# Patient Record
Sex: MALE | Race: BLACK OR AFRICAN AMERICAN | Employment: UNEMPLOYED | URBAN - METROPOLITAN AREA
[De-identification: names, ages, dates, MRNs, and addresses within clinical notes are randomized per-mention and may not be internally consistent; named-entity substitution may affect disease eponyms.]

---

## 2017-08-04 ENCOUNTER — HOSPITAL ENCOUNTER (INPATIENT)
Age: 52
LOS: 14 days | Discharge: HOME | DRG: 460 | End: 2017-08-18
Attending: FAMILY MEDICINE | Admitting: HOSPITALIST
Payer: MEDICAID

## 2017-08-04 ENCOUNTER — APPOINTMENT (OUTPATIENT)
Dept: CT IMAGING | Age: 52
DRG: 460 | End: 2017-08-04
Attending: HOSPITALIST
Payer: MEDICAID

## 2017-08-04 ENCOUNTER — APPOINTMENT (OUTPATIENT)
Dept: GENERAL RADIOLOGY | Age: 52
DRG: 460 | End: 2017-08-04
Attending: FAMILY MEDICINE
Payer: MEDICAID

## 2017-08-04 ENCOUNTER — APPOINTMENT (OUTPATIENT)
Dept: INTERVENTIONAL RADIOLOGY/VASCULAR | Age: 52
DRG: 460 | End: 2017-08-04
Attending: HOSPITALIST
Payer: MEDICAID

## 2017-08-04 ENCOUNTER — APPOINTMENT (OUTPATIENT)
Dept: ULTRASOUND IMAGING | Age: 52
DRG: 460 | End: 2017-08-04
Attending: INTERNAL MEDICINE
Payer: MEDICAID

## 2017-08-04 DIAGNOSIS — N17.9 ACUTE RENAL FAILURE, UNSPECIFIED ACUTE RENAL FAILURE TYPE (HCC): Primary | ICD-10-CM

## 2017-08-04 DIAGNOSIS — N19: ICD-10-CM

## 2017-08-04 DIAGNOSIS — D63.8 CHRONIC DISEASE ANEMIA: ICD-10-CM

## 2017-08-04 DIAGNOSIS — R63.4 WEIGHT LOSS: ICD-10-CM

## 2017-08-04 PROBLEM — D64.9 SEVERE ANEMIA: Status: ACTIVE | Noted: 2017-08-04

## 2017-08-04 PROBLEM — Z91.199 NONADHERENCE TO MEDICAL TREATMENT: Chronic | Status: ACTIVE | Noted: 2017-08-04

## 2017-08-04 PROBLEM — I16.0 HYPERTENSIVE URGENCY, MALIGNANT: Status: ACTIVE | Noted: 2017-08-04

## 2017-08-04 LAB
ALBUMIN SERPL BCP-MCNC: 3.3 G/DL (ref 3.4–5)
ALBUMIN/GLOB SERPL: 0.9 {RATIO} (ref 0.8–1.7)
ALP SERPL-CCNC: 113 U/L (ref 45–117)
ALT SERPL-CCNC: 18 U/L (ref 16–61)
ANION GAP BLD CALC-SCNC: 23 MMOL/L (ref 3–18)
APPEARANCE UR: CLEAR
AST SERPL W P-5'-P-CCNC: 11 U/L (ref 15–37)
BACTERIA URNS QL MICRO: ABNORMAL /HPF
BASOPHILS # BLD AUTO: 0 K/UL (ref 0–0.06)
BASOPHILS # BLD: 0 % (ref 0–2)
BILIRUB SERPL-MCNC: 0.5 MG/DL (ref 0.2–1)
BILIRUB UR QL: NEGATIVE
BUN SERPL-MCNC: 105 MG/DL (ref 7–18)
BUN/CREAT SERPL: 7 (ref 12–20)
CALCIUM SERPL-MCNC: 6 MG/DL (ref 8.5–10.1)
CHLORIDE SERPL-SCNC: 108 MMOL/L (ref 100–108)
CO2 SERPL-SCNC: 13 MMOL/L (ref 21–32)
COLOR UR: YELLOW
CREAT SERPL-MCNC: 15.67 MG/DL (ref 0.6–1.3)
DIFFERENTIAL METHOD BLD: ABNORMAL
EOSINOPHIL # BLD: 0.2 K/UL (ref 0–0.4)
EOSINOPHIL NFR BLD: 2 % (ref 0–5)
EPITH CASTS URNS QL MICRO: ABNORMAL /LPF (ref 0–5)
ERYTHROCYTE [DISTWIDTH] IN BLOOD BY AUTOMATED COUNT: 13.9 % (ref 11.6–14.5)
FERRITIN SERPL-MCNC: 414 NG/ML (ref 8–388)
GLOBULIN SER CALC-MCNC: 3.7 G/DL (ref 2–4)
GLUCOSE SERPL-MCNC: 83 MG/DL (ref 74–99)
GLUCOSE UR STRIP.AUTO-MCNC: NEGATIVE MG/DL
HCT VFR BLD AUTO: 21.1 % (ref 36–48)
HGB BLD-MCNC: 7.1 G/DL (ref 13–16)
HGB UR QL STRIP: ABNORMAL
INR PPP: 1.2 (ref 0.8–1.2)
IRON SATN MFR SERPL: 30 %
IRON SERPL-MCNC: 57 UG/DL (ref 50–175)
KETONES UR QL STRIP.AUTO: NEGATIVE MG/DL
LEUKOCYTE ESTERASE UR QL STRIP.AUTO: NEGATIVE
LIPASE SERPL-CCNC: 339 U/L (ref 73–393)
LYMPHOCYTES # BLD AUTO: 17 % (ref 21–52)
LYMPHOCYTES # BLD: 1.7 K/UL (ref 0.9–3.6)
MCH RBC QN AUTO: 30 PG (ref 24–34)
MCHC RBC AUTO-ENTMCNC: 33.6 G/DL (ref 31–37)
MCV RBC AUTO: 89 FL (ref 74–97)
MONOCYTES # BLD: 0.6 K/UL (ref 0.05–1.2)
MONOCYTES NFR BLD AUTO: 6 % (ref 3–10)
NEUTS SEG # BLD: 7.3 K/UL (ref 1.8–8)
NEUTS SEG NFR BLD AUTO: 75 % (ref 40–73)
NITRITE UR QL STRIP.AUTO: NEGATIVE
PH UR STRIP: 5 [PH] (ref 5–8)
PLATELET # BLD AUTO: 257 K/UL (ref 135–420)
PMV BLD AUTO: 9.5 FL (ref 9.2–11.8)
POTASSIUM SERPL-SCNC: 5.4 MMOL/L (ref 3.5–5.5)
PROT SERPL-MCNC: 7 G/DL (ref 6.4–8.2)
PROT UR STRIP-MCNC: 300 MG/DL
PROTHROMBIN TIME: 14.3 SEC (ref 11.5–15.2)
RBC # BLD AUTO: 2.37 M/UL (ref 4.7–5.5)
RBC #/AREA URNS HPF: ABNORMAL /HPF (ref 0–5)
RBC MORPH BLD: ABNORMAL
SODIUM SERPL-SCNC: 144 MMOL/L (ref 136–145)
SP GR UR REFRACTOMETRY: 1.01 (ref 1–1.03)
TIBC SERPL-MCNC: 190 UG/DL (ref 250–450)
UROBILINOGEN UR QL STRIP.AUTO: 0.2 EU/DL (ref 0.2–1)
WBC # BLD AUTO: 9.8 K/UL (ref 4.6–13.2)
WBC URNS QL MICRO: ABNORMAL /HPF (ref 0–5)

## 2017-08-04 PROCEDURE — 74011000250 HC RX REV CODE- 250: Performed by: HOSPITALIST

## 2017-08-04 PROCEDURE — 74022 RADEX COMPL AQT ABD SERIES: CPT

## 2017-08-04 PROCEDURE — 93005 ELECTROCARDIOGRAM TRACING: CPT

## 2017-08-04 PROCEDURE — C1894 INTRO/SHEATH, NON-LASER: HCPCS

## 2017-08-04 PROCEDURE — 86900 BLOOD TYPING SEROLOGIC ABO: CPT | Performed by: HOSPITALIST

## 2017-08-04 PROCEDURE — 80053 COMPREHEN METABOLIC PANEL: CPT | Performed by: FAMILY MEDICINE

## 2017-08-04 PROCEDURE — 83690 ASSAY OF LIPASE: CPT | Performed by: FAMILY MEDICINE

## 2017-08-04 PROCEDURE — 86038 ANTINUCLEAR ANTIBODIES: CPT | Performed by: INTERNAL MEDICINE

## 2017-08-04 PROCEDURE — 36415 COLL VENOUS BLD VENIPUNCTURE: CPT | Performed by: HOSPITALIST

## 2017-08-04 PROCEDURE — 86160 COMPLEMENT ANTIGEN: CPT | Performed by: INTERNAL MEDICINE

## 2017-08-04 PROCEDURE — 82728 ASSAY OF FERRITIN: CPT | Performed by: INTERNAL MEDICINE

## 2017-08-04 PROCEDURE — 74011250636 HC RX REV CODE- 250/636: Performed by: RADIOLOGY

## 2017-08-04 PROCEDURE — 96361 HYDRATE IV INFUSION ADD-ON: CPT

## 2017-08-04 PROCEDURE — 81001 URINALYSIS AUTO W/SCOPE: CPT | Performed by: FAMILY MEDICINE

## 2017-08-04 PROCEDURE — 76770 US EXAM ABDO BACK WALL COMP: CPT

## 2017-08-04 PROCEDURE — 85610 PROTHROMBIN TIME: CPT | Performed by: HOSPITALIST

## 2017-08-04 PROCEDURE — 86920 COMPATIBILITY TEST SPIN: CPT | Performed by: HOSPITALIST

## 2017-08-04 PROCEDURE — 74011250636 HC RX REV CODE- 250/636: Performed by: HOSPITALIST

## 2017-08-04 PROCEDURE — 99284 EMERGENCY DEPT VISIT MOD MDM: CPT

## 2017-08-04 PROCEDURE — 74011000250 HC RX REV CODE- 250: Performed by: RADIOLOGY

## 2017-08-04 PROCEDURE — 85025 COMPLETE CBC W/AUTO DIFF WBC: CPT | Performed by: FAMILY MEDICINE

## 2017-08-04 PROCEDURE — 74011250636 HC RX REV CODE- 250/636: Performed by: FAMILY MEDICINE

## 2017-08-04 PROCEDURE — 86256 FLUORESCENT ANTIBODY TITER: CPT | Performed by: INTERNAL MEDICINE

## 2017-08-04 PROCEDURE — 74011250637 HC RX REV CODE- 250/637: Performed by: HOSPITALIST

## 2017-08-04 PROCEDURE — 83540 ASSAY OF IRON: CPT | Performed by: INTERNAL MEDICINE

## 2017-08-04 PROCEDURE — 96374 THER/PROPH/DIAG INJ IV PUSH: CPT

## 2017-08-04 PROCEDURE — 65610000006 HC RM INTENSIVE CARE

## 2017-08-04 PROCEDURE — 70490 CT SOFT TISSUE NECK W/O DYE: CPT

## 2017-08-04 PROCEDURE — 74011000250 HC RX REV CODE- 250: Performed by: FAMILY MEDICINE

## 2017-08-04 RX ORDER — ACETAMINOPHEN 325 MG/1
650 TABLET ORAL
Status: DISCONTINUED | OUTPATIENT
Start: 2017-08-04 | End: 2017-08-18 | Stop reason: HOSPADM

## 2017-08-04 RX ORDER — ONDANSETRON 2 MG/ML
4 INJECTION INTRAMUSCULAR; INTRAVENOUS
Status: DISCONTINUED | OUTPATIENT
Start: 2017-08-04 | End: 2017-08-18 | Stop reason: HOSPADM

## 2017-08-04 RX ORDER — HEPARIN SODIUM (PORCINE) LOCK FLUSH IV SOLN 100 UNIT/ML 100 UNIT/ML
500 SOLUTION INTRAVENOUS
Status: COMPLETED | OUTPATIENT
Start: 2017-08-04 | End: 2017-08-04

## 2017-08-04 RX ORDER — SODIUM CHLORIDE 9 MG/ML
250 INJECTION, SOLUTION INTRAVENOUS AS NEEDED
Status: DISCONTINUED | OUTPATIENT
Start: 2017-08-04 | End: 2017-08-18 | Stop reason: HOSPADM

## 2017-08-04 RX ORDER — HYDRALAZINE HYDROCHLORIDE 20 MG/ML
20 INJECTION INTRAMUSCULAR; INTRAVENOUS
Status: DISCONTINUED | OUTPATIENT
Start: 2017-08-04 | End: 2017-08-18 | Stop reason: HOSPADM

## 2017-08-04 RX ORDER — LISINOPRIL AND HYDROCHLOROTHIAZIDE 20; 25 MG/1; MG/1
1 TABLET ORAL DAILY
COMMUNITY
End: 2017-08-18

## 2017-08-04 RX ORDER — SODIUM CHLORIDE 9 MG/ML
250 INJECTION, SOLUTION INTRAVENOUS ONCE
Status: COMPLETED | OUTPATIENT
Start: 2017-08-04 | End: 2017-08-05

## 2017-08-04 RX ORDER — AMLODIPINE BESYLATE 5 MG/1
10 TABLET ORAL DAILY
Status: DISCONTINUED | OUTPATIENT
Start: 2017-08-04 | End: 2017-08-08

## 2017-08-04 RX ORDER — HEPARIN SODIUM 5000 [USP'U]/ML
5000 INJECTION, SOLUTION INTRAVENOUS; SUBCUTANEOUS EVERY 8 HOURS
Status: DISCONTINUED | OUTPATIENT
Start: 2017-08-04 | End: 2017-08-18 | Stop reason: HOSPADM

## 2017-08-04 RX ORDER — LABETALOL HYDROCHLORIDE 5 MG/ML
20 INJECTION, SOLUTION INTRAVENOUS
Status: DISCONTINUED | OUTPATIENT
Start: 2017-08-04 | End: 2017-08-05

## 2017-08-04 RX ORDER — LABETALOL HCL 20 MG/4 ML
20 SYRINGE (ML) INTRAVENOUS
Status: DISCONTINUED | OUTPATIENT
Start: 2017-08-04 | End: 2017-08-04 | Stop reason: RX

## 2017-08-04 RX ORDER — LABETALOL HYDROCHLORIDE 5 MG/ML
20 INJECTION, SOLUTION INTRAVENOUS
Status: COMPLETED | OUTPATIENT
Start: 2017-08-04 | End: 2017-08-04

## 2017-08-04 RX ORDER — LIDOCAINE HYDROCHLORIDE 10 MG/ML
1-20 INJECTION INFILTRATION; PERINEURAL
Status: COMPLETED | OUTPATIENT
Start: 2017-08-04 | End: 2017-08-04

## 2017-08-04 RX ORDER — ONDANSETRON 2 MG/ML
4 INJECTION INTRAMUSCULAR; INTRAVENOUS ONCE
Status: COMPLETED | OUTPATIENT
Start: 2017-08-04 | End: 2017-08-04

## 2017-08-04 RX ORDER — HEPARIN SODIUM 200 [USP'U]/100ML
500 INJECTION, SOLUTION INTRAVENOUS
Status: COMPLETED | OUTPATIENT
Start: 2017-08-04 | End: 2017-08-05

## 2017-08-04 RX ORDER — HEPARIN SODIUM 1000 [USP'U]/ML
10000 INJECTION, SOLUTION INTRAVENOUS; SUBCUTANEOUS
Status: COMPLETED | OUTPATIENT
Start: 2017-08-04 | End: 2017-08-04

## 2017-08-04 RX ADMIN — HYDRALAZINE HYDROCHLORIDE 20 MG: 20 INJECTION INTRAMUSCULAR; INTRAVENOUS at 20:32

## 2017-08-04 RX ADMIN — MICROFIBRILLAR COLLAGEN HEMOSTAT POWDER: POWDER at 17:59

## 2017-08-04 RX ADMIN — SODIUM CHLORIDE 250 ML: 900 INJECTION, SOLUTION INTRAVENOUS at 18:15

## 2017-08-04 RX ADMIN — HYDRALAZINE HYDROCHLORIDE 20 MG: 20 INJECTION INTRAMUSCULAR; INTRAVENOUS at 17:27

## 2017-08-04 RX ADMIN — HEPARIN SODIUM 4000 UNITS: 1000 INJECTION, SOLUTION INTRAVENOUS; SUBCUTANEOUS at 15:47

## 2017-08-04 RX ADMIN — SODIUM CHLORIDE 1000 ML: 900 INJECTION, SOLUTION INTRAVENOUS at 13:07

## 2017-08-04 RX ADMIN — AMLODIPINE BESYLATE 10 MG: 5 TABLET ORAL at 20:05

## 2017-08-04 RX ADMIN — HEPARIN SODIUM 500 UNITS: 200 INJECTION, SOLUTION INTRAVENOUS at 15:35

## 2017-08-04 RX ADMIN — LIDOCAINE HYDROCHLORIDE 3 ML: 10 INJECTION, SOLUTION INFILTRATION; PERINEURAL at 15:42

## 2017-08-04 RX ADMIN — HEPARIN SODIUM (PORCINE) LOCK FLUSH IV SOLN 100 UNIT/ML 200 UNITS: 100 SOLUTION at 15:47

## 2017-08-04 RX ADMIN — LABETALOL HYDROCHLORIDE 20 MG: 5 INJECTION INTRAVENOUS at 14:45

## 2017-08-04 RX ADMIN — ONDANSETRON 4 MG: 2 INJECTION INTRAMUSCULAR; INTRAVENOUS at 13:07

## 2017-08-04 NOTE — IP AVS SNAPSHOT
Summary of Care Report The Summary of Care report has been created to help improve care coordination. Users with access to [employer] or [facility] (Web-based application) may access additional patient information including the Discharge Summary. If you are not currently a [address] user and need more information, please call the number listed below in the [place] [address] section and ask to be connected with Medical Records. Facility Information Name Address Phone [address] [place] [id number] [phone] Patient Information Patient Name Sex  Bertha Gonzáles ([id number]) Male 1965 Discharge Information Admitting Provider Service Area Unit Mercedes Inman MD / [phone] 8 [address] Surg/Onco / [phone] Discharge Provider Discharge Date/Time Discharge Disposition Destination Mercedes Inman MD / [phone] 2017 Afternoon (Pending) AHR (none) Patient Language Language ENGLISH [13] Hospital Problems as of 2017  Reviewed: 2017 10:15 AM by Nathan Santana MD  
  
  
  
 Class Noted - Resolved Last Modified POA Active Problems * (Principal)PAM (acute kidney injury) ([facility] [address]  2017 - Present 2017 by Nathan Santana MD Yes Entered by Christine Rabago MD  
  Overview Signed 2017 11:47 PM by Nathan Santana MD  
   Although clinical history is lacking, this is likely acute on chronic kidney disease Hypertensive urgency, malignant  2017 - Present 2017 by Nathan Santana MD Yes Entered by Mercedes Inman MD  
  Uremia  2017 - Present 2017 by Nathan Santana MD Yes Entered by Mercedes Inman MD  
  Severe anemia  2017 - Present 2017 by Nathan Santana MD Yes   Entered by Mercedes Inman MD  
 Nonadherence to medical treatment (Chronic)  8/4/2017 - Present 8/4/2017 by Inez Treviño MD Yes Entered by Inez Treviño MD  
  Rash of genital area  8/5/2017 - Present 8/8/2017 by Annabella Lorenzo MD Yes Entered by Inez Treviño MD  
  Overview Addendum 8/5/2017 12:25 AM by Inez Treviño MD  
   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 Doubt erythema migrans. Pleural effusion, right  8/6/2017 - Present 8/6/2017 by Inez Treviño MD No  
  Entered by Inez Treviño MD  
  Severe protein-calorie malnutrition ([address]  8/8/2017 - Present 8/8/2017 by Annabella Lorenzo MD Yes Entered by Annabella Lorenzo MD  
  
Non-Hospital Problems as of 8/18/2017  Reviewed: 8/6/2017 10:15 AM by Inez Treviño MD  
  
  
  
 Class Noted - Resolved Last Modified Active Problems Routine general medical examination at a health care facility  11/12/2013 - Present 11/12/2013 by Leonie Miner Entered by Leonie Miner Unspecified essential hypertension  11/12/2013 - Present 8/4/2017 by Inez Treviño MD  
  Entered by Leonie Miner Tobacco abuse  11/22/2013 - Present 8/4/2017 by Inez Treviño MD  
  Entered by Raven Lozano MD  
  Overview Signed 8/4/2017 11:47 PM by Inez Treviño MD  
   Should get PFTs as an outpatient You are allergic to the following No active allergies Current Discharge Medication List  
  
START taking these medications Dose & Instructions Dispensing Information Comments  
 amLODIPine 5 mg tablet Commonly known as:  Haig [place] Dose:  5 mg Take 1 Tab by mouth daily. Quantity:  30 Tab Refills:  1  
   
 aspirin 81 mg chewable tablet Dose:  81 mg Take 1 Tab by mouth daily. Quantity:  30 Tab Refills:  1  
   
 calcium carbonate 500 mg calcium (1,250 mg) tablet Commonly known as:  OS-NATALIE Dose:  1 Tab Take 1 Tab by mouth three (3) times daily (with meals). Quantity:  90 Tab Refills:  0  
   
 carvedilol 25 mg tablet Commonly known as:  Shahana July Dose:  25 mg Take 1 Tab by mouth two (2) times daily (with meals). Indications: hypertension Quantity:  60 Tab Refills:  1  
   
 cloNIDine 0.2 mg/24 hr patch Commonly known as:  CATAPRES Dose:  1 Patch 1 Patch by TransDERmal route every seven (7) days. Quantity:  4 Patch Refills:  1  
   
 clotrimazole-betamethasone topical cream  
Commonly known as:  Valla [place] Apply to groin BID Quantity:  60 g Refills:  0  
   
 simvastatin 20 mg tablet Commonly known as:  ZOCOR Dose:  20 mg Take 1 Tab by mouth nightly. Quantity:  30 Tab Refills:  1  
   
 vit B Cmplx 3-FA-Vit C-Biotin 1- mg-mg-mcg tablet Commonly known as:  NEPHRO FLETCHER RX Dose:  1 Tab Take 1 Tab by mouth daily. Quantity:  30 Tab Refills:  1 STOP taking these medications Comments GOODY'S EXTRA STRENGTH 250-250-65 mg per tablet Generic drug:  aspirin-acetaminophen-caffeine  
   
   
 lisinopril-hydroCHLOROthiazide 20-25 mg per tablet Commonly known as:  Ambrocio Morton Follow-up Information Follow up With Details Comments Contact Info [facility]  Patient will see physician while on dialysis. Chair day and time Kwymjv-Askixliif-Pduscl @ 6:30 a.m. Please arrive by 6:15 a.m. on the first day. [address], [address] 
[phone] Arlis Parkinson  Patient was given [facility] information and schedule. [phone] Discharge Instructions None Chart Review Routing History No Routing History on File

## 2017-08-04 NOTE — ED PROVIDER NOTES
[address]  EMERGENCY DEPARTMENT HISTORY AND PHYSICAL EXAM       Date: 8/4/2017   Patient Name: Anam Chatterjee   YOB: 1965  Medical Record Number: [id number]    History of Presenting Illness     Chief Complaint   Patient presents with    Shortness of Breath    Vomiting        History Provided By:  patient    Additional History:   12:23 PM  Anam Chatterjee is a 49 y.o. male with presenting to the ED C/O intermittent N/V, onset 1 month ago. Associated sxs include SOB, weight loss, and BLE weakness. Pt denies fever, diarrhea, hematochezia, melena, abdominal pain, any other pain, tobacco/EtOH/illicit drug use and any other symptoms or complaints. Primary Care Provider: None   Specialist:    Past History     Past Medical History:   Past Medical History:   Diagnosis Date    Hypertension     Non compliance w medication regimen     noncompliant with HTN meds        Past Surgical History:   History reviewed. No pertinent surgical history. Family History:   Family History   Problem Relation Age of Onset    Cancer Mother         Social History:   Social History   Substance Use Topics    Smoking status: Current Every Day Smoker     Packs/day: 0.50     Years: 7.00     Types: Cigarettes    Smokeless tobacco: Never Used    Alcohol use Yes      Comment: every day beer 20ounces        Allergies:   No Known Allergies     Review of Systems   Review of Systems   Constitutional: Positive for unexpected weight change (loss). Negative for fever. HENT: Positive for congestion. Respiratory: Positive for shortness of breath. Gastrointestinal: Positive for nausea and vomiting. Negative for abdominal pain, anal bleeding, blood in stool and diarrhea. Musculoskeletal: Negative for arthralgias and myalgias. Neurological: Positive for weakness (BLE). All other systems reviewed and are negative.       Physical Exam  Vitals:    08/04/17 1145 08/04/17 1400 08/04/17 1415 08/04/17 1551   BP: (!) 213/115 (!) 245/101 (!) 238/109    Pulse: 91 (!) 109 (!) 102    Resp: 20 29 21 18   Temp: 98.3 °F (36.8 °C)      SpO2: 100% 95% 97%    Weight: 54.4 kg (120 lb)      Height: 5' 5\" (1.651 m)          Physical Exam   Nursing note and vitals reviewed. Vital signs and nursing notes reviewed    CONSTITUTIONAL: Alert; thin middle-aged male, doesnt appear to feel well  HEAD:  Normocephalic, atraumatic  EYES: PERRL; EOM's intact. ENTM: Nose: no rhinorrhea; Throat: no erythema or exudate, mucous membranes moist  Neck:  No JVD, supple without lymphadenopathy  RESP: Chest clear, equal breath sounds. CV: S1 and S2 WNL; No murmurs, gallops or rubs. GI: Normal bowel sounds, abdomen soft and non-tender. No masses or organomegaly. : No costo-vertebral angle tenderness. RECTAL: Brown stool, heme negative. BACK:  Non-tender  UPPER EXT:  Normal inspection  LOWER EXT: No edema, no calf tenderness. Distal pulses intact. NEURO: CN intact, reflexes 2/4 and sym, strength 5/5 and sym, sensation intact. SKIN: No rashes; Normal for age and stage. PSYCH:  Alert and oriented, normal affect. Diagnostic Study Results     Labs -      Recent Results (from the past 12 hour(s))   CBC WITH AUTOMATED DIFF    Collection Time: 08/04/17 12:40 PM   Result Value Ref Range    WBC 9.8 4.6 - 13.2 K/uL    RBC 2.37 (L) 4.70 - 5.50 M/uL    HGB 7.1 (L) 13.0 - 16.0 g/dL    HCT 21.1 (L) 36.0 - 48.0 %    MCV 89.0 74.0 - 97.0 FL    MCH 30.0 24.0 - 34.0 PG    MCHC 33.6 31.0 - 37.0 g/dL    RDW 13.9 11.6 - 14.5 %    PLATELET 409 577 - 863 K/uL    MPV 9.5 9.2 - 11.8 FL    NEUTROPHILS 75 (H) 40 - 73 %    LYMPHOCYTES 17 (L) 21 - 52 %    MONOCYTES 6 3 - 10 %    EOSINOPHILS 2 0 - 5 %    BASOPHILS 0 0 - 2 %    ABS. NEUTROPHILS 7.3 1.8 - 8.0 K/UL    ABS. LYMPHOCYTES 1.7 0.9 - 3.6 K/UL    ABS. MONOCYTES 0.6 0.05 - 1.2 K/UL    ABS. EOSINOPHILS 0.2 0.0 - 0.4 K/UL    ABS.  BASOPHILS 0.0 0.0 - 0.06 K/UL    RBC COMMENTS MICROCYTOSIS  1+        RBC COMMENTS SPHEROCYTES  1+        RBC COMMENTS POIKILOCYTOSIS  1+        RBC COMMENTS OVALOCYTES  FEW  SCHISTOCYTES  FEW        DF AUTOMATED     METABOLIC PANEL, COMPREHENSIVE    Collection Time: 08/04/17 12:40 PM   Result Value Ref Range    Sodium 144 136 - 145 mmol/L    Potassium 5.4 3.5 - 5.5 mmol/L    Chloride 108 100 - 108 mmol/L    CO2 13 (L) 21 - 32 mmol/L    Anion gap 23 (H) 3.0 - 18 mmol/L    Glucose 83 74 - 99 mg/dL     (H) 7.0 - 18 MG/DL    Creatinine 15.67 (H) 0.6 - 1.3 MG/DL    BUN/Creatinine ratio 7 (L) 12 - 20      GFR est AA 4 (L) >60 ml/min/1.73m2    GFR est non-AA 3 (L) >60 ml/min/1.73m2    Calcium 6.0 (L) 8.5 - 10.1 MG/DL    Bilirubin, total 0.5 0.2 - 1.0 MG/DL    ALT (SGPT) 18 16 - 61 U/L    AST (SGOT) 11 (L) 15 - 37 U/L    Alk.  phosphatase 113 45 - 117 U/L    Protein, total 7.0 6.4 - 8.2 g/dL    Albumin 3.3 (L) 3.4 - 5.0 g/dL    Globulin 3.7 2.0 - 4.0 g/dL    A-G Ratio 0.9 0.8 - 1.7     LIPASE    Collection Time: 08/04/17 12:40 PM   Result Value Ref Range    Lipase 339 73 - 393 U/L   URINALYSIS W/ RFLX MICROSCOPIC    Collection Time: 08/04/17 12:40 PM   Result Value Ref Range    Color YELLOW      Appearance CLEAR      Specific gravity 1.011 1.005 - 1.030      pH (UA) 5.0 5.0 - 8.0      Protein 300 (A) NEG mg/dL    Glucose NEGATIVE  NEG mg/dL    Ketone NEGATIVE  NEG mg/dL    Bilirubin NEGATIVE  NEG      Blood SMALL (A) NEG      Urobilinogen 0.2 0.2 - 1.0 EU/dL    Nitrites NEGATIVE  NEG      Leukocyte Esterase NEGATIVE  NEG     URINE MICROSCOPIC ONLY    Collection Time: 08/04/17 12:40 PM   Result Value Ref Range    WBC 0 to 3 0 - 5 /hpf    RBC 0 to 3 0 - 5 /hpf    Epithelial cells FEW 0 - 5 /lpf    Bacteria FEW (A) NEG /hpf     RADIOLOGY FINDINGS  Abdomen with chest X-ray shows no acute process  Pending review by Radiologist  Recorded by Quan Ballard, ED Scribe, as dictated by Ely Cruz MD    Radiologic Studies -    XR ABD ACUTE W 1 V CHEST   Final Result  IMPRESSION:   Single nonspecific mildly dilated loop of small bowel in the upper abdomen with questionable wall thickening. Otherwise, the small bowel and colon are nondilated. As read by the radiologist.       Musa Sullivan    (Results Pending)        Medical Decision Making   I am the first provider for this patient. I reviewed the vital signs, available nursing notes, past medical history, past surgical history, family history and social history. Vital Signs-Reviewed the patient's vital signs. Patient Vitals for the past 12 hrs:   Temp Pulse Resp BP SpO2   08/04/17 1551 - - 18 - -   08/04/17 1415 - (!) 102 21 (!) 238/109 97 %   08/04/17 1400 - (!) 109 29 (!) 245/101 95 %   08/04/17 1145 98.3 °F (36.8 °C) 91 20 (!) 213/115 100 %       INITIAL CLINICAL IMPRESSION and PLANS:  The patient presents with the primary complaint(s) of: abdominal pain. The presentation, to include historical aspects and clinical findings are consistent with the DX of pancreatitis. However, other possible DX's to consider as primary, associated with, or exacerbated by include:    1. Gallstones  2. UTI  3. GERD    Considering the above, my initial management plan to evaluate and therapeutic interventions include the following and as noted in the orders:    1. Labs: CBC, BMP, lipase, UA  2. Imaging: Abd XR    Pulse Oximetry Analysis - Normal 100% on RA. No intervention needed. Cardiac Monitor:   Rate: 108 bpm  Rhythm: Sinus Tachycardia      EKG interpretation: (Preliminary)  6:13 PM  78 bpm, NSR, possible left atrial enlargement, prolonged QT  EKG read by Lillie. Shira Guzman MD     Old Medical Records: Nursing notes. Procedures:    PROCEDURE NOTE - RECTAL EXAM:   1:35 PM  Performed by: Art Elmore MD  Rectal exam performed. Brown stool was collected. Stool was Hemoccult tested, and found to be heme Negative. The procedure took 1-15 minutes, and pt tolerated well.     Medications Given in the ED:  Medications labetalol (NORMODYNE;TRANDATE) injection 20 mg (not administered)   ondansetron (ZOFRAN) injection 4 mg (not administered)   acetaminophen (TYLENOL) tablet 650 mg (not administered)   pantoprazole (PROTONIX) 40 mg in sodium chloride 0.9 % 10 mL injection (not administered)   heparin (porcine) injection 5,000 Units (not administered)   hydrALAZINE (APRESOLINE) 20 mg/mL injection 20 mg (not administered)   0.9% sodium chloride infusion 250 mL (not administered)   sodium chloride 0.9 % bolus infusion 1,000 mL (0 mL IntraVENous IV Completed 8/4/17 1430)   ondansetron (ZOFRAN) injection 4 mg (4 mg IntraVENous Given 8/4/17 1307)   labetalol (NORMODYNE;TRANDATE) injection 20 mg (20 mg IntraVENous Given 8/4/17 1445)   lidocaine (XYLOCAINE) 10 mg/mL (1 %) injection 1-20 mL (3 mL SubCUTAneous Given by Provider 8/4/17 2802)   heparinized saline 2 units/mL infusion 1,000 Units (500 Units Irrigation New Bag 8/4/17 1535)   heparin (porcine) 100 unit/mL injection 500 Units (200 Units InterCATHeter Given by Provider 8/4/17 1548)   heparin (porcine) 1,000 unit/mL injection 10,000 Units (4,000 Units Hemodialysis Given by Provider 8/4/17 154)      ED Course:     12:23 PM Initial assessment performed. 1:49 PM Discussed patient's history, exam, and available diagnostics results with Roz Alvarez MD, hospitalist, who agree with admitting the patient to ICU. Requests that we consult nephrology. 1:52 PM Pt is tachypneic and has kussmaul breathing. Admission Note: 1:50 PM  Patient is being admitted to the hospital by Roz Alvarez MD. The results of their tests and reasons for their admission have been discussed with them and/or available family. They convey agreement and understanding for the need to be admitted and for their admission diagnosis. 2:01 PM Discussed patient's history, exam, and available diagnostics results with Dr. Graciela Saleh, nephrology, will consult on patient.     2:15 PM Roz Alvarez MD and  Bertha Grewal, nephrologist in ED to see patient. 2:34 PM Dr. Bertha Grewal confimed that patient will have emergent dialysis      CONDITIONS ON ADMISSION:  Deep Vein Thrombosis is not present at the time of admission. Thrombosis is not present at the time of admission. Urinary Tract Infection is not present at the time of admission. Pneumonia is not present at the time of admission. MRSA is not present at the time of admission. Wound infection is not present at the time of admission. Pressure Ulcer is not present at the time of admission. Diagnosis   Clinical Impression:   1. Acute renal failure, unspecified acute renal failure type ([facility] [address]    2. Chronic disease anemia    3. Weight loss    4. Uremic syndrome       Critical care time:  2:18 PM  I have spent 30 minutes of critical care time involved in lab review, consultations with specialist, family decision-making, and documentation. During this entire length of time I was immediately available to the patient. Critical Care: The reason for providing this level of medical care for this critically ill patient was due a critical illness that impaired one or more vital organ systems such that there was a high probability of imminent or life threatening deterioration in the patients condition. This care involved high complexity decision making to assess, manipulate, and support vital system functions, to treat this degreee vital organ system failure and to prevent further life threatening deterioration of the patients condition. Discussion  Pt presented with anorexia, nausea, weight loss for last several weeks. He has no known medical issues. On arrival systolic blood pressure was in 220-230's. Had some tachypnea. AAS was negative, creat was 15 CO2 was 13, potassium was 5. Hes going to be admitted to ICU for emergent dialysis. Nephrology has been involved. Notes he had anemia is likely acute on chronic disease.  He is heme negative    _______________________________   Attestations:     SCRIBE ATTESTATION:  This note is prepared by [employer] and Taylor Bishop, acting as Scribe for Tessa Rodriguez MD.    PROVIDER ATTESTATION:  Tessa Rodriguez MD: The scribe's documentation has been prepared under my direction and personally reviewed by me in its entirety.  I confirm that the note above accurately reflects all work, treatment, procedures, and medical decision making performed by me.   _______________________________

## 2017-08-04 NOTE — ED NOTES
Dialysis catheter site continues to bleed through dressings. MD paged for orders. Patient also endorses discomfort with swallowing at this time.

## 2017-08-04 NOTE — PROGRESS NOTES
Admission Medication Reconciliation has been performed on this ED patient consisting of interview of the patient regarding their PTA Home Medication List, Allergies and PMH as well as obtaining outpatient pharmacy information. Interviewed patient significant other who was not a good historian. Patient did not provide a written list of home medications. Patient's outpatient pharmacy is [facility]  Smoking status is daily ~ 5 cigs  Alcohol use monthly socially  Illicit drug use marijuana two weeks ago  Patient ABX use within the past 30 days = none  Has patient received any antineoplastics in the past 30 days? na  Has patient received any radiation treatments in the past 45 days? na      Medication Reconciliation Interventions:   Wrong Medication Identified 0  Wrong/missing medication strength or dose identified  0  Wrong/missing Interval Identified 0  Wrong/missing Route Identified 0  Medication Duplication 0  Omissions 1  Commissions 0  Other Issue(s) Identified (Indicate): 0            Medication Compliance Issues and/or Medication Concerns:   at first she reported he takes labetolol and amlodipine. And that ran out of refills 2 months ago on the amlodipine. [facility] reports zestoretic (no labetolol) and amlodipine last filled for 30ds last August ( a year ago). Significant other states she must be the one taking the labetolol and to go by what Allen Turner says.     4 [facility]. Pharmacist  [phone]

## 2017-08-04 NOTE — CONSULTS
Nephrology Consult    Patient: Meg Stallings  Requesting physician: Dr. Joe Blevins  Reason for consult: Renal failure    CC: Weakness and tremors    History of Present Illness:  Meg Stallings is a 49 y.o. AA male with a past medical history significant for HTN, followed at [facility], was on labetalol and norvasc but out of home meds for a while. Pt presented to the hospital with worsening SOB, weakness and tremors. Pt has experienced poor appetite and n/v. No cp, fever or chills. He did take BC powders prn pain. Cr was 1.2 in 11/2013, now elevated to 15.6 today.  and CO2 at 13, K 5.4. Pt however reports having \"normal amount\" of urine output. Nephrology was consulted for further evaluation and management of his renal failure. Past Medical History:  Patient Active Problem List   Diagnosis Code    Cervical strain, acute S16. 1XXA    Routine general medical examination at a health care facility Z00.00    Unspecified essential hypertension I10    Tobacco abuse Z72.0    PAM (acute kidney injury) ([facility] [address] N17.9   HTN dx at least since 2013    Social History:  Social History     Social History    Marital status: SINGLE     Spouse name: N/A    Number of children: N/A    Years of education: N/A     Social History Main Topics    Smoking status: Current Every Day Smoker     Packs/day: 0.50     Years: 7.00     Types: Cigarettes    Smokeless tobacco: Never Used    Alcohol use Yes      Comment: every day beer 20ounces    Drug use: Yes     Special: Marijuana    Sexual activity: Not Asked     Other Topics Concern    None     Social History Narrative       Family History:  Family History   Problem Relation Age of Onset    Cancer Mother      Pt reports no kidney disease in the family.     Allergy:  No Known Allergies     Medication:  Home meds: reviewed    Inpatient meds:  Current Facility-Administered Medications   Medication Dose Route Frequency    labetalol (NORMODYNE;TRANDATE) 20 mg/4 mL (5 mg/mL) injection 20 mg  20 mg IntraVENous NOW     Current Outpatient Prescriptions   Medication Sig    lisinopril-hydrochlorothiazide (PRINZIDE, ZESTORETIC) 20-25 mg per tablet Take 1 Tab by mouth daily.                         Review of Systems:  Gen: no fever or chills, has worsening weakness and poor appetite  Head: no headache or trauma  Eyes: no change in vision  Throat/Neck: no sore throat or dysphagia  CV: no chest pain or palpitation, + GONZALEZ  Pulm: no cough or hemoptysis  GI: + nausea, vomiting but no diarrhea  : no dysuria or hematuria  Skin: no new rash or lesions  Endocrine: no hot or cold intolerance  Psych: no anxiety or depression    Vital signs:   Visit Vitals    BP (!) 213/115 (BP 1 Location: Right arm, BP Patient Position: At rest)    Pulse 91    Temp 98.3 °F (36.8 °C)    Resp 20    Ht 5' 5\" (1.651 m)    Wt 54.4 kg (120 lb)    SpO2 100%    BMI 19.97 kg/m2     No intake or output data in the 24 hours ending 08/04/17 1410    Physical Exam:    General: No acute distress, has wasted appearance   HENT: Atraumatic and normocephalic   Eyes: Normal conjunctiva, EOMI   Neck: Supple with no mass or JVD   Cardiovascular: Normal S1 & S2, no m/r/g   Pulmonary/Chest Wall: Clear to auscultation bilaterally, no w/c   Abdominal: Soft and non-tender, normal active bowel sound   Musculoskeletal: No edema lower ext bilaterraly   Skin: No lesions   Neurological: No focal neurological deficits, no asterixis       Data Review:  BMP:   Lab Results   Component Value Date/Time     08/04/2017 12:40 PM    K 5.4 08/04/2017 12:40 PM     08/04/2017 12:40 PM    CO2 13 (L) 08/04/2017 12:40 PM    AGAP 23 (H) 08/04/2017 12:40 PM    GLU 83 08/04/2017 12:40 PM     (H) 08/04/2017 12:40 PM    CREA 15.67 (H) 08/04/2017 12:40 PM    GFRAA 4 (L) 08/04/2017 12:40 PM    GFRNA 3 (L) 08/04/2017 12:40 PM     CMP:   Lab Results   Component Value Date/Time     08/04/2017 12:40 PM    K 5.4 08/04/2017 12:40 PM    CL 108 08/04/2017 12:40 PM    CO2 13 (L) 08/04/2017 12:40 PM    AGAP 23 (H) 08/04/2017 12:40 PM    GLU 83 08/04/2017 12:40 PM     (H) 08/04/2017 12:40 PM    CREA 15.67 (H) 08/04/2017 12:40 PM    GFRAA 4 (L) 08/04/2017 12:40 PM    GFRNA 3 (L) 08/04/2017 12:40 PM    CA 6.0 (L) 08/04/2017 12:40 PM    ALB 3.3 (L) 08/04/2017 12:40 PM    TP 7.0 08/04/2017 12:40 PM    GLOB 3.7 08/04/2017 12:40 PM    AGRAT 0.9 08/04/2017 12:40 PM    SGOT 11 (L) 08/04/2017 12:40 PM    ALT 18 08/04/2017 12:40 PM     CBC:   Lab Results   Component Value Date/Time    WBC 9.8 08/04/2017 12:40 PM    HGB 7.1 (L) 08/04/2017 12:40 PM    HCT 21.1 (L) 08/04/2017 12:40 PM     08/04/2017 12:40 PM     All Cardiac Markers in the last 24 hours: No results found for: CPK, CKMMB, CKMB, RCK3, CKMBT, CKNDX, CKND1, RICHARD, TROPT, TROIQ, SWAPNIL, TROPT, TNIPOC, BNP, BNPP  Recent Glucose Results:   Lab Results   Component Value Date/Time    GLU 83 08/04/2017 12:40 PM     ABG: No results found for: PH, PHI, PCO2, PCO2I, PO2, PO2I, HCO3, HCO3I, FIO2, FIO2I  COAGS: No results found for: APTT, PTP, INR  Liver Panel:   Lab Results   Component Value Date/Time    ALB 3.3 (L) 08/04/2017 12:40 PM    TP 7.0 08/04/2017 12:40 PM    GLOB 3.7 08/04/2017 12:40 PM    AGRAT 0.9 08/04/2017 12:40 PM    SGOT 11 (L) 08/04/2017 12:40 PM    ALT 18 08/04/2017 12:40 PM     08/04/2017 12:40 PM     Pancreatic Markers:   Lab Results   Component Value Date/Time    AMAE 339 08/04/2017 12:40 PM       CXR:    Single nonspecific mildly dilated loop of small bowel in the upper abdomen with  questionable wall thickening. Otherwise, the small bowel and colon are  nondilated. Kidney ultrasound:    none    Impression:     1. PAM on possible CKD. Don't have any recent baseline lab. Suspect pt has been having progressive CKD due to uncontrolled hypertension. PAM could be due to hypertensive urgency vs progression of his CKD.   Will need a kidney ultrasound to rule out obstruction but this is less likely  2. Hypertensive urgengy  3. Metabolic acidosis  4. Anemia  5. Proteinuria  6. Hematuria    Plan:     Check RADHA, c3, c4, ANCA, hepB, hepC  Kidney ultrasound  Discussed at length with pt and wife, agree to start dialysis  Discussed with ED attending and Dr Tanesha Mtz of the admitting team, will need help from vascular for temp dialysis catheter  Will arrange for first HD today  Recheck a renal panel, cbc, iron panel, ferritin and iPTH in AM  May need another run tomorrow  Avoid IV contrast and NSAIDs for now  BP management    Thanks for inviting us to participate in this patient's medical care.   We'll follow the patient closely with the medical team.    Maria Ines Benson MD  [facility]  [phone]

## 2017-08-04 NOTE — ED TRIAGE NOTES
Patient with complaints of intermittent vomiting, weight loss and shortness of breath for one month. Patient blood pressure 216/110 in triage. Sepsis Screening completed    (  )Patient meets SIRS criteria. ( x )Patient does not meet SIRS criteria.       SIRS Criteria is achieved when two or more of the following are present   Temperature < 96.8°F (36°C) or > 100.9°F (38.3°C)   Heart Rate > 90 beats per minute   Respiratory Rate > 20 breaths per minute   WBC count > 12,000 or <4,000 or > 10% bands

## 2017-08-04 NOTE — IP AVS SNAPSHOT
Ina Potter 
 
 
 [address] [address] 
[phone] Patient: [facility] MRN: [id number] GTL:[id number] Current Discharge Medication List  
  
START taking these medications Dose & Instructions Dispensing Information Comments Morning Noon Evening Bedtime  
 amLODIPine 5 mg tablet Commonly known as:  Nathalie Tam Your last dose was: Your next dose is:    
   
   
 Dose:  5 mg Take 1 Tab by mouth daily. Quantity:  30 Tab Refills:  1  
     
   
   
   
  
 aspirin 81 mg chewable tablet Your last dose was: Your next dose is:    
   
   
 Dose:  81 mg Take 1 Tab by mouth daily. Quantity:  30 Tab Refills:  1  
     
   
   
   
  
 calcium carbonate 500 mg calcium (1,250 mg) tablet Commonly known as:  OS-NATALIE Your last dose was: Your next dose is:    
   
   
 Dose:  1 Tab Take 1 Tab by mouth three (3) times daily (with meals). Quantity:  90 Tab Refills:  0  
     
   
   
   
  
 carvedilol 25 mg tablet Commonly known as:  Angelina Penmarcellot Your last dose was: Your next dose is:    
   
   
 Dose:  25 mg Take 1 Tab by mouth two (2) times daily (with meals). Indications: hypertension Quantity:  60 Tab Refills:  1  
     
   
   
   
  
 cloNIDine 0.2 mg/24 hr patch Commonly known as:  CATAPRES Your last dose was: Your next dose is:    
   
   
 Dose:  1 Patch 1 Patch by TransDERmal route every seven (7) days. Quantity:  4 Patch Refills:  1  
     
   
   
   
  
 clotrimazole-betamethasone topical cream  
Commonly known as:  Margy Ernandez Your last dose was: Your next dose is:    
   
   
 Apply to groin BID Quantity:  60 g Refills:  0  
     
   
   
   
  
 simvastatin 20 mg tablet Commonly known as:  ZOCOR Your last dose was: Your next dose is:    
   
   
 Dose:  20 mg Take 1 Tab by mouth nightly. Quantity:  30 Tab Refills:  1  
     
   
   
   
  
 vit B Cmplx 3-FA-Vit C-Biotin 1- mg-mg-mcg tablet Commonly known as:  NEPHRO FLETCHER RX Your last dose was: Your next dose is:    
   
   
 Dose:  1 Tab Take 1 Tab by mouth daily. Quantity:  30 Tab Refills:  1 STOP taking these medications GOODY'S EXTRA STRENGTH 250-250-65 mg per tablet Generic drug:  aspirin-acetaminophen-caffeine  
   
  
 lisinopril-hydroCHLOROthiazide 20-25 mg per tablet Commonly known as:  Joy Job Where to Get Your Medications Information on where to get these meds will be given to you by the nurse or doctor. ! Ask your nurse or doctor about these medications  
  amLODIPine 5 mg tablet  
 aspirin 81 mg chewable tablet  
 calcium carbonate 500 mg calcium (1,250 mg) tablet  
 carvedilol 25 mg tablet  
 cloNIDine 0.2 mg/24 hr patch  
 clotrimazole-betamethasone topical cream  
 simvastatin 20 mg tablet  
 vit B Cmplx 3-FA-Vit C-Biotin 1- mg-mg-mcg tablet

## 2017-08-04 NOTE — H&P
[address] [place] [address]  ROUTINE HISTORY AND PHYSICAL    Name:  Katie Osgood  MR#:  [id number]  :  1965  Account #:  [de-identified]  Date of Adm:  2017      ADMITTING DIAGNOSES  1. Acute renal failure. 2. Uremia. 3. Hypertensive urgency and malignant hypertension. 4. Tobacco abuse. 5. Severe anemia. HISTORY OF PRESENT ILLNESS: This is a 72-year-old [facility]  American male with a past medical history for hypertension. He is  supposed to be on labetalol and Norvasc, but it sounds as if he has not  taken his medications for some time. He is a poor historian when it  comes to specifying how much time, but according to his wife, he has  been doing poorly for the past 6 weeks. He has lost weight, up to 20  pounds or so, he has had intermittent and persistent nausea with  vomiting. He used to work as a . He had to stop working because  all types of food were making him sick. He has been feeling short of  breath over the past few weeks, which has been worsening. No fevers  or chills. He has had some headaches. The whites of his eyes have  turned yellow per his wife and he was brought in finally to the  emergency room today at the convincing of his wife, where he was  noted to have a creatinine of 15, potassium of 5.4 and dyspnea that is  fairly significant on exertion with evidence for probable congestive  heart failure symptoms and/or volume overload. He does report that he  has been urinating regularly, but we do not have any clear evidence of  that at this point. Nephrology was consulted in the emergency room  and they had recommended that he be dialyzed this evening urgently  and be admitted to the intensive care unit. PAST MEDICAL HISTORY: Notable for hypertension, tobacco use and  there is no history of chronic kidney disease that he is aware of. He  has never been told that. SOCIAL HISTORY: Up until 6 weeks ago, he was drinking at least 3-4  beers a day.  He smokes a pack of cigarettes a day. He does smoke  marijuana on a regular basis. He denies illicit drug use otherwise. FAMILY HISTORY: His mother had cancer. There is no reported  kidney disease. ALLERGIES: HE HAS NO MEDICATION ALLERGIES. MEDICATIONS: He is not on any prescription medications at home  currently. REVIEW OF SYSTEMS  GENERAL: He denies fevers or chills. He has had no upper respiratory  infection symptoms, had no notable headaches. EYES: No change in his vision. CARDIOVASCULAR: No chest pain, palpitations. He has had no leg  swelling. RESPIRATORY: He complains of shortness of breath when he exerts  himself, a nonproductive cough, no hemoptysis. GASTROINTESTINAL: He has nausea and intermittent vomiting. No  diarrhea. No blood in the stool or hematemesis. GENITOURINARY: He denies any dysuria or hematuria. His urine has  looked darker than usual for a week or 2. SKIN: No rashes or lesions. PHYSICAL EXAMINATION  GENERAL: He is awake and alert. He appears as a chronically ill,  black male in no acute distress, but he is dyspneic after exerting  himself to the bathroom. VITAL SIGNS: Temperature is 98.3, pulse 102, blood pressure  238/109, respiratory rate 21, SaO2 is 97% on room air. NECK: No JVD, supple. No lymphadenopathy. HEENT: Oropharynx is dry. No lesions. Sclerae icteric. Conjunctivae  pale. LUNGS: Diminished breath sounds at the bases with rales bilaterally,  no wheezes noted. CARDIAC: Tachycardic, regular rhythm. No murmur, rub or gallop. ABDOMEN: Soft, flat, nontender, nondistended. No  hepatosplenomegaly. LOWER EXTREMITIES: No clubbing, cyanosis or edema. Distal  pulses are palpable. LABORATORY DATA: His labs show a chest x-ray, clear lungs, no  effusions. He has small bowel and colon that looked fairly normal.  Renal ultrasound result is pending. He had a urinalysis that showed no  nitrites or leukocyte esterase.  White blood cell count of 9.8,  hemoglobin and hematocrit of 7.1 and 21.1, platelets are 017. BUN  and creatinine 105 and 15.67. GFR is 4. ALT and AST 18 and 11  respectively. Lipase 339. Potassium 5.4, sodium 144, bicarb is 13. ASSESSMENT  1. Acute renal failure with uremia and hypertensive urgency. 2. Severe anemia, likely related to chronic kidney disease, though he  was Hemoccult negative in the emergency room. PLAN: Admission to the intensive care unit. Consult the intensivist and  Nephrology has been consulted and already worked on this patient. I  have discussed the dialysis catheter with Interventional Radiology and  we are working towards dialyzing him this evening. Labetalol has been  ordered as needed every 4 hours for systolic greater than 532. I  suspect his blood pressure is going to come down quite a bit though,  however, once he is dialyzed, and because of his nausea and  vomiting, we have ordered Protonix intravenously, Zofran as needed  for nausea and vomiting. Heparin for DVT prophylaxis, but hold until  after he is dialyzed. Tylenol as needed for headache. He has multiple  labs that have been ordered by Nephrology to determine the type of  kidney disorder that he has and would need an EKG stat. A renal  ultrasound is pending. He needs to be transfused a unit of blood with  dialysis if possible, and we will follow him closely in the intensive care  unit tonight. He will be on a cardiac monitor there. We are planning  dialysis, blood pressure management and control, transfusion of blood  and aggressive workup to determine the etiology for his renal failure. At this point in time, he is unstable and needs to be admitted to the  intensive care unit.         MD Ivana Fiore / [facility]  D:  08/04/2017   15:26  T:  08/04/2017   16:06  Job #:  [id number]

## 2017-08-04 NOTE — ED NOTES
TRANSFER - OUT REPORT:    Verbal report given to Lauren Mcclure RN on [facility]  being transferred to (ICU) for routine progression of care       Report consisted of patients Situation, Background, Assessment and   Recommendations(SBAR). Information from the following report(s) SBAR, ED Summary, Procedure Summary, MAR, Recent Results and Cardiac Rhythm sinus tach was reviewed with the receiving nurse. Lines:   Peripheral IV 08/04/17 Right Antecubital (Active)   Phlebitis Assessment 0 8/4/2017 12:44 PM   Infiltration Assessment 0 8/4/2017 12:44 PM   Dressing Status Clean, dry, & intact 8/4/2017 12:44 PM   Dressing Type Transparent 8/4/2017 12:44 PM   Hub Color/Line Status Pink;Patent 8/4/2017 12:44 PM   Action Taken Blood drawn 8/4/2017 12:44 PM        Opportunity for questions and clarification was provided.       Patient transported with:   Monitor  O2 @ 2 LPM liters  Registered Nurse

## 2017-08-04 NOTE — Clinical Note
Status[de-identified] Inpatient [101] Type of Bed: Intensive Care [6] Inpatient Hospitalization Certified Necessary for the Following Reasons: 4. Patient requires ICU level of care interventions (further clarification in H&P documentation) Admitting Diagnosis: PAM (acute kidney injury) ([facility] [address] [[id number]] Admitting Physician: Brijesh Villarreal [[id number]] Attending Physician: Brijesh Villarreal [[id number]] Estimated Length of Stay: 2 Midnights Discharge Plan[de-identified] Home with Office Follow-up

## 2017-08-04 NOTE — IP AVS SNAPSHOT
Nelson Johnson 
 
 
 [address] [facility] [address] 
[phone] Patient: [facility] MRN: [id number] MHT:2/44/7143 You are allergic to the following No active allergies Recent Documentation Height Weight BMI Smoking Status 1.651 m 58.2 kg 21.33 kg/m2 Current Every Day Smoker Emergency Contacts Name Discharge Info Relation Home Work Mobile Rahel Cruz  Spouse [3] [phone] About your hospitalization You were admitted on:  August 4, 2017 You last received care in the:  [address] [place] [address] You were discharged on:  August 18, 2017 Unit phone number:  [phone] Why you were hospitalized Your primary diagnosis was:  Tyrone (Acute Kidney Injury) (Hcc) Your diagnoses also included:  Hypertensive Urgency, Malignant, Uremia, Severe Anemia, Nonadherence To Medical Treatment, Rash Of Genital Area, Pleural Effusion, Right, Severe Protein-Calorie Malnutrition (Hcc) Providers Seen During Your Hospitalizations Provider Role Specialty Primary office phone Piter Dhillon MD Attending Provider Emergency Medicine [phone] Trina Pillai MD Attending Provider [facility] [phone] Your Primary Care Physician (PCP) Primary Care Physician Office Phone Office Fax NONE ** None ** ** None ** Follow-up Information Follow up With Details Comments Contact Info [facility]  Patient will see physician while on dialysis. Chair day and time Srkfvl-[id number]-Zxbugl @ 6:30 a.m. Please arrive by 6:15 a.m. on the first day. [address], [address] 
[phone] Alexander Heredia  Patient was given [facility] information and schedule. [phone] Current Discharge Medication List  
  
START taking these medications Dose & Instructions Dispensing Information Comments Morning Noon Evening Bedtime  
 amLODIPine 5 mg tablet Commonly known as:  Nathalie Tam Your last dose was: Your next dose is:    
   
   
 Dose:  5 mg Take 1 Tab by mouth daily. Quantity:  30 Tab Refills:  1  
     
   
   
   
  
 aspirin 81 mg chewable tablet Your last dose was: Your next dose is:    
   
   
 Dose:  81 mg Take 1 Tab by mouth daily. Quantity:  30 Tab Refills:  1  
     
   
   
   
  
 calcium carbonate 500 mg calcium (1,250 mg) tablet Commonly known as:  OS-NATALIE Your last dose was: Your next dose is:    
   
   
 Dose:  1 Tab Take 1 Tab by mouth three (3) times daily (with meals). Quantity:  90 Tab Refills:  0  
     
   
   
   
  
 carvedilol 25 mg tablet Commonly known as:  Navarro Penmarcellot Your last dose was: Your next dose is:    
   
   
 Dose:  25 mg Take 1 Tab by mouth two (2) times daily (with meals). Indications: hypertension Quantity:  60 Tab Refills:  1  
     
   
   
   
  
 cloNIDine 0.2 mg/24 hr patch Commonly known as:  CATAPRES Your last dose was: Your next dose is:    
   
   
 Dose:  1 Patch 1 Patch by TransDERmal route every seven (7) days. Quantity:  4 Patch Refills:  1  
     
   
   
   
  
 clotrimazole-betamethasone topical cream  
Commonly known as:  Margy Ernandez Your last dose was: Your next dose is:    
   
   
 Apply to groin BID Quantity:  60 g Refills:  0  
     
   
   
   
  
 simvastatin 20 mg tablet Commonly known as:  ZOCOR Your last dose was: Your next dose is:    
   
   
 Dose:  20 mg Take 1 Tab by mouth nightly. Quantity:  30 Tab Refills:  1  
     
   
   
   
  
 vit B Cmplx 3-FA-Vit C-Biotin 1- mg-mg-mcg tablet Commonly known as:  NEPHRO FLETCHER RX Your last dose was: Your next dose is:    
   
   
 Dose:  1 Tab Take 1 Tab by mouth daily. Quantity:  30 Tab Refills:  1 STOP taking these medications GOODY'S EXTRA STRENGTH 250-250-65 mg per tablet Generic drug:  aspirin-acetaminophen-caffeine  
   
  
 lisinopril-hydroCHLOROthiazide 20-25 mg per tablet Commonly known as:  Boo Cash Where to Get Your Medications Information on where to get these meds will be given to you by the nurse or doctor. ! Ask your nurse or doctor about these medications  
  amLODIPine 5 mg tablet  
 aspirin 81 mg chewable tablet  
 calcium carbonate 500 mg calcium (1,250 mg) tablet  
 carvedilol 25 mg tablet  
 cloNIDine 0.2 mg/24 hr patch  
 clotrimazole-betamethasone topical cream  
 simvastatin 20 mg tablet  
 vit B Cmplx 3-FA-Vit C-Biotin 1- mg-mg-mcg tablet Discharge Instructions None Discharge Orders None [employer] Announcement We are excited to announce that we are making your provider's discharge notes available to you in [employer]. You will see these notes when they are completed and signed by the physician that discharged you from your recent hospital stay. If you have any questions or concerns about any information you see in [employer], please call the Health Information Department where you were seen or reach out to your Primary Care Provider for more information about your plan of care. Introducing hospitals & HEALTH SERVICES! [employer] introduces [employer] patient portal. Now you can access parts of your medical record, email your doctor's office, and request medication refills online. 1. In your internet browser, go to [email or web address] 2. Click on the First Time User? Click Here link in the Sign In box. You will see the New Member Sign Up page. 3. Enter your [employer] Access Code exactly as it appears below. You will not need to use this code after youve completed the sign-up process. If you do not sign up before the expiration date, you must request a new code. · [email or web address] Access Code: [email or web address] Expires: 11/2/2017  2:58 PM 
 
4. Enter the last four digits of your Social Security Number (xxxx) and Date of Birth (mm/dd/yyyy) as indicated and click Submit. You will be taken to the next sign-up page. 5. Create a [email or web address] ID. This will be your [email or web address] login ID and cannot be changed, so think of one that is secure and easy to remember. 6. Create a [employer] password. You can change your password at any time. 7. Enter your Password Reset Question and Answer. This can be used at a later time if you forget your password. 8. Enter your e-mail address. You will receive e-mail notification when new information is available in [address] 9. Click Sign Up. You can now view and download portions of your medical record. 10. Click the Download Summary menu link to download a portable copy of your medical information. If you have questions, please visit the Frequently Asked Questions section of the [email or web address] website. Remember, [email or web address] is NOT to be used for urgent needs. For medical emergencies, dial 911. Now available from your iPhone and Android! General Information Please provide this summary of care documentation to your next provider. Patient Signature:  ____________________________________________________________ Date:  ____________________________________________________________  
  
Bobbi Brown Provider Signature:  ____________________________________________________________ Date:  ____________________________________________________________

## 2017-08-04 NOTE — ED NOTES
Assumed care of patient. Patient presents complaining of nausea, vomiting, and weight loss. Patient states \"I haven't been eating well for almost three months. \" Patient's wife states \"He vomits almost always after he eats. \" Patient denies any other symptoms. PIV access established with 20g in right AC. Urine sample obtained from patient.

## 2017-08-04 NOTE — ED NOTES
Responded to call bell, wife states the dialysis catheter site right neck/chest wall is bleeding, noted steady oozing from site, tegaderm removed and direct pressure held to site, sutures x 2 intact, bleeding from suture sites as well, no hematoma noted, denies any chest pain, states chronic shortness of breath and no change to that and is at baseline, placed on o2 nc at 4 lpm for o2 sat 88%; primary nurse sanjeev to bedside; 02 sat up to 98%, in nad;   1700-gelfoam applied to site due to continued to ooze at slower steady rate when manual direct pressure released

## 2017-08-05 PROBLEM — R21 RASH OF GENITAL AREA: Status: ACTIVE | Noted: 2017-08-05

## 2017-08-05 LAB
ALBUMIN SERPL BCP-MCNC: 3.1 G/DL (ref 3.4–5)
ANION GAP BLD CALC-SCNC: 21 MMOL/L (ref 3–18)
ATRIAL RATE: 78 BPM
ATRIAL RATE: 94 BPM
BUN SERPL-MCNC: 52 MG/DL (ref 7–18)
BUN/CREAT SERPL: 6 (ref 12–20)
CALCIUM SERPL-MCNC: 7.8 MG/DL (ref 8.5–10.1)
CALCIUM SERPL-MCNC: 7.9 MG/DL (ref 8.5–10.1)
CALCULATED P AXIS, ECG09: 64 DEGREES
CALCULATED P AXIS, ECG09: 67 DEGREES
CALCULATED R AXIS, ECG10: 18 DEGREES
CALCULATED R AXIS, ECG10: 26 DEGREES
CALCULATED T AXIS, ECG11: 65 DEGREES
CALCULATED T AXIS, ECG11: 78 DEGREES
CHLORIDE SERPL-SCNC: 104 MMOL/L (ref 100–108)
CO2 SERPL-SCNC: 19 MMOL/L (ref 21–32)
CREAT SERPL-MCNC: 8.65 MG/DL (ref 0.6–1.3)
DIAGNOSIS, 93000: NORMAL
DIAGNOSIS, 93000: NORMAL
ERYTHROCYTE [DISTWIDTH] IN BLOOD BY AUTOMATED COUNT: 14.2 % (ref 11.6–14.5)
GLUCOSE SERPL-MCNC: 80 MG/DL (ref 74–99)
HCT VFR BLD AUTO: 24.1 % (ref 36–48)
HGB BLD-MCNC: 8.2 G/DL (ref 13–16)
MCH RBC QN AUTO: 29.6 PG (ref 24–34)
MCHC RBC AUTO-ENTMCNC: 34 G/DL (ref 31–37)
MCV RBC AUTO: 87 FL (ref 74–97)
P-R INTERVAL, ECG05: 140 MS
P-R INTERVAL, ECG05: 158 MS
PHOSPHATE SERPL-MCNC: 4.2 MG/DL (ref 2.5–4.9)
PLATELET # BLD AUTO: 247 K/UL (ref 135–420)
PMV BLD AUTO: 10 FL (ref 9.2–11.8)
POTASSIUM SERPL-SCNC: 3.2 MMOL/L (ref 3.5–5.5)
PTH-INTACT SERPL-MCNC: 681.8 PG/ML (ref 14–72)
Q-T INTERVAL, ECG07: 420 MS
Q-T INTERVAL, ECG07: 500 MS
QRS DURATION, ECG06: 92 MS
QRS DURATION, ECG06: 96 MS
QTC CALCULATION (BEZET), ECG08: 525 MS
QTC CALCULATION (BEZET), ECG08: 570 MS
RBC # BLD AUTO: 2.77 M/UL (ref 4.7–5.5)
SODIUM SERPL-SCNC: 144 MMOL/L (ref 136–145)
VENTRICULAR RATE, ECG03: 78 BPM
VENTRICULAR RATE, ECG03: 94 BPM
WBC # BLD AUTO: 12.8 K/UL (ref 4.6–13.2)

## 2017-08-05 PROCEDURE — 86803 HEPATITIS C AB TEST: CPT | Performed by: INTERNAL MEDICINE

## 2017-08-05 PROCEDURE — 87491 CHLMYD TRACH DNA AMP PROBE: CPT | Performed by: INTERNAL MEDICINE

## 2017-08-05 PROCEDURE — 82164 ANGIOTENSIN I ENZYME TEST: CPT | Performed by: INTERNAL MEDICINE

## 2017-08-05 PROCEDURE — 80069 RENAL FUNCTION PANEL: CPT | Performed by: INTERNAL MEDICINE

## 2017-08-05 PROCEDURE — 86706 HEP B SURFACE ANTIBODY: CPT | Performed by: INTERNAL MEDICINE

## 2017-08-05 PROCEDURE — 65610000006 HC RM INTENSIVE CARE

## 2017-08-05 PROCEDURE — 90935 HEMODIALYSIS ONE EVALUATION: CPT

## 2017-08-05 PROCEDURE — 87340 HEPATITIS B SURFACE AG IA: CPT | Performed by: INTERNAL MEDICINE

## 2017-08-05 PROCEDURE — 74011250637 HC RX REV CODE- 250/637: Performed by: INTERNAL MEDICINE

## 2017-08-05 PROCEDURE — 74011250636 HC RX REV CODE- 250/636: Performed by: INTERNAL MEDICINE

## 2017-08-05 PROCEDURE — P9016 RBC LEUKOCYTES REDUCED: HCPCS | Performed by: HOSPITALIST

## 2017-08-05 PROCEDURE — 82088 ASSAY OF ALDOSTERONE: CPT | Performed by: INTERNAL MEDICINE

## 2017-08-05 PROCEDURE — 86618 LYME DISEASE ANTIBODY: CPT | Performed by: INTERNAL MEDICINE

## 2017-08-05 PROCEDURE — 36415 COLL VENOUS BLD VENIPUNCTURE: CPT | Performed by: INTERNAL MEDICINE

## 2017-08-05 PROCEDURE — 30233N1 TRANSFUSION OF NONAUTOLOGOUS RED BLOOD CELLS INTO PERIPHERAL VEIN, PERCUTANEOUS APPROACH: ICD-10-PCS | Performed by: HOSPITALIST

## 2017-08-05 PROCEDURE — 74011000250 HC RX REV CODE- 250: Performed by: HOSPITALIST

## 2017-08-05 PROCEDURE — 74011250636 HC RX REV CODE- 250/636: Performed by: HOSPITALIST

## 2017-08-05 PROCEDURE — 74011250636 HC RX REV CODE- 250/636

## 2017-08-05 PROCEDURE — 85027 COMPLETE CBC AUTOMATED: CPT | Performed by: INTERNAL MEDICINE

## 2017-08-05 PROCEDURE — 5A1D60Z PERFORMANCE OF URINARY FILTRATION, MULTIPLE: ICD-10-PCS | Performed by: INTERNAL MEDICINE

## 2017-08-05 PROCEDURE — 83970 ASSAY OF PARATHORMONE: CPT | Performed by: INTERNAL MEDICINE

## 2017-08-05 PROCEDURE — 74011250637 HC RX REV CODE- 250/637: Performed by: HOSPITALIST

## 2017-08-05 RX ORDER — CLONIDINE 0.2 MG/24H
1 PATCH, EXTENDED RELEASE TRANSDERMAL
Status: DISCONTINUED | OUTPATIENT
Start: 2017-08-05 | End: 2017-08-18 | Stop reason: HOSPADM

## 2017-08-05 RX ORDER — CARVEDILOL 3.12 MG/1
3.12 TABLET ORAL 2 TIMES DAILY WITH MEALS
Status: DISCONTINUED | OUTPATIENT
Start: 2017-08-05 | End: 2017-08-05

## 2017-08-05 RX ORDER — CARVEDILOL 3.12 MG/1
3.12 TABLET ORAL ONCE
Status: COMPLETED | OUTPATIENT
Start: 2017-08-05 | End: 2017-08-05

## 2017-08-05 RX ORDER — CARVEDILOL 3.12 MG/1
6.25 TABLET ORAL 2 TIMES DAILY WITH MEALS
Status: DISCONTINUED | OUTPATIENT
Start: 2017-08-05 | End: 2017-08-08

## 2017-08-05 RX ORDER — DOXYCYCLINE 100 MG/1
100 CAPSULE ORAL EVERY 12 HOURS
Status: DISCONTINUED | OUTPATIENT
Start: 2017-08-05 | End: 2017-08-11

## 2017-08-05 RX ORDER — PANTOPRAZOLE SODIUM 40 MG/1
40 TABLET, DELAYED RELEASE ORAL
Status: DISCONTINUED | OUTPATIENT
Start: 2017-08-05 | End: 2017-08-18 | Stop reason: HOSPADM

## 2017-08-05 RX ORDER — HEPARIN SODIUM 1000 [USP'U]/ML
INJECTION, SOLUTION INTRAVENOUS; SUBCUTANEOUS
Status: COMPLETED
Start: 2017-08-05 | End: 2017-08-05

## 2017-08-05 RX ADMIN — PANTOPRAZOLE SODIUM 40 MG: 40 TABLET, DELAYED RELEASE ORAL at 09:35

## 2017-08-05 RX ADMIN — DOXYCYCLINE 100 MG: 100 CAPSULE ORAL at 09:36

## 2017-08-05 RX ADMIN — HEPARIN SODIUM 5000 UNITS: 5000 INJECTION, SOLUTION INTRAVENOUS; SUBCUTANEOUS at 23:11

## 2017-08-05 RX ADMIN — HEPARIN SODIUM 5000 UNITS: 5000 INJECTION, SOLUTION INTRAVENOUS; SUBCUTANEOUS at 15:58

## 2017-08-05 RX ADMIN — ERYTHROPOIETIN 5000 UNITS: 3000 INJECTION, SOLUTION INTRAVENOUS; SUBCUTANEOUS at 15:19

## 2017-08-05 RX ADMIN — HEPARIN SODIUM 5000 UNITS: 5000 INJECTION, SOLUTION INTRAVENOUS; SUBCUTANEOUS at 06:20

## 2017-08-05 RX ADMIN — CARVEDILOL 3.12 MG: 3.12 TABLET, FILM COATED ORAL at 15:59

## 2017-08-05 RX ADMIN — CARVEDILOL 3.12 MG: 3.12 TABLET, FILM COATED ORAL at 09:35

## 2017-08-05 RX ADMIN — CARVEDILOL 6.25 MG: 3.12 TABLET, FILM COATED ORAL at 18:42

## 2017-08-05 RX ADMIN — LABETALOL HYDROCHLORIDE 20 MG: 5 INJECTION, SOLUTION INTRAVENOUS at 01:19

## 2017-08-05 RX ADMIN — HYDRALAZINE HYDROCHLORIDE 20 MG: 20 INJECTION INTRAMUSCULAR; INTRAVENOUS at 06:18

## 2017-08-05 RX ADMIN — AMLODIPINE BESYLATE 10 MG: 5 TABLET ORAL at 09:35

## 2017-08-05 RX ADMIN — DOXYCYCLINE 100 MG: 100 CAPSULE ORAL at 20:11

## 2017-08-05 RX ADMIN — HEPARIN SODIUM 1300 UNITS: 1000 INJECTION, SOLUTION INTRAVENOUS; SUBCUTANEOUS at 15:15

## 2017-08-05 RX ADMIN — DOXYCYCLINE 100 MG: 100 CAPSULE ORAL at 02:34

## 2017-08-05 RX ADMIN — HYDRALAZINE HYDROCHLORIDE 20 MG: 20 INJECTION INTRAMUSCULAR; INTRAVENOUS at 17:52

## 2017-08-05 NOTE — PROGRESS NOTES
Hospitalist Progress Note    Patient: Vince Dyson MRN: [id number]  CSN: [id number]    YOB: 1965  Age: 49 y.o. Sex: male    DOA: 8/4/2017 LOS:  LOS: 1 day          Chief Complaint:    ARF      Assessment/Plan     ARF  Uremia  Hypertensive urgency  Severe anemia    Dialyzed last night  Bleeding from right neck UDALL stopped  Transfused 1 unit blood, Hgb is up appropriately  Add catapres patch for HYN, dropping it too much makes him very nauseated as he has been hy[ertensive for so long  Dialysis again today'add renal diet  DVT proph-heparin  Daily BMP and CBC    Very ill young man, will likely be facing ESRD and dialysis dependence  BP likely etiology but extensive workup ordered also to clarify by nephrology      Patient Active Problem List   Diagnosis Code    Cervical strain, acute S16. 1XXA    Routine general medical examination at a health care facility Z00.00    Unspecified essential hypertension I10    Tobacco abuse Z72.0    PAM (acute kidney injury) ([facility] [address] N17.9    Hypertensive urgency, malignant I16.0    Uremia N19    Severe anemia D64.9    Nonadherence to medical treatment Z91.19    Rash of genital area R21       Subjective:    Denies SOB, has a mild HA    Review of systems:    Constitutional: denies fevers, chills, myalgias  Respiratory: denies SOB, cough  Cardiovascular: denies chest pain, palpitations  Gastrointestinal: denies nausea, vomiting, diarrhea      Vital signs/Intake and Output:  Visit Vitals    /75    Pulse (!) 101    Temp 99 °F (37.2 °C)    Resp 22    Ht 5' 5\" (1.651 m)    Wt 53.4 kg (117 lb 11.6 oz)    SpO2 100%    BMI 19.59 kg/m2     Current Shift:     Last three shifts:  08/03 1901 - 08/05 0700  In: 310   Out: 1325 [Urine:325]    Exam:    General: Well developed, alert, NAD, OX3  Head/Neck: NCAT, supple, No masses, No lymphadenopathy  CVS:Regular rate and rhythm, no M/R/G, S1/S2 heard, no thrill  Lungs:Clear to auscultation bilaterally, no wheezes, rhonchi, or rales  Abdomen: Soft, Nontender, No distention, Normal Bowel sounds, No hepatomegaly  Extremities: No C/C/E, pulses palpable 2+  Neuro:grossly normal , follows commands  Psych:appropriate                Labs: Results:       Chemistry Recent Labs      08/05/17   0445  08/04/17   1240   GLU  80  83   NA  144  144   K  3.2*  5.4   CL  104  108   CO2  19*  13*   BUN  52*  105*   CREA  8.65*  15.67*   CA  7.9*  6.0*   AGAP  21*  23*   BUCR  6*  7*   AP   --   113   TP   --   7.0   ALB  3.1*  3.3*   GLOB   --   3.7   AGRAT   --   0.9      CBC w/Diff Recent Labs      08/05/17 0445 08/04/17   1240   WBC  12.8  9.8   RBC  2.77*  2.37*   HGB  8.2*  7.1*   HCT  24.1*  21.1*   PLT  247  257   GRANS   --   75*   LYMPH   --   17*   EOS   --   2      Cardiac Enzymes No results for input(s): CPK, CKND1, RICHARD in the last 72 hours. No lab exists for component: CKRMB, TROIP   Coagulation Recent Labs      08/04/17   1240   PTP  14.3   INR  1.2       Lipid Panel Lab Results   Component Value Date/Time    Cholesterol, total 195 11/12/2013 10:53 AM    HDL Cholesterol 42 11/12/2013 10:53 AM    LDL, calculated 121.4 11/12/2013 10:53 AM    VLDL, calculated 31.6 11/12/2013 10:53 AM    Triglyceride 158 11/12/2013 10:53 AM    CHOL/HDL Ratio 4.6 11/12/2013 10:53 AM      BNP No results for input(s): BNPP in the last 72 hours.    Liver Enzymes Recent Labs      08/05/17 0445  08/04/17   1240   TP   --   7.0   ALB  3.1*  3.3*   AP   --   113   SGOT   --   11*      Thyroid Studies No results found for: T4, T3U, TSH, TSHEXT     Procedures/imaging: see electronic medical records for all procedures/Xrays and details which were not copied into this note but were reviewed prior to creation of Bailey Dejesus MD

## 2017-08-05 NOTE — CONSULTS
Pulmonology Consult    Subjective:   Consult Note: 8/4/2017 11:24 PM    This patient has been seen and evaluated at the request of Dr. Pat Meredith. Patient is a 49 y.o. male smoker admitted with accelerated hypertension. He has known that he has hypertension since 2013. He admits that he has been nonadherent to prescribed antihypertensive therapy. He presented today with dyspnea. This apparently is a new symptom. He has experienced chronic muscle cramps (irrespective of whether or not he takes his antihypertensive regimen), about 20 lbs of weight loss over the past 6 weeks or so, and intermittent nausea and vomiting. In the ER, he was found to have SBP > 200. His serum Cr > 15. He was admitted to the ICU in anticipation of urgent hemodialysis. He immigrated from [place] over 20 years ago. He used to work as a . He had to stop working because all types of food were making him sick. 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 He has been feeling short of breath over the past few weeks, which has been worsening. The patient reports (after initial clinical interview) that he removed a tick from his penis approximately 2 weeks ago. Subsequently, he developed a rash that persists. Past Medical History:   Diagnosis Date    Hypertension     Non compliance w medication regimen     noncompliant with HTN meds     History reviewed. No pertinent surgical history.    Family History   Problem Relation Age of Onset    Cancer Mother      Social History   Substance Use Topics    Smoking status: Current Every Day Smoker     Packs/day: 0.50     Years: 7.00     Types: Cigarettes    Smokeless tobacco: Never Used    Alcohol use Yes      Comment: every day beer 20ounces      Current Facility-Administered Medications   Medication Dose Route Frequency Provider Last Rate Last Dose    labetalol (NORMODYNE;TRANDATE) injection 20 mg  20 mg IntraVENous Q4H PRN Srini Pete MD        ondansetron [facility]) injection 4 mg  4 mg IntraVENous Q6H PRN Cassie Whitaker MD        acetaminophen (TYLENOL) tablet 650 mg  650 mg Oral Q6H PRN Cassie Whitaker MD        [START ON 2017] pantoprazole (PROTONIX) 40 mg in sodium chloride 0.9 % 10 mL injection  40 mg IntraVENous DAILY Cassie Whitaker MD        heparin (porcine) injection 5,000 Units  5,000 Units SubCUTAneous Diane Sun MD   Stopped at 17 1727    hydrALAZINE (APRESOLINE) 20 mg/mL injection 20 mg  20 mg IntraVENous Q6H PRN Cassie Whitaker MD   20 mg at 17    0.9% sodium chloride infusion 250 mL  250 mL IntraVENous PRN Cassie Whitaker MD        amLODIPine (NORVASC) tablet 10 mg  10 mg Oral DAILY Cassie Whitaker MD   10 mg at 17        No Known Allergies    Review of Systems:  A comprehensive review of systems was negative except for: Constitutional: positive for fatigue and weight loss  Eyes: positive for icterus  Respiratory: positive for dyspnea on exertion  Gastrointestinal: positive for nausea and vomiting  Genitourinary: positive for frequency  Musculoskeletal: positive for cramps    Objective:     Blood pressure 158/78, pulse (!) 102, temperature 98.8 °F (37.1 °C), resp. rate 18, height 5' 5\" (1.651 m), weight 53.4 kg (117 lb 11.6 oz), SpO2 98 %.  Temp (24hrs), Av.6 °F (37 °C), Min:98.3 °F (36.8 °C), Max:98.8 °F (37.1 °C)      Intake and Output:  Current Shift:    Last 3 Shifts:      Physical Exam:   Visit Vitals    /78    Pulse (!) 102    Temp 98.8 °F (37.1 °C)    Resp 18    Ht 5' 5\" (1.651 m)    Wt 53.4 kg (117 lb 11.6 oz)    SpO2 98%    BMI 19.59 kg/m2     General appearance: alert, cooperative, no distress, appears stated age  Head: Normocephalic, without obvious abnormality, atraumatic  Eyes: positive findings: scleral icterus (mild)  Throat: Lips, mucosa, and tongue normal. Teeth and gums normal  Neck: supple, symmetrical, trachea midline, no adenopathy, thyroid: not enlarged, symmetric, no tenderness/mass/nodules, no carotid bruit and no JVD  Lungs: clear to auscultation bilaterally  Chest wall: no tenderness  Heart: S1, S2 normal, diastolic murmur: mid diastolic 3/6, crescendo, decrescendo at apex  Abdomen: soft, non-tender. Bowel sounds normal. No masses,  no organomegaly  Extremities: extremities normal, atraumatic, no cyanosis or edema  Skin: Skin color, texture, turgor normal. 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 Pedunculated fungating warty growth in the inside of the right thigh. Lymph nodes: Cervical, supraclavicular, and axillary nodes normal.  Neurologic: Alert and oriented X 3, normal strength and tone. Normal symmetric reflexes.  Normal coordination and gait    Additional comments:None    Lab/Data Review:  CMP:   Lab Results   Component Value Date/Time     08/04/2017 12:40 PM    K 5.4 08/04/2017 12:40 PM     08/04/2017 12:40 PM    CO2 13 (L) 08/04/2017 12:40 PM    AGAP 23 (H) 08/04/2017 12:40 PM    GLU 83 08/04/2017 12:40 PM     (H) 08/04/2017 12:40 PM    CREA 15.67 (H) 08/04/2017 12:40 PM    GFRAA 4 (L) 08/04/2017 12:40 PM    GFRNA 3 (L) 08/04/2017 12:40 PM    CA 6.0 (L) 08/04/2017 12:40 PM    ALB 3.3 (L) 08/04/2017 12:40 PM    TP 7.0 08/04/2017 12:40 PM    GLOB 3.7 08/04/2017 12:40 PM    AGRAT 0.9 08/04/2017 12:40 PM    SGOT 11 (L) 08/04/2017 12:40 PM    ALT 18 08/04/2017 12:40 PM     CBC:   Lab Results   Component Value Date/Time    WBC 9.8 08/04/2017 12:40 PM    HGB 7.1 (L) 08/04/2017 12:40 PM    HCT 21.1 (L) 08/04/2017 12:40 PM     08/04/2017 12:40 PM     All Cardiac Markers in the last 24 hours: No results found for: CPK, CKMMB, CKMB, RCK3, CKMBT, CKNDX, CKND1, RICHARD, TROPT, TROIQ, SWAPNIL, TROPT, TNIPOC, BNP, BNPP    Chest X-Ray: Normal.    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 Doubt erythema       migrans. Plan:       Prophylaxis:  Stress Ulcer Protocol Active: Yes  Deep Vein Thrombosis Protocol Active: Yes      Check labs:  BMP   Check plasma renin activity, serum aldosterone concentration, urine for catecholamines. Check serum Lyme titers. Check GC-PCR. Check ACE level. Check cultures:      Check radiology:  ultrasound kidneys    Procedures:  hemodialysis    Therapeutic:  Continue current  antihypertensive agents  Start  antibiotics (doxycycline)     Consult:  Nephrology input appreciated. Activity: Bed Rest    Disposition and Family: Discussed above with patient.     Total time spent with patient: 55 min

## 2017-08-05 NOTE — ROUTINE PROCESS
Bedside and Verbal shift change report given to Taylor Beltran RN (oncoming nurse) by London Becerra RN (offgoing nurse).  Report included the following information SBAR, Kardex, ED Summary, Procedure Summary, Intake/Output, MAR, Recent Results, Med Rec Status and Cardiac Rhythm SR-ST.

## 2017-08-05 NOTE — PROGRESS NOTES
@5452 pt admitted from ED on bed on room air, awake alert and oriented x4. Denies pain at present. Experiencing some SOB head elevated, 2L/nc started . Pt no longer having SOB . Dialysis cath observed in rt neck with pigtail. Pt voids clear yellow urine. HR and BP elevated. Medications administered as prescribed. Assessment done and documented in approriate flow sheets Nursing management continues. @4408 Dr Malorie Goode was called updated on pt informed that Norvasc was administered and SBP still above 200 order to administer Hydralazine as previously ordered ,same done. Observation continues. @6644 Spoke to Dr. Aminah Santana , Pt will get dialysis tonight, dialysis nurse has already been informed and has verbal orders. @9085 Dr. Jose Lewis was called and informed of consult, updated on pt and the fact that vital signs improving an pt will have dialysis tonight. Care continues. @2300 Dr. Jose Lewis at bedside orders carried out as prescribed. @0000 Dialysis commenced care continues. @0119  Pt SBP consistently above 200 , Hydralazine cannot be administered at this time as it will be to soon per orders. Labetalol administered . PT BP immediately dropped into 119/57  And hr dropped into the 50's and 40's , patient became nauseated . Pt remains awake alert and oriented . Pt heart rate and BP recovered in 30 mins . @6426 Dr Malorie Goode was called and updated on patient and the effects of Labetalol on pt vital signs. Labetalol was discontinued as a result. Pt is no longer vomiting and blood pressure and HR improved . Nursing observation continues. @0300 pt asleep easily aroused vital signs continue to fluctuate. Pt continues to deny pain nursing management continues. @0500 no changes care continues. @6641 Bedside and Verbal shift change report given to Yana Saha (oncoming nurse) by Óscar Rogers (offgoing nurse). Report included the following information SBAR, Kardex, Intake/Output, MAR, Recent Results and Med Rec Status.    Alarm parameters reviewed, on and audible Appropriate for patient clinical condition

## 2017-08-05 NOTE — PROGRESS NOTES
Nephrology Progress Note      History of Present Illness:  Dina Limon is a 49 y.o. AA male with a past medical history significant for HTN, followed at [facility], was on labetalol and norvasc but out of home meds for a while. Pt presented to the hospital with worsening SOB, weakness and tremors. Pt has experienced poor appetite and n/v. No cp, fever or chills. He did take BC powders prn pain. Cr was 1.2 in 11/2013, now elevated to 15.6 today.  and CO2 at 13, K 5.4. Pt however reports having \"normal amount\" of urine output. Nephrology was consulted for further evaluation and management of his renal failure. Pt tolerated first HD yesterday. Currently, SOB much better. No CP or LE edema.      Medication:  Inpatient meds:  Current Facility-Administered Medications   Medication Dose Route Frequency    doxycycline (MONODOX) capsule 100 mg  100 mg Oral Q12H    cloNIDine (CATAPRES) 0.2 mg/24 hr patch 1 Patch  1 Patch TransDERmal Q7D    pantoprazole (PROTONIX) tablet 40 mg  40 mg Oral ACB    carvedilol (COREG) tablet 3.125 mg  3.125 mg Oral BID WITH MEALS    ondansetron (ZOFRAN) injection 4 mg  4 mg IntraVENous Q6H PRN    acetaminophen (TYLENOL) tablet 650 mg  650 mg Oral Q6H PRN    heparin (porcine) injection 5,000 Units  5,000 Units SubCUTAneous Q8H    hydrALAZINE (APRESOLINE) 20 mg/mL injection 20 mg  20 mg IntraVENous Q6H PRN    0.9% sodium chloride infusion 250 mL  250 mL IntraVENous PRN    amLODIPine (NORVASC) tablet 10 mg  10 mg Oral DAILY         Physical Examination    Vital signs:   Visit Vitals    /77    Pulse (!) 102    Temp 99.5 °F (37.5 °C)    Resp 17    Ht 5' 5\" (1.651 m)    Wt 53.4 kg (117 lb 11.6 oz)    SpO2 98%    BMI 19.59 kg/m2       Intake/Output Summary (Last 24 hours) at 08/05/17 1118  Last data filed at 08/05/17 0944   Gross per 24 hour   Intake              550 ml   Output             1425 ml   Net             -875 ml       Physical Exam:    General: No acute distress, has wasted appearance   HENT: Atraumatic and normocephalic   Eyes: Normal conjunctiva, EOMI   Neck: Supple with no mass or JVD   Cardiovascular: Normal S1 & S2, no m/r/g   Pulmonary/Chest Wall: Clear to auscultation bilaterally, no w/c   Abdominal: Soft and non-tender, normal active bowel sound   Musculoskeletal: No edema lower ext bilaterraly   Skin: No lesions   Neurological: No focal neurological deficits, no asterixis   HD access: Rt IJ temp line     Data Review:  BMP:   Lab Results   Component Value Date/Time     08/05/2017 04:45 AM    K 3.2 (L) 08/05/2017 04:45 AM     08/05/2017 04:45 AM    CO2 19 (L) 08/05/2017 04:45 AM    AGAP 21 (H) 08/05/2017 04:45 AM    GLU 80 08/05/2017 04:45 AM    BUN 52 (H) 08/05/2017 04:45 AM    CREA 8.65 (H) 08/05/2017 04:45 AM    GFRAA 8 (L) 08/05/2017 04:45 AM    GFRNA 7 (L) 08/05/2017 04:45 AM     CMP:   Lab Results   Component Value Date/Time     08/05/2017 04:45 AM    K 3.2 (L) 08/05/2017 04:45 AM     08/05/2017 04:45 AM    CO2 19 (L) 08/05/2017 04:45 AM    AGAP 21 (H) 08/05/2017 04:45 AM    GLU 80 08/05/2017 04:45 AM    BUN 52 (H) 08/05/2017 04:45 AM    CREA 8.65 (H) 08/05/2017 04:45 AM    GFRAA 8 (L) 08/05/2017 04:45 AM    GFRNA 7 (L) 08/05/2017 04:45 AM    CA 7.9 (L) 08/05/2017 04:45 AM    PHOS 4.2 08/05/2017 04:45 AM    ALB 3.1 (L) 08/05/2017 04:45 AM    TP 7.0 08/04/2017 12:40 PM    GLOB 3.7 08/04/2017 12:40 PM    AGRAT 0.9 08/04/2017 12:40 PM    SGOT 11 (L) 08/04/2017 12:40 PM    ALT 18 08/04/2017 12:40 PM     CBC:   Lab Results   Component Value Date/Time    WBC 12.8 08/05/2017 04:45 AM    HGB 8.2 (L) 08/05/2017 04:45 AM    HCT 24.1 (L) 08/05/2017 04:45 AM     08/05/2017 04:45 AM     All Cardiac Markers in the last 24 hours: No results found for: CPK, CKMMB, CKMB, RCK3, CKMBT, CKNDX, CKND1, RICHARD, TROPT, TROIQ, SWAPNIL, TROPT, TNIPOC, BNP, BNPP  Recent Glucose Results:   Lab Results   Component Value Date/Time    GLU 80 08/05/2017 04:45 AM    GLU 83 08/04/2017 12:40 PM     ABG: No results found for: PH, PHI, PCO2, PCO2I, PO2, PO2I, HCO3, HCO3I, FIO2, FIO2I  COAGS:   Lab Results   Component Value Date/Time    PTP 14.3 08/04/2017 12:40 PM    INR 1.2 08/04/2017 12:40 PM     Liver Panel:   Lab Results   Component Value Date/Time    ALB 3.1 (L) 08/05/2017 04:45 AM    TP 7.0 08/04/2017 12:40 PM    GLOB 3.7 08/04/2017 12:40 PM    AGRAT 0.9 08/04/2017 12:40 PM    SGOT 11 (L) 08/04/2017 12:40 PM    ALT 18 08/04/2017 12:40 PM     08/04/2017 12:40 PM     Pancreatic Markers:   Lab Results   Component Value Date/Time    LPSE 339 08/04/2017 12:40 PM       CXR:    Single nonspecific mildly dilated loop of small bowel in the upper abdomen with  questionable wall thickening. Otherwise, the small bowel and colon are  nondilated. Impression:     - PAM on CKD vs progression of his CKD. Suspect pt has been having progressive CKD due to uncontrolled hypertension. PAM could be due to hypertensive urgenc . Don't have any recent baseline lab. Echogenic kidneys on US. Started on HD on 8/4. - Hypertensive urgency, improving  - Metabolic acidosis, improving   - Anemia, adequate iron.    - Proteinuria  - Hematuria    Plan:   - Second HD today on 4K bath for more clearance  - RADHA, c3, c4, ANCA, hepB, hepC,  iron panel, ferritin and iPTH pending   - Avoid IV contrast and NSAIDs for now  - Continue current BP management  - EPO TIW  - Sec HTN workup in progress       MD Jesse Banuelos  [phone]

## 2017-08-05 NOTE — DIALYSIS
ACUTE HEMODIALYSIS FLOW SHEET    HEMODIALYSIS ORDERS: Physician: MONICA Capellan     Dialyzer:  Revaclear        Duration: 2 hr  BFR: 300   DFR: 600   Dialysate:  Temp 36.5 K+   2    Ca+  3 Na 140 Bicarb 30   Weight:  53.4 kg    Bed Scale []     Unable to Obtain []      Dry weight/UF Goal: 1 KG Access R. IJ  Needle Gauge N/A    Heparin []  Bolus      Units    [] Hourly       Units    [x]None     Catheter locking solution Heparijn   Pre BP:   176/84    Pulse:     000     Temperature:   98.9  Respirations: 19  Tx: NS       ml/Bolus  Other        [x] N/A   Labs: Pre  : Hep B ag/ Ab   Additional Orders(medications, blood products, hypotension management): 1 U PRBC     [x] Time Out/Safety Check  [x] [facility] Consent Verified     CATHETER ACCESS: []N/A   [x]Right   []Left   [x]IJ     []Fem   [] First use X-ray verified     []Tunnel                [x] Non Tunneled   [x]No S/S infection  []Redness  []Drainage []Cultured []Swelling []Pain   [x]Medical Aseptic Prep Utilized   []Dressing Changed  [] Biopatch  Date:       []Clotted   [x]Patent   Flows: [x]Good  []Poor  []Reversed   If access problem,  notified: []Yes    []N/A  Date:           GRAFT/FISTULA ACCESS:  [x]N/A     []Right     []Left     []UE     []LE   []AVG   []AVF        []Buttonhole    []Medical Aseptic Prep Utilized   []No S/S infection  []Redness  []Drainage []Cultured []Swelling []Pain    Bruit:   [] Strong    [] Weak       Thrill :   [] Strong    [] Weak       Needle Gauge:    Length:     If access problem,  notified: []Yes     [x]N/A  Date:        Please describe access if present and not used:N/A       GENERAL ASSESSMENT:    LUNGS:  Rate 19 SaO2%   100  [] N/A    [x] Clear  [] Coarse  [] Crackles  [] Wheezing        [] Diminished     Location : []RLL   []LLL    []RUL  []KATRIN   Cough: []Productive  [x]Dry  []N/A   Respirations:  []Easy  [x]Labored   Therapy:  []RA  [x]NC 2 l/min    Mask: []NRB []Venti       O2%                  []Ventilator []Intubated  [] Trach  [] BiPaP   CARDIAC: [x]Regular      [] Irregular   [] Pericardial Rub  [] JVD        []  Monitored  [x] Bedside  [] Remotely monitored [] N/A  Rhythm: nsr-TACHYCARDIA    EDEMA: [x] None noted at this time  []Generalized  [] Pitting [] 1    [] 2    [] 3    [] 4                 [] Facial  [] Pedal  []  UE  [] LE   SKIN:   [x] Warm  [] Hot     [] Cold   [x] Dry     [] Pale   [] Diaphoretic                  [] Flushed  [] Jaundiced  [] Cyanotic  [] Rash  [] Weeping   LOC:    [x] Alert      [x]Oriented:    [x] Person     [x] Place  [x]Time               [] Confused  [] Lethargic  [] Medicated  [] Non-responsive     GI / ABDOMEN:  [x] Flat    [] Distended    [] Soft    [] Firm   []  Obese                             [] Diarrhea  [x] Bowel Sounds-LBM 8/4/17  [] Nausea  [] Vomiting       / URINE ASSESSMENT:[x] Voiding, uses a urinal   [] Oliguria  [] Anuria   []  Neal     [] Incontinent    []  Incontinent Brief      []  Bathroom Privileges     PAIN: [x] 0 []1  []2   []3   []4   []5   []6   []7   []8   []9   []10            Scale 0-10  Action/Follow Up: N/A   MOBILITY:  [x] Amb    [] Amb/Assist    [x] Bed    [] Wheelchair  [] Stretcher      All Vitals and Treatment Details on Attached [address]: [facility]        Room # 101-icu     [] 1st Time Acute  [x] Stat  [] Routine  [] Urgent     [] Acute Room  []  Bedside  [x] ICU/CCU  [] ER   Isolation Precautions:  [x] Dialysis   [] Airborne   [] Contact    [] Reverse   Special Considerations:         [x] Blood Consent Verified []N/A     ALLERGIES:   [x] NKA          Code Status:  [x] Full Code  [] DNR  [] Other           HBsAg ONLY: Date Drawn 8/4/17         []Negative []Positive [x]Unknown   HBsAb: Date 8/4/17    [] Susceptible   [] [id number] []Not Drawn  [x] Drawn     Current Labs:    Date of Labs: Today [x]     Results for CHRISTOPHER, ALMA ST (MRN [id number]) as of 8/5/2017 00:28   Ref.  Range 8/4/2017 12:40   WBC Latest Ref Range: 4.6 - 13.2 K/uL 9.8   RBC Latest Ref Range: 4.70 - 5.50 M/uL 2.37 (L)   HGB Latest Ref Range: 13.0 - 16.0 g/dL 7.1 (L)   HCT Latest Ref Range: 36.0 - 48.0 % 21.1 (L)   MCV Latest Ref Range: 74.0 - 97.0 FL 89.0   MCH Latest Ref Range: 24.0 - 34.0 PG 30.0   MCHC Latest Ref Range: 31.0 - 37.0 g/dL 33.6   RDW Latest Ref Range: 11.6 - 14.5 % 13.9   PLATELET Latest Ref Range: 135 - 420 K/uL 257   MPV Latest Ref Range: 9.2 - 11.8 FL 9.5   NEUTROPHILS Latest Ref Range: 40 - 73 % 75 (H)   LYMPHOCYTES Latest Ref Range: 21 - 52 % 17 (L)   MONOCYTES Latest Ref Range: 3 - 10 % 6   EOSINOPHILS Latest Ref Range: 0 - 5 % 2   BASOPHILS Latest Ref Range: 0 - 2 % 0   DF Latest Units:   AUTOMATED   ABS. NEUTROPHILS Latest Ref Range: 1.8 - 8.0 K/UL 7.3   ABS. LYMPHOCYTES Latest Ref Range: 0.9 - 3.6 K/UL 1.7   ABS. MONOCYTES Latest Ref Range: 0.05 - 1.2 K/UL 0.6   ABS. EOSINOPHILS Latest Ref Range: 0.0 - 0.4 K/UL 0.2   ABS. BASOPHILS Latest Ref Range: 0.0 - 0.06 K/UL 0.0   RBC COMMENTS Latest Units:   OVALOCYTES. ..    Color Latest Units:   YELLOW   Appearance Latest Units:   CLEAR   Specific gravity Latest Ref Range: 1.005 - 1.030   1.011   pH (UA) Latest Ref Range: 5.0 - 8.0   5.0   Protein Latest Ref Range: NEG mg/dL 300 (A)   Glucose Latest Ref Range: NEG mg/dL NEGATIVE   Ketone Latest Ref Range: NEG mg/dL NEGATIVE   Blood Latest Ref Range: NEG   SMALL (A)   Bilirubin Latest Ref Range: NEG   NEGATIVE   Urobilinogen Latest Ref Range: 0.2 - 1.0 EU/dL 0.2   Nitrites Latest Ref Range: NEG   NEGATIVE   Leukocyte Esterase Latest Ref Range: NEG   NEGATIVE   Epithelial cells Latest Ref Range: 0 - 5 /lpf FEW   WBC Latest Ref Range: 0 - 5 /hpf 0 to 3   RBC Latest Ref Range: 0 - 5 /hpf 0 to 3   Bacteria Latest Ref Range: NEG /hpf FEW (A)   INR Latest Ref Range: 0.8 - 1.2   1.2   Prothrombin time Latest Ref Range: 11.5 - 15.2 sec 14.3   Sodium Latest Ref Range: 136 - 145 mmol/L 144   Potassium Latest Ref Range: 3.5 - 5.5 mmol/L 5.4   Chloride Latest Ref Range: 100 - 108 mmol/L 108   CO2 Latest Ref Range: 21 - 32 mmol/L 13 (L)   Anion gap Latest Ref Range: 3.0 - 18 mmol/L 23 (H)   Glucose Latest Ref Range: 74 - 99 mg/dL 83   BUN Latest Ref Range: 7.0 - 18 MG/ (H)   Creatinine Latest Ref Range: 0.6 - 1.3 MG/DL 15.67 (H)   BUN/Creatinine ratio Latest Ref Range: 12 - 20   7 (L)   Calcium Latest Ref Range: 8.5 - 10.1 MG/DL 6.0 (L)   GFR est non-AA Latest Ref Range: >60 ml/min/1.73m2 3 (L)   GFR est AA Latest Ref Range: >60 ml/min/1.73m2 4 (L)   Bilirubin, total Latest Ref Range: 0.2 - 1.0 MG/DL 0.5   Protein, total Latest Ref Range: 6.4 - 8.2 g/dL 7.0   Albumin Latest Ref Range: 3.4 - 5.0 g/dL 3.3 (L)   Globulin Latest Ref Range: 2.0 - 4.0 g/dL 3.7   A-G Ratio Latest Ref Range: 0.8 - 1.7   0.9   ALT (SGPT) Latest Ref Range: 16 - 61 U/L 18   AST Latest Ref Range: 15 - 37 U/L 11 (L)   Alk.  phosphatase Latest Ref Range: 45 - 117 U/L 113   Lipase Latest Ref Range: 73 - 393 U/L 339                                                                                                                                   DIET:  [x] Renal    [] Other     [] NPO     []  Diabetic      PRIMARY NURSE REPORT: First initial/Last name/Title      Pre Dialysis: Jefry Sorto RN    Time: 8/4/17 2869      EDUCATION:    [x] Patient [] Other         Knowledge Basis: []None [x]Minimal [] Substantial   Barriers to learning  [x]N/A   [x] Access Care     [x] S&S of infection     [] Fluid Management     []K+     []Procedural    []Albumin     [] Medications     [] Tx Options     [] Transplant     [] Diet     [x] Other-Acute vs chronic kidney failure   Teaching Tools:  [x] Explain  [] Demo  [] Handouts [] Video  Patient response:   [x] Verbalized understanding  [] Teach back  [] Return demonstration [] Requires follow up: pt does not feel well and this is a new diagnoses   Inappropriate due to            [address] Before each treatment:     Machine Number: [facility] [place]                                                             [] Portable Machine #1/RO serial #                                   [x] Portable Machine #2/RO serial # [id number]                                                                                                           Alarm Test:  Pass time 1507        Other:         [x] RO/Machine Log Complete      Temp    36.5            [x]Extracorporeal Circuit Tested for integrity   Dialysate: pH  7.2 Conductivity: Meter   14     HD Machine   14.1                  TCD: 13.9  Dialyzer Lot # [id number]            Blood Tubing Lot # [id number]          Saline Lot #  [id number]     CHLORINE TESTING-Before each treatment and every 4 hours    Total Chlorine: [x] less than 0.1 ppm  Time: 7587 4 Hr/2nd Check Time: n/a   (if greater than 0.1 ppm from Primary then every 30 minutes from Secondary)     TREATMENT INITIATION  with Dialysis Precautions:   [x] All Connections Secured                 [x] Saline Line Double Clamped   [x] Venous Parameters Set                  [x] Arterial Parameters Set    [x] Prime Given  250 ml               [x]Air Foam Detector Engaged      Treatment Initiation Note: tx initiated without difficulty, pt is a/o x3. CVC is an IJ placed in R. Neck today pt denies pain and/or discomfort . No drainage noted from site. Nurse will monitor pt throughout tx. Medication Dose Volume Route Initials Dialyzer Cleared: [x] Good [] Fair  [] Poor    Blood processed:  34.6 L  UF Removed  1000 Ml    Post Wt: 52.4    kg  Post BP:   172/84       Pulse: 96      Respirations: 17  Temperature: 98.4     PRBC 1 Unit 310 ml Dialysis ST, RN Post Tx Vascular Access: AVF/AVG: Bleeding stopped  N/A                                   Catheter: Locking solution: Heparin 1:1000 Art. 1.3  Junito.  1.3                                   Post Assessment:                                    Skin:  [x] Warm  [x] Dry [] Diaphoretic    [] Flushed  [] Pale [] Cyanotic, C/O rash in perineal area, physician aware   [address]  Time Lungs: [x] Clear    [] Course  [] Crackles  [] Wheezing [] Diminished   Abby Washburn RN ST 2949 Cardiac: [x] Regular   [] Irregular   [] Monitor  [] N/A  Rhythm: NSR       Edema:  [x] None    [] General     [] Facial   [] Pedal    [] UE    [] LE       Pain: [x]0  []1  []2   []3  []4   []5   []6   []7   []8   []9   []10         Post Treatment Note: HD tx complete, patient tolerated procedure well, 1000 ml net UF removed     POST TREATMENT PRIMARY NURSE HANDOFF REPORT:     First initial/Last name/Title         Post Dialysis: Selvin Boucher RN Time:  0200     Abbreviations: AVG-arterial venous graft, AVF-arterial venous fistula, IJ-Internal Jugular, Subcl-Subclavian, Fem-Femoral, Tx-treatment, AP/HR-apical heart rate, DFR-dialysate flow rate, BFR-blood flow rate, AP-arterial pressure, -venous pressure, UF-ultrafiltrate, TMP-transmembrane pressure, Junito-Venous, Art-Arterial, RO-Reverse Osmosis

## 2017-08-05 NOTE — PROGRESS NOTES
Critical Care Daily Progress Note    Subjective:     Admission Date: 8/4/2017     Complaint:  Admitted yesterday with accelerated hypertension. Got HD last night. On amlodipine, Catapres and hydralazine PRN. Patient admits that his nonadherence to prescribed therapy was due to unemployment/financial constraints. No nausea. No vomiting. Still has bouts of dyspnea, which even occur during rest.    Current Facility-Administered Medications   Medication Dose Route Frequency    doxycycline (MONODOX) capsule 100 mg  100 mg Oral Q12H    cloNIDine (CATAPRES) 0.2 mg/24 hr patch 1 Patch  1 Patch TransDERmal Q7D    pantoprazole (PROTONIX) tablet 40 mg  40 mg Oral ACB    ondansetron (ZOFRAN) injection 4 mg  4 mg IntraVENous Q6H PRN    acetaminophen (TYLENOL) tablet 650 mg  650 mg Oral Q6H PRN    heparin (porcine) injection 5,000 Units  5,000 Units SubCUTAneous Q8H    hydrALAZINE (APRESOLINE) 20 mg/mL injection 20 mg  20 mg IntraVENous Q6H PRN    0.9% sodium chloride infusion 250 mL  250 mL IntraVENous PRN    amLODIPine (NORVASC) tablet 10 mg  10 mg Oral DAILY       Review of Systems:  Integument/breast: persistent inguinal/scrotal rash    Objective:     Visit Vitals    /75    Pulse (!) 101    Temp 99 °F (37.2 °C)    Resp 22    Ht 5' 5\" (1.651 m)    Wt 53.4 kg (117 lb 11.6 oz)    SpO2 100%    BMI 19.59 kg/m2       Intake and Output:   08/03 1901 - 08/05 0700  In: 310   Out: 1325 [Urine:325]       Chest tube IN:    Chest tube OUT    NG Tube IN:    NG Tube OUT:    Urine void OUT: Urine Voided: 100 ml (08/05/17 0430)  Urine cath OUT:    IV IN:     TPN IN:    Feeding tube IN:      Physical Exam:   General appearance: alert, well appearing, and in no distress. Head; normocephalic, atraumatic. KENNETH.   ENT- ENT exam normal, no neck nodes or sinus tenderness. Neck exam - supple, no significant adenopathy. Chest: clear to auscultation, no wheezes, rales or rhonchi, symmetric air entry.    CVS exam: regular S1 and S2, tachycardic, 3/6 systolic murmur best heard at the mitral area. Abdominal exam: soft, nontender, nondistended, no masses or organomegaly. Exam of extremities: peripheral pulses normal, no pedal edema, no clubbing or cyanosis  Musculoskeletal exam: no joint tenderness, deformity or swelling. Neurological exam reveals alert, oriented, normal speech, no focal findings or movement disorder noted. Skin exam - persistent well demarcated, lichenified, sclerotic, pruritic eruption involving scrotum. Data Review:     Recent Results (from the past 24 hour(s))   CBC WITH AUTOMATED DIFF    Collection Time: 08/04/17 12:40 PM   Result Value Ref Range    WBC 9.8 4.6 - 13.2 K/uL    RBC 2.37 (L) 4.70 - 5.50 M/uL    HGB 7.1 (L) 13.0 - 16.0 g/dL    HCT 21.1 (L) 36.0 - 48.0 %    MCV 89.0 74.0 - 97.0 FL    MCH 30.0 24.0 - 34.0 PG    MCHC 33.6 31.0 - 37.0 g/dL    RDW 13.9 11.6 - 14.5 %    PLATELET 316 620 - 024 K/uL    MPV 9.5 9.2 - 11.8 FL    NEUTROPHILS 75 (H) 40 - 73 %    LYMPHOCYTES 17 (L) 21 - 52 %    MONOCYTES 6 3 - 10 %    EOSINOPHILS 2 0 - 5 %    BASOPHILS 0 0 - 2 %    ABS. NEUTROPHILS 7.3 1.8 - 8.0 K/UL    ABS. LYMPHOCYTES 1.7 0.9 - 3.6 K/UL    ABS. MONOCYTES 0.6 0.05 - 1.2 K/UL    ABS. EOSINOPHILS 0.2 0.0 - 0.4 K/UL    ABS.  BASOPHILS 0.0 0.0 - 0.06 K/UL    RBC COMMENTS MICROCYTOSIS  1+        RBC COMMENTS SPHEROCYTES  1+        RBC COMMENTS POIKILOCYTOSIS  1+        RBC COMMENTS OVALOCYTES  FEW  SCHISTOCYTES  FEW        DF AUTOMATED     METABOLIC PANEL, COMPREHENSIVE    Collection Time: 08/04/17 12:40 PM   Result Value Ref Range    Sodium 144 136 - 145 mmol/L    Potassium 5.4 3.5 - 5.5 mmol/L    Chloride 108 100 - 108 mmol/L    CO2 13 (L) 21 - 32 mmol/L    Anion gap 23 (H) 3.0 - 18 mmol/L    Glucose 83 74 - 99 mg/dL     (H) 7.0 - 18 MG/DL    Creatinine 15.67 (H) 0.6 - 1.3 MG/DL    BUN/Creatinine ratio 7 (L) 12 - 20      GFR est AA 4 (L) >60 ml/min/1.73m2    GFR est non-AA 3 (L) >60 ml/min/1.73m2 Calcium 6.0 (L) 8.5 - 10.1 MG/DL    Bilirubin, total 0.5 0.2 - 1.0 MG/DL    ALT (SGPT) 18 16 - 61 U/L    AST (SGOT) 11 (L) 15 - 37 U/L    Alk.  phosphatase 113 45 - 117 U/L    Protein, total 7.0 6.4 - 8.2 g/dL    Albumin 3.3 (L) 3.4 - 5.0 g/dL    Globulin 3.7 2.0 - 4.0 g/dL    A-G Ratio 0.9 0.8 - 1.7     LIPASE    Collection Time: 08/04/17 12:40 PM   Result Value Ref Range    Lipase 339 73 - 393 U/L   URINALYSIS W/ RFLX MICROSCOPIC    Collection Time: 08/04/17 12:40 PM   Result Value Ref Range    Color YELLOW      Appearance CLEAR      Specific gravity 1.011 1.005 - 1.030      pH (UA) 5.0 5.0 - 8.0      Protein 300 (A) NEG mg/dL    Glucose NEGATIVE  NEG mg/dL    Ketone NEGATIVE  NEG mg/dL    Bilirubin NEGATIVE  NEG      Blood SMALL (A) NEG      Urobilinogen 0.2 0.2 - 1.0 EU/dL    Nitrites NEGATIVE  NEG      Leukocyte Esterase NEGATIVE  NEG     URINE MICROSCOPIC ONLY    Collection Time: 08/04/17 12:40 PM   Result Value Ref Range    WBC 0 to 3 0 - 5 /hpf    RBC 0 to 3 0 - 5 /hpf    Epithelial cells FEW 0 - 5 /lpf    Bacteria FEW (A) NEG /hpf   PROTHROMBIN TIME + INR    Collection Time: 08/04/17 12:40 PM   Result Value Ref Range    Prothrombin time 14.3 11.5 - 15.2 sec    INR 1.2 0.8 - 1.2     EKG, 12 LEAD, INITIAL    Collection Time: 08/04/17  5:11 PM   Result Value Ref Range    Ventricular Rate 94 BPM    Atrial Rate 94 BPM    P-R Interval 158 ms    QRS Duration 92 ms    Q-T Interval 420 ms    QTC Calculation (Bezet) 525 ms    Calculated P Axis 67 degrees    Calculated R Axis 18 degrees    Calculated T Axis 78 degrees    Diagnosis       Normal sinus rhythm  Possible Left atrial enlargement  Left ventricular hypertrophy  Prolonged QT  Abnormal ECG  No previous ECGs available     IRON PROFILE    Collection Time: 08/04/17  5:15 PM   Result Value Ref Range    Iron 57 50 - 175 ug/dL    TIBC 190 (L) 250 - 450 ug/dL    Iron % saturation 30 %   FERRITIN    Collection Time: 08/04/17  5:15 PM   Result Value Ref Range    Ferritin 414 (H) 8 - 388 NG/ML   EKG, 12 LEAD, INITIAL    Collection Time: 08/04/17  6:13 PM   Result Value Ref Range    Ventricular Rate 78 BPM    Atrial Rate 78 BPM    P-R Interval 140 ms    QRS Duration 96 ms    Q-T Interval 500 ms    QTC Calculation (Bezet) 570 ms    Calculated P Axis 64 degrees    Calculated R Axis 26 degrees    Calculated T Axis 65 degrees    Diagnosis       Normal sinus rhythm  Possible Left atrial enlargement  Left ventricular hypertrophy  Prolonged QT  Abnormal ECG  When compared with ECG of 04-AUG-2017 17:11,  No significant change was found     TYPE & CROSSMATCH    Collection Time: 08/04/17  8:00 PM   Result Value Ref Range    Crossmatch Expiration 08/07/2017     ABO/Rh(D) A POSITIVE     Antibody screen NEG     CALLED TO: 1 [facility] ICU RN AT 1258 ON [id number] BY Derrek Sadler     Unit number [id number]     Blood component type RC LR AS1     Unit division 00     Status of unit ISSUED     Crossmatch result Compatible    RENAL FUNCTION PANEL    Collection Time: 08/05/17  4:45 AM   Result Value Ref Range    Sodium 144 136 - 145 mmol/L    Potassium 3.2 (L) 3.5 - 5.5 mmol/L    Chloride 104 100 - 108 mmol/L    CO2 19 (L) 21 - 32 mmol/L    Anion gap 21 (H) 3.0 - 18 mmol/L    Glucose 80 74 - 99 mg/dL    BUN 52 (H) 7.0 - 18 MG/DL    Creatinine 8.65 (H) 0.6 - 1.3 MG/DL    BUN/Creatinine ratio 6 (L) 12 - 20      GFR est AA 8 (L) >60 ml/min/1.73m2    GFR est non-AA 7 (L) >60 ml/min/1.73m2    Calcium 7.9 (L) 8.5 - 10.1 MG/DL    Phosphorus 4.2 2.5 - 4.9 MG/DL    Albumin 3.1 (L) 3.4 - 5.0 g/dL   CBC W/O DIFF    Collection Time: 08/05/17  4:45 AM   Result Value Ref Range    WBC 12.8 4.6 - 13.2 K/uL    RBC 2.77 (L) 4.70 - 5.50 M/uL    HGB 8.2 (L) 13.0 - 16.0 g/dL    HCT 24.1 (L) 36.0 - 48.0 %    MCV 87.0 74.0 - 97.0 FL    MCH 29.6 24.0 - 34.0 PG    MCHC 34.0 31.0 - 37.0 g/dL    RDW 14.2 11.6 - 14.5 %    PLATELET 426 980 - 859 K/uL    MPV 10.0 9.2 - 11.8 FL       Images:       Hemodynamics:       PAP: Wedge:         CVP:             CO:                CI:                  SVR:             PVR:          Oxygen Therapy:  Oxygen Therapy  O2 Sat (%): 100 % (08/05/17 0800)  O2 Device: Nasal cannula (08/04/17 1948)  O2 Flow Rate (L/min): 2 l/min (08/04/17 1948)    Ventilator:         Assessment:     Principal Problem:    PAM (acute kidney injury) ([facility] [address] (8/4/2017)      Overview: Although clinical history is lacking, this is likely acute on chronic       kidney disease    Active Problems:    Hypertensive urgency, malignant (8/4/2017)      Uremia (8/4/2017)      Severe anemia (8/4/2017)      Nonadherence to medical treatment (8/4/2017)      Rash of genital area (8/5/2017)      Overview: 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 Doubt erythema       migrans. Plan:     Check labs:  ABG    Check cultures:      Check radiology:  chest x-ray    Procedures:      Therapeutic: Start  antihypertensive agents (add carvedilol)    Consult:  None    Activity: Bed Rest    Disposition and Family: Unchanged.     Total time spent with patient:

## 2017-08-05 NOTE — DIALYSIS
ACUTE HEMODIALYSIS FLOW SHEET    HEMODIALYSIS ORDERS: Physician: London Lefort, M     Dialyzer:  Revaclear        Duration: 3 hr  BFR: 350   DFR: 600   Dialysate:  Temp 36.9 K+   4    Ca+  3 Na 140 Bicarb 35   Weight:  53.4 kg    Bed Scale [x]  Dry weight/UF Goal: 1500 ml Access CVC- R. IJ     Heparin   [x]None     Catheter locking solution Heparin   Pre BP:   152/83    Pulse:     88     Temperature:   99.8  Respirations: 16     Labs:[] N/A   Additional Orders(medications, blood products, hypotension management): Epogen     [x] Time Out/Safety Check  [x] [facility] Consent Verified     CATHETER ACCESS:   [x]Right   []Left   [x]IJ     []Fem   [x] First use X-ray verified     []Tunnel                [x] Non Tunneled   [x]No S/S infection  []Redness  []Drainage []Cultured []Swelling []Pain   [x]Medical Aseptic Prep Utilized   [x]Dressing Changed  [x] Biopatch  Date:8/5/17   []Clotted   [x]Patent   Flows: [x]Good  []Poor  []Reversed        GENERAL ASSESSMENT:    LUNGS:  Rate 16 SaO2%   100  [] N/A    [x] Clear     Cough: [x]Productive   Respirations:  [x]Labored at times, pt observed taking deep breaths during conversation. However, he states that his breathing is better than yesterday   Therapy:  []RA  [x]NC 2 l/min      CARDIAC: [x]Regular  [x] Bedside Rhythm: NSR   EDEMA: [x] None seen at this time    SKIN:   [x] Warm    [x] Dry    [x] Rash , pt describes a rash to the r. Upper thigh. Nurse briefly saw this,. It is hyperpigmented with small papules. Pt admits that he excessively scratches this area. In addition, in the center there is a large elevation possible keloid? Physician-hospitalist aware. Pt. Denies drainage from this area. LOC:    [x] Alert      [x]Oriented:    [x] Person     [x] Place  [x]Time   GI / ABDOMEN:  [x] Flat   [x] Soft [x] Bowel Sounds-LBM 8/5/17 early morning described as beth colored and pasty.   [x] Nausea- not at his time but earlier today    / URINE ASSESSMENT:[x] Voiding -uses urinal, straw colored strong odor. Currently under 24 hour collection. PAIN: [x] 0 []1  []2   []3   []4   []5   []6   []7   []8   []9   []10            Scale 0-10  Action/Follow Up: pt denies pain however he states that there is discomfort to the IJ site and when he swallows. Nurse offered pt ice or something to drink but he states that he is fine at this time. MOBILITY:   [x] Amb/Assist    [x] Bed -at this time however, pt able to ambulate      All Vitals and Treatment Details on Attached [address]: THE [facility]         Room # 101-ICU     [] 1st Time Acute  [] Stat  [x] Routine  [] Urgent     [] Acute Room  []  Bedside  [x] ICU/CCU  [] ER   Isolation Precautions:  [x] Dialysis   [] Airborne   [] Contact    [] Reverse   Special Considerations:  Pt is a new start, second dialysis treatment, has not been determined type of kidney failure. ALLERGIES:   [x] NKA          Code Status:  [x] Full Code  [] DNR  [] Other           HBsAg ONLY: Date Drawn 8/4/17         []Negative []Positive [x]Unknown   HBsAb: Date 8/4/17    [] Susceptible   [] [id number] []Not Drawn  [x] Drawn     Current Labs:    Date of Labs: Today [x]       Results for ALAM WHITE (MRN [id number]) as of 8/5/2017 13:13   Ref.  Range 8/5/2017 04:45   WBC Latest Ref Range: 4.6 - 13.2 K/uL 12.8   RBC Latest Ref Range: 4.70 - 5.50 M/uL 2.77 (L)   HGB Latest Ref Range: 13.0 - 16.0 g/dL 8.2 (L)   HCT Latest Ref Range: 36.0 - 48.0 % 24.1 (L)   MCV Latest Ref Range: 74.0 - 97.0 FL 87.0   MCH Latest Ref Range: 24.0 - 34.0 PG 29.6   MCHC Latest Ref Range: 31.0 - 37.0 g/dL 34.0   RDW Latest Ref Range: 11.6 - 14.5 % 14.2   PLATELET Latest Ref Range: 135 - 420 K/uL 247   MPV Latest Ref Range: 9.2 - 11.8 FL 10.0   Sodium Latest Ref Range: 136 - 145 mmol/L 144   Potassium Latest Ref Range: 3.5 - 5.5 mmol/L 3.2 (L)   Chloride Latest Ref Range: 100 - 108 mmol/L 104   CO2 Latest Ref Range: 21 - 32 mmol/L 19 (L)   Anion gap Latest Ref Range: 3.0 - 18 mmol/L 21 (H)   Glucose Latest Ref Range: 74 - 99 mg/dL 80   BUN Latest Ref Range: 7.0 - 18 MG/DL 52 (H)   Creatinine Latest Ref Range: 0.6 - 1.3 MG/DL 8.65 (H)   BUN/Creatinine ratio Latest Ref Range: 12 - 20   6 (L)   Calcium Latest Ref Range: 8.5 - 10.1 MG/DL 7.9 (L)   Phosphorus Latest Ref Range: 2.5 - 4.9 MG/DL 4.2   GFR est non-AA Latest Ref Range: >60 ml/min/1.73m2 7 (L)   GFR est AA Latest Ref Range: >60 ml/min/1.73m2 8 (L)   Albumin Latest Ref Range: 3.4 - 5.0 g/dL 3.1 (L)                                                                                                                                 DIET:  [x] Renal    [] Other     [] NPO     []  Diabetic      PRIMARY NURSE REPORT: First initial/Last name/Title      Pre Dialysis: Farhan Schulte RN    Time: 1200      EDUCATION:    [x] Patient [] Other   Knowledge Basis: []None [x]Minimal [] Substantial   Barriers to learning  [x]N/A   [x] Access Care     [x] S&S of infection     [] Fluid Management     [x]K+   - pt on 4k bath, nurse explained thatb pts curremnt k+ level is 3.5, normal is 3.5-5.5. Pt asked questions in re: dialyzer and hiow acid/ bicarb works, pt verba   [x]Albumin -pt albumin is low, he states he has experienced wt. Loss of 30 lbs over the last couple of months d/t n/v. Pt. States he feels like he can eat today and named good sources of protein. [x] Medications- pt. Will receive first dose of epogen(hormone normally made in kidney) today. Nurse explained purpose was to increase RBCs   [x] Diet- see above in re: albumin      -Teaching Tools:  [x] Explain    -Patient response:   [x] Verbalized understanding[x] Requires follow up: pt. In newly diagnosed w/ kidney failure this is his second treatment. Continued education and care will be needed.         RO/HEMODIALYSIS MACHINE SAFETY CHECKS  Before each treatment:     Machine Number:                   [facility] [place]                                                                [] Portable Machine #1/RO serial #                                   [x] Portable Machine #2/RO serial # [id number]    Alarm Test:  Pass time 8293         Other:         [x] RO/Machine Log Complete      Temp    36.9            [x]Extracorporeal Circuit Tested for integrity   Dialysate: pH  7.0 Conductivity: Meter   14.4     HD Machine   14.4                  TCD: 14.1  Dialyzer Lot # [id number]            Blood Tubing Lot # [id number]          Saline Lot #  [id number]     CHLORINE TESTING-Before each treatment and every 4 hours    Total Chlorine: [x] less than 0.1 ppm  Time: 1240 4 Hr/2nd Check Time: N/A   (if greater than 0.1 ppm from Primary then every 30 minutes from Secondary)     TREATMENT INITIATION  with Dialysis Precautions:   [x] All Connections Secured                 [x] Saline Line Double Clamped   [x] Venous Parameters Set                  [x] Arterial Parameters Set    [x] Prime Given  250 ml               [x]Air Foam Detector Engaged      Treatment Initiation Note: Initiated tx without difficulty. Nurse will cont. To monitor throughout dialysis. Medication Dose Volume Route Initials Dialyzer Cleared: [] Good [x] Fair  [] Poor    Blood processed:  55.3 L  UF Removed  1500 Ml    Post Wt: 51.9(est)  kg  Post BP:   194/88       Pulse: 101      Respirations: 17  Temperature: 99.4     Epogen  5000u 2 ml Dialysis ST, RN Post Tx Vascular Access: N/A                                 Catheter: Locking solution: Heparin 1:1000 Art.  1.3ml  Junito. 1.3ml                                   Post Assessment: No significant changes from previous assessment-ST, RN 1600                                   Skin:  [x] Warm  [x] Dry , no changes from previous assessment   [facility] Signatures Title Initials  Time Lungs: [x] Clear    [] Coarse  [] Crackles  [] Wheezing [] Diminished   Cherilynn Sox RN ST 1600 Cardiac: [x] Regular   [] Irregular   [] Monitor  [] N/A  Rhythm:           Edema:  [x] None    [] General     [] Facial   [] Pedal    [] UE    [] LE       Pain: [x]0  []1  []2   []3  []4   []5   []6   []7   []8   []9   []10         Post Treatment Note: HD tx complete, patient tolerated procedure well, 1500ml net UF removed     POST TREATMENT PRIMARY NURSE HANDOFF REPORT:     First initial/Last name/Title         Post Dialysis: Mariya Mondragon RN Time:  1600     Abbreviations: AVG-arterial venous graft, AVF-arterial venous fistula, IJ-Internal Jugular, Subcl-Subclavian, Fem-Femoral, Tx-treatment, AP/HR-apical heart rate, DFR-dialysate flow rate, BFR-blood flow rate, AP-arterial pressure, -venous pressure, UF-ultrafiltrate, TMP-transmembrane pressure, Junito-Venous, Art-Arterial, RO-Reverse Osmosis

## 2017-08-06 ENCOUNTER — APPOINTMENT (OUTPATIENT)
Dept: GENERAL RADIOLOGY | Age: 52
DRG: 460 | End: 2017-08-06
Attending: INTERNAL MEDICINE
Payer: MEDICAID

## 2017-08-06 PROBLEM — J90 PLEURAL EFFUSION, RIGHT: Status: ACTIVE | Noted: 2017-08-06

## 2017-08-06 LAB
ANION GAP BLD CALC-SCNC: 9 MMOL/L (ref 3–18)
ARTERIAL PATENCY WRIST A: YES
BASE EXCESS BLD CALC-SCNC: 4 MMOL/L
BDY SITE: ABNORMAL
BODY TEMPERATURE: 37.5
BUN SERPL-MCNC: 26 MG/DL (ref 7–18)
BUN/CREAT SERPL: 4 (ref 12–20)
C3 SERPL-MCNC: 101 MG/DL (ref 82–167)
C4 SERPL-MCNC: 44 MG/DL (ref 14–44)
CALCIUM SERPL-MCNC: 7.4 MG/DL (ref 8.5–10.1)
CHLORIDE SERPL-SCNC: 103 MMOL/L (ref 100–108)
CO2 SERPL-SCNC: 28 MMOL/L (ref 21–32)
CREAT SERPL-MCNC: 5.89 MG/DL (ref 0.6–1.3)
ERYTHROCYTE [DISTWIDTH] IN BLOOD BY AUTOMATED COUNT: 14.6 % (ref 11.6–14.5)
GAS FLOW.O2 O2 DELIVERY SYS: ABNORMAL L/MIN
GAS FLOW.O2 SETTING OXYMISER: 2 L/M
GLUCOSE SERPL-MCNC: 99 MG/DL (ref 74–99)
HCO3 BLD-SCNC: 28.3 MMOL/L (ref 22–26)
HCT VFR BLD AUTO: 22.9 % (ref 36–48)
HGB BLD-MCNC: 7.7 G/DL (ref 13–16)
MCH RBC QN AUTO: 29.8 PG (ref 24–34)
MCHC RBC AUTO-ENTMCNC: 33.6 G/DL (ref 31–37)
MCV RBC AUTO: 88.8 FL (ref 74–97)
O2/TOTAL GAS SETTING VFR VENT: 0.29 %
PCO2 BLD: 41.6 MMHG (ref 35–45)
PH BLD: 7.44 [PH] (ref 7.35–7.45)
PLATELET # BLD AUTO: 208 K/UL (ref 135–420)
PMV BLD AUTO: 10.4 FL (ref 9.2–11.8)
PO2 BLD: 142 MMHG (ref 80–100)
POTASSIUM SERPL-SCNC: 4.3 MMOL/L (ref 3.5–5.5)
RBC # BLD AUTO: 2.58 M/UL (ref 4.7–5.5)
SAO2 % BLD: 99 % (ref 92–97)
SERVICE CMNT-IMP: ABNORMAL
SODIUM SERPL-SCNC: 140 MMOL/L (ref 136–145)
SPECIMEN TYPE: ABNORMAL
TOTAL RESP. RATE, ITRR: 18
WBC # BLD AUTO: 11.5 K/UL (ref 4.6–13.2)

## 2017-08-06 PROCEDURE — 71010 XR CHEST PORT: CPT

## 2017-08-06 PROCEDURE — 82384 ASSAY THREE CATECHOLAMINES: CPT | Performed by: HOSPITALIST

## 2017-08-06 PROCEDURE — 80048 BASIC METABOLIC PNL TOTAL CA: CPT | Performed by: HOSPITALIST

## 2017-08-06 PROCEDURE — 85027 COMPLETE CBC AUTOMATED: CPT | Performed by: HOSPITALIST

## 2017-08-06 PROCEDURE — 36600 WITHDRAWAL OF ARTERIAL BLOOD: CPT

## 2017-08-06 PROCEDURE — 93306 TTE W/DOPPLER COMPLETE: CPT

## 2017-08-06 PROCEDURE — 74011250637 HC RX REV CODE- 250/637: Performed by: INTERNAL MEDICINE

## 2017-08-06 PROCEDURE — 74011250637 HC RX REV CODE- 250/637: Performed by: HOSPITALIST

## 2017-08-06 PROCEDURE — 74011250636 HC RX REV CODE- 250/636: Performed by: HOSPITALIST

## 2017-08-06 PROCEDURE — 65660000000 HC RM CCU STEPDOWN

## 2017-08-06 PROCEDURE — 36415 COLL VENOUS BLD VENIPUNCTURE: CPT | Performed by: HOSPITALIST

## 2017-08-06 PROCEDURE — 82803 BLOOD GASES ANY COMBINATION: CPT

## 2017-08-06 RX ORDER — CALCITRIOL 0.25 UG/1
0.25 CAPSULE ORAL DAILY
Status: DISCONTINUED | OUTPATIENT
Start: 2017-08-06 | End: 2017-08-11

## 2017-08-06 RX ORDER — NYSTATIN 100000 U/G
OINTMENT TOPICAL 3 TIMES DAILY
Status: DISCONTINUED | OUTPATIENT
Start: 2017-08-06 | End: 2017-08-15

## 2017-08-06 RX ADMIN — NYSTATIN: 100000 OINTMENT TOPICAL at 21:38

## 2017-08-06 RX ADMIN — CARVEDILOL 6.25 MG: 3.12 TABLET, FILM COATED ORAL at 07:57

## 2017-08-06 RX ADMIN — AMLODIPINE BESYLATE 10 MG: 5 TABLET ORAL at 07:57

## 2017-08-06 RX ADMIN — HEPARIN SODIUM 5000 UNITS: 5000 INJECTION, SOLUTION INTRAVENOUS; SUBCUTANEOUS at 16:07

## 2017-08-06 RX ADMIN — NYSTATIN: 100000 OINTMENT TOPICAL at 13:48

## 2017-08-06 RX ADMIN — DOXYCYCLINE 100 MG: 100 CAPSULE ORAL at 07:57

## 2017-08-06 RX ADMIN — PANTOPRAZOLE SODIUM 40 MG: 40 TABLET, DELAYED RELEASE ORAL at 06:36

## 2017-08-06 RX ADMIN — CALCITRIOL 0.25 MCG: 0.25 CAPSULE, LIQUID FILLED ORAL at 13:48

## 2017-08-06 RX ADMIN — HEPARIN SODIUM 5000 UNITS: 5000 INJECTION, SOLUTION INTRAVENOUS; SUBCUTANEOUS at 06:36

## 2017-08-06 RX ADMIN — CARVEDILOL 6.25 MG: 3.12 TABLET, FILM COATED ORAL at 17:17

## 2017-08-06 RX ADMIN — DOXYCYCLINE 100 MG: 100 CAPSULE ORAL at 21:38

## 2017-08-06 NOTE — PROGRESS NOTES
@2000 pt taken over awake alert oriented x4 in bed on 2L/nc, denies pain vital signs fluctuates. Wife in with patient assisting with personal hygiene. Assessment done and charted in relevant flow sheets . Nursing management continues. @2200 no changes care continues. @0000 pt asleep easily aroused vital signs improved ,nursing management continues. @0200 no changes. @4628 pt care continues no new developments . Nursing observation continues. @0530 no new developments nursing care continues  @9429 Bedside and Verbal shift change report given to Ayan Victor  (oncoming nurse) by Stephania Erwin (offgoing nurse). Report included the following information SBAR, Kardex, Intake/Output, MAR, Recent Results and Med Rec Status.    Alarm parameters reviewed, on and audible Appropriate for patient clinical condition

## 2017-08-06 NOTE — ROUTINE PROCESS
Bedside and Verbal shift change report given to Jessy Alvarez RN (oncoming nurse) by Anabella Kim RN (offgoing nurse).  Report included the following information SBAR, Kardex, Intake/Output, MAR, Recent Results, Med Rec Status and Cardiac Rhythm SR.

## 2017-08-06 NOTE — ROUTINE PROCESS
TRANSFER - IN REPORT:    Verbal report received from [facility] VENANCIO, RN on [facility]  being received from ICU for routine progression of care      Report consisted of patients Situation, Background, Assessment and   Recommendations(SBAR). Information from the following report(s) SBAR, Kardex, Procedure Summary, Intake/Output, MAR, Recent Results, Med Rec Status and Cardiac Rhythm SR was reviewed with the receiving nurse. Opportunity for questions and clarification was provided. Assessment completed upon patients arrival to unit and care assumed.

## 2017-08-06 NOTE — PROGRESS NOTES
Nephrology Progress Note      History of Present Illness:  Dina Limon is a 49 y.o. AA male with a past medical history significant for HTN, followed at [facility] clinic, was on labetalol and norvasc but out of home meds for a while. Pt presented to the hospital with worsening SOB, weakness and tremors. Pt has experienced poor appetite and n/v. No cp, fever or chills. He did take BC powders prn pain. Cr was 1.2 in 11/2013, now elevated to 15.6 today.  and CO2 at 13, K 5.4. Pt however reports having \"normal amount\" of urine output. Nephrology was consulted for further evaluation and management of his renal failure. Pt tolerated second HD yesterday. Currently No SOB, CP or LE edema.      Medication:  Inpatient meds:  Current Facility-Administered Medications   Medication Dose Route Frequency    nystatin (MYCOSTATIN) 100,000 unit/gram ointment   Topical TID    doxycycline (MONODOX) capsule 100 mg  100 mg Oral Q12H    cloNIDine (CATAPRES) 0.2 mg/24 hr patch 1 Patch  1 Patch TransDERmal Q7D    pantoprazole (PROTONIX) tablet 40 mg  40 mg Oral ACB    carvedilol (COREG) tablet 6.25 mg  6.25 mg Oral BID WITH MEALS    ondansetron (ZOFRAN) injection 4 mg  4 mg IntraVENous Q6H PRN    acetaminophen (TYLENOL) tablet 650 mg  650 mg Oral Q6H PRN    heparin (porcine) injection 5,000 Units  5,000 Units SubCUTAneous Q8H    hydrALAZINE (APRESOLINE) 20 mg/mL injection 20 mg  20 mg IntraVENous Q6H PRN    0.9% sodium chloride infusion 250 mL  250 mL IntraVENous PRN    amLODIPine (NORVASC) tablet 10 mg  10 mg Oral DAILY         Physical Examination    Vital signs:   Visit Vitals    /78    Pulse 80    Temp 99.8 °F (37.7 °C)    Resp 18    Ht 5' 5\" (1.651 m)    Wt 51.2 kg (112 lb 14 oz)    SpO2 100%    BMI 18.78 kg/m2       Intake/Output Summary (Last 24 hours) at 08/06/17 0947  Last data filed at 08/05/17 2300   Gross per 24 hour   Intake              240 ml   Output             1675 ml   Net -1435 ml       Physical Exam:    General: No acute distress, has wasted appearance   HENT: Atraumatic and normocephalic   Eyes: Normal conjunctiva, EOMI   Neck: Supple with no mass or JVD   Cardiovascular: Normal S1 & S2, no m/r/g   Pulmonary/Chest Wall: Clear to auscultation bilaterally, no w/c   Abdominal: Soft and non-tender, normal active bowel sound   Musculoskeletal: No edema lower ext bilaterraly   Skin: No lesions   Neurological: No focal neurological deficits, no asterixis   HD access: Rt IJ temp line     Data Review:  BMP:   Lab Results   Component Value Date/Time     08/06/2017 04:51 AM    K 4.3 08/06/2017 04:51 AM     08/06/2017 04:51 AM    CO2 28 08/06/2017 04:51 AM    AGAP 9 08/06/2017 04:51 AM    GLU 99 08/06/2017 04:51 AM    BUN 26 (H) 08/06/2017 04:51 AM    CREA 5.89 (H) 08/06/2017 04:51 AM    GFRAA 12 (L) 08/06/2017 04:51 AM    GFRNA 10 (L) 08/06/2017 04:51 AM     CMP:   Lab Results   Component Value Date/Time     08/06/2017 04:51 AM    K 4.3 08/06/2017 04:51 AM     08/06/2017 04:51 AM    CO2 28 08/06/2017 04:51 AM    AGAP 9 08/06/2017 04:51 AM    GLU 99 08/06/2017 04:51 AM    BUN 26 (H) 08/06/2017 04:51 AM    CREA 5.89 (H) 08/06/2017 04:51 AM    GFRAA 12 (L) 08/06/2017 04:51 AM    GFRNA 10 (L) 08/06/2017 04:51 AM    CA 7.4 (L) 08/06/2017 04:51 AM     CBC:   Lab Results   Component Value Date/Time    WBC 11.5 08/06/2017 04:51 AM    HGB 7.7 (L) 08/06/2017 04:51 AM    HCT 22.9 (L) 08/06/2017 04:51 AM     08/06/2017 04:51 AM     All Cardiac Markers in the last 24 hours: No results found for: CPK, CKMMB, CKMB, RCK3, CKMBT, CKNDX, CKND1, RICHARD, TROPT, TROIQ, SWAPNIL, TROPT, TNIPOC, BNP, BNPP  Recent Glucose Results:   Lab Results   Component Value Date/Time    GLU 99 08/06/2017 04:51 AM     ABG:   Lab Results   Component Value Date/Time    PHI 7.443 08/06/2017 12:35 AM    PCO2I 41.6 08/06/2017 12:35 AM    PO2I 142 (H) 08/06/2017 12:35 AM    HCO3I 28.3 (H) 08/06/2017 12:35 AM FIO2I 0.29 08/06/2017 12:35 AM     COAGS:   No results found for: APTT, PTP, INR  Liver Panel:   No results found for: ALB, CBIL, TBIL, TP, GLOB, AGRAT, SGOT, ASTPOC, ALTPOC, ALT, GPT, AP  Pancreatic Markers:   No results found for: AMYLPOCT, AML, LIPPOCT, LPSE    CXR:    Single nonspecific mildly dilated loop of small bowel in the upper abdomen with  questionable wall thickening. Otherwise, the small bowel and colon are  nondilated. Impression:     - PAM on CKD vs progression of his CKD. Suspect pt has been having progressive CKD due to uncontrolled hypertension. PAM could be due to hypertensive urgenc . Don't have any recent baseline lab. Echogenic kidneys on US. Started on HD on 8/4. - Hypertensive urgency, improving  - Metabolic acidosis, improving   - Anemia, adequate iron.    - Sec HPT  - Proteinuria  - Hematuria    Plan:   - Next HD tomorrow  - Calcitriol daily   - RADHA, c3, c4, ANCA, hepB, hepC,   pending   - Avoid IV contrast and NSAIDs for now  - Continue current BP management  - EPO TIW  - Sec HTN workup in progress   - Echo pending   - Will need tunneled HD cath and permanent HD access eval  - Will arrange for out pt HD chair       Nettie Alexander MD  [address] [facility]

## 2017-08-06 NOTE — PROGRESS NOTES
Critical Care Daily Progress Note    Subjective:     Admission Date: 8/4/2017     Complaint:  Admitted with accelerated hypertension. Had HD again yesterday. On amlodipine, carvedilol Catapres and hydralazine PRN. Patient admits that his nonadherence to prescribed therapy was due to unemployment/financial constraints. No nausea. No vomiting. Has occasional dyspnea. CXR shows a small right pleural effusion.     Current Facility-Administered Medications   Medication Dose Route Frequency    nystatin (MYCOSTATIN) 100,000 unit/gram ointment   Topical TID    calcitRIOL (ROCALTROL) capsule 0.25 mcg  0.25 mcg Oral DAILY    [START ON 8/7/2017] epoetin luann (EPOGEN;PROCRIT) injection 5,000 Units  5,000 Units IntraVENous DIALYSIS MON, WED & FRI    doxycycline (MONODOX) capsule 100 mg  100 mg Oral Q12H    cloNIDine (CATAPRES) 0.2 mg/24 hr patch 1 Patch  1 Patch TransDERmal Q7D    pantoprazole (PROTONIX) tablet 40 mg  40 mg Oral ACB    carvedilol (COREG) tablet 6.25 mg  6.25 mg Oral BID WITH MEALS    ondansetron (ZOFRAN) injection 4 mg  4 mg IntraVENous Q6H PRN    acetaminophen (TYLENOL) tablet 650 mg  650 mg Oral Q6H PRN    heparin (porcine) injection 5,000 Units  5,000 Units SubCUTAneous Q8H    hydrALAZINE (APRESOLINE) 20 mg/mL injection 20 mg  20 mg IntraVENous Q6H PRN    0.9% sodium chloride infusion 250 mL  250 mL IntraVENous PRN    amLODIPine (NORVASC) tablet 10 mg  10 mg Oral DAILY       Review of Systems:  Integument/breast: persistent inguinal/scrotal rash    Objective:     Visit Vitals    /78    Pulse 80    Temp 99.8 °F (37.7 °C)    Resp 18    Ht 5' 5\" (1.651 m)    Wt 51.2 kg (112 lb 14 oz)    SpO2 100%    BMI 18.78 kg/m2       Intake and Output:   08/04 1901 - 08/06 0700  In: 790 [P.O.:480]  Out: 3100 [Urine:600]       Chest tube IN:    Chest tube OUT    NG Tube IN:    NG Tube OUT:    Urine void OUT: Urine Voided: 100 ml (08/05/17 2300)  Urine cath OUT:    IV IN:     TPN IN:    Feeding tube IN:      Physical Exam:   General appearance: alert, well appearing, and in no distress. Head; normocephalic, atraumatic. KENNETH.   ENT- ENT exam normal, no neck nodes or sinus tenderness. Neck exam - supple, no significant adenopathy. Chest: clear to auscultation, no wheezes, rales or rhonchi, symmetric air entry. CVS exam: regular S1 and S2, tachycardic, 2/6 systolic murmur best heard at the mitral area. Abdominal exam: soft, nontender, nondistended, no masses or organomegaly. Exam of extremities: peripheral pulses normal, no pedal edema, no clubbing or cyanosis  Musculoskeletal exam: no joint tenderness, deformity or swelling. Neurological exam reveals alert, oriented, normal speech, no focal findings or movement disorder noted. Skin exam - persistent well demarcated, lichenified, sclerotic, pruritic eruption involving scrotum. Data Review:     Recent Results (from the past 24 hour(s))   POC G3    Collection Time: 08/06/17 12:35 AM   Result Value Ref Range    Device: NASAL CANNULA      Flow rate (POC) 2 L/M    FIO2 (POC) 0.29 %    pH (POC) 7.443 7.35 - 7.45      pCO2 (POC) 41.6 35.0 - 45.0 MMHG    pO2 (POC) 142 (H) 80 - 100 MMHG    HCO3 (POC) 28.3 (H) 22 - 26 MMOL/L    sO2 (POC) 99 (H) 92 - 97 %    Base excess (POC) 4 mmol/L    Allens test (POC) YES      Total resp. rate 18      Site RIGHT RADIAL      Patient temp.  37.5      Specimen type (POC) ARTERIAL      Performed by [facility]    Collection Time: 08/06/17  4:51 AM   Result Value Ref Range    Sodium 140 136 - 145 mmol/L    Potassium 4.3 3.5 - 5.5 mmol/L    Chloride 103 100 - 108 mmol/L    CO2 28 21 - 32 mmol/L    Anion gap 9 3.0 - 18 mmol/L    Glucose 99 74 - 99 mg/dL    BUN 26 (H) 7.0 - 18 MG/DL    Creatinine 5.89 (H) 0.6 - 1.3 MG/DL    BUN/Creatinine ratio 4 (L) 12 - 20      GFR est AA 12 (L) >60 ml/min/1.73m2    GFR est non-AA 10 (L) >60 ml/min/1.73m2    Calcium 7.4 (L) 8.5 - 10.1 MG/DL   CBC W/O DIFF Collection Time: 08/06/17  4:51 AM   Result Value Ref Range    WBC 11.5 4.6 - 13.2 K/uL    RBC 2.58 (L) 4.70 - 5.50 M/uL    HGB 7.7 (L) 13.0 - 16.0 g/dL    HCT 22.9 (L) 36.0 - 48.0 %    MCV 88.8 74.0 - 97.0 FL    MCH 29.8 24.0 - 34.0 PG    MCHC 33.6 31.0 - 37.0 g/dL    RDW 14.6 (H) 11.6 - 14.5 %    PLATELET 456 125 - 343 K/uL    MPV 10.4 9.2 - 11.8 FL       Images:       Hemodynamics:       PAP:             Wedge:         CVP:             CO:                CI:                  SVR:             PVR:          Oxygen Therapy:  Oxygen Therapy  O2 Sat (%): 100 % (08/06/17 0700)  O2 Device: Nasal cannula (08/05/17 2000)  O2 Flow Rate (L/min): 2 l/min (08/05/17 2000)    Ventilator:         Assessment:     Principal Problem:    PAM (acute kidney injury) (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 Doubt erythema       migrans. Plan:     Check labs:      Check cultures:      Check radiology:      Procedures:  2D echo PENDING    Therapeutic: Continue current  antihypertensive agents (amlodipine, carvedilol, Catapres and hydralazine PRN)    Consult:  None    Activity: Bed Rest    Disposition and Family: Unchanged. If BP reasonably controlled today, should be able to transfer to floor in am. Anticipating HD again tomorrow (Monday).     Total time spent with patient:

## 2017-08-06 NOTE — PROGRESS NOTES
Hospitalist Progress Note    Patient: Jena Cabral MRN: [id number]  CSN: [id number]    YOB: 1965  Age: 49 y.o. Sex: male    DOA: 8/4/2017 LOS:  LOS: 2 days          Chief Complaint:  ARF        Assessment/Plan     ARF, now s/p 2 dialysis sessions-CR is improvd as is BP control  HTN [id number] same meds as on for now  Anemia of CKD-stable with Hgb 7.7, received 1 unit blood since admission, on epo with dialysis  Scrotal rash-add nystatin, may be excoriated chronic yeast, no acute lesions seen, multiple tests sent yesterday as he noted a tick on penis recently-pedunculated soft skin polyp right thigh  HTN-unmedicated and noncompliant for a long time  Uremia resolved    Can go to tele floor  PT and OOB  DVT prophylaxis  May need dialysis chair and line for outpt treatment   Appreciate nephro and crit care help with case    Patient Active Problem List   Diagnosis Code    Cervical strain, acute S16. 1XXA    Routine general medical examination at a health care facility Z00.00    Unspecified essential hypertension I10    Tobacco abuse Z72.0    PAM (acute kidney injury) ([facility] [address] N17.9    Hypertensive urgency, malignant I16.0    Uremia N19    Severe anemia D64.9    Nonadherence to medical treatment Z91.19    Rash of genital area R21       Subjective:    Feeling better  Nausea finally gone, has eaten  Denies HA, CP, SOB    Review of systems:    Constitutional: denies fevers, chills, myalgias  Respiratory: denies SOB, cough  Cardiovascular: denies chest pain, palpitations  Gastrointestinal: denies nausea, vomiting, diarrhea      Vital signs/Intake and Output:  Visit Vitals    /78    Pulse 80    Temp 99.8 °F (37.7 °C)    Resp 18    Ht 5' 5\" (1.651 m)    Wt 51.2 kg (112 lb 14 oz)    SpO2 100%    BMI 18.78 kg/m2     Current Shift:     Last three shifts:  08/04 1901 - 08/06 0700  In: 790 [P.O.:480]  Out: 3100 [Urine:600]    Exam:    General: Well developed, alert, NAD, OX3  Head/Neck: NCAT, supple, No masses, No lymphadenopathy  CVS:Regular rate and rhythm, no M/R/G, S1/S2 heard, no thrill  Lungs:Clear to auscultation bilaterally, no wheezes, rhonchi, or rales  Abdomen: Soft, Nontender, No distention, Normal Bowel sounds, No hepatomegaly  Extremities: No C/C/E, pulses palpable 2+  Skin:lichenified scrotal rash, soft tissue polyp right thigh on stalk, approx 1.5 cm in diameter, uncircumcised penis, no lesions or d/c noted  Neuro:grossly normal , follows commands  Psych:appropriate                Labs: Results:       Chemistry Recent Labs      08/06/17   0451 08/05/17   0445  08/05/17   0400  08/04/17   1240   GLU  99  80   --   83   NA  140  144   --   144   K  4.3  3.2*   --   5.4   CL  103  104   --   108   CO2  28  19*   --   13*   BUN  26*  52*   --   105*   CREA  5.89*  8.65*   --   15.67*   CA  7.4*  7.9*  7.8*  6.0*   AGAP  9  21*   --   23*   BUCR  4*  6*   --   7*   AP   --    --    --   113   TP   --    --    --   7.0   ALB   --   3.1*   --   3.3*   GLOB   --    --    --   3.7   AGRAT   --    --    --   0.9      CBC w/Diff Recent Labs      08/06/17 0451 08/05/17 0445 08/04/17   1240   WBC  11.5  12.8  9.8   RBC  2.58*  2.77*  2.37*   HGB  7.7*  8.2*  7.1*   HCT  22.9*  24.1*  21.1*   PLT  208  247  257   GRANS   --    --   75*   LYMPH   --    --   17*   EOS   --    --   2      Cardiac Enzymes No results for input(s): CPK, CKND1, RICHARD in the last 72 hours. No lab exists for component: CKRMB, TROIP   Coagulation Recent Labs      08/04/17   1240   PTP  14.3   INR  1.2       Lipid Panel Lab Results   Component Value Date/Time    Cholesterol, total 195 11/12/2013 10:53 AM    HDL Cholesterol 42 11/12/2013 10:53 AM    LDL, calculated 121.4 11/12/2013 10:53 AM    VLDL, calculated 31.6 11/12/2013 10:53 AM    Triglyceride 158 11/12/2013 10:53 AM    CHOL/HDL Ratio 4.6 11/12/2013 10:53 AM      BNP No results for input(s): BNPP in the last 72 hours.    Liver Enzymes Recent Labs      08/05/17   0445  08/04/17   1240   TP   --   7.0   ALB  3.1*  3.3*   AP   --   113   SGOT   --   11*      Thyroid Studies No results found for: T4, T3U, TSH, TSHEXT     Procedures/imaging: see electronic medical records for all procedures/Xrays and details which were not copied into this note but were reviewed prior to creation of Kathie Ann MD

## 2017-08-06 NOTE — PROGRESS NOTES
conducted an initial consultation and Spiritual Assessment for [facility], who is a 49 y.o.,male. Patients Primary Language is: [place]. According to the patients EMR Temple Affiliation is: No Alevism. The reason the Patient came to the hospital is:   Patient Active Problem List    Diagnosis Date Noted    Pleural effusion, right 08/06/2017    Rash of genital area 08/05/2017    PAM (acute kidney injury) ([facility] [address] 08/04/2017    Hypertensive urgency, malignant 08/04/2017    Uremia 08/04/2017    Severe anemia 08/04/2017    Nonadherence to medical treatment 08/04/2017    Tobacco abuse 11/22/2013    Cervical strain, acute 11/12/2013    Routine general medical examination at a health care facility 11/12/2013    Unspecified essential hypertension 11/12/2013      Very attentive patient.  explained his mandate and had a brief conversation. Patient claimed to be Orthodox and was appreciative of visit and prayer. The  provided the following Interventions:  Initiated a relationship of care and support. Explored issues of shan, belief, spirituality and Temple/ritual needs while hospitalized. Listened empathically. Provided chaplaincy education. Provided information about Spiritual Care Services. Offered prayer and assurance of prayer on patient's behalf. Chart reviewed. The following outcomes where achieved:  Patient shared limited information about both their medical narrative and spiritual journey/beliefs.  confirmed Patient's Temple Affiliation. Patient processed feeling about current hospitalization. Patient expressed gratitude for 's visit. Assessment:  Patient does not have any Temple/cultural needs that will affect patients preferences in health care. Plan:  Chaplains will continue to follow and will provide pastoral care on an as needed/requested basis.   recommends bedside caregivers page  on duty if patient shows signs of acute spiritual or emotional distress.     The [address]  [phone]

## 2017-08-07 ENCOUNTER — APPOINTMENT (OUTPATIENT)
Dept: INTERVENTIONAL RADIOLOGY/VASCULAR | Age: 52
DRG: 460 | End: 2017-08-07
Attending: SURGERY
Payer: MEDICAID

## 2017-08-07 LAB
ACE SERPL-CCNC: < 15 U/L (ref 14–82)
ANA TITR SER IF: NEGATIVE {TITER}
ANION GAP BLD CALC-SCNC: 11 MMOL/L (ref 3–18)
B BURGDOR IGG+IGM SER-ACNC: <0.91 ISR (ref 0–0.9)
BUN SERPL-MCNC: 38 MG/DL (ref 7–18)
BUN/CREAT SERPL: 5 (ref 12–20)
C TRACH RRNA SPEC QL NAA+PROBE: NEGATIVE
C-ANCA TITR SER IF: NORMAL TITER
CALCIUM SERPL-MCNC: 6.7 MG/DL (ref 8.5–10.1)
CHLORIDE SERPL-SCNC: 102 MMOL/L (ref 100–108)
CO2 SERPL-SCNC: 25 MMOL/L (ref 21–32)
CREAT SERPL-MCNC: 8.1 MG/DL (ref 0.6–1.3)
ERYTHROCYTE [DISTWIDTH] IN BLOOD BY AUTOMATED COUNT: 14.9 % (ref 11.6–14.5)
GLUCOSE SERPL-MCNC: 111 MG/DL (ref 74–99)
HBV SURFACE AB SER QL IA: NEGATIVE
HBV SURFACE AB SERPL IA-ACNC: <3.1 MIU/ML
HBV SURFACE AG SER QL: <0.1 INDEX
HBV SURFACE AG SER QL: NEGATIVE
HCT VFR BLD AUTO: 22.6 % (ref 36–48)
HCV AB SER IA-ACNC: 0.07 INDEX
HCV AB SERPL QL IA: NEGATIVE
HCV COMMENT,HCGAC: NORMAL
HEP BS AB COMMENT,HBSAC: ABNORMAL
HGB BLD-MCNC: 7.3 G/DL (ref 13–16)
MCH RBC QN AUTO: 29.9 PG (ref 24–34)
MCHC RBC AUTO-ENTMCNC: 32.3 G/DL (ref 31–37)
MCV RBC AUTO: 92.6 FL (ref 74–97)
N GONORRHOEA RRNA SPEC QL NAA+PROBE: NEGATIVE
P-ANCA ATYPICAL TITR SER IF: NORMAL TITER
P-ANCA TITR SER IF: NORMAL TITER
PLATELET # BLD AUTO: 213 K/UL (ref 135–420)
PMV BLD AUTO: 10.7 FL (ref 9.2–11.8)
POTASSIUM SERPL-SCNC: 4.8 MMOL/L (ref 3.5–5.5)
RBC # BLD AUTO: 2.44 M/UL (ref 4.7–5.5)
SODIUM SERPL-SCNC: 138 MMOL/L (ref 136–145)
SPECIMEN SOURCE: NORMAL
WBC # BLD AUTO: 12.2 K/UL (ref 4.6–13.2)

## 2017-08-07 PROCEDURE — 80048 BASIC METABOLIC PNL TOTAL CA: CPT | Performed by: HOSPITALIST

## 2017-08-07 PROCEDURE — 85027 COMPLETE CBC AUTOMATED: CPT | Performed by: HOSPITALIST

## 2017-08-07 PROCEDURE — 74011250637 HC RX REV CODE- 250/637: Performed by: INTERNAL MEDICINE

## 2017-08-07 PROCEDURE — 02HV33Z INSERTION OF INFUSION DEVICE INTO SUPERIOR VENA CAVA, PERCUTANEOUS APPROACH: ICD-10-PCS | Performed by: SURGERY

## 2017-08-07 PROCEDURE — 36415 COLL VENOUS BLD VENIPUNCTURE: CPT | Performed by: HOSPITALIST

## 2017-08-07 PROCEDURE — 36430 TRANSFUSION BLD/BLD COMPNT: CPT

## 2017-08-07 PROCEDURE — P9016 RBC LEUKOCYTES REDUCED: HCPCS | Performed by: HOSPITALIST

## 2017-08-07 PROCEDURE — 65660000000 HC RM CCU STEPDOWN

## 2017-08-07 PROCEDURE — 74011250636 HC RX REV CODE- 250/636: Performed by: HOSPITALIST

## 2017-08-07 PROCEDURE — 74011000250 HC RX REV CODE- 250: Performed by: HOSPITALIST

## 2017-08-07 PROCEDURE — 74011250637 HC RX REV CODE- 250/637: Performed by: HOSPITALIST

## 2017-08-07 PROCEDURE — 74011250636 HC RX REV CODE- 250/636: Performed by: SURGERY

## 2017-08-07 PROCEDURE — C1750 CATH, HEMODIALYSIS,LONG-TERM: HCPCS

## 2017-08-07 PROCEDURE — 99152 MOD SED SAME PHYS/QHP 5/>YRS: CPT

## 2017-08-07 PROCEDURE — 74011250636 HC RX REV CODE- 250/636: Performed by: INTERNAL MEDICINE

## 2017-08-07 RX ORDER — SODIUM CHLORIDE 9 MG/ML
250 INJECTION, SOLUTION INTRAVENOUS AS NEEDED
Status: DISCONTINUED | OUTPATIENT
Start: 2017-08-07 | End: 2017-08-18 | Stop reason: HOSPADM

## 2017-08-07 RX ORDER — HEPARIN SODIUM 200 [USP'U]/100ML
500 INJECTION, SOLUTION INTRAVENOUS
Status: COMPLETED | OUTPATIENT
Start: 2017-08-07 | End: 2017-08-13

## 2017-08-07 RX ORDER — NALOXONE HYDROCHLORIDE 0.4 MG/ML
0.1 INJECTION, SOLUTION INTRAMUSCULAR; INTRAVENOUS; SUBCUTANEOUS AS NEEDED
Status: DISCONTINUED | OUTPATIENT
Start: 2017-08-07 | End: 2017-08-08 | Stop reason: ALTCHOICE

## 2017-08-07 RX ORDER — HEPARIN SODIUM 1000 [USP'U]/ML
10000 INJECTION, SOLUTION INTRAVENOUS; SUBCUTANEOUS
Status: COMPLETED | OUTPATIENT
Start: 2017-08-07 | End: 2017-08-07

## 2017-08-07 RX ORDER — FLUMAZENIL 0.1 MG/ML
0.2 INJECTION INTRAVENOUS
Status: DISCONTINUED | OUTPATIENT
Start: 2017-08-07 | End: 2017-08-08 | Stop reason: ALTCHOICE

## 2017-08-07 RX ORDER — SODIUM CHLORIDE 0.9 % (FLUSH) 0.9 %
5-10 SYRINGE (ML) INJECTION AS NEEDED
Status: DISCONTINUED | OUTPATIENT
Start: 2017-08-07 | End: 2017-08-08 | Stop reason: ALTCHOICE

## 2017-08-07 RX ORDER — MIDAZOLAM HYDROCHLORIDE 1 MG/ML
.5-4 INJECTION, SOLUTION INTRAMUSCULAR; INTRAVENOUS
Status: DISCONTINUED | OUTPATIENT
Start: 2017-08-07 | End: 2017-08-08 | Stop reason: ALTCHOICE

## 2017-08-07 RX ORDER — LIDOCAINE HYDROCHLORIDE 10 MG/ML
1-20 INJECTION INFILTRATION; PERINEURAL
Status: COMPLETED | OUTPATIENT
Start: 2017-08-07 | End: 2017-08-07

## 2017-08-07 RX ORDER — SODIUM CHLORIDE 9 MG/ML
25 INJECTION, SOLUTION INTRAVENOUS CONTINUOUS
Status: DISCONTINUED | OUTPATIENT
Start: 2017-08-07 | End: 2017-08-08 | Stop reason: ALTCHOICE

## 2017-08-07 RX ORDER — SODIUM CHLORIDE 0.9 % (FLUSH) 0.9 %
5-10 SYRINGE (ML) INJECTION EVERY 8 HOURS
Status: DISCONTINUED | OUTPATIENT
Start: 2017-08-07 | End: 2017-08-08 | Stop reason: ALTCHOICE

## 2017-08-07 RX ORDER — FENTANYL CITRATE 50 UG/ML
25-200 INJECTION, SOLUTION INTRAMUSCULAR; INTRAVENOUS
Status: DISCONTINUED | OUTPATIENT
Start: 2017-08-07 | End: 2017-08-08 | Stop reason: ALTCHOICE

## 2017-08-07 RX ADMIN — ERYTHROPOIETIN 5000 UNITS: 3000 INJECTION, SOLUTION INTRAVENOUS; SUBCUTANEOUS at 17:42

## 2017-08-07 RX ADMIN — PANTOPRAZOLE SODIUM 40 MG: 40 TABLET, DELAYED RELEASE ORAL at 06:52

## 2017-08-07 RX ADMIN — NYSTATIN: 100000 OINTMENT TOPICAL at 15:41

## 2017-08-07 RX ADMIN — MIDAZOLAM HYDROCHLORIDE 1 MG: 1 INJECTION, SOLUTION INTRAMUSCULAR; INTRAVENOUS at 11:04

## 2017-08-07 RX ADMIN — HEPARIN SODIUM 5000 UNITS: 5000 INJECTION, SOLUTION INTRAVENOUS; SUBCUTANEOUS at 00:47

## 2017-08-07 RX ADMIN — Medication 10 ML: at 23:16

## 2017-08-07 RX ADMIN — HEPARIN SODIUM 500 UNITS: 200 INJECTION, SOLUTION INTRAVENOUS at 11:01

## 2017-08-07 RX ADMIN — FENTANYL CITRATE 50 MCG: 50 INJECTION, SOLUTION INTRAMUSCULAR; INTRAVENOUS at 11:04

## 2017-08-07 RX ADMIN — Medication 1 TABLET: at 12:21

## 2017-08-07 RX ADMIN — Medication 10 ML: at 12:21

## 2017-08-07 RX ADMIN — CALCITRIOL 0.25 MCG: 0.25 CAPSULE, LIQUID FILLED ORAL at 08:43

## 2017-08-07 RX ADMIN — HEPARIN SODIUM 5000 UNITS: 5000 INJECTION, SOLUTION INTRAVENOUS; SUBCUTANEOUS at 23:15

## 2017-08-07 RX ADMIN — AMLODIPINE BESYLATE 10 MG: 5 TABLET ORAL at 08:43

## 2017-08-07 RX ADMIN — CARVEDILOL 6.25 MG: 3.12 TABLET, FILM COATED ORAL at 17:44

## 2017-08-07 RX ADMIN — CARVEDILOL 6.25 MG: 3.12 TABLET, FILM COATED ORAL at 08:43

## 2017-08-07 RX ADMIN — DOXYCYCLINE 100 MG: 100 CAPSULE ORAL at 20:38

## 2017-08-07 RX ADMIN — NYSTATIN: 100000 OINTMENT TOPICAL at 23:16

## 2017-08-07 RX ADMIN — LIDOCAINE HYDROCHLORIDE 5 ML: 10 INJECTION, SOLUTION INFILTRATION; PERINEURAL at 11:04

## 2017-08-07 RX ADMIN — HEPARIN SODIUM 4000 UNITS: 1000 INJECTION, SOLUTION INTRAVENOUS; SUBCUTANEOUS at 11:15

## 2017-08-07 RX ADMIN — HYDRALAZINE HYDROCHLORIDE 20 MG: 20 INJECTION INTRAMUSCULAR; INTRAVENOUS at 20:38

## 2017-08-07 RX ADMIN — HEPARIN SODIUM 5000 UNITS: 5000 INJECTION, SOLUTION INTRAVENOUS; SUBCUTANEOUS at 15:41

## 2017-08-07 RX ADMIN — DOXYCYCLINE 100 MG: 100 CAPSULE ORAL at 08:43

## 2017-08-07 RX ADMIN — NYSTATIN: 100000 OINTMENT TOPICAL at 08:44

## 2017-08-07 NOTE — PROGRESS NOTES
2130:  Pt in room transported via wheelchair by ICU personnel. Pt resting in bed. Breathing even and unlabored. No s/s of distress of any kind. Pt oriented to floor and room. Call bell within reach. Family at bedside. Shift summary:  Pt had uneventful shift.

## 2017-08-07 NOTE — PROGRESS NOTES
PT    Attempted PT evaluation at this time. Pt was receiving a blood transfusion and anticipated receiving HD soon. Will attempt PT evaluation tomorrow if pt medically appropriate.     Dave De Leónt Kiel PT, DPT

## 2017-08-07 NOTE — PROGRESS NOTES
Readmission Risk Assessment: Low Risk and MSSP/Good Help ACO patients    RRAT Score: 1 - 12    Initial Assessment:Emergency Contact: chart reviewed pt admitted for PAM,APA notified for medicaid application, cm has been contact by Korin Patiño ,arrangements being made for dialysis chair but not finalized,pt states he's from [place] been living here in the [facility] since 1999,lives at home with carlos,denies employment at this time,cm will cont to review case and remain available with assistance for dialysis chair arrangements. Pertinent Medical Hx: see chart  PCP/Specialists: Community Services: [facility]  DME:     Low Risk Care Transition Plan:  1. Evaluate for [facility] or [address] coordination of resources  2. Involve patient/caregiver in assessment, planning, education and implement of intervention. 3. CM daily patient care huddles/interdisciplinary rounds. 4. PCP/Specialist appointment within 7 - 10 days made prior to discharge. 5. Facilitate transportation and logistics for follow-up appointments. 6. Handoff to [address] Nurse Navigator or PCP practice.

## 2017-08-07 NOTE — ROUTINE PROCESS
1935:  Bedside and Verbal shift change report received from Federica Max RN (offgoing nurse) to Marisela García RN (oncoming nurse). Report included the following information SBAR, Kardex, Intake/Output, MAR, Recent Results and Cardiac Rhythm SR. Pt resting in bed. Appears to be in no distress at this time. Call bell within reach. Zone phone number given to pt. Pt instructed to call zone phone or call bell if needed. Pt instructed to call for assistance before ambulating. 2300:  24h urine sent to lab; 750ml collected from 2300 8/6/17 to 2300 8/7/17.  0350:  Pt complaint of chest pain. Per protocol, placed Pt on 2L NC O2 and performed EKG, see results. Made Dr. Parish Thomson aware who ordered cardiac panel and morphine 1mg IV. Bedside and Verbal shift change report given to Casey Ruelas RN (oncoming nurse) by Marisela García RN (offgoing nurse).  Report included the following information SBAR, Kardex, Intake/Output, MAR, Recent Results, Med Rec Status and Cardiac Rhythm SR.

## 2017-08-07 NOTE — PROGRESS NOTES
0800 Assumed pt care, pt is alert and oriented x4, denies SOB/cp/pain, lungs are clear on room air. VSS and no acute events reported overnight. Pt instructed to collect urine on ice until 2300 tonight. Pt NPO for [facility] placement today. Will continue to monitor pt.

## 2017-08-07 NOTE — PROGRESS NOTES
OT note: Pt getting blood right now secondary to HGB 7.3 and dialysis after that. Will re-attempt eval again tomorrow.  Thank you Eduard Ribera OTR/L

## 2017-08-07 NOTE — PROGRESS NOTES
INITIAL NUTRITION ASSESSMENT     RECOMMENDATIONS/PLAN:   Will order Nepro TID to aid in meeting estimated needs  Monitor/encourage PO intakes >75%  Will add nephrovite to aid in meeting DRIs  Monitor appetite, N/V  Monitor weight/fluid status, skin integrity, bowel function    Adult Malnutrition Criteria:     Nutrition assessment was completed by RDN and the patient was found to meet the following malnutrition criteria established by ASPEN/AND:    Adult Malnutrition Guidelines:  SEVERE PROTEIN CALORIE MALNUTRITION IN THE CONTEXT OF CHRONIC ILLNESS  Weight Loss:  >5% x 1 month or >7.5% x 3 months or >10% x 6 months or >20% x 12 months  Energy Intake: <75% energy intake compared to estimated energy needs >1 month  Body Fat:  Severe Depletion  Muscle Mass: Severe Depletion      [facility]  08/07/17      REASON FOR ASSESSMENT:     []  RN Referral:    [] MST score >/=2  Malnutrition Screening Tool (MST):  Recently Lost Weight Without Trying: Yes  If Yes, How Much Weight Loss: >33 lbs  Eating Poorly Due to Decreased Appetite: Yes  MST Score: 5     NUTRITION ASSESSMENT:   Client History: 49 yrs old Male admitted with likely PAM on CKD however unclear hx. Pt also with HTN urgency per MD note. Pt for [facility] placement today. Pt also with small right PE,     PMHx: HTN, tobacco use, reports 6 weeks ago he was drinking 3-4 beers a day, +marijuana use.   Cultural/[employer] Food Preferences: None Identified    FOOD/NUTRITION HISTORY  Diet History: poor appetite PTA  Food Allergies:  [x] NKFA       Pertinent PTA Medications: reviewed    NUTRITION INTAKE   Diet Order:   NPO for [facility] today  Average PO Intake:       Patient Vitals for the past 100 hrs:   % Diet Eaten   08/07/17 0600 0 %   08/06/17 1823 50 %   08/06/17 1300 100 %   08/06/17 0900 100 %   08/05/17 1830 10 %   08/05/17 1330 0 %   08/05/17 0944 100 %      Pertinent Medications:  [x] Reviewed; protonix    Insulin:  [] SSI  [] Pre-meal   []  Basal   [] Drip  [x] None  Pt expected to meet estimated nutrient needs through next review:          []  Yes     [x] No; NPO cannot provide for estimated needs ANTHROPOMETRICS  Height: 5' 5\" (165.1 cm)       Weight: 51.5 kg (113 lb 8.6 oz)    BMI: 18.9 kg/m^2  -  normal weight (18.5%-24.9% BMI)        Weight change: per H&P wt loss of approx 20lbs over past few months, pt reports 40lbs wt loss x 3 months                                 Comparison to Reference Standards:  IBW: 136 lbs      %IBW: 83%      AdjBW: N/A kg    NUTRITION-FOCUSED PHYSICAL ASSESSMENT  Skin: no pressure areas per documentation   GI: BM 8/5    BIOCHEMICAL DATA & MEDICAL TESTS  Pertinent Labs:  [x] Reviewed    NUTRITION PRESCRIPTION  Calories: 2067 kcal/day based on miffilin x 1.3 x 1.2  Protein: 53 g/day based on 1 g/kg  CHO: 258 g/day based on 50% of total energy  Fluid: 2067 ml/day based on 1 kcal/ml      NUTRITION DIAGNOSES:   1. Unintentional weight loss related to poor appetite as evidenced by wt loss of approx 40lbs x 3 months per pt report. NUTRITION INTERVENTIONS:   INTERVENTIONS:        GOALS:  1. Nepphrovite; nepro 1. Meet 100% estimated needs and DRIs throughout the next 3 days     LEARNING NEEDS (Diet, Supplementation, Food/Nutrient-Drug Interaction):   [] None Identified  [x] Inpatient education provided/documented--will need renal diet education prior to D/C    [] Identified and patient:  [] Declined     [] Was not appropriate/indicated  NUTRITION MONITORING /EVALUATION:   Follow PO intake  Monitor wt  Monitor renal labs, electrolytes, fluid status  Monitor for additional supplement needs    [] Participated in Interdisciplinary Rounds  [x] [address] [place] [address] Reviewed/Documented  DISCHARGE NUTRITION RECOMMENDATIONS ADDRESSED:     [x] Yes- recommended renal diet     [] To be determined closer to discharge    NUTRITION RISK:     [x]  At risk                     []  Not currently at risk     Will follow-up per policy.   Dmitri La RD  PAGER: [phone]

## 2017-08-07 NOTE — ROUTINE PROCESS
TRANSFER - OUT REPORT:    Verbal report given to PETE Gaspar RN(name) on [employer]  being transferred to [employer] Telemetry(unit) for routine progression of care       Report consisted of patients Situation, Background, Assessment and   Recommendations(SBAR). Information from the following report(s) SBAR, Kardex, Intake/Output, MAR, Recent Results, Med Rec Status and Cardiac Rhythm SR was reviewed with the receiving nurse. Lines:   Peripheral IV 08/04/17 Right Antecubital (Active)   Site Assessment Clean, dry, & intact 8/6/2017  4:00 PM   Phlebitis Assessment 0 8/6/2017  4:00 PM   Infiltration Assessment 0 8/6/2017  4:00 PM   Dressing Status Clean, dry, & intact 8/6/2017  4:00 PM   Dressing Type Transparent 8/6/2017  4:00 PM   Hub Color/Line Status Capped 8/6/2017  4:00 PM   Action Taken Open ports on tubing capped 8/6/2017  4:00 PM   Alcohol Cap Used Yes 8/6/2017  4:00 PM        Opportunity for questions and clarification was provided.       Patient transported with:   Monitor  Patient-specific medications from Pharmacy  Registered Nurse

## 2017-08-07 NOTE — PROGRESS NOTES
Pt SR on monitor, on room air, tolerating well, A&Ox4, BECERRA, Oliguric, all urine being collected for 24 hour urine to end at 2300, Dialysis this shift through new TDC, Skin is intact. 1 unit PRBC given, per Dr. Gina Goldman check CBC in AM. Pt updated on POC, all questions answered.

## 2017-08-07 NOTE — ROUTINE PROCESS
Bedside and Verbal shift change report given to Mason Lion RN (oncoming nurse) by Nathalie Vasquez RN (offgoing nurse).  Report included the following information SBAR, Kardex, Intake/Output, MAR, Recent Results, Med Rec Status and Cardiac Rhythm SR.

## 2017-08-07 NOTE — CDMP QUERY
Please clarify if this patient is being treated/managed for:    =>Severe Protein Calorie Malnutrition as documented by Dietitian  =>Other Explanation of clinical findings  =>Unable to Determine (no explanation of clinical findings)    The medical record reflects the following:    Risk:ESRD, HTN    Clinical Indicators:RD note states--\"Nutrition assessment was completed by RDN and the patient was found to meet the following malnutrition criteria established by ASPEN/AND:     Adult Malnutrition Criteria:      Nutrition assessment was completed by RDN and the patient was found to meet the following malnutrition criteria established by ASPEN/AND:     Adult Malnutrition Guidelines:  SEVERE PROTEIN CALORIE MALNUTRITION IN THE CONTEXT OF CHRONIC ILLNESS  Weight Loss:  >5% x 1 month or >7.5% x 3 months or >10% x 6 months or >20% x 12 months  Energy Intake: <75% energy intake compared to estimated energy needs >1 month  Body Fat:  Severe Depletion  Muscle Mass: Severe Depletion   Recently Lost Weight Without Trying: Yes  If Yes, How Much Weight Loss: >33 lbs  Eating Poorly Due to Decreased Appetite: Yes  MST Score: 5  BMI=18.89    Treatment: Nephro TID    Please clarify and document your clinical opinion in the progress notes and discharge summary including the definitive and/or presumptive diagnosis, (suspected or probable), related to the above clinical findings. Please include clinical findings supporting your diagnosis. If you DECLINE this query or would like to communicate with [facility], please utilize the \"[facility] message box\" at the TOP of the Progress Note on the right.       Thank you,  Lindsey Castanon RN [facility]  [phone]

## 2017-08-07 NOTE — INTERVAL H&P NOTE
H&P Update:  [facility] was seen and examined. History and physical has been reviewed. Significant clinical changes have occurred as noted: The patient has tolerated dialysis via his temporary right IJ catheter. He will now need long term dialysis so we will convert this access to a tunneled access today.      Signed By: Kiran Salmeron MD     August 7, 2017 10:42 AM

## 2017-08-07 NOTE — H&P (VIEW-ONLY)
Nephrology Consult    Patient: Rishi Steele  Requesting physician: Dr. Tenzin Frias  Reason for consult: Renal failure    CC: Weakness and tremors    History of Present Illness:  Rishi Steele is a 49 y.o. AA male with a past medical history significant for HTN, followed at [facility] clinic, was on labetalol and norvasc but out of home meds for a while. Pt presented to the hospital with worsening SOB, weakness and tremors. Pt has experienced poor appetite and n/v. No cp, fever or chills. He did take BC powders prn pain. Cr was 1.2 in 11/2013, now elevated to 15.6 today.  and CO2 at 13, K 5.4. Pt however reports having \"normal amount\" of urine output. Nephrology was consulted for further evaluation and management of his renal failure. Past Medical History:  Patient Active Problem List   Diagnosis Code    Cervical strain, acute S16. 1XXA    Routine general medical examination at a health care facility Z00.00    Unspecified essential hypertension I10    Tobacco abuse Z72.0    PAM [facility]acute kidney injury) ([facility] [address] N17.9   HTN dx at least since 2013    Social History:  Social History     Social History    Marital status: SINGLE     Spouse name: N/A    Number of children: N/A    Years of education: N/A     Social History Main Topics    Smoking status: Current Every Day Smoker     Packs/day: 0.50     Years: 7.00     Types: Cigarettes    Smokeless tobacco: Never Used    Alcohol use Yes      Comment: every day beer 20ounces    Drug use: Yes     Special: Marijuana    Sexual activity: Not Asked     Other Topics Concern    None     Social History Narrative       Family History:  Family History   Problem Relation Age of Onset    Cancer Mother      Pt reports no kidney disease in the family.     Allergy:  No Known Allergies     Medication:  Home meds: reviewed    Inpatient meds:  Current Facility-Administered Medications   Medication Dose Route Frequency    labetalol (NORMODYNE;TRANDATE) 20 mg/4 mL (5 mg/mL) injection 20 mg  20 mg IntraVENous NOW     Current Outpatient Prescriptions   Medication Sig    lisinopril-hydrochlorothiazide (PRINZIDE, ZESTORETIC) 20-25 mg per tablet Take 1 Tab by mouth daily.                         Review of Systems:  Gen: no fever or chills, has worsening weakness and poor appetite  Head: no headache or trauma  Eyes: no change in vision  Throat/Neck: no sore throat or dysphagia  CV: no chest pain or palpitation, + GONZALEZ  Pulm: no cough or hemoptysis  GI: + nausea, vomiting but no diarrhea  : no dysuria or hematuria  Skin: no new rash or lesions  Endocrine: no hot or cold intolerance  Psych: no anxiety or depression    Vital signs:   Visit Vitals    BP (!) 213/115 (BP 1 Location: Right arm, BP Patient Position: At rest)    Pulse 91    Temp 98.3 °F (36.8 °C)    Resp 20    Ht 5' 5\" (1.651 m)    Wt 54.4 kg (120 lb)    SpO2 100%    BMI 19.97 kg/m2     No intake or output data in the 24 hours ending 08/04/17 1410    Physical Exam:    General: No acute distress, has wasted appearance   HENT: Atraumatic and normocephalic   Eyes: Normal conjunctiva, EOMI   Neck: Supple with no mass or JVD   Cardiovascular: Normal S1 & S2, no m/r/g   Pulmonary/Chest Wall: Clear to auscultation bilaterally, no w/c   Abdominal: Soft and non-tender, normal active bowel sound   Musculoskeletal: No edema lower ext bilaterraly   Skin: No lesions   Neurological: No focal neurological deficits, no asterixis       Data Review:  BMP:   Lab Results   Component Value Date/Time     08/04/2017 12:40 PM    K 5.4 08/04/2017 12:40 PM     08/04/2017 12:40 PM    CO2 13 (L) 08/04/2017 12:40 PM    AGAP 23 (H) 08/04/2017 12:40 PM    GLU 83 08/04/2017 12:40 PM     (H) 08/04/2017 12:40 PM    CREA 15.67 (H) 08/04/2017 12:40 PM    GFRAA 4 (L) 08/04/2017 12:40 PM    GFRNA 3 (L) 08/04/2017 12:40 PM     CMP:   Lab Results   Component Value Date/Time     08/04/2017 12:40 PM    K 5.4 08/04/2017 12:40 PM    CL 108 08/04/2017 12:40 PM    CO2 13 (L) 08/04/2017 12:40 PM    AGAP 23 (H) 08/04/2017 12:40 PM    GLU 83 08/04/2017 12:40 PM     (H) 08/04/2017 12:40 PM    CREA 15.67 (H) 08/04/2017 12:40 PM    GFRAA 4 (L) 08/04/2017 12:40 PM    GFRNA 3 (L) 08/04/2017 12:40 PM    CA 6.0 (L) 08/04/2017 12:40 PM    ALB 3.3 (L) 08/04/2017 12:40 PM    TP 7.0 08/04/2017 12:40 PM    GLOB 3.7 08/04/2017 12:40 PM    AGRAT 0.9 08/04/2017 12:40 PM    SGOT 11 (L) 08/04/2017 12:40 PM    ALT 18 08/04/2017 12:40 PM     CBC:   Lab Results   Component Value Date/Time    WBC 9.8 08/04/2017 12:40 PM    HGB 7.1 (L) 08/04/2017 12:40 PM    HCT 21.1 (L) 08/04/2017 12:40 PM     08/04/2017 12:40 PM     All Cardiac Markers in the last 24 hours: No results found for: CPK, CKMMB, CKMB, RCK3, CKMBT, CKNDX, CKND1, RICHARD, TROPT, TROIQ, SWAPNIL, TROPT, TNIPOC, BNP, BNPP  Recent Glucose Results:   Lab Results   Component Value Date/Time    GLU 83 08/04/2017 12:40 PM     ABG: No results found for: PH, PHI, PCO2, PCO2I, PO2, PO2I, HCO3, HCO3I, FIO2, FIO2I  COAGS: No results found for: APTT, PTP, INR  Liver Panel:   Lab Results   Component Value Date/Time    ALB 3.3 (L) 08/04/2017 12:40 PM    TP 7.0 08/04/2017 12:40 PM    GLOB 3.7 08/04/2017 12:40 PM    AGRAT 0.9 08/04/2017 12:40 PM    SGOT 11 (L) 08/04/2017 12:40 PM    ALT 18 08/04/2017 12:40 PM     08/04/2017 12:40 PM     Pancreatic Markers:   Lab Results   Component Value Date/Time    AMAE 339 08/04/2017 12:40 PM       CXR:    Single nonspecific mildly dilated loop of small bowel in the upper abdomen with  questionable wall thickening. Otherwise, the small bowel and colon are  nondilated. Kidney ultrasound:    none    Impression:     1. PAM on possible CKD. Don't have any recent baseline lab. Suspect pt has been having progressive CKD due to uncontrolled hypertension. PAM could be due to hypertensive urgency vs progression of his CKD.   Will need a kidney ultrasound to rule out obstruction but this is less likely  2. Hypertensive urgengy  3. Metabolic acidosis  4. Anemia  5. Proteinuria  6. Hematuria    Plan:     Check RADHA, c3, c4, ANCA, hepB, hepC  Kidney ultrasound  Discussed at length with pt and wife, agree to start dialysis  Discussed with ED attending and Dr Darío Stevens of the admitting team, will need help from vascular for temp dialysis catheter  Will arrange for first HD today  Recheck a renal panel, cbc, iron panel, ferritin and iPTH in AM  May need another run tomorrow  Avoid IV contrast and NSAIDs for now  BP management    Thanks for inviting us to participate in this patient's medical care.   We'll follow the patient closely with the medical team.    MD Jesse Pink  [phone]

## 2017-08-07 NOTE — PROGRESS NOTES
[facility]  Pulmonary, Critical Care, and Sleep Medicine    Name: Tono Dahl MRN: [id number]   : 1965 Hospital: [facility] [place]   Date: 2017        Subjective:     Patient is a 49 y.o. male   Doing better. No pain  No respiratory issues. RT IJ cath site intact.        Current Facility-Administered Medications   Medication Dose Route Frequency    0.9% sodium chloride infusion  25 mL/hr IntraVENous CONTINUOUS    sodium chloride (NS) flush 5-10 mL  5-10 mL IntraVENous Q8H    vit B Cmplx 3-FA-Vit C-Biotin (NEPHRO FLETCHER RX) tablet 1 Tab  1 Tab Oral DAILY    nystatin (MYCOSTATIN) 100,000 unit/gram ointment   Topical TID    calcitRIOL (ROCALTROL) capsule 0.25 mcg  0.25 mcg Oral DAILY    epoetin luann (EPOGEN;PROCRIT) 5,000 Units  5,000 Units IntraVENous DIALYSIS MON, WED & FRI    doxycycline (MONODOX) capsule 100 mg  100 mg Oral Q12H    cloNIDine (CATAPRES) 0.2 mg/24 hr patch 1 Patch  1 Patch TransDERmal Q7D    pantoprazole (PROTONIX) tablet 40 mg  40 mg Oral ACB    carvedilol (COREG) tablet 6.25 mg  6.25 mg Oral BID WITH MEALS    heparin (porcine) injection 5,000 Units  5,000 Units SubCUTAneous Q8H    amLODIPine (NORVASC) tablet 10 mg  10 mg Oral DAILY          Objective:   Vital Signs:    Visit Vitals    /81 (BP 1 Location: Left arm, BP Patient Position: At rest)    Pulse 73    Temp 98.2 °F (36.8 °C)    Resp 16    Ht 5' 5\" (1.651 m)    Wt 51.5 kg (113 lb 8.6 oz)    SpO2 100%    BMI 18.89 kg/m2       O2 Device: Room air   O2 Flow Rate (L/min): 3 l/min   Temp (24hrs), Av.4 °F (36.9 °C), Min:98.1 °F (36.7 °C), Max:98.7 °F (37.1 °C)       Intake/Output:     Intake/Output Summary (Last 24 hours) at 17 1128  Last data filed at 17 0600   Gross per 24 hour   Intake              360 ml   Output              150 ml   Net              210 ml           Physical Exam:   General: comfortable  Neck: No adenopathy or thyroid swelling  CVS: S1S2 no murmurs  RS: Mod AE bilaterally, decreased BS with crackles at bases  Abd: soft, non tender, no hepatosplenomegaly  Neuro: non focal, awake, alert  Extrm: no leg edema   Skin: no rash    Data review:   Labs:  Recent Labs      08/07/17 0430 08/06/17 0451 08/05/17   0445   WBC  12.2  11.5  12.8   HGB  7.3*  7.7*  8.2*   HCT  22.6*  22.9*  24.1*   PLT  213  208  247     Recent Labs      08/07/17   0430 08/06/17 0451 08/05/17 0445   08/04/17   1240   NA  138  140  144   --   144   K  4.8  4.3  3.2*   --   5.4   CL  102  103  104   --   108   CO2  25  28  19*   --   13*   GLU  111*  99  80   --   83   BUN  38*  26*  52*   --   105*   CREA  8.10*  5.89*  8.65*   --   15.67*   CA  6.7*  7.4*  7.9*   < >  6.0*   PHOS   --    --   4.2   --    --    ALB   --    --   3.1*   --   3.3*   SGOT   --    --    --    --   11*   ALT   --    --    --    --   18   INR   --    --    --    --   1.2    < > = values in this interval not displayed. No results for input(s): PH, PCO2, PO2, HCO3, FIO2 in the last 72 hours. Imaging:  I have personally reviewed the patients radiographs and have reviewed the reports:        IMPRESSION:   · Acute on chronic kidney failure due to hypertension, now dialysis dependent  · Hypertensive urgency, resolved  · Anemia, due to kidney failure suspect chronic      RECOMMENDATIONS:   · HD today per nephrology  · Erythropoietin per neprhology.   · Likely needing permanent access  · No respiratory issues  · DVT, PUD prophylaxis  · Will sign off   ·        Qian Dee MD

## 2017-08-07 NOTE — PROGRESS NOTES
Hospitalist Progress Note    Patient: Maykel Aquino MRN: [id number]  CSN: [id number]    YOB: 1965  Age: 49 y.o. Sex: male    DOA: 8/4/2017 LOS:  LOS: 3 days          Chief Complaint:    ARF      Assessment/Plan       ARF, dialyzing again today, presumed dialysis dependence at this juncture-looking for outpt chair and medicaid applcn-dialysis access placed today  HTN bxssjuc-btihiaan-cjmabuuo same meds as on for now  Anemia of CKD-stable with Hgb 7.7, received 1 unit blood since admission, on epo with dialysis-will need another transfusion  Scrotal rash-add nystatin, may be excoriated chronic yeast, no acute lesions seen, multiple tests sent yesterday as he noted a tick on penis recently-pedunculated soft skin polyp right thigh  HTN-unmedicated and noncompliant for a long time  Uremia resolved    Echo reviewed-EF 55%  Dialysis today  Transfuse blood  BP control  PT consult    Patient Active Problem List   Diagnosis Code    Cervical strain, acute S16. 1XXA    Routine general medical examination at a health care facility Z00.00    Unspecified essential hypertension I10    Tobacco abuse Z72.0    PAM (acute kidney injury) ([facility] [address] N17.9    Hypertensive urgency, malignant I16.0    Uremia N19    Severe anemia D64.9    Nonadherence to medical treatment Z91.19    Rash of genital area R21    Pleural effusion, right J90       Subjective:    Denies SOB  No headaches  No N/V  Did eat last night, NPO this am for procedure    Review of systems:    Constitutional: denies fevers, chills, myalgias  Respiratory: denies SOB, cough  Cardiovascular: denies chest pain, palpitations  Gastrointestinal: denies nausea, vomiting, diarrhea      Vital signs/Intake and Output:  Visit Vitals    /75    Pulse 70    Temp 98 °F (36.7 °C)    Resp 16    Ht 5' 5\" (1.651 m)    Wt 51.5 kg (113 lb 8.6 oz)    SpO2 100%    BMI 18.89 kg/m2     Current Shift:  08/07 0701 - 08/07 1900  In: 100 [P.O.:100]  Out: - Last three shifts:  08/05 1901 - 08/07 0700  In: 600 [P.O.:600]  Out: 400 [Urine:400]    Exam:    General: Well developed, alert, NAD, OX3  Head/Neck: NCAT, supple, No masses, No lymphadenopathy  CVS:Regular rate and rhythm, no M/R/G, S1/S2 heard, no thrill  Lungs:Clear to auscultation bilaterally, no wheezes, rhonchi, or rales  Abdomen: Soft, Nontender, No distention, Normal Bowel sounds, No hepatomegaly  Extremities: No C/C/E, pulses palpable 2+  Neuro:grossly normal , follows commands  Psych:appropriate                Labs: Results:       Chemistry Recent Labs      08/07/17 0430 08/06/17 0451 08/05/17   0445   GLU  111*  99  80   NA  138  140  144   K  4.8  4.3  3.2*   CL  102  103  104   CO2  25  28  19*   BUN  38*  26*  52*   CREA  8.10*  5.89*  8.65*   CA  6.7*  7.4*  7.9*   AGAP  11  9  21*   BUCR  5*  4*  6*   ALB   --    --   3.1*      CBC w/Diff Recent Labs      08/07/17 0430 08/06/17 0451 08/05/17   0445   WBC  12.2  11.5  12.8   RBC  2.44*  2.58*  2.77*   HGB  7.3*  7.7*  8.2*   HCT  22.6*  22.9*  24.1*   PLT  213  208  247      Cardiac Enzymes No results for input(s): CPK, CKND1, RICHARD in the last 72 hours. No lab exists for component: CKRMB, TROIP   Coagulation No results for input(s): PTP, INR, APTT in the last 72 hours. No lab exists for component: INREXT    Lipid Panel Lab Results   Component Value Date/Time    Cholesterol, total 195 11/12/2013 10:53 AM    HDL Cholesterol 42 11/12/2013 10:53 AM    LDL, calculated 121.4 11/12/2013 10:53 AM    VLDL, calculated 31.6 11/12/2013 10:53 AM    Triglyceride 158 11/12/2013 10:53 AM    CHOL/HDL Ratio 4.6 11/12/2013 10:53 AM      BNP No results for input(s): BNPP in the last 72 hours.    Liver Enzymes Recent Labs      08/05/17   0445   ALB  3.1*      Thyroid Studies No results found for: T4, T3U, TSH, TSHEXT     Procedures/imaging: see electronic medical records for all procedures/Xrays and details which were not copied into this note but were reviewed prior to creation of Nikki Lehman MD

## 2017-08-07 NOTE — PROGRESS NOTES
Nephrology Progress Note      History of Present Illness:  Jeni Camarillo is a 49 y.o. AA male with a past medical history significant for HTN, followed at [facility] clinic, was on labetalol and norvasc but out of home meds for a while. Pt presented to the hospital with worsening SOB, weakness and tremors. Pt has experienced poor appetite and n/v. No cp, fever or chills. He did take BC powders prn pain. Cr was 1.2 in 11/2013, now elevated to 15.6 today.  and CO2 at 13, K 5.4. Pt however reports having \"normal amount\" of urine output. Nephrology was consulted for further evaluation and management of his renal failure. Pt w/o new complaints. No SOB, CP or LE edema. Impression:     - PAM on CKD vs progression of his CKD. Suspect pt has been having progressive CKD due to uncontrolled hypertension. Possible PAM could be due to hypertensive urgency . Don't have any recent baseline lab. Echogenic kidneys on US. Started on HD on 8/4. Normal c3, c4,   - Hypertensive urgency, improving  - Metabolic acidosis, improving   - Anemia of CKD, adequate iron.    - Sec HPT, on Calcitriol daily   - Proteinuria  - Hematuria    Plan:   - HD today for 3.5 hjrs  - RADHA, ANCA, hepB, hepC,   pending   - Avoid IV contrast and NSAIDs for now  - Continue current BP management  - Continue EPO TIW  - Plan for prbc tx  - Scheduled for tunneled HD cath, needs permanent HD access  - Out pt HD chair placement in progress         Medication:  Inpatient meds:  Current Facility-Administered Medications   Medication Dose Route Frequency    0.9% sodium chloride infusion 250 mL  250 mL IntraVENous PRN    flumazenil (ROMAZICON) 0.1 mg/mL injection 0.2 mg  0.2 mg IntraVENous Rad Multiple    fentaNYL citrate (PF) injection  mcg   mcg IntraVENous Rad Multiple    0.9% sodium chloride infusion  25 mL/hr IntraVENous CONTINUOUS    naloxone (NARCAN) injection 0.1 mg  0.1 mg IntraVENous PRN    midazolam (VERSED) injection 0.5-4 mg  0.5-4 mg IntraVENous Rad Multiple    sodium chloride (NS) flush 5-10 mL  5-10 mL IntraVENous Q8H    sodium chloride (NS) flush 5-10 mL  5-10 mL IntraVENous PRN    nystatin (MYCOSTATIN) 100,000 unit/gram ointment   Topical TID    calcitRIOL (ROCALTROL) capsule 0.25 mcg  0.25 mcg Oral DAILY    epoetin luann (EPOGEN;PROCRIT) 5,000 Units  5,000 Units IntraVENous DIALYSIS MON, WED & FRI    doxycycline (MONODOX) capsule 100 mg  100 mg Oral Q12H    cloNIDine (CATAPRES) 0.2 mg/24 hr patch 1 Patch  1 Patch TransDERmal Q7D    pantoprazole (PROTONIX) tablet 40 mg  40 mg Oral ACB    carvedilol (COREG) tablet 6.25 mg  6.25 mg Oral BID WITH MEALS    ondansetron (ZOFRAN) injection 4 mg  4 mg IntraVENous Q6H PRN    acetaminophen (TYLENOL) tablet 650 mg  650 mg Oral Q6H PRN    heparin (porcine) injection 5,000 Units  5,000 Units SubCUTAneous Q8H    hydrALAZINE (APRESOLINE) 20 mg/mL injection 20 mg  20 mg IntraVENous Q6H PRN    0.9% sodium chloride infusion 250 mL  250 mL IntraVENous PRN    amLODIPine (NORVASC) tablet 10 mg  10 mg Oral DAILY         Physical Examination    Vital signs:   Visit Vitals    /85    Pulse 70    Temp 98.1 °F (36.7 °C)    Resp 17    Ht 5' 5\" (1.651 m)    Wt 51.5 kg (113 lb 8.6 oz)    SpO2 100%    BMI 18.89 kg/m2       Intake/Output Summary (Last 24 hours) at 08/07/17 1011  Last data filed at 08/07/17 0600   Gross per 24 hour   Intake              360 ml   Output              150 ml   Net              210 ml       Physical Exam:    General: No acute distress, has wasted appearance   HENT: Atraumatic and normocephalic   Eyes: Normal conjunctiva, EOMI   Neck: Supple with no mass or JVD   Cardiovascular: Normal S1 & S2, no m/r/g   Pulmonary/Chest Wall: Clear to auscultation bilaterally, no w/c   Abdominal: Soft and non-tender, normal active bowel sound   Musculoskeletal: No edema lower ext bilaterraly   Skin: No lesions   Neurological: No focal neurological deficits, no mechelle   HD access: Rt IJ temp line     Data Review:  BMP:   Lab Results   Component Value Date/Time     08/07/2017 04:30 AM    K 4.8 08/07/2017 04:30 AM     08/07/2017 04:30 AM    CO2 25 08/07/2017 04:30 AM    AGAP 11 08/07/2017 04:30 AM     (H) 08/07/2017 04:30 AM    BUN 38 (H) 08/07/2017 04:30 AM    CREA 8.10 (H) 08/07/2017 04:30 AM    GFRAA 9 (L) 08/07/2017 04:30 AM    GFRNA 7 (L) 08/07/2017 04:30 AM     CMP:   Lab Results   Component Value Date/Time     08/07/2017 04:30 AM    K 4.8 08/07/2017 04:30 AM     08/07/2017 04:30 AM    CO2 25 08/07/2017 04:30 AM    AGAP 11 08/07/2017 04:30 AM     (H) 08/07/2017 04:30 AM    BUN 38 (H) 08/07/2017 04:30 AM    CREA 8.10 (H) 08/07/2017 04:30 AM    GFRAA 9 (L) 08/07/2017 04:30 AM    GFRNA 7 (L) 08/07/2017 04:30 AM    CA 6.7 (L) 08/07/2017 04:30 AM     CBC:   Lab Results   Component Value Date/Time    WBC 12.2 08/07/2017 04:30 AM    HGB 7.3 (L) 08/07/2017 04:30 AM    HCT 22.6 (L) 08/07/2017 04:30 AM     08/07/2017 04:30 AM     All Cardiac Markers in the last 24 hours: No results found for: CPK, CKMMB, CKMB, RCK3, CKMBT, CKNDX, CKND1, RICHARD, TROPT, TROIQ, SWAPNIL, TROPT, TNIPOC, BNP, BNPP  Recent Glucose Results:   Lab Results   Component Value Date/Time     (H) 08/07/2017 04:30 AM     ABG:   No results found for: PH, PHI, PCO2, PCO2I, PO2, PO2I, HCO3, HCO3I, FIO2, FIO2I  COAGS:   No results found for: APTT, PTP, INR  Liver Panel:   No results found for: ALB, CBIL, TBIL, TP, GLOB, AGRAT, SGOT, ASTPOC, ALTPOC, ALT, GPT, AP  Pancreatic Markers:   No results found for: AMYLPOCT, AML, LIPPOCT, LPSE    CXR:    Single nonspecific mildly dilated loop of small bowel in the upper abdomen with  questionable wall thickening. Otherwise, the small bowel and colon are  nondilated.       MD Jesse Walker [place]  [phone]

## 2017-08-07 NOTE — PROGRESS NOTES
TRANSFER - OUT REPORT:    Verbal report given to CATALINA Westfall(name) on [facility]  being transferred to (unit) for routine post - op       Report consisted of patients Situation, Background, Assessment and   Recommendations(SBAR). Information from the following report(s) SBAR, Kardex and MAR was reviewed with the receiving nurse. Lines:   Peripheral IV 08/04/17 Right Antecubital (Active)   Site Assessment Clean, dry, & intact 8/7/2017  9:39 AM   Phlebitis Assessment 0 8/7/2017  9:39 AM   Infiltration Assessment 0 8/7/2017  9:39 AM   Dressing Status Clean, dry, & intact 8/7/2017  9:39 AM   Dressing Type Transparent;Tape 8/7/2017  9:39 AM   Hub Color/Line Status Pink 8/7/2017  9:39 AM   Action Taken Open ports on tubing capped 8/7/2017  9:39 AM   Alcohol Cap Used Yes 8/7/2017  9:39 AM        Opportunity for questions and clarification was provided.       Patient transported with:   [employer]

## 2017-08-07 NOTE — PROGRESS NOTES
**SHIFT SUMMARY**     Shift uneventful today. BP well controlled; no PRN antihypertensives given. No c/o n/v. No c/o pain or sob. Patient finished 24hr urine this am however sample was not sent to the lab in time and will need to be recollected. Recollection to start on Telemetry. Patient is to be NPO after midnight tonight for [facility] placement with Dr. Pritesh Alvarado. Patient is then to start a MWF dialysis schedule. Report was given to Heaven Kunz RN on Telemetry and will transfer this evening.

## 2017-08-07 NOTE — BRIEF OP NOTE
BRIEF OPERATIVE NOTE    Date of Procedure: 8/7/2017  Preoperative Diagnosis: ESRD  Postoperative Diagnosis: ESRD    Procedure: Exchange of temporary dialysis catheter for a tunneled right IJ catheter over a wire  Surgeon: Troy Oconnell MD  Anesthesia: Moderate sedation  Estimated Blood Loss: Minimal  Specimens: * Cannot find log *   Findings: Satisfactory placement into atriocaval junction, good blood return with no resistance to flushing   Complications: None  Implants: 19cm Palindrome catheter

## 2017-08-08 PROBLEM — E43 SEVERE PROTEIN-CALORIE MALNUTRITION (HCC): Status: ACTIVE | Noted: 2017-08-08

## 2017-08-08 LAB
ABO + RH BLD: NORMAL
APTT PPP: 31.4 SEC (ref 23–36.4)
BASOPHILS # BLD AUTO: 0.2 K/UL (ref 0–0.1)
BASOPHILS # BLD: 1 % (ref 0–3)
BLD PROD TYP BPU: NORMAL
BLOOD GROUP ANTIBODIES SERPL: NORMAL
BPU ID: NORMAL
CALLED TO:,BCALL1: NORMAL
CALLED TO:,BCALL2: NORMAL
CK MB CFR SERPL CALC: ABNORMAL % (ref 0–4)
CK MB CFR SERPL CALC: ABNORMAL % (ref 0–4)
CK MB SERPL-MCNC: <1 NG/ML (ref 5–25)
CK MB SERPL-MCNC: <1 NG/ML (ref 5–25)
CK SERPL-CCNC: 114 U/L (ref 39–308)
CK SERPL-CCNC: 117 U/L (ref 39–308)
CROSSMATCH RESULT,%XM: NORMAL
DIFFERENTIAL METHOD BLD: ABNORMAL
EOSINOPHIL # BLD: 0.2 K/UL (ref 0–0.4)
EOSINOPHIL NFR BLD: 1 % (ref 0–5)
ERYTHROCYTE [DISTWIDTH] IN BLOOD BY AUTOMATED COUNT: 15.8 % (ref 11.6–14.5)
ERYTHROCYTE [DISTWIDTH] IN BLOOD BY AUTOMATED COUNT: 16.1 % (ref 11.6–14.5)
HCT VFR BLD AUTO: 26 % (ref 36–48)
HCT VFR BLD AUTO: 29.5 % (ref 36–48)
HGB BLD-MCNC: 8.5 G/DL (ref 13–16)
HGB BLD-MCNC: 9.3 G/DL (ref 13–16)
LYMPHOCYTES # BLD AUTO: 13 % (ref 20–51)
LYMPHOCYTES # BLD: 2.1 K/UL (ref 0.8–3.5)
MCH RBC QN AUTO: 29.5 PG (ref 24–34)
MCH RBC QN AUTO: 29.6 PG (ref 24–34)
MCHC RBC AUTO-ENTMCNC: 31.5 G/DL (ref 31–37)
MCHC RBC AUTO-ENTMCNC: 32.7 G/DL (ref 31–37)
MCV RBC AUTO: 90.6 FL (ref 74–97)
MCV RBC AUTO: 93.7 FL (ref 74–97)
MONOCYTES # BLD: 2.3 K/UL (ref 0–1)
MONOCYTES NFR BLD AUTO: 14 % (ref 2–9)
NEUTS SEG # BLD: 11.5 K/UL (ref 1.8–8)
NEUTS SEG NFR BLD AUTO: 71 % (ref 42–75)
PLATELET # BLD AUTO: 208 K/UL (ref 135–420)
PLATELET # BLD AUTO: 248 K/UL (ref 135–420)
PLATELET COMMENTS,PCOM: ABNORMAL
PMV BLD AUTO: 10.1 FL (ref 9.2–11.8)
PMV BLD AUTO: 11.8 FL (ref 9.2–11.8)
RBC # BLD AUTO: 2.87 M/UL (ref 4.7–5.5)
RBC # BLD AUTO: 3.15 M/UL (ref 4.7–5.5)
RBC MORPH BLD: ABNORMAL
SPECIMEN EXP DATE BLD: NORMAL
STATUS OF UNIT,%ST: NORMAL
TROPONIN I SERPL-MCNC: 0.19 NG/ML (ref 0–0.06)
TROPONIN I SERPL-MCNC: 0.24 NG/ML (ref 0–0.06)
UNIT DIVISION, %UDIV: 0
WBC # BLD AUTO: 13.5 K/UL (ref 4.6–13.2)
WBC # BLD AUTO: 16.3 K/UL (ref 4.6–13.2)

## 2017-08-08 PROCEDURE — 74011250637 HC RX REV CODE- 250/637: Performed by: HOSPITALIST

## 2017-08-08 PROCEDURE — 93005 ELECTROCARDIOGRAM TRACING: CPT

## 2017-08-08 PROCEDURE — 74011250636 HC RX REV CODE- 250/636: Performed by: HOSPITALIST

## 2017-08-08 PROCEDURE — 97161 PT EVAL LOW COMPLEX 20 MIN: CPT

## 2017-08-08 PROCEDURE — 85025 COMPLETE CBC W/AUTO DIFF WBC: CPT | Performed by: HOSPITALIST

## 2017-08-08 PROCEDURE — 85027 COMPLETE CBC AUTOMATED: CPT | Performed by: HOSPITALIST

## 2017-08-08 PROCEDURE — 36415 COLL VENOUS BLD VENIPUNCTURE: CPT | Performed by: HOSPITALIST

## 2017-08-08 PROCEDURE — 85730 THROMBOPLASTIN TIME PARTIAL: CPT | Performed by: HOSPITALIST

## 2017-08-08 PROCEDURE — 74011250637 HC RX REV CODE- 250/637: Performed by: INTERNAL MEDICINE

## 2017-08-08 PROCEDURE — 74011250636 HC RX REV CODE- 250/636: Performed by: INTERNAL MEDICINE

## 2017-08-08 PROCEDURE — 97166 OT EVAL MOD COMPLEX 45 MIN: CPT

## 2017-08-08 PROCEDURE — 97116 GAIT TRAINING THERAPY: CPT

## 2017-08-08 PROCEDURE — 65660000000 HC RM CCU STEPDOWN

## 2017-08-08 PROCEDURE — 82550 ASSAY OF CK (CPK): CPT | Performed by: INTERNAL MEDICINE

## 2017-08-08 PROCEDURE — 93970 EXTREMITY STUDY: CPT

## 2017-08-08 RX ORDER — CARVEDILOL 12.5 MG/1
12.5 TABLET ORAL 2 TIMES DAILY WITH MEALS
Status: DISCONTINUED | OUTPATIENT
Start: 2017-08-08 | End: 2017-08-09

## 2017-08-08 RX ORDER — GUAIFENESIN 100 MG/5ML
81 LIQUID (ML) ORAL DAILY
Status: DISCONTINUED | OUTPATIENT
Start: 2017-08-09 | End: 2017-08-08

## 2017-08-08 RX ORDER — GUAIFENESIN 100 MG/5ML
81 LIQUID (ML) ORAL DAILY
Status: DISCONTINUED | OUTPATIENT
Start: 2017-08-08 | End: 2017-08-18 | Stop reason: HOSPADM

## 2017-08-08 RX ORDER — MORPHINE SULFATE 2 MG/ML
1 INJECTION, SOLUTION INTRAMUSCULAR; INTRAVENOUS ONCE
Status: COMPLETED | OUTPATIENT
Start: 2017-08-08 | End: 2017-08-08

## 2017-08-08 RX ORDER — HEPARIN SODIUM 10000 [USP'U]/100ML
18-36 INJECTION, SOLUTION INTRAVENOUS
Status: DISCONTINUED | OUTPATIENT
Start: 2017-08-08 | End: 2017-08-08

## 2017-08-08 RX ORDER — SIMVASTATIN 20 MG/1
20 TABLET, FILM COATED ORAL
Status: DISCONTINUED | OUTPATIENT
Start: 2017-08-08 | End: 2017-08-18 | Stop reason: HOSPADM

## 2017-08-08 RX ADMIN — HEPARIN SODIUM 5000 UNITS: 5000 INJECTION, SOLUTION INTRAVENOUS; SUBCUTANEOUS at 07:35

## 2017-08-08 RX ADMIN — SIMVASTATIN 20 MG: 20 TABLET, FILM COATED ORAL at 22:08

## 2017-08-08 RX ADMIN — Medication 1 MG: at 04:28

## 2017-08-08 RX ADMIN — DOXYCYCLINE 100 MG: 100 CAPSULE ORAL at 12:52

## 2017-08-08 RX ADMIN — DOXYCYCLINE 100 MG: 100 CAPSULE ORAL at 22:08

## 2017-08-08 RX ADMIN — AMLODIPINE BESYLATE 10 MG: 5 TABLET ORAL at 10:11

## 2017-08-08 RX ADMIN — ASPIRIN 81 MG 81 MG: 81 TABLET ORAL at 17:35

## 2017-08-08 RX ADMIN — CARVEDILOL 6.25 MG: 3.12 TABLET, FILM COATED ORAL at 10:11

## 2017-08-08 RX ADMIN — CALCITRIOL 0.25 MCG: 0.25 CAPSULE, LIQUID FILLED ORAL at 10:11

## 2017-08-08 RX ADMIN — NYSTATIN: 100000 OINTMENT TOPICAL at 22:09

## 2017-08-08 RX ADMIN — PANTOPRAZOLE SODIUM 40 MG: 40 TABLET, DELAYED RELEASE ORAL at 07:35

## 2017-08-08 RX ADMIN — HEPARIN SODIUM 5000 UNITS: 5000 INJECTION, SOLUTION INTRAVENOUS; SUBCUTANEOUS at 17:35

## 2017-08-08 RX ADMIN — Medication 1 TABLET: at 10:11

## 2017-08-08 RX ADMIN — NYSTATIN: 100000 OINTMENT TOPICAL at 10:13

## 2017-08-08 RX ADMIN — NYSTATIN: 100000 OINTMENT TOPICAL at 17:37

## 2017-08-08 RX ADMIN — Medication 10 ML: at 07:36

## 2017-08-08 RX ADMIN — CARVEDILOL 12.5 MG: 12.5 TABLET, FILM COATED ORAL at 17:35

## 2017-08-08 NOTE — PROGRESS NOTES
At this time dialysis chair still pending,spoke with Reagan Ellis from [facility] numbers cm provided [employer] contact number to discuss medicaid process.

## 2017-08-08 NOTE — PROGRESS NOTES
Hospitalist Progress Note    Patient: Arleen Mckeon MRN: [id number]  CSN: [id number]    YOB: 1965  Age: 49 y.o.   Sex: male    DOA: 8/4/2017 LOS:  LOS: 4 days          Chief Complaint:    ARF      Assessment/Plan     ARF- now dialysis dependent  HTN urgency  Anemia of CKD  Severe prot abril malnutrition  Uremia resolved  Chest pain    His chest pain is atypical, worse with deep breath and lying down, however at hi risk for ischemia  Will trend card enzymes-his echo showed normal EF-and if recurrent CP-order treadmill stress Thursday as his endurance is improved  Continue dialysis and seeking outpt set-up for him  Inc dose of coreg with concern for cardiac disease and uncontroled HTN-stop norvasc  Add asa daily  Add statin, make sure we check lipids    monmitor on tele    Patient Active Problem List   Diagnosis Code    Routine general medical examination at a health care facility Z00.00    Unspecified essential hypertension I10    Tobacco abuse Z72.0    PAM (acute kidney injury) ([facility] [address] N17.9    Hypertensive urgency, malignant I16.0    Uremia N19    Severe anemia D64.9    Nonadherence to medical treatment Z91.19    Rash of genital area R21    Pleural effusion, right J90    Severe protein-calorie malnutrition ([address] E43       Subjective:    occasional chest pain, does not last long, sometimes right side of chest, sometimes left, worse when lying down, but Annia Cone today, happened last night  His strength getting better  Denies SOB  Weakness improved as is appetite    Review of systems:    Constitutional: denies fevers, chills, myalgias  Respiratory: denies SOB, cough  Cardiovascular: denies palpitations  Gastrointestinal: denies nausea, vomiting, diarrhea      Vital signs/Intake and Output:  Visit Vitals    /87 (BP 1 Location: Left arm, BP Patient Position: At rest)    Pulse 75    Temp 98.5 °F (36.9 °C)    Resp 17    Ht 5' 5\" (1.651 m)    Wt 55.1 kg (121 lb 6.4 oz)    SpO2 100%  BMI 20.2 kg/m2     Current Shift:  08/08 0701 - 08/08 1900  In: 240 [P.O.:240]  Out: -   Last three shifts:  08/06 1901 - 08/08 0700  In: 100 [P.O.:100]  Out: 750 [Urine:750]    Exam:    General: Well developed, alert, NAD, OX3  Head/Neck: NCAT, supple, No masses, No lymphadenopathy  CVS:Regular rate and rhythm, no M/R/G, S1/S2 heard, no thrill  Lungs:Clear to auscultation bilaterally, no wheezes, rhonchi, or rales  Abdomen: Soft, Nontender, No distention, Normal Bowel sounds, No hepatomegaly  Extremities: No C/C/E, pulses palpable 2+  Skin:normal texture and turgor, no rashes, no lesions  Neuro:grossly normal , follows commands  Psych:appropriate                Labs: Results:       Chemistry Recent Labs      08/07/17   0430  08/06/17   0451   GLU  111*  99   NA  138  140   K  4.8  4.3   CL  102  103   CO2  25  28   BUN  38*  26*   CREA  8.10*  5.89*   CA  6.7*  7.4*   AGAP  11  9   BUCR  5*  4*      CBC w/Diff Recent Labs      08/08/17   0405  08/07/17   0430  08/06/17   0451   WBC  13.5*  12.2  11.5   RBC  2.87*  2.44*  2.58*   HGB  8.5*  7.3*  7.7*   HCT  26.0*  22.6*  22.9*   PLT  208  213  208      Cardiac Enzymes Recent Labs      08/08/17   0420   CPK  117   CKND1  CALCULATION NOT PERFORMED WHEN RESULT IS BELOW LINEAR LIMIT      Coagulation No results for input(s): PTP, INR, APTT in the last 72 hours. No lab exists for component: INREXT    Lipid Panel Lab Results   Component Value Date/Time    Cholesterol, total 195 11/12/2013 10:53 AM    HDL Cholesterol 42 11/12/2013 10:53 AM    LDL, calculated 121.4 11/12/2013 10:53 AM    VLDL, calculated 31.6 11/12/2013 10:53 AM    Triglyceride 158 11/12/2013 10:53 AM    CHOL/HDL Ratio 4.6 11/12/2013 10:53 AM      BNP No results for input(s): BNPP in the last 72 hours. Liver Enzymes No results for input(s): TP, ALB, TBIL, AP, SGOT, GPT in the last 72 hours.     No lab exists for component: DBIL   Thyroid Studies No results found for: T4, T3U, TSH, TSHEXT Procedures/imaging: see electronic medical records for all procedures/Xrays and details which were not copied into this note but were reviewed prior to creation of Terrie Ochoa MD

## 2017-08-08 NOTE — ROUTINE PROCESS
Bedside and Verbal shift change report given to CAMILA Leo RN (oncoming nurse) by Dev Aguilera RN  (offgoing nurse). Report included the following information SBAR, Kardex, Intake/Output, MAR, Recent Results and Cardiac Rhythm SR BBB.

## 2017-08-08 NOTE — PROGRESS NOTES
0730 Assumed care of pt from [facility]. Pt resting quietly in bed , no signs of distress, call bell within reach. 1230 Lab called and states that 24 hour urine collection would need to be repeated. States that urine collection bottle was not correct and they are waiting for [facility] to deliver the correct bottle. [address] with Uriel Rosado, vascular tech states that blood clot was noted at the [facility] insertion site, states that she will put in her report momentarily. 1342 Paged  to inform her of vascular results    1348 Spoke with , pt to begin Heparin gtt- DVT protocol with no starting bolus  -spoke with Artemio Patel in pharmacy to assist with putting in correct Heparin protocol order    1500 Called down to lab, PTT was not drawn yet,  said pt was not in room. Tech on the way back up to draw lab. Shift Summary- Shift uneventful. Pt rested comfortably with family at bedside. Denied any chest pain, shortness of breath or discomfort. Pt able to ambulate in hallway with PT, swallow medication whole, ate more than 50% of all meals. Pt's ptt had not been drawn yet, Heparin gtt was not started.

## 2017-08-08 NOTE — PROGRESS NOTES
Problem: Mobility Impaired (Adult and Pediatric)  Goal: *Acute Goals and Plan of Care (Insert Text)  Physical Therapy Goals  Initiated 8/8/2017 and to be accomplished within 3-7 day(s)  1. Patient will move from supine to sit and sit to supine in bed with independence. 2. Patient will transfer from bed to chair and chair to bed with modified independence using the least restrictive device. 3. Patient will perform sit to stand with modified independence. 4. Patient will ambulate with modified independence for 150 feet with the least restrictive device. 5. Patient will ascend/descend 13 stairs with Nae handrail(s) with modified independence. Outcome: Progressing Towards Goal  PHYSICAL THERAPY TREATMENT     Patient: Horacio Kiran (49 y.o. male)  Date: 8/8/2017  Diagnosis: PAM (acute kidney injury) ([facility] [address] PAM (acute kidney injury) ([facility] [address]  Precautions: Fall   Chart, physical therapy assessment, plan of care and goals were reviewed. ASSESSMENT:  Pt tolerated increased ambulation distance, 110 ft. Pt amb s/AD, GB applied, 2L O2 via NC, toward end of amb period pt demonstrated fatigue, lateral deviations, slight unsteadiness, pt reaching for wall for support required CGA/Desean to steady. With standing rest pt able to amb back to room with CGA. Pt fatigued after amb. Pt educated on seated there ex to be perform EOB, LAQ, marches with cues for upright posture and core stabilization. Encouraged pt to amb in room with assistance and perform exercises throughout the day. Plan to continue to progress as pt tolerates,  Progression toward goals:  [X]      Improving appropriately and progressing toward goals  [ ]      Improving slowly and progressing toward goals  [ ]      Not making progress toward goals and plan of care will be adjusted       PLAN:  Patient continues to benefit from skilled intervention to address the above impairments. Continue treatment per established plan of care.   Discharge Recommendations: Home Health  Further Equipment Recommendations for Discharge:  N/A       SUBJECTIVE:   Patient stated I am doing ok, just a little tired.       OBJECTIVE DATA SUMMARY:   Critical Behavior:  Neurologic State: Alert, Appropriate for age  Orientation Level: Oriented X4, Appropriate for age  Cognition: Appropriate decision making, Appropriate for age attention/concentration, Appropriate safety awareness  Safety/Judgement: Awareness of environment  Functional Mobility Training:  Bed Mobility:  Supine to Sit: Modified independent  Sit to Supine: Modified independent  Transfers:  Sit to Stand: Supervision  Stand to Sit: Supervision  Balance:  Sitting: Intact  Standing: Impaired; With support  Standing - Static: Good  Standing - Dynamic : Fair  Ambulation/Gait Training:  Distance (ft): 110 Feet (ft)  Assistive Device: Gait belt  Ambulation - Level of Assistance: Contact guard assistance  Gait Description (WDL): Exceptions to WDL  Gait Abnormalities: Decreased step clearance; Path deviations  Base of Support: Narrowed  Speed/Elodia: Slow  Step Length: Right shortened;Left shortened  Swing Pattern: Right asymmetrical;Left asymmetrical  Therapeutic Exercises:   Seated there ex: ankle pumps, LAQ, marches, x10 ea c/cues for posture and core stabilization  Pain:  Pain Scale 1: Numeric (0 - 10)  Pain Intensity 1: 0  Pain Location 1: Chest;Back  Pain Intervention(s) 1: Medication (see MAR);MD notified (comment)  Activity Tolerance:   Fair  Please refer to the flowsheet for vital signs taken during this treatment.   After treatment:   [ ] Patient left in no apparent distress sitting up in chair  [X] Patient left in no apparent distress in bed  [X] Call bell left within reach  [ ] Nursing notified  [ ] Caregiver present  [ ] Bed alarm activated      Livia Macedo   Time Calculation: 13 mins

## 2017-08-08 NOTE — PROGRESS NOTES
Alarm parameters reviewed, on and audible Appropriate for patient clinical condition. 1630: Spoke with Dr. Miguel Kulkarni regarding pt's heparin drip. PTT 31.4. However thrombosis is superficial adherent to TDC line. Heparin drip not started as thrombosis is superficial.  Received orders to D/C. Shift summary:  Pt remained stable. No acute changes. Family at bedside during the afternoon. Pt resting comfortably. No complaints of pain. Pt able to ambulate safely in room without assistance.

## 2017-08-08 NOTE — PROGRESS NOTES
Bedside and Verbal shift change report given to Tony Ty RN (oncoming nurse) by Chrissie López RN (offgoing nurse). Report included the following information SBAR, Kardex, ED Summary, Procedure Summary, Intake/Output, MAR, Accordion, Recent Results and Med Rec Status.

## 2017-08-08 NOTE — PROGRESS NOTES
Problem: Mobility Impaired (Adult and Pediatric)  Goal: *Acute Goals and Plan of Care (Insert Text)  Physical Therapy Goals  Initiated 8/8/2017 and to be accomplished within 3-7 day(s)  1. Patient will move from supine to sit and sit to supine in bed with independence. 2. Patient will transfer from bed to chair and chair to bed with modified independence using the least restrictive device. 3. Patient will perform sit to stand with modified independence. 4. Patient will ambulate with modified independence for 150 feet with the least restrictive device. 5. Patient will ascend/descend 13 stairs with Nae handrail(s) with modified independence. Outcome: Progressing Towards Goal  PHYSICAL THERAPY EVALUATION     Patient: Zaire Burkett (49 y.o. male)  Date: 8/8/2017  Primary Diagnosis: PAM (acute kidney injury) [facility])  Precautions:   Fall      ASSESSMENT :  Based on the objective data described below, the patient presents with lower extremity weakness, decreased gait quality and ambulation, impaired stair negotiation, decreased dynamic standing balance, and overall limitations in functional mobility. Pt was independent  Prior to admission and did not require AD. Pt has 14 steps to get into home. Pt performed supine to sit with Nae, sit to stand with supervision. Patient ambulated 40 feet with GB applied and SBA/CGA. Pt demonstrated slight unsteadiness with amb, decrease wilder and step length. Pt tolerated session well as evidenced by no c/o increased pain, no lightheadedness, no dizziness. Pt on 2L O2 via NC; pt was not on O2 at home, pt would benefit from weaning O2 when appropriate, as pt tolerates. Patient would benefit from skilled inpatient physical therapy to address deficits, progress as tolerated to achieve long term goals and allow safe discharge. Patient will benefit from skilled intervention to address the above impairments.   Patients rehabilitation potential is considered to be Good  Factors which may influence rehabilitation potential include:   [ ]         None noted  [ ]         Mental ability/status  [X]         Medical condition  [ ]         Home/family situation and support systems  [ ]         Safety awareness  [ ]         Pain tolerance/management  [ ]         Other:        PLAN :  Recommendations and Planned Interventions:  [ ]           Bed Mobility Training             [X]    Neuromuscular Re-Education  [X]           Transfer Training                   [ ]    Orthotic/Prosthetic Training  [X]           Gait Training                          [ ]    Modalities  [X]           Therapeutic Exercises          [ ]    Edema Management/Control  [X]           Therapeutic Activities            [X]    Patient and Family Training/Education  [ ]           Other (comment):     Frequency/Duration: Patient will be followed by physical therapy 1-2 times per day to address goals. Discharge Recommendations: To Be Determined  Further Equipment Recommendations for Discharge: Will continue to assess       SUBJECTIVE:   Patient stated I feel a little weak.       OBJECTIVE DATA SUMMARY:       Past Medical History:   Diagnosis Date    Hypertension      Non compliance w medication regimen       noncompliant with HTN meds   History reviewed. No pertinent surgical history.   Barriers to Learning/Limitations: None  Compensate with: N/A  Prior Level of Function/Home Situation: Independent amb s/AD  Home Situation  Home Environment: Private residence  # Steps to Enter: 15  Rails to Enter: Yes  Hand Rails : Right  One/Two Story Residence: One story  # of Interior Steps: 0  Height of Each Step (in): 6 inches  Interior Rails: None  Lift Chair Available: No  Living Alone: No  Support Systems: Spouse/Significant Other/Partner, Family member(s)  Patient Expects to be Discharged to[de-identified] Private residence  Current DME Used/Available at Home: None  Critical Behavior:  Psychosocial  Purposeful Interaction: Yes  Pt Identified Daily Priority: Clinical issues (comment); Communication issues (comment)  Caritas Process: Nurture loving kindness;Nurture spiritual self;Establish trust;Attend basic human needs  Caring Interventions: Reassure; Therapeutic modalities  Reassure: Therapeutic listening;Quiet presence;Caring rounds  Therapeutic Modalities: Humor; Intentional therapeutic touch  Skin Condition/Temp: Dry;Warm  Skin Integrity: Other (comment) (scrotal rash )  Skin Integumentary  Skin Color: Appropriate for ethnicity  Skin Condition/Temp: Dry;Warm  Skin Integrity: Other (comment) (scrotal rash )  Turgor: Non-tenting  Hair Growth: Present  Varicosities: Absent  Strength:    Strength: Generally decreased, functional  Tone & Sensation:   Tone: Normal  Sensation: Impaired  Range Of Motion:  AROM: Within functional limits  Functional Mobility:  Bed Mobility:  Supine to Sit: Modified independent  Sit to Supine: Modified independent  Transfers:  Sit to Stand: Supervision  Stand to Sit: Supervision  Balance:   Sitting: Intact  Standing: Impaired; Without support  Standing - Static: Good  Standing - Dynamic : Fair  Ambulation/Gait Training:  Distance (ft): 40 Feet (ft)  Assistive Device: Gait belt  Ambulation - Level of Assistance: Stand-by asssistance;Contact guard assistance  Gait Description (WDL): Exceptions to WDL  Gait Abnormalities: Decreased step clearance;Shuffling gait  Base of Support: Narrowed  Speed/Elodia: Slow  Step Length: Right shortened;Left shortened  Swing Pattern: Right asymmetrical;Left asymmetrical  Pain:  Pain Scale 1: Numeric (0 - 10)  Pain Intensity 1: 0  Pain Location 1: Chest;Back  Pain Intervention(s) 1: Medication (see MAR);MD notified (comment)  Activity Tolerance:   Good  Please refer to the flowsheet for vital signs taken during this treatment.   After treatment:   [ ]         Patient left in no apparent distress sitting up in chair  [X]         Patient left in no apparent distress in bed  [X]         Call bell left within reach  [ ]         Nursing notified  [ ]         Caregiver present  [ ]         Bed alarm activated      COMMUNICATION/EDUCATION:   [X]         Fall prevention education was provided and the patient/caregiver indicated understanding. [X]         Patient/family have participated as able in goal setting and plan of care. [ ]         Patient/family agree to work toward stated goals and plan of care. [ ]         Patient understands intent and goals of therapy, but is neutral about his/her participation. [ ]         Patient is unable to participate in goal setting and plan of care.      Thank you for this referral.  Job Macedo   Time Calculation: 10 mins      Eval Complexity: History: MEDIUM  Complexity : 1-2 comorbidities / personal factors will impact the outcome/ POC Exam:LOW Complexity : 1-2 Standardized tests and measures addressing body structure, function, activity limitation and / or participation in recreation  Presentation: LOW Complexity : Stable, uncomplicated  Clinical Decision Making:Low Complexity amb >30 ft, transfers c/supervision/SBA Overall Complexity:LOW

## 2017-08-08 NOTE — PROCEDURES
[facility]  *** FINAL REPORT ***    Name: Apoorva Espinal  MRN: [id number]    Inpatient  : 15 Sep 1965  HIS Order #: [id number]  [id number] [facility] Visit #: [id number]  Date: 08 Aug 2017    TYPE OF TEST: Peripheral Venous Testing    REASON FOR TEST  Surveillance    Right Arm:-  Deep venous thrombosis:           Not examined  Superficial venous thrombosis:    Not examined    Left Arm:-  Deep venous thrombosis:           Not examined  Superficial venous thrombosis:    Not examined    Vein Mapping:    Diam.   Depth  (mm)    Right Cephalic Vein:    Axilla:    Upper Arm:       3.3     5.3    Lower Arm:       2.5     4.0    Antecubital:     3.6     3.1    Upper Forearm:   2.5     1.2    Forearm:         2.4     1.8    Wrist:           1.6     2.0    Right Basilic Vein:    Upper Arm:       7.7     4.5    Lower Arm:       6.3     4.7    Antecubital:     4.5     2.6    Upper Forearm:   2.1     1.3    Lower Forearm:   1.6     1.3    Wrist:    R.Median Cubital:  4.1     3.5    Right Brachial Vein:    Proximal:        3.8    11.1    Distal:          2.7     6.7    Right Subclavian Artery:  Right Axillary Artery  : Normal    Left Cephalic Vein:    Axilla:          3.5     6.0    Upper Arm:       3.5     6.0    Lower Arm:       2.8     6.0    Antecubital:     3.2     6.0    Upper Forearm:   1.5     6.0    Forearm:            6.0    Wrist:           2.1     6.0    Left Basilic Vein:    Upper Arm:       4.3     6.0    Lower Arm:       4.4     6.0    Antecubital:     2.8     6.0    Upper Forearm:   2.0     6.0    Lower Forearm:   1.4     6.0    Wrist:              6. 0    L. Median Cubital:  2.2     7.4    Left Brachial Vein:    Proximal:        5.7    12.1    Distal:          3.9     8.2    Left Subclavian Artery:  Left Axillary Artery  : Normal      INTERPRETATION/FINDINGS  Duplex images were obtained using 2-D gray scale, color flow, and  spectral Doppler analysis. Right arm :  1.  The cephalic vein may be an inadequate conduit for distal bypass -  the smallest diameter is 1.6 mm.  2. The basilic vein may be an inadequate conduit for distal bypass -  the smallest diameter is 1.6 mm.  3. Superficial thrombosis noticed in the basilic at the cubital fossa  and is adherent to the [facility] catheter line  Left arm :  1. The cephalic vein may be an inadequate conduit for distal bypass -  the smallest diameter is 1.5 mm.  2. The basilic vein may be an inadequate conduit for distal bypass -  the smallest diameter is 1.4 mm. ADDITIONAL COMMENTS  Verbal report given to Pacheco Alexandre RN    I have personally reviewed the data relevant to the interpretation of  this  study. TECHNOLOGIST: Natasha Horne  Signed: 08/08/2017 02:58 PM    PHYSICIAN: Erickson Rondon MD  Signed: 08/09/2017 02:56 PM

## 2017-08-08 NOTE — PROGRESS NOTES
Nephrology Progress Note      History of Present Illness:  Lianne Hong is a 49 y.o. AA male with a past medical history significant for HTN, followed at [facility], was on labetalol and norvasc but out of home meds for a while. Pt presented to the hospital with worsening SOB, weakness and tremors. Pt has experienced poor appetite and n/v. No cp, fever or chills. He did take BC powders prn pain. Cr was 1.2 in 11/2013, now elevated to 15.6 today.  and CO2 at 13, K 5.4. Pt however reports having \"normal amount\" of urine output. Nephrology was consulted for further evaluation and management of his renal failure. Pt had TDC and tolerated HD yesterday. No new complaints today. No SOB, CP or LE edema. Impression:     - PAM on CKD vs possible advanced CKD. Suspect pt has been having progressive CKD due to uncontrolled hypertension. Possible PAM could be due to hypertensive urgency . Don't have any recent baseline lab. Echogenic kidneys on US. Started on HD on 8/4. Normal c3, c4, RADHA, ANCA, Hep B/C  - Hypertensive urgency, improving  - Metabolic acidosis, improving   - Anemia of CKD, adequate iron.  S/p prbc tx  - Sec HPT, on Calcitriol daily   - Proteinuria  - Hematuria    Plan:   - Next HD tomorrow   - Avoid IV contrast and NSAIDs for now  - Continue current BP management  - Continue EPO TIW  - Needs permanent HD access  - Out pt HD chair placement in progress         Medication:  Inpatient meds:  Current Facility-Administered Medications   Medication Dose Route Frequency    0.9% sodium chloride infusion 250 mL  250 mL IntraVENous PRN    vit B Cmplx 3-FA-Vit C-Biotin (NEPHRO FLETCHER RX) tablet 1 Tab  1 Tab Oral DAILY    0.9% sodium chloride infusion 250 mL  250 mL IntraVENous PRN    nystatin (MYCOSTATIN) 100,000 unit/gram ointment   Topical TID    calcitRIOL (ROCALTROL) capsule 0.25 mcg  0.25 mcg Oral DAILY    epoetin luann (EPOGEN;PROCRIT) 5,000 Units  5,000 Units IntraVENous DIALYSIS MON, WED & FRI    doxycycline (MONODOX) capsule 100 mg  100 mg Oral Q12H    cloNIDine (CATAPRES) 0.2 mg/24 hr patch 1 Patch  1 Patch TransDERmal Q7D    pantoprazole (PROTONIX) tablet 40 mg  40 mg Oral ACB    carvedilol (COREG) tablet 6.25 mg  6.25 mg Oral BID WITH MEALS    ondansetron (ZOFRAN) injection 4 mg  4 mg IntraVENous Q6H PRN    acetaminophen (TYLENOL) tablet 650 mg  650 mg Oral Q6H PRN    heparin (porcine) injection 5,000 Units  5,000 Units SubCUTAneous Q8H    hydrALAZINE (APRESOLINE) 20 mg/mL injection 20 mg  20 mg IntraVENous Q6H PRN    0.9% sodium chloride infusion 250 mL  250 mL IntraVENous PRN    amLODIPine (NORVASC) tablet 10 mg  10 mg Oral DAILY         Physical Examination    Vital signs:   Visit Vitals    /77    Pulse 73    Temp 98.9 °F (37.2 °C)    Resp 12    Ht 5' 5\" (1.651 m)    Wt 55.1 kg (121 lb 6.4 oz)    SpO2 100%    BMI 20.2 kg/m2       Intake/Output Summary (Last 24 hours) at 08/08/17 1031  Last data filed at 08/08/17 0800   Gross per 24 hour   Intake              100 ml   Output              750 ml   Net             -650 ml       Physical Exam:    General: No acute distress, has wasted appearance   HENT: Atraumatic and normocephalic   Eyes: Normal conjunctiva, EOMI   Neck: Supple with no mass or JVD   Cardiovascular: Normal S1 & S2, no m/r/g   Pulmonary/Chest Wall: Clear to auscultation bilaterally, no w/c   Abdominal: Soft and non-tender, normal active bowel sound   Musculoskeletal: No edema lower ext bilaterraly   Skin: No lesions   Neurological: No focal neurological deficits, no asterixis   HD access: Rt IJ TDC      Data Review:  BMP:   No results found for: NA, K, CL, CO2, AGAP, GLU, BUN, CREA, GFRAA, GFRNA  CMP:   No results found for: NA, K, CL, CO2, AGAP, GLU, BUN, CREA, GFRAA, GFRNA, CA, MG, PHOS, ALB, TBIL, TP, ALB, GLOB, AGRAT, SGOT, ALT, GPT  CBC:   Lab Results   Component Value Date/Time    WBC 13.5 (H) 08/08/2017 04:05 AM    HGB 8.5 (L) 08/08/2017 04:05 AM    HCT 26.0 (L) 08/08/2017 04:05 AM     08/08/2017 04:05 AM     All Cardiac Markers in the last 24 hours:   Lab Results   Component Value Date/Time     08/08/2017 04:20 AM    CKMB <1.0 08/08/2017 04:20 AM    CKND1  08/08/2017 04:20 AM     CALCULATION NOT PERFORMED WHEN RESULT IS BELOW LINEAR LIMIT    TROIQ 0.24 (H) 08/08/2017 04:20 AM     Recent Glucose Results:   No results found for: GLU  ABG:   No results found for: PH, PHI, PCO2, PCO2I, PO2, PO2I, HCO3, HCO3I, FIO2, FIO2I  COAGS:   No results found for: APTT, PTP, INR  Liver Panel:   No results found for: ALB, CBIL, TBIL, TP, GLOB, AGRAT, SGOT, ASTPOC, ALTPOC, ALT, GPT, AP  Pancreatic Markers:   No results found for: AMYLPOCT, AML, LIPPOCT, LPSE    CXR:    Single nonspecific mildly dilated loop of small bowel in the upper abdomen with  questionable wall thickening. Otherwise, the small bowel and colon are  nondilated.       Yasmine Wilkins MD  [facility] [place]  [phone]

## 2017-08-09 PROCEDURE — 74011250637 HC RX REV CODE- 250/637: Performed by: INTERNAL MEDICINE

## 2017-08-09 PROCEDURE — 65660000000 HC RM CCU STEPDOWN

## 2017-08-09 PROCEDURE — 97116 GAIT TRAINING THERAPY: CPT

## 2017-08-09 PROCEDURE — 74011250636 HC RX REV CODE- 250/636: Performed by: HOSPITALIST

## 2017-08-09 PROCEDURE — 97530 THERAPEUTIC ACTIVITIES: CPT

## 2017-08-09 PROCEDURE — 74011250637 HC RX REV CODE- 250/637: Performed by: HOSPITALIST

## 2017-08-09 PROCEDURE — 74011250636 HC RX REV CODE- 250/636: Performed by: INTERNAL MEDICINE

## 2017-08-09 PROCEDURE — 90935 HEMODIALYSIS ONE EVALUATION: CPT

## 2017-08-09 RX ORDER — HEPARIN SODIUM 1000 [USP'U]/ML
2500 INJECTION, SOLUTION INTRAVENOUS; SUBCUTANEOUS ONCE
Status: COMPLETED | OUTPATIENT
Start: 2017-08-09 | End: 2017-08-09

## 2017-08-09 RX ORDER — CARVEDILOL 12.5 MG/1
25 TABLET ORAL 2 TIMES DAILY WITH MEALS
Status: DISCONTINUED | OUTPATIENT
Start: 2017-08-09 | End: 2017-08-18 | Stop reason: HOSPADM

## 2017-08-09 RX ADMIN — SIMVASTATIN 20 MG: 20 TABLET, FILM COATED ORAL at 21:07

## 2017-08-09 RX ADMIN — HEPARIN SODIUM 2500 UNITS: 1000 INJECTION, SOLUTION INTRAVENOUS; SUBCUTANEOUS at 11:00

## 2017-08-09 RX ADMIN — HEPARIN SODIUM 5000 UNITS: 5000 INJECTION, SOLUTION INTRAVENOUS; SUBCUTANEOUS at 00:19

## 2017-08-09 RX ADMIN — NYSTATIN: 100000 OINTMENT TOPICAL at 09:00

## 2017-08-09 RX ADMIN — ERYTHROPOIETIN 5000 UNITS: 3000 INJECTION, SOLUTION INTRAVENOUS; SUBCUTANEOUS at 09:00

## 2017-08-09 RX ADMIN — NYSTATIN: 100000 OINTMENT TOPICAL at 21:08

## 2017-08-09 RX ADMIN — DOXYCYCLINE 100 MG: 100 CAPSULE ORAL at 21:07

## 2017-08-09 RX ADMIN — HEPARIN SODIUM 5000 UNITS: 5000 INJECTION, SOLUTION INTRAVENOUS; SUBCUTANEOUS at 17:06

## 2017-08-09 RX ADMIN — HEPARIN SODIUM 5000 UNITS: 5000 INJECTION, SOLUTION INTRAVENOUS; SUBCUTANEOUS at 23:20

## 2017-08-09 RX ADMIN — NYSTATIN: 100000 OINTMENT TOPICAL at 16:00

## 2017-08-09 RX ADMIN — CARVEDILOL 25 MG: 12.5 TABLET, FILM COATED ORAL at 17:06

## 2017-08-09 NOTE — PROGRESS NOTES
Problem: Mobility Impaired (Adult and Pediatric)  Goal: *Acute Goals and Plan of Care (Insert Text)  Physical Therapy Goals  Initiated 8/8/2017 and to be accomplished within 3-7 day(s)  1. Patient will move from supine to sit and sit to supine in bed with independence. 2. Patient will transfer from bed to chair and chair to bed with modified independence using the least restrictive device. 3. Patient will perform sit to stand with modified independence. 4. Patient will ambulate with modified independence for 150 feet with the least restrictive device. 5. Patient will ascend/descend 13 stairs with Nae handrail(s) with modified independence. Outcome: Progressing Towards Goal  PHYSICAL THERAPY TREATMENT     Patient: Mindy Trevino (49 y.o. male)  Date: 8/9/2017  Diagnosis: PAM (acute kidney injury) ([facility] [address] PAM (acute kidney injury) ([facility] [address]       Precautions: Fall   Chart, physical therapy assessment, plan of care and goals were reviewed. ASSESSMENT:  Pt is very ambulatory, but has 1 LOB episode (Toward Left). Pt is not able to recover without assistance. Pt notes increasing bilateral LE pain and stiffness. Progression toward goals:  [ ]      Improving appropriately and progressing toward goals  [X]      Improving slowly and progressing toward goals  [ ]      Not making progress toward goals and plan of care will be adjusted       PLAN:  Patient continues to benefit from skilled intervention to address the above impairments. Continue treatment per established plan of care. Discharge Recommendations:  Home Health  Further Equipment Recommendations for Discharge:  N/A       SUBJECTIVE:   Patient stated I'm waiting .       OBJECTIVE DATA SUMMARY:   Critical Behavior:  Neurologic State: Alert  Orientation Level: Oriented X4  Cognition: Appropriate decision making, Appropriate for age attention/concentration, Appropriate safety awareness, Follows commands  Safety/Judgement: Awareness of environment  Functional Mobility Training:  Bed Mobility:  Supine to Sit: Modified independent  Sit to Supine: Modified independent  Transfers:  Sit to Stand: Supervision  Stand to Sit: Supervision  Balance:  Sitting: Intact  Standing: Impaired  Standing - Static: Good  Standing - Dynamic : Fair  Ambulation/Gait Training:  Distance (ft): 350 Feet (ft)  Assistive Device: Gait belt  Ambulation - Level of Assistance: Contact guard assistance  Gait Abnormalities: Decreased step clearance; Path deviations  Base of Support: Narrowed  Speed/Elodia: Slow  Step Length: Right shortened;Left shortened  Pain:  Pain Scale 1: Numeric (0 - 10)  Pain Intensity 1: 0   Pain out: 3/10  Activity Tolerance:   Good-  Please refer to the flowsheet for vital signs taken during this treatment.   After treatment:   [ ] Patient left in no apparent distress sitting up in chair  [X] Patient left in no apparent distress in bed  [X] Call bell left within reach  [X] Nursing notified  [ ] Caregiver present  [ ] Bed alarm activated      Katy Pacer, PTA   Time Calculation: 35 mins

## 2017-08-09 NOTE — PROGRESS NOTES
0940:  Dialysis RN at bedside. 0945:  Epogen pulled for dialysis RN. 1030:  Heparin pulled for dialysis RN.

## 2017-08-09 NOTE — PROGRESS NOTES
1915: Assumed patient care, he was alert and oriented to person, place, time and situation. Respiratory status was stable on 2L via nasal cannula. Vital signs were stable. MEWS score was a one. Patient denied any nausea vomiting dizziness or anxiety. White board was updated and explained. Bed was locked and in lowest position. Call bell, water and personal belongings were within reach. Patient had no questions, comments or concerns after bedside shift report. 0700: Patient had an uneventful shift. Respiratory status, vital signs and MEWS score remained stable. Patient was resting quietly with no signs of distress noted. Bed locked and in lowest position. Call bell water and personal belongings were within reach. Patient had no questions, comments or concerns after bedside shift report. [facility].

## 2017-08-09 NOTE — H&P
Hospitalist Progress Note    Patient: Belle Hurst MRN: [id number]  CSN: [id number]    YOB: 1965  Age: 49 y.o. Sex: male    DOA: 8/4/2017 LOS:  LOS: 5 days          Chief Complaint: ARF    Assessment/Plan     ARF on Hemodialysis. HTN urgency. Anemia of CKD. Severe prot abril malnutrition. Uremia resolved. Chest pain. Seen on hemodialysis. Doing well. BP control sub-optimal.  Increase coreg. Follow hgb. Seeking HD chair outpatient. Patient Active Problem List   Diagnosis Code    Routine general medical examination at a health care facility Z00.00    Unspecified essential hypertension I10    Tobacco abuse Z72.0    PAM (acute kidney injury) ([facility] [address] N17.9    Hypertensive urgency, malignant I16.0    Uremia N19    Severe anemia D64.9    Nonadherence to medical treatment Z91.19    Rash of genital area R21    Pleural effusion, right J90    Severe protein-calorie malnutrition (HCC) E43       Subjective:    Seen and examined. Seen on dialysis. Review of systems:    Constitutional: denies fevers, chills, myalgias  Respiratory: denies SOB, cough  Cardiovascular: denies chest pain, palpitations  Gastrointestinal: denies nausea, vomiting, diarrhea      Vital signs/Intake and Output:  Visit Vitals    BP (!) 160/93    Pulse 72    Temp 97.8 °F (36.6 °C) (Oral)    Resp 17    Ht 5' 5\" (1.651 m)    Wt 55.1 kg (121 lb 6.4 oz)    SpO2 100%    BMI 20.2 kg/m2     Current Shift:  08/09 0701 - 08/09 1900  In: -   Out: 1500   Last three shifts:  08/07 1901 - 08/09 0700  In: 840 [P.O.:840]  Out: 750 [Urine:750]    Exam:    NAD  Head/Neck: mmm  CVS:s1s2 rrr  Lungs:  ctab/l  Abdomen: Soft,bs+  Extremities: No edema.                 Labs: Results:       Chemistry Recent Labs      08/07/17   0430   GLU  111*   NA  138   K  4.8   CL  102   CO2  25   BUN  38*   CREA  8.10*   CA  6.7*   AGAP  11   BUCR  5*      CBC w/Diff Recent Labs      08/08/17   1400  08/08/17   0405  08/07/17   0430 WBC  16.3*  13.5*  12.2   RBC  3.15*  2.87*  2.44*   HGB  9.3*  8.5*  7.3*   HCT  29.5*  26.0*  22.6*   PLT  248  208  213   GRANS  71   --    --    LYMPH  13*   --    --    EOS  1   --    --       Cardiac Enzymes Recent Labs      08/08/17   1400  08/08/17   0420   CPK  114  117   CKND1  CALCULATION NOT PERFORMED WHEN RESULT IS BELOW LINEAR LIMIT  CALCULATION NOT PERFORMED WHEN RESULT IS BELOW LINEAR LIMIT      Coagulation Recent Labs      08/08/17   1535   APTT  31.4       Lipid Panel Lab Results   Component Value Date/Time    Cholesterol, total 195 11/12/2013 10:53 AM    HDL Cholesterol 42 11/12/2013 10:53 AM    LDL, calculated 121.4 11/12/2013 10:53 AM    VLDL, calculated 31.6 11/12/2013 10:53 AM    Triglyceride 158 11/12/2013 10:53 AM    CHOL/HDL Ratio 4.6 11/12/2013 10:53 AM      BNP No results for input(s): BNPP in the last 72 hours. Liver Enzymes No results for input(s): TP, ALB, TBIL, AP, SGOT, GPT in the last 72 hours.     No lab exists for component: DBIL   Thyroid Studies No results found for: T4, T3U, TSH, TSHEXT     Procedures/imaging: see electronic medical records for all procedures/Xrays and details which were not copied into this note but were reviewed prior to creation of Joyce Peña MD

## 2017-08-09 NOTE — PROGRESS NOTES
Nephrology Progress Note      History of Present Illness:  Melchor Vallejo is a 49 y.o. AA male with a past medical history significant for HTN, followed at [facility] clinic, was on labetalol and norvasc but out of home meds for a while. Pt presented to the hospital with worsening SOB, weakness and tremors. Pt has experienced poor appetite and n/v. No cp, fever or chills. He did take BC powders prn pain. Cr was 1.2 in 11/2013, now elevated to 15.6 today.  and CO2 at 13, K 5.4. Pt however reports having \"normal amount\" of urine output. Nephrology was consulted for further evaluation and management of his renal failure. Pt had TDC placed 8/7 and HD initiated. HD today, well tolerated. Dnies SOB, CP or LE edema. Impression:     - PAM on CKD vs possible advanced CKD. Suspect pt has been having progressive CKD due to uncontrolled hypertension. Possible PAM on CKD  due to hypertensive urgency . No recent baseline lab. Echogenic kidneys on US. Started on HD on 8/4. Normal c3, c4, RADHA, ANCA, Hep B/C. I suspect patient is  now at ESRD and HD dependent.   - Hypertensive urgency, improving  - Metabolic acidosis, improving   - Anemia of CKD, adequate iron. S/p prbc tx  - Sec HPT, on Calcitriol daily   - Proteinuria  - Hematuria    Plan:   - HD today.   - Avoid IV contrast and NSAIDs for now  - Continue current BP management  - Continue EPO TIW  - Needs permanent HD access  - Out pt HD chair placement in progress         Medication:  Inpatient meds:  Current Facility-Administered Medications   Medication Dose Route Frequency    carvedilol (COREG) tablet 25 mg  25 mg Oral BID WITH MEALS    simvastatin (ZOCOR) tablet 20 mg  20 mg Oral QHS    aspirin chewable tablet 81 mg  81 mg Oral DAILY    0.9% sodium chloride infusion 250 mL  250 mL IntraVENous PRN    vit B Cmplx 3-FA-Vit C-Biotin (NEPHRO FLETCHER RX) tablet 1 Tab  1 Tab Oral DAILY    0.9% sodium chloride infusion 250 mL  250 mL IntraVENous PRN    nystatin (MYCOSTATIN) 100,000 unit/gram ointment   Topical TID    calcitRIOL (ROCALTROL) capsule 0.25 mcg  0.25 mcg Oral DAILY    epoetin luann (EPOGEN;PROCRIT) 5,000 Units  5,000 Units IntraVENous DIALYSIS MON, WED & FRI    doxycycline (MONODOX) capsule 100 mg  100 mg Oral Q12H    cloNIDine (CATAPRES) 0.2 mg/24 hr patch 1 Patch  1 Patch TransDERmal Q7D    pantoprazole (PROTONIX) tablet 40 mg  40 mg Oral ACB    ondansetron (ZOFRAN) injection 4 mg  4 mg IntraVENous Q6H PRN    acetaminophen (TYLENOL) tablet 650 mg  650 mg Oral Q6H PRN    heparin (porcine) injection 5,000 Units  5,000 Units SubCUTAneous Q8H    hydrALAZINE (APRESOLINE) 20 mg/mL injection 20 mg  20 mg IntraVENous Q6H PRN    0.9% sodium chloride infusion 250 mL  250 mL IntraVENous PRN         Physical Examination    Vital signs:   Visit Vitals    BP (!) 160/93    Pulse 72    Temp 97.8 °F (36.6 °C) (Oral)    Resp 17    Ht 5' 5\" (1.651 m)    Wt 55.1 kg (121 lb 6.4 oz)    SpO2 100%    BMI 20.2 kg/m2       Intake/Output Summary (Last 24 hours) at 08/09/17 1518  Last data filed at 08/09/17 1251   Gross per 24 hour   Intake              240 ml   Output             1500 ml   Net            -1260 ml       Physical Exam:    General: No acute distress, has wasted appearance   HENT: Atraumatic and normocephalic   Eyes: Normal conjunctiva, EOMI   Neck: Supple with no mass or JVD   Cardiovascular: Normal S1 & S2, no m/r/g   Pulmonary/Chest Wall: Clear to auscultation bilaterally, no w/c   Abdominal: Soft and non-tender, normal active bowel sound   Musculoskeletal: No edema lower ext bilaterraly   Skin: No lesions   Neurological: No focal neurological deficits, no asterixis   HD access: Rt IJ TDC      Data Review:  BMP:   No results found for: NA, K, CL, CO2, AGAP, GLU, BUN, CREA, GFRAA, GFRNA  CMP:   No results found for: NA, K, CL, CO2, AGAP, GLU, BUN, CREA, GFRAA, GFRNA, CA, MG, PHOS, ALB, TBIL, TP, ALB, GLOB, AGRAT, SGOT, ALT, GPT  CBC:   No results found for: WBC, HGB, HGBEXT, HCT, HCTEXT, PLT, PLTEXT, HGBEXT, HCTEXT, PLTEXT  All Cardiac Markers in the last 24 hours:   No results found for: CPK, CKMMB, CKMB, RCK3, CKMBT, CKNDX, CKND1, RICHARD, TROPT, TROIQ, SWAPNIL, TROPT, TNIPOC, BNP, BNPP  Recent Glucose Results:   No results found for: GLU  ABG:   No results found for: PH, PHI, PCO2, PCO2I, PO2, PO2I, HCO3, HCO3I, FIO2, FIO2I  COAGS:   Lab Results   Component Value Date/Time    APTT 31.4 08/08/2017 03:35 PM     Liver Panel:   No results found for: ALB, CBIL, TBIL, TP, GLOB, AGRAT, SGOT, ASTPOC, ALTPOC, ALT, GPT, AP  Pancreatic Markers:   No results found for: AMYLPOCT, AML, LIPPOCT, LPSE    CXR:    Single nonspecific mildly dilated loop of small bowel in the upper abdomen with  questionable wall thickening. Otherwise, the small bowel and colon are  nondilated.       Alfredo Saini MD  [facility]  [phone]

## 2017-08-09 NOTE — DIALYSIS
Pt in bed, a+o x 4, no s/s of distress noted. Right IJ accessed per protocol. Flushed with 10cc of NS with ease. Tx initiated at 0949, [facility] flowing with ease. For hemodynamic stability UF goal 1500ml initiated. Orders from Dr Marlo Moore for treatment to be 3hrs. Tx completed at 1251, tolerated well. 1500 ml removed. De-accessed per protocol. Heparin indwell 1.6ml in arterial, and 1.6ml in venous catheter. BVP 57.7 Unit nurse given report.

## 2017-08-09 NOTE — PROGRESS NOTES
Call received from [facility] spoke with Sandy Rodríguez informed cm that patient looks eligible for disability medicaid,Yelena Rico  from [facility] aware dialysis chair pending at this time.

## 2017-08-09 NOTE — ROUTINE PROCESS
Bedside and Verbal shift change report given to Fallon Devi RN   (oncoming nurse) by Nilsa Lara RN   (offgoing nurse). Report included the following information SBAR, Kardex, Procedure Summary, Intake/Output, MAR, Accordion, Recent Results, Med Rec Status and Alarm Parameters .

## 2017-08-10 LAB
ALDOST SERPL-MCNC: <1 NG/DL (ref 0–30)
ATRIAL RATE: 87 BPM
CALCULATED P AXIS, ECG09: 54 DEGREES
CALCULATED R AXIS, ECG10: 2 DEGREES
CALCULATED T AXIS, ECG11: 48 DEGREES
DIAGNOSIS, 93000: NORMAL
P-R INTERVAL, ECG05: 142 MS
Q-T INTERVAL, ECG07: 410 MS
QRS DURATION, ECG06: 98 MS
QTC CALCULATION (BEZET), ECG08: 493 MS
RENIN PLAS-CCNC: 1.17 NG/ML/HR (ref 0.17–5.38)
VENTRICULAR RATE, ECG03: 87 BPM

## 2017-08-10 PROCEDURE — 97116 GAIT TRAINING THERAPY: CPT

## 2017-08-10 PROCEDURE — 74011250637 HC RX REV CODE- 250/637: Performed by: HOSPITALIST

## 2017-08-10 PROCEDURE — 74011250637 HC RX REV CODE- 250/637: Performed by: INTERNAL MEDICINE

## 2017-08-10 PROCEDURE — 97530 THERAPEUTIC ACTIVITIES: CPT

## 2017-08-10 PROCEDURE — 65660000000 HC RM CCU STEPDOWN

## 2017-08-10 PROCEDURE — 77010033678 HC OXYGEN DAILY

## 2017-08-10 PROCEDURE — 74011250636 HC RX REV CODE- 250/636: Performed by: HOSPITALIST

## 2017-08-10 RX ADMIN — SIMVASTATIN 20 MG: 20 TABLET, FILM COATED ORAL at 21:20

## 2017-08-10 RX ADMIN — NYSTATIN: 100000 OINTMENT TOPICAL at 09:00

## 2017-08-10 RX ADMIN — HEPARIN SODIUM 5000 UNITS: 5000 INJECTION, SOLUTION INTRAVENOUS; SUBCUTANEOUS at 22:41

## 2017-08-10 RX ADMIN — CALCITRIOL 0.25 MCG: 0.25 CAPSULE, LIQUID FILLED ORAL at 08:44

## 2017-08-10 RX ADMIN — CARVEDILOL 25 MG: 12.5 TABLET, FILM COATED ORAL at 18:12

## 2017-08-10 RX ADMIN — ASPIRIN 81 MG 81 MG: 81 TABLET ORAL at 08:45

## 2017-08-10 RX ADMIN — HEPARIN SODIUM 5000 UNITS: 5000 INJECTION, SOLUTION INTRAVENOUS; SUBCUTANEOUS at 18:12

## 2017-08-10 RX ADMIN — NYSTATIN: 100000 OINTMENT TOPICAL at 19:58

## 2017-08-10 RX ADMIN — NYSTATIN: 100000 OINTMENT TOPICAL at 22:44

## 2017-08-10 RX ADMIN — DOXYCYCLINE 100 MG: 100 CAPSULE ORAL at 08:45

## 2017-08-10 RX ADMIN — HEPARIN SODIUM 5000 UNITS: 5000 INJECTION, SOLUTION INTRAVENOUS; SUBCUTANEOUS at 06:49

## 2017-08-10 RX ADMIN — DOXYCYCLINE 100 MG: 100 CAPSULE ORAL at 20:13

## 2017-08-10 RX ADMIN — CARVEDILOL 25 MG: 12.5 TABLET, FILM COATED ORAL at 08:45

## 2017-08-10 RX ADMIN — Medication 1 TABLET: at 08:45

## 2017-08-10 RX ADMIN — PANTOPRAZOLE SODIUM 40 MG: 40 TABLET, DELAYED RELEASE ORAL at 06:48

## 2017-08-10 NOTE — OP NOTES
[address] [place] [address]  OPERATIVE REPORT    Name:  Ryland Colby  MR#:  [id number]  :  1965  Account #:  [de-identified]  Date of Adm:  2017  Date of Surgery:  2017      PREOPERATIVE DIAGNOSIS: End-stage renal disease with a  temporary dialysis catheter. POSTOPERATIVE DIAGNOSIS: End-stage renal disease with a  temporary dialysis catheter. PROCEDURES PERFORMED: Conversion of right IJ temporary  dialysis catheter for a tunneled dialysis catheter. SURGEON: Maribeth Fothergill, MD.    ANESTHESIA: Moderate sedation with local.    PACKS AND DRAINS: None. IMPLANTS: None. ESTIMATED BLOOD LOSS: Minimal    SPECIMENS REMOVED: None. COMPLICATIONS: None. IMPLANTS: A 19 cm Palindrome catheter. INDICATIONS FOR PROCEDURE: The patient is a 52-year-old  gentleman with end-stage renal disease, who presented to [facility] with complaints of renal failure. The patient had  temporary dialysis catheter placed and after tolerating dialysis well,  The patient was presented for a planned tunneled dialysis catheter  conversion. Informed consent was obtained. DESCRIPTION OF PROCEDURE: On 2017, the patient  presented to the catheterization suite, identified by name and ID  bracelet by myself and entire operative team. Once this was done, the  patient was placed on the operating table in supine position with both  arms tucked and the appropriate depth of anesthesia was obtained. The patient was prepped and draped and time-out performed. At this  point, a Bentson wire was then advanced down the patient's temporary  dialysis catheter down to the inferior vena cava. Once this was done,  we used 1% lidocaine to anesthetize the right chest wall and a counter  incision was made in the chest wall here, then used blunt dissection to  dissect up to the puncture site and then tunneled a 19 cm Palindrome  catheter through this up into our IJ puncture site.  We then advanced 2  dilators followed by a peel-away sheath over the wire and then placed  the tunneled dialysis catheter through the peel-away sheath down to  the atriocaval junction. Once we removed the peel-away sheath, we  had good placement in the atriocaval junction with good blood return  with no resistant to flushing. At the end of the procedure, I was present  and scrubbed for the entire procedure.         MD Morgan Paez Stands  D:  08/10/2017   10:55  T:  08/10/2017   14:33  Job #:  [id number]

## 2017-08-10 NOTE — ROUTINE PROCESS
Bedside and Verbal shift change report given to [address] [place] [address] (oncoming nurse) by Flako Hare RN (offgoing nurse). Report included the following information SBAR and Kardex.

## 2017-08-10 NOTE — ROUTINE PROCESS
Bedside and Verbal shift change report given to [facility], RN by Liam Eldridge RN. Report included the following information SBAR and Kardex.

## 2017-08-10 NOTE — ROUTINE PROCESS
Bedside and Verbal shift change report given to Franchesca Coats RN (oncoming nurse) by Normand Schwab, RN   (offgoing nurse). Report included the following information SBAR, Kardex, Procedure Summary, Intake/Output, MAR, Accordion, Recent Results, Med Rec Status, Cardiac Rhythm SR and Alarm Parameters .

## 2017-08-10 NOTE — PROGRESS NOTES
Cm spoke with Onel Herrera from [facility] at this time requesting Dialysis contract with [facility] until finalization of medicaid,waiting to receive fax from [facility]. Norbert Orozco so executive team to review.

## 2017-08-10 NOTE — PROGRESS NOTES
Problem: Mobility Impaired (Adult and Pediatric)  Goal: *Acute Goals and Plan of Care (Insert Text)  Physical Therapy Goals  Initiated 8/8/2017 and to be accomplished within 3-7 day(s)  1. Patient will move from supine to sit and sit to supine in bed with independence. 2. Patient will transfer from bed to chair and chair to bed with modified independence using the least restrictive device. 3. Patient will perform sit to stand with modified independence. 4. Patient will ambulate with modified independence for 150 feet with the least restrictive device. 5. Patient will ascend/descend 13 stairs with Nae handrail(s) with modified independence. Outcome: Progressing Towards Goal  PHYSICAL THERAPY TREATMENT     Patient: Jessica Mendiola (49 y.o. male)  Date: 8/10/2017  Diagnosis: PAM (acute kidney injury) ([facility] PAM (acute kidney injury) ([facility]       Precautions: Fall   Chart, physical therapy assessment, plan of care and goals were reviewed. ASSESSMENT:  Pt's mobility continues to improve, but fatigue and LE discomfort. Pt has minor balance deficits at this time and will likely be D/c'd on next session. Light headedness and darkening vision noted at end of session. BP of 152/77 taken (similar to recent readings). Progression toward goals:  [ ]      Improving appropriately and progressing toward goals  [X]      Improving slowly and progressing toward goals  [ ]      Not making progress toward goals and plan of care will be adjusted       PLAN:  Patient continues to benefit from skilled intervention to address the above impairments. Continue treatment per established plan of care. Discharge Recommendations:  Home Health  Further Equipment Recommendations for Discharge:  N/A       SUBJECTIVE:   Patient stated I'll go.       OBJECTIVE DATA SUMMARY:   Critical Behavior:  Neurologic State: Alert, Eyes open spontaneously  Orientation Level: Oriented X4  Cognition: Appropriate decision making, Appropriate for age attention/concentration, Appropriate safety awareness, Follows commands  Safety/Judgement: Awareness of environment  Functional Mobility Training:  Bed Mobility:  Rolling: Modified independent  Supine to Sit: Modified independent  Sit to Supine: Modified independent  Transfers:  Sit to Stand: Supervision  Stand to Sit: Supervision     Balance:  Sitting: Intact  Standing: Impaired  Standing - Static: Good  Standing - Dynamic : Fair  Ambulation/Gait Training:  Distance (ft): 400 Feet (ft)  Assistive Device: Gait belt  Ambulation - Level of Assistance: Contact guard assistance  Gait Abnormalities: Decreased step clearance; Path deviations  Base of Support: Narrowed  Speed/Elodia: Slow  Step Length: Left shortened;Right shortened  Stairs:  Number of Stairs Trained: 12 (U/D)  Stairs - Level of Assistance: Supervision;Stand-by asssistance  Rail Use: Both (alternating)  Pain:  Pain Scale 1: Numeric (0 - 10)  Pain Intensity 1: 0     Activity Tolerance:   Fair  Please refer to the flowsheet for vital signs taken during this treatment.   After treatment:   [X] Patient left in no apparent distress sitting up in chair  [ ] Patient left in no apparent distress in bed  [X] Call bell left within reach  [X] Nursing notified  [X] Caregiver present  [ ] Bed alarm activated      Evelyn Justice PTA   Time Calculation: 25 mins

## 2017-08-10 NOTE — PROGRESS NOTES
Problem: Falls - Risk of  Goal: *Absence of falls  Outcome: Progressing Towards Goal  Patient remains free from falls. Patient educated on calling for assistance if needed for ambulation. Patient verbalized understanding. Call bell within reach. Will continue to monitor.

## 2017-08-10 NOTE — PROGRESS NOTES
Hospitalist Progress Note    Patient: Lianne Hong MRN: [id number]  CSN: [id number]    YOB: 1965  Age: 49 y.o. Sex: male    DOA: 8/4/2017 LOS:  LOS: 6 days          Chief Complaint:          Assessment/Plan       Patient Active Problem List   Diagnosis Code    Routine general medical examination at a health care facility Z00.00    Unspecified essential hypertension I10    Tobacco abuse Z72.0    PAM (acute kidney injury) ([address] N17.9    Hypertensive urgency, malignant I16.0    Uremia N19    Severe anemia D64.9    Nonadherence to medical treatment Z91.19    Rash of genital area R21    Pleural effusion, right J90    Severe protein-calorie malnutrition ([address] E43     Looking good. Up on side of bed eating and with family. No acute needs. Feels well. Tolerating hemodialysis. ARF on Hemodialysis. HTN urgency. Anemia of CKD. Severe prot abril malnutrition. Uremia resolved. Chest pain.     Seen on hemodialysis. Doing well. BP control sub-optimal.  Increase coreg. Follow hgb. Seeking HD chair outpatient. Subjective:    Feeling well. No cp or sob. No abd pain or nausea. Family present. All questions answered.        Review of systems:    Constitutional: denies fevers, chills, myalgias  Respiratory: denies SOB, cough  Cardiovascular: denies chest pain, palpitations  Gastrointestinal: denies nausea, vomiting, diarrhea      Vital signs/Intake and Output:  Visit Vitals    /75    Pulse 76    Temp 98.1 °F (36.7 °C)    Resp 18    Ht 5' 5\" (1.651 m)    Wt 55.1 kg (121 lb 6.4 oz)    SpO2 100%    BMI 20.2 kg/m2     Current Shift:     Last three shifts:  08/08 1901 - 08/10 0700  In: 1480 [P.O.:1480]  Out: 1625 [Urine:125]    Exam:    General: Well developed, alert, NAD, OX3  Head/Neck: mmm  CVS:Rs1s2  Lungs:Clear to auscultation bilaterally, no wheezes, rhonchi, or rales  Abdomen: Softbs+  Extremities: No felisha,a                Labs: Results:       Chemistry No results for input(s): GLU, NA, K, CL, CO2, BUN, CREA, CA, AGAP, BUCR, TBIL, GPT, AP, TP, ALB, GLOB, AGRAT in the last 72 hours. CBC w/Diff Recent Labs      08/08/17   1400  08/08/17   0405   WBC  16.3*  13.5*   RBC  3.15*  2.87*   HGB  9.3*  8.5*   HCT  29.5*  26.0*   PLT  248  208   GRANS  71   --    LYMPH  13*   --    EOS  1   --       Cardiac Enzymes Recent Labs      08/08/17   1400  08/08/17   0420   CPK  114  117   CKND1  CALCULATION NOT PERFORMED WHEN RESULT IS BELOW LINEAR LIMIT  CALCULATION NOT PERFORMED WHEN RESULT IS BELOW LINEAR LIMIT      Coagulation Recent Labs      08/08/17   1535   APTT  31.4       Lipid Panel Lab Results   Component Value Date/Time    Cholesterol, total 195 11/12/2013 10:53 AM    HDL Cholesterol 42 11/12/2013 10:53 AM    LDL, calculated 121.4 11/12/2013 10:53 AM    VLDL, calculated 31.6 11/12/2013 10:53 AM    Triglyceride 158 11/12/2013 10:53 AM    CHOL/HDL Ratio 4.6 11/12/2013 10:53 AM      BNP No results for input(s): BNPP in the last 72 hours. Liver Enzymes No results for input(s): TP, ALB, TBIL, AP, SGOT, GPT in the last 72 hours.     No lab exists for component: DBIL   Thyroid Studies No results found for: T4, T3U, TSH, TSHEXT     Procedures/imaging: see electronic medical records for all procedures/Xrays and details which were not copied into this note but were reviewed prior to creation of Janine Crews MD

## 2017-08-10 NOTE — PROGRESS NOTES
NUTRITION FOLLOW-UP    RECOMMENDATIONS/PLAN:   Repeat labs  Continue w/ current diet  Monitor labs/lytes, PO Intake, wt, fluid status, and skin integrity       Adult Malnutrition Criteria:     Nutrition assessment was completed by RDN and the patient was found to meet the following malnutrition criteria established by ASPEN/AND:    Adult Malnutrition Guidelines:  SEVERE PROTEIN CALORIE MALNUTRITION IN THE CONTEXT OF CHRONIC ILLNESS  Weight Loss:  >5% x 1 month or >7.5% x 3 months or >10% x 6 months or >20% x 12 months  Energy Intake: <75% energy intake compared to estimated energy needs >1 month  Body Fat:  Severe Depletion  Muscle Mass: Severe Depletion  . Asad Gomez  08/10/17    NUTRITION ASSESSMENT:   Client Update: 49 yrs old Male with likely PAM on CKD however unclear hx. Pt also with HTN urgency per MD note. Pt has noted small right PE. Pt had TDC placed on 8/7/17 and HD initiated. Per MD note on 8/8/17 \"His chest pain is atypical, worse with deep breath and lying down, however at hi risk for ischemia, Will trend card enzymes-his echo showed normal EF-and if recurrent CP-order treadmill stress Thursday as his endurance is improved\" Spoke w/ pt today he reports eating well and taking his nepro. Gave pt diet education on his new renal diet. Provided a handout from nutrition care manual about what items should be limited or watched and how to read a food label. Also, provided a renal shopping guide to help make grocery shopping easier for him and his family. He was very receptive to education and acknowledge understanding.      FOOD/NUTRITION INTAKE   Diet Order:  Renal   Supplements: Nepro TID  Food Allergies: NKFA/  Average PO Intake:      Patient Vitals for the past 100 hrs:   % Diet Eaten   08/09/17 2207 100 %   08/09/17 1536 100 %   08/08/17 1915 100 %   08/08/17 1330 100 %   08/08/17 1011 100 %   08/08/17 0800 0 %   08/07/17 1312 240 %   08/07/17 0600 0 %   08/06/17 1823 50 %   08/06/17 1300 100 % 08/06/17 0900 100 %      Pertinent Medications:  [x] Reviewed; Rocaltrol, protonix, nephro  Insulin:  []SSI  []Pre-meal   []Basal    []Drip  [x]None  Cultural/Yazidism Food Preferences: None Identified    BIOCHEMICAL DATA & MEDICAL TESTS  Pertinent Labs:  [x] Reviewed; ANTHROPOMETRICS  Height: 5' 5\" (165.1 cm)       Weight: 55.1 kg (121 lb 6.4 oz)         BMI: 20.2 kg/m^2 normal weight (18.5%-24.9% BMI)   Adm Weight: 113 lbs                Weight change: +8lb PO intake has increased; Adjusted Body Weight:n/a     NUTRITION-FOCUSED PHYSICAL ASSESSMENT  Skin: Np pressure injury noted       GI: +BM 8/8/17    NUTRITION PRESCRIPTION  Calories:5362-4605 kcal/day based on 30-35 kcal/kg   Protein: 66 g/day based on 1.2 g/kg  CHO: 207-241 g/day based on 50% of total energy  Fluid: 1653-1929ml/day based on 1 kcal/ml      NUTRITION DIAGNOSES:   1. Unintentional weight loss related to poor appetite as evidenced by wt loss of approx 40lbs x 3 months per pt report    NUTRITION INTERVENTIONS:   INTERVENTIONS:        GOALS:  1. Continue w/ current diet 1. Continue to meet estimated needs by consuming >75% of PO Intake by next review 3 days             LEARNING NEEDS (Diet, Supplementation, Food/Nutrient-Drug Interaction):   [] None Identified   [x] Education provided/documented      Identified and patient: [] Declined   [] Was not appropriate/indicated        NUTRITION MONITORING /EVALUATION:   Follow PO intake  Monitor wt  Monitor renal labs, electrolytes, fluid status  Monitor for additional supplement needs     Previous Recommendations Implemented: yes        Previous Goals Met:  yes -Pt intake have greatly improved. [] Participated in Interdisciplinary Rounds    [x] Interdisciplinary Care Plan Reviewed  DISCHARGE NUTRITION RECOMMENDATIONS ADDRESSED:     [x] Yes- recommended Renal diet     NUTRITION RISK:           [x] At risk                        [] Not currently at risk        Will follow-up per policy.   [employer] Yvonne Gonzalez, MALDONADO  PAGER:  [phone]

## 2017-08-10 NOTE — PROGRESS NOTES
0220 - Head to toe assessment completed at this time. 20G Right AC INT. TDC right chest wall in place. Patient rated pain 0/10. No distress noted, no concerns voiced. Patient denies CP and SOB. Family at bedside. Call bell within reach. Will continue to monitor.

## 2017-08-10 NOTE — PROGRESS NOTES
Nephrology Progress Note      History of Present Illness:  Ayden Toledo is a 49 y.o. AA male with a past medical history significant for HTN, followed at [facility] clinic, was on Labetalol and Norvasc but was out of home meds for a while. Pt presented to the hospital with worsening SOB, weakness and tremors. Also complained  poor appetite, Nausea and vomiting. Denied CP/Fever or chills. He did take BC powders prn for pain. Cr was 1.2 in 11/2013, now elevated to 15.6  On admission.  and CO2 at 13, K 5.4. Pt reported having \"normal amount\" of urine output. Nephrology was consulted for further evaluation and management of his renal failure. TDC placed  and HD initiated. Last HD session 8/9/17, well tolerated. Dnies CP/SOB. Impression:     - PAM on CKD vs possible advanced CKD. Suspect pt has been having progressive CKD due to uncontrolled hypertension. Possible PAM on CKD  due to hypertensive urgency . No recent preadmission baseline lab available. Echogenic kidneys on US. Started on HD on 8/4. Normal c3, c4, RADHA, ANCA, Hep B/C. I suspect patient is  now at ESRD and HD dependent.   - Hypertensive urgency, improving  - Metabolic acidosis, improving   - Anemia of CKD, adequate iron.  S/p prbc tx  - Sec HPT, on Calcitriol daily   - Proteinuria  - Hematuria    Plan:   - HD tomorrow 8/11  - Avoid IV contrast and NSAIDs for now  - Continue current BP management  - Continue EPO TIW  - Needs permanent HD access  - Out pt HD chair placement in progress         Medication:  Inpatient meds:  Current Facility-Administered Medications   Medication Dose Route Frequency    carvedilol (COREG) tablet 25 mg  25 mg Oral BID WITH MEALS    simvastatin (ZOCOR) tablet 20 mg  20 mg Oral QHS    aspirin chewable tablet 81 mg  81 mg Oral DAILY    0.9% sodium chloride infusion 250 mL  250 mL IntraVENous PRN    vit B Cmplx 3-FA-Vit C-Biotin (NEPHRO FLETCHER RX) tablet 1 Tab  1 Tab Oral DAILY    0.9% sodium chloride infusion 250 mL  250 mL IntraVENous PRN    nystatin (MYCOSTATIN) 100,000 unit/gram ointment   Topical TID    calcitRIOL (ROCALTROL) capsule 0.25 mcg  0.25 mcg Oral DAILY    epoetin luann (EPOGEN;PROCRIT) 5,000 Units  5,000 Units IntraVENous DIALYSIS MON, WED & FRI    doxycycline (MONODOX) capsule 100 mg  100 mg Oral Q12H    cloNIDine (CATAPRES) 0.2 mg/24 hr patch 1 Patch  1 Patch TransDERmal Q7D    pantoprazole (PROTONIX) tablet 40 mg  40 mg Oral ACB    ondansetron (ZOFRAN) injection 4 mg  4 mg IntraVENous Q6H PRN    acetaminophen (TYLENOL) tablet 650 mg  650 mg Oral Q6H PRN    heparin (porcine) injection 5,000 Units  5,000 Units SubCUTAneous Q8H    hydrALAZINE (APRESOLINE) 20 mg/mL injection 20 mg  20 mg IntraVENous Q6H PRN    0.9% sodium chloride infusion 250 mL  250 mL IntraVENous PRN         Physical Examination    Vital signs:   Visit Vitals    /75    Pulse 76    Temp 98.1 °F (36.7 °C)    Resp 18    Ht 5' 5\" (1.651 m)    Wt 55.1 kg (121 lb 6.4 oz)    SpO2 100%    BMI 20.2 kg/m2       Intake/Output Summary (Last 24 hours) at 08/10/17 1056  Last data filed at 08/10/17 0649   Gross per 24 hour   Intake             1240 ml   Output             1625 ml   Net             -385 ml       Physical Exam:    General: No acute distress, has wasted appearance   HENT: Atraumatic and normocephalic   Eyes: Normal conjunctiva, EOMI   Neck: Supple with no mass or JVD   Cardiovascular: Normal S1 & S2, no m/r/g   Pulmonary/Chest Wall: Clear to auscultation bilaterally, no w/c   Abdominal: Soft and non-tender, normal active bowel sound   Musculoskeletal: No edema lower ext bilaterraly   Skin: No lesions   Neurological: No focal neurological deficits, no asterixis   HD access: Rt IJ TDC      Data Review:  BMP:   No results found for: NA, K, CL, CO2, AGAP, GLU, BUN, CREA, GFRAA, GFRNA  CMP:   No results found for: NA, K, CL, CO2, AGAP, GLU, BUN, CREA, GFRAA, GFRNA, CA, MG, PHOS, ALB, TBIL, TP, ALB, GLOB, AGRAT, SGOT, ALT, GPT  CBC:   No results found for: WBC, HGB, HGBEXT, HCT, HCTEXT, PLT, PLTEXT, HGBEXT, HCTEXT, PLTEXT  All Cardiac Markers in the last 24 hours:   No results found for: CPK, CKMMB, CKMB, RCK3, CKMBT, CKNDX, CKND1, RICHARD, TROPT, TROIQ, SWAPNIL, TROPT, TNIPOC, BNP, BNPP  Recent Glucose Results:   No results found for: GLU  ABG:   No results found for: PH, PHI, PCO2, PCO2I, PO2, PO2I, HCO3, HCO3I, FIO2, FIO2I  COAGS:   No results found for: APTT, PTP, INR  Liver Panel:   No results found for: ALB, CBIL, TBIL, TP, GLOB, AGRAT, SGOT, ASTPOC, ALTPOC, ALT, GPT, AP  Pancreatic Markers:   No results found for: AMYLPOCT, AML, LIPPOCT, LPSE    CXR:    Single nonspecific mildly dilated loop of small bowel in the upper abdomen with  questionable wall thickening. Otherwise, the small bowel and colon are  nondilated.       MD Jesse Marshall  [phone]

## 2017-08-11 ENCOUNTER — APPOINTMENT (OUTPATIENT)
Dept: GENERAL RADIOLOGY | Age: 52
DRG: 460 | End: 2017-08-11
Attending: HOSPITALIST
Payer: MEDICAID

## 2017-08-11 LAB — GLUCOSE BLD STRIP.AUTO-MCNC: 123 MG/DL (ref 70–110)

## 2017-08-11 PROCEDURE — 71020 XR CHEST PA LAT: CPT

## 2017-08-11 PROCEDURE — 74011250637 HC RX REV CODE- 250/637: Performed by: HOSPITALIST

## 2017-08-11 PROCEDURE — 93970 EXTREMITY STUDY: CPT

## 2017-08-11 PROCEDURE — 74011250637 HC RX REV CODE- 250/637: Performed by: INTERNAL MEDICINE

## 2017-08-11 PROCEDURE — 74011250636 HC RX REV CODE- 250/636: Performed by: HOSPITALIST

## 2017-08-11 PROCEDURE — 02PYX3Z REMOVAL OF INFUSION DEVICE FROM GREAT VESSEL, EXTERNAL APPROACH: ICD-10-PCS | Performed by: SURGERY

## 2017-08-11 PROCEDURE — 02HV33Z INSERTION OF INFUSION DEVICE INTO SUPERIOR VENA CAVA, PERCUTANEOUS APPROACH: ICD-10-PCS | Performed by: SURGERY

## 2017-08-11 PROCEDURE — 65660000000 HC RM CCU STEPDOWN

## 2017-08-11 PROCEDURE — 82962 GLUCOSE BLOOD TEST: CPT

## 2017-08-11 PROCEDURE — 97530 THERAPEUTIC ACTIVITIES: CPT

## 2017-08-11 RX ADMIN — NYSTATIN: 100000 OINTMENT TOPICAL at 23:02

## 2017-08-11 RX ADMIN — CARVEDILOL 25 MG: 12.5 TABLET, FILM COATED ORAL at 08:42

## 2017-08-11 RX ADMIN — Medication 1 TABLET: at 08:42

## 2017-08-11 RX ADMIN — CARVEDILOL 25 MG: 12.5 TABLET, FILM COATED ORAL at 18:25

## 2017-08-11 RX ADMIN — DOXYCYCLINE 100 MG: 100 CAPSULE ORAL at 08:41

## 2017-08-11 RX ADMIN — HEPARIN SODIUM 5000 UNITS: 5000 INJECTION, SOLUTION INTRAVENOUS; SUBCUTANEOUS at 18:25

## 2017-08-11 RX ADMIN — PANTOPRAZOLE SODIUM 40 MG: 40 TABLET, DELAYED RELEASE ORAL at 06:57

## 2017-08-11 RX ADMIN — ASPIRIN 81 MG 81 MG: 81 TABLET ORAL at 08:41

## 2017-08-11 RX ADMIN — HEPARIN SODIUM 5000 UNITS: 5000 INJECTION, SOLUTION INTRAVENOUS; SUBCUTANEOUS at 23:08

## 2017-08-11 RX ADMIN — NYSTATIN: 100000 OINTMENT TOPICAL at 08:43

## 2017-08-11 RX ADMIN — NYSTATIN: 100000 OINTMENT TOPICAL at 16:00

## 2017-08-11 RX ADMIN — CALCITRIOL 0.25 MCG: 0.25 CAPSULE, LIQUID FILLED ORAL at 08:41

## 2017-08-11 RX ADMIN — HEPARIN SODIUM 5000 UNITS: 5000 INJECTION, SOLUTION INTRAVENOUS; SUBCUTANEOUS at 06:57

## 2017-08-11 RX ADMIN — SIMVASTATIN 20 MG: 20 TABLET, FILM COATED ORAL at 23:02

## 2017-08-11 NOTE — ROUTINE PROCESS
Bedside and Verbal shift change report given to [facility] (oncoming nurse) by Cyrus Valle (offgoing nurse). Report included the following information SBAR, Kardex and MAR.

## 2017-08-11 NOTE — PROGRESS NOTES
Nephrology Progress Note    History of Present Illness:  Tres Gutierrez is a 49 y.o. AA male with a past medical history significant for HTN, followed at [facility] clinic, was on Labetalol and Norvasc but was out of home meds for a while. Pt presented to the hospital with worsening SOB, weakness and tremors. Also complained  poor appetite, Nausea and vomiting. Denied CP/Fever or chills. He did take BC powders prn for pain. Cr was 1.2 in 11/2013, now elevated to 15.6  On admission.  and CO2 at 13, K 5.4. Pt reported having \"normal amount\" of urine output. Nephrology was consulted for further evaluation and management of his renal failure. TDC placed  and HD initiated. Subjectives:   Last HD on Wednesday. Doing well today, no cp/sob. Awaiting chair    Impression:     - New ESRD, dialysis dependent. Suspect pt has been having progressive CKD due to uncontrolled hypertension. Echogenic kidneys on US. Started on HD on 8/4. Normal c3, c4, RADHA, ANCA, Hep B/C.- Hypertensive urgency, improving  - Metabolic acidosis, resolving with HD   - Anemia of CKD, adequate iron.  S/p prbc tx  - Sec HTN   - Proteinuria  - Hematuria    Plan:   - Dialysis today for clearance  - Goal UF 2L as toelrated  - Cont procrit with dialysis  - Avoid IV contrast and NSAIDs for now  - Continue current BP management  - Needs permanent HD access  - Out pt HD chair placement in progress   - d/w pt and dialysis nurse      Medication:  Inpatient meds:  Current Facility-Administered Medications   Medication Dose Route Frequency    carvedilol (COREG) tablet 25 mg  25 mg Oral BID WITH MEALS    simvastatin (ZOCOR) tablet 20 mg  20 mg Oral QHS    aspirin chewable tablet 81 mg  81 mg Oral DAILY    0.9% sodium chloride infusion 250 mL  250 mL IntraVENous PRN    vit B Cmplx 3-FA-Vit C-Biotin (NEPHRO FLETCHER RX) tablet 1 Tab  1 Tab Oral DAILY    0.9% sodium chloride infusion 250 mL  250 mL IntraVENous PRN    nystatin (MYCOSTATIN) 100,000 unit/gram ointment   Topical TID    calcitRIOL (ROCALTROL) capsule 0.25 mcg  0.25 mcg Oral DAILY    epoetin luann (EPOGEN;PROCRIT) 5,000 Units  5,000 Units IntraVENous DIALYSIS MON, WED & FRI    cloNIDine (CATAPRES) 0.2 mg/24 hr patch 1 Patch  1 Patch TransDERmal Q7D    pantoprazole (PROTONIX) tablet 40 mg  40 mg Oral ACB    ondansetron (ZOFRAN) injection 4 mg  4 mg IntraVENous Q6H PRN    acetaminophen (TYLENOL) tablet 650 mg  650 mg Oral Q6H PRN    heparin (porcine) injection 5,000 Units  5,000 Units SubCUTAneous Q8H    hydrALAZINE (APRESOLINE) 20 mg/mL injection 20 mg  20 mg IntraVENous Q6H PRN    0.9% sodium chloride infusion 250 mL  250 mL IntraVENous PRN         Physical Examination    Vital signs:   Visit Vitals    /88    Pulse 66    Temp 97.9 °F (36.6 °C)    Resp 18    Ht 5' 5\" (1.651 m)    Wt 56.5 kg (124 lb 9 oz)    SpO2 100%    BMI 20.73 kg/m2       Intake/Output Summary (Last 24 hours) at 08/11/17 1638  Last data filed at 08/11/17 1327   Gross per 24 hour   Intake              710 ml   Output                0 ml   Net              710 ml       Physical Exam:    General: NAD   HENT: Atraumatic and normocephalic   Eyes: Normal conjunctiva   Neck: Supple with no mass.   No JVD   Cardiovascular: Normal S1 & S2, no m/r/g   Pulmonary/Chest Wall: Clear to auscultation bilaterally, No w/c   Abdominal: Soft and non-tender, normal active bowel sound   Musculoskeletal: No edema lower ext bilaterraly   Skin: No new rash   Neurological: No focal neurological deficits, AAOx4   HD access: +TDC in place      Data Review:  BMP:   No results found for: NA, K, CL, CO2, AGAP, GLU, BUN, CREA, GFRAA, GFRNA  CMP:   No results found for: NA, K, CL, CO2, AGAP, GLU, BUN, CREA, GFRAA, GFRNA, CA, MG, PHOS, ALB, TBIL, TP, ALB, GLOB, AGRAT, SGOT, ALT, GPT  CBC:   No results found for: WBC, HGB, HGBEXT, HCT, HCTEXT, PLT, PLTEXT, HGBEXT, HCTEXT, PLTEXT  All Cardiac Markers in the last 24 hours:   No results found for: CPK, CKMMB, CKMB, RCK3, CKMBT, CKNDX, CKND1, RICHARD, TROPT, TROIQ, SWAPNIL, TROPT, TNIPOC, BNP, BNPP  Recent Glucose Results:   No results found for: GLU  ABG:   No results found for: PH, PHI, PCO2, PCO2I, PO2, PO2I, HCO3, HCO3I, FIO2, FIO2I  COAGS:   No results found for: APTT, PTP, INR  Liver Panel:   No results found for: ALB, CBIL, TBIL, TP, GLOB, AGRAT, SGOT, ASTPOC, ALTPOC, ALT, GPT, AP  Pancreatic Markers:   No results found for: AMYLPOCT, AML, LIPPOCT, LPSE    CXR:    Single nonspecific mildly dilated loop of small bowel in the upper abdomen with  questionable wall thickening. Otherwise, the small bowel and colon are  nondilated.       Collis Sever, MD [employer]  [phone]

## 2017-08-11 NOTE — PROGRESS NOTES
D/c plan not finalized at this time. Met with pt. During IDR's patient is being screended for medicaid by Steve Jamil however there has to be a contract that  has to be submitted unable to submit contract at this time. daya will call us with chair time and date when available. Care Management Interventions  Transition of Care Consult (CM Consult): Discharge Planning     Care manager available and will assist with safe transition of care.

## 2017-08-11 NOTE — PROGRESS NOTES
Hospitalist Progress Note    Patient: Josh Lala MRN: [id number]  CSN: [id number]    YOB: 1965  Age: 49 y.o.   Sex: male    DOA: 8/4/2017 LOS:  LOS: 7 days          Chief Complaint:    ARF      Assessment/Plan     ARF-now dialysis dependent  Hypertensive urgency  Anemia of CKD  Leg spasms  Chest pain  Anorexia and vomiting due to uremia resolved  Anemia of CKD    Will rule out DVT-get duplex of LE, no swelling but c/o calf tightness and spasms  Chest pain sounds very atypical, he describes pain around his side today-CE unremarkable, echo showed nl EF  Continue baby asa daily  BP meds as doing  Check TSH  PT says he has passed their plans for him    Await dialysis chair as outpt    Patient Active Problem List   Diagnosis Code    Routine general medical examination at a health care facility Z00.00    Unspecified essential hypertension I10    Tobacco abuse Z72.0    PAM (acute kidney injury) ([facility] [address] N17.9    Hypertensive urgency, malignant I16.0    Uremia N19    Severe anemia D64.9    Nonadherence to medical treatment Z91.19    Rash of genital area R21    Pleural effusion, right J90    Severe protein-calorie malnutrition (HCC) E43       Subjective:  C/o trouble taking deep breath, leg spasms, pain around right side, and \"jerks of legs\"  No leg swelling  No chest pain or SOB today  Did well with PT/OT      Review of systems:    Constitutional: denies fevers, chills, myalgias  Respiratory: denies SOB, cough  Cardiovascular: denies chest pain, palpitations  Gastrointestinal: denies nausea, vomiting, diarrhea      Vital signs/Intake and Output:  Visit Vitals    /85    Pulse 72    Temp 98.3 °F (36.8 °C)    Resp 18    Ht 5' 5\" (1.651 m)    Wt 56.5 kg (124 lb 9 oz)    SpO2 100%    BMI 20.73 kg/m2     Current Shift:  08/11 0701 - 08/11 1900  In: 120 [P.O.:120]  Out: 0   Last three shifts:  08/09 1901 - 08/11 0700  In: 1240 [P.O.:1240]  Out: 125 [Urine:125]    Exam:    General: Well developed, alert, NAD, OX3  Head/Neck: NCAT, supple, No masses, No lymphadenopathy  CVS:Regular rate and rhythm, no M/R/G, S1/S2 heard, no thrill  Lungs:Clear to auscultation bilaterally, no wheezes, rhonchi, or rales  Abdomen: Soft, Nontender, No distention, Normal Bowel sounds, No hepatomegaly  Extremities: No C/C/E, pulses palpable 2+  Skin:normal texture and turgor, no rashes, no lesions  Neuro:grossly normal , follows commands  Psych:appropriate                Labs: Results:       Chemistry No results for input(s): GLU, NA, K, CL, CO2, BUN, CREA, CA, AGAP, BUCR, TBIL, GPT, AP, TP, ALB, GLOB, AGRAT in the last 72 hours. CBC w/Diff Recent Labs      08/08/17   1400   WBC  16.3*   RBC  3.15*   HGB  9.3*   HCT  29.5*   PLT  248   GRANS  71   LYMPH  13*   EOS  1      Cardiac Enzymes Recent Labs      08/08/17   1400   CPK  114   CKND1  CALCULATION NOT PERFORMED WHEN RESULT IS BELOW LINEAR LIMIT      Coagulation Recent Labs      08/08/17   1535   APTT  31.4       Lipid Panel Lab Results   Component Value Date/Time    Cholesterol, total 195 11/12/2013 10:53 AM    HDL Cholesterol 42 11/12/2013 10:53 AM    LDL, calculated 121.4 11/12/2013 10:53 AM    VLDL, calculated 31.6 11/12/2013 10:53 AM    Triglyceride 158 11/12/2013 10:53 AM    CHOL/HDL Ratio 4.6 11/12/2013 10:53 AM      BNP No results for input(s): BNPP in the last 72 hours. Liver Enzymes No results for input(s): TP, ALB, TBIL, AP, SGOT, GPT in the last 72 hours.     No lab exists for component: DBIL   Thyroid Studies No results found for: T4, T3U, TSH, TSHEXT     Procedures/imaging: see electronic medical records for all procedures/Xrays and details which were not copied into this note but were reviewed prior to creation of Jacqueline Mancilla MD

## 2017-08-11 NOTE — PROGRESS NOTES
NUTRITION FOLLOW-UP    RECOMMENDATIONS/PLAN:   Repeat labs  Continue w/ current diet  Monitor labs/lytes, PO Intake, wt, fluid status, and skin integrity         Adult Malnutrition Criteria:      Nutrition assessment was completed by RDN and the patient was found to meet the following malnutrition criteria established by ASPEN/AND:     Adult Malnutrition Guidelines:  SEVERE PROTEIN CALORIE MALNUTRITION IN THE CONTEXT OF CHRONIC ILLNESS  Weight Loss:  >5% x 1 month or >7.5% x 3 months or >10% x 6 months or >20% x 12 months  Energy Intake: <75% energy intake compared to estimated energy needs >1 month  Body Fat:  Severe Depletion  Muscle Mass: Severe Depletion  .     Reta Heart  08/11/17    NUTRITION ASSESSMENT:   Client Update: 49 yrs old Male with likely PAM on CKD however unclear hx. Pt also with HTN urgency per MD note. Pt has noted small right PE. Spoke w/ pt today about education for follow up. Pt reported understanding from yesterdays edu session. Also, pt reports good appetite     FOOD/NUTRITION INTAKE   Diet Order:  Renal  Supplements: Nepro TID  Food Allergies: NKFA/  Average PO Intake:      Patient Vitals for the past 100 hrs:   % Diet Eaten   08/11/17 0703 0 %   08/10/17 1955 100 %   08/09/17 2207 100 %   08/09/17 1536 100 %   08/08/17 1915 100 %   08/08/17 1330 100 %   08/08/17 1011 100 %   08/08/17 0800 0 %   08/07/17 1312 240 %   08/07/17 0600 0 %      Pertinent Medications:  [x] Reviewed; Rocaltrol, protonix, nephro    Insulin:  []SSI  []Pre-meal   []Basal    []Drip  [x]None  Cultural/Restorationist Food Preferences: None Identified    BIOCHEMICAL DATA & MEDICAL TESTS  Pertinent Labs:  [x] Reviewed;     ANTHROPOMETRICS  Height: 5' 5\" (165.1 cm)       Weight: 56.5 kg (124 lb 9 oz)         BMI: 20.7 kg/m^2 normal weight (18.5%-24.9% BMI)   Adm Weight: 113 lbs                Weight change: +11lbs since admission PO Intake has increased   Adjusted Body Weight: n/a     NUTRITION-FOCUSED PHYSICAL ASSESSMENT  Skin: No pressure injury noted    GI: +BM 8/8/17    NUTRITION PRESCRIPTION  Calories:8877-4557 kcal/day based on 30-35 kcal/kg   Protein: 66 g/day based on 1.2 g/kg  CHO: 207-241 g/day based on 50% of total energy  Fluid: 1653-1929ml/day based on 1 kcal/ml        NUTRITION DIAGNOSES:   1. Unintentional weight loss related to poor appetite as evidenced by wt loss of approx 40lbs x 3 months per pt report     NUTRITION INTERVENTIONS:   INTERVENTIONS:                                                                                                                                                                         GOALS:  1. Continue w/ current diet 1. Continue to meet estimated needs by consuming >75% of PO Intake by next review 3 days                   LEARNING NEEDS (Diet, Supplementation, Food/Nutrient-Drug Interaction):   [] None Identified   [x] Education provided/documented  Diet education provided on 8/10/17    Identified and patient: [] Declined   [] Was not appropriate/indicated        NUTRITION MONITORING /EVALUATION:   Follow PO intake  Monitor wt  Monitor renal labs, electrolytes, fluid status  Monitor for additional supplement needs     Previous Recommendations Implemented: no        Previous Goals Met:  yes -pt continues to eat well      [] Participated in Interdisciplinary Rounds    [x] Interdisciplinary Care Plan Reviewed  DISCHARGE NUTRITION RECOMMENDATIONS ADDRESSED:     [x] Yes- recommended renal diet     NUTRITION RISK:           [x] At risk                        [] Not currently at risk        Will follow-up per policy.   Krupa Melara RD  PAGER:  [phone]

## 2017-08-11 NOTE — PROGRESS NOTES
Problem: Self Care Deficits Care Plan (Adult)  Goal: *Acute Goals and Plan of Care (Insert Text)  Occupational Therapy Goals  Initiated 8/8/2017 within 7 day(s). Pt has met all goals; will discharge from OT caseload at this time; Rea Underwood, MS OTR/L.    1. Patient will perform bathing with setup/modified independence seated sinkside. 2. Patient will perform grooming with modified independence standing sinkside 2-3 minutes. 3. Patient will perform all aspects of toileting with modified independence. 4. Patient will perform upper extremity therapeutic exercise/activities with modified independence for 15 minutes. 5. Patient will utilize energy conservation techniques during functional activities without cue(s). Outcome: Resolved/Met Date Met:  08/11/17  OCCUPATIONAL THERAPY TREATMENT/DISCHARGE     Patient: Ayden Toledo (67 y.o. male)  Date: 8/11/2017  Diagnosis: PAM (acute kidney injury) ([facility] [address] PAM (acute kidney injury) ([facility] [address]       Precautions: Fall  Chart, occupational therapy assessment, plan of care, and goals were reviewed. ASSESSMENT: Pt supine on arrival, agreeable to therapy, no c/o pain. Independent for bed mobility; donned/doffed socks at EOB independently. Pt stood with mod I, educated on activity pacing for safety during functional transfers, he verbalized understanding. Pt engaged in dynamic reaching activity with regard to item retrieval from floor level. Retrieved cup x5 with good dynamic balance and no LOB. Activity graded up by utilizing pencil for increased challenge; pt demo'd good safety awareness with increased time to retrieve item but with good balance. Engaged in simulation of tub transfer x5 with verbal cues for hand placement and activity pacing. Pt educated on home safety and activity pacing for increased safety upon d/c home; verbalized understanding. Left supine with needs within reach. All functional goals met at this time; will d/c from OT caseload.      EDUCATION: Pt educated on activity pacing, tub transfer techniques, and energy conservation strategies. He verbalized understanding. Progression toward goals:  [X]          Improving appropriately and progressing toward goals  [ ]          Improving slowly and progressing toward goals  [ ]          Not making progress toward goals and plan of care will be adjusted          PLAN:  Patient will be discharged from occupational therapy at this time. Rationale for discharge:  [X] Goals Achieved  [ ] Liddie Begin  [ ] Patient not participating in therapy  [ ] Other:  Discharge Recommendations:  Home Health  Further Equipment Recommendations for Discharge:  shower chair       SUBJECTIVE:   Patient stated I'm definitely getting stronger. \"      OBJECTIVE DATA SUMMARY:   G CODES: Self Care  Current  CI= 1-19%   Goal  CI= 1-19%   D/C  CI= 1-19%. The severity rating is based on the Other Functional Assessment, MMT, ROM     Cognitive/Behavioral Status:  Neurologic State: Alert, Appropriate for age  Orientation Level: Oriented X4  Cognition: Appropriate decision making, Appropriate for age attention/concentration, Appropriate safety awareness, Follows commands  Safety/Judgement: Awareness of environment, Fall prevention      Functional Mobility and Transfers for ADLs:              Bed Mobility:  Supine to Sit: Independent  Sit to Supine: Independent                 Transfers:  Sit to Stand: Modified independent              Toilet Transfer : Modified independent                Balance:  Sitting: Intact  Standing: Intact  Standing - Static: Good  Standing - Dynamic : Good     Cognitive Retraining  Safety/Judgement: Awareness of environment; Fall prevention     Therapeutic Activity:  Pt engaged in dynamic reaching activity with regard to item retrieval from floor level. Pt engaged in functional reaching with cup x5 with good dynamic balance and no LOB.  Activity graded up by utilizing pencil for increased challenge; pt demo'd good safety awareness with increased time to retrieve item but with good balance. Engaged in simulation of tub transfer x5 with verbal cues for hand placement and activity pacing. Pain:  Pre treatment pain: 0/10  Post treatment pain: 0/10  Pain Scale 1: Numeric (0 - 10)  Pain Intensity 1: 0     Activity Tolerance:  Good  Please refer to the flowsheet for vital signs taken during this treatment.   After treatment:   [ ]  Patient left in no apparent distress sitting up in chair  [X]  Patient left in no apparent distress in bed  [X]  Call bell left within reach  [X]  Nursing notified  [ ]  Caregiver present  [ ]  Bed alarm activated     Yoselyn Looney MS OTR/L  Time Calculation: 12 mins

## 2017-08-11 NOTE — PROCEDURES
Formerly [facility] [place]  *** FINAL REPORT ***    Name: Katie Osgood  MRN: [id number]    Inpatient  : 15 Sep 1965  HIS Order #: [id number]  [id number] [facility] Visit #: [id number]  Date: 11 Aug 2017    TYPE OF TEST: Peripheral Venous Testing    REASON FOR TEST  Pain in limb    Right Leg:-  Deep venous thrombosis:           No  Superficial venous thrombosis:    No  Deep venous insufficiency:        Not examined  Superficial venous insufficiency: Not examined    Left Leg:-  Deep venous thrombosis:           No  Superficial venous thrombosis:    No  Deep venous insufficiency:        Not examined  Superficial venous insufficiency: Not examined      INTERPRETATION/FINDINGS  Duplex images were obtained using 2-D gray scale, color flow, and  spectral Doppler analysis. Right leg :  1. Deep vein(s) visualized include the common femoral, deep femoral,  proximal femoral, mid femoral, distal femoral, popliteal(above knee),  popliteal(fossa), popliteal(below knee), posterior tibial and peroneal   veins. 2. No evidence of deep venous thrombosis detected in the veins  visualized. 3. Superficial vein(s) visualized include the great saphenous vein. 4. No evidence of superficial thrombosis detected. Left leg :  1. Deep vein(s) visualized include the common femoral, deep femoral,  proximal femoral, mid femoral, distal femoral, popliteal(above knee),  popliteal(fossa), popliteal(below knee), posterior tibial and peroneal   veins. 2. No evidence of deep venous thrombosis detected in the veins  visualized. 3. Superficial vein(s) visualized include the great saphenous vein. 4. No evidence of superficial thrombosis detected. ADDITIONAL COMMENTS    I have personally reviewed the data relevant to the interpretation of  this  study. TECHNOLOGIST: Dai Romo  Signed: 2017 05:26 PM    PHYSICIAN: Erickson Hand MD  Signed: 2017 02:23 PM

## 2017-08-11 NOTE — ROUTINE PROCESS
Bedside and Verbal shift change report given to Becca Moreno RN (oncoming nurse) by Del Chung RN (offgoing nurse). Report included the following information SBAR and Kardex.

## 2017-08-11 NOTE — PROGRESS NOTES
3782 - Bedside report received from Lenard Chavez RN. Patient in bed resting at this time. Pain 0/10. Plan of care for the day addressed with the patient. 2314 - Patient sitting on side of bed. IV to right AC intact and patent. R IJ dialysis cath intact and capped. Lungs clear. Bowel sounds active to all quadrants. Slight Jaundice to bilateral eyes. Rash to scrotum improving. Tele #7 NSR. Patient ambulates well unassisted. 1930 - Bedside and Verbal shift change report given to Aman Hunter RN by Nico Gomes RN. Report included the following information SBAR, Kardex, OR Summary, Intake/Output and MAR. Patient to have dialysis today, Dr. Earline Devine discussed with dialysis nurse.

## 2017-08-11 NOTE — PROGRESS NOTES
Problem: Mobility Impaired (Adult and Pediatric)  Goal: *Acute Goals and Plan of Care (Insert Text)  Physical Therapy Goals  Initiated 8/8/2017 and to be accomplished within 3-7 day(s)  1. Patient will move from supine to sit and sit to supine in bed with independence. 2. Patient will transfer from bed to chair and chair to bed with modified independence using the least restrictive device. 3. Patient will perform sit to stand with modified independence. 4. Patient will ambulate with modified independence for 150 feet with the least restrictive device. 5. Patient will ascend/descend 13 stairs with Nae handrail(s) with modified independence. Outcome: Not Progressing Towards Goal  Pt refused today's PT session due to:  [ ]  Nausea/vomiting  [ ]  Eating  [ ]  Pain  [X]  Pt lethargic (Not today)  [ ]  Off Unit  Will f/u later, as pt's schedule allows. Thank you.   Matthias Fagan, PTA

## 2017-08-12 LAB
ALBUMIN SERPL BCP-MCNC: 2.5 G/DL (ref 3.4–5)
ANION GAP BLD CALC-SCNC: 5 MMOL/L (ref 3–18)
BUN SERPL-MCNC: 38 MG/DL (ref 7–18)
BUN/CREAT SERPL: 7 (ref 12–20)
CALCIUM SERPL-MCNC: 7.6 MG/DL (ref 8.4–10.4)
CALCIUM SERPL-MCNC: 7.6 MG/DL (ref 8.5–10.1)
CHLORIDE SERPL-SCNC: 104 MMOL/L (ref 100–108)
CO2 SERPL-SCNC: 32 MMOL/L (ref 21–32)
CREAT SERPL-MCNC: 5.15 MG/DL (ref 0.6–1.3)
ERYTHROCYTE [DISTWIDTH] IN BLOOD BY AUTOMATED COUNT: 15.4 % (ref 11.6–14.5)
GLUCOSE SERPL-MCNC: 104 MG/DL (ref 74–99)
HCT VFR BLD AUTO: 24.9 % (ref 36–48)
HGB BLD-MCNC: 8 G/DL (ref 13–16)
MCH RBC QN AUTO: 30.2 PG (ref 24–34)
MCHC RBC AUTO-ENTMCNC: 32.1 G/DL (ref 31–37)
MCV RBC AUTO: 94 FL (ref 74–97)
PHOSPHATE SERPL-MCNC: 2.9 MG/DL (ref 2.5–4.9)
PLATELET # BLD AUTO: 243 K/UL (ref 135–420)
PMV BLD AUTO: 11 FL (ref 9.2–11.8)
POTASSIUM SERPL-SCNC: 4 MMOL/L (ref 3.5–5.5)
PTH-INTACT SERPL-MCNC: 667.3 PG/ML (ref 14–72)
RBC # BLD AUTO: 2.65 M/UL (ref 4.7–5.5)
SODIUM SERPL-SCNC: 141 MMOL/L (ref 136–145)
TSH SERPL DL<=0.05 MIU/L-ACNC: 0.88 UIU/ML (ref 0.36–3.74)
WBC # BLD AUTO: 10.9 K/UL (ref 4.6–13.2)

## 2017-08-12 PROCEDURE — 85027 COMPLETE CBC AUTOMATED: CPT | Performed by: HOSPITALIST

## 2017-08-12 PROCEDURE — 83970 ASSAY OF PARATHORMONE: CPT | Performed by: INTERNAL MEDICINE

## 2017-08-12 PROCEDURE — 74011250636 HC RX REV CODE- 250/636: Performed by: HOSPITALIST

## 2017-08-12 PROCEDURE — 65660000000 HC RM CCU STEPDOWN

## 2017-08-12 PROCEDURE — 84443 ASSAY THYROID STIM HORMONE: CPT | Performed by: HOSPITALIST

## 2017-08-12 PROCEDURE — 36415 COLL VENOUS BLD VENIPUNCTURE: CPT | Performed by: HOSPITALIST

## 2017-08-12 PROCEDURE — 74011250637 HC RX REV CODE- 250/637: Performed by: HOSPITALIST

## 2017-08-12 PROCEDURE — 80069 RENAL FUNCTION PANEL: CPT | Performed by: HOSPITALIST

## 2017-08-12 PROCEDURE — 97530 THERAPEUTIC ACTIVITIES: CPT

## 2017-08-12 PROCEDURE — 74011250637 HC RX REV CODE- 250/637: Performed by: INTERNAL MEDICINE

## 2017-08-12 PROCEDURE — 97116 GAIT TRAINING THERAPY: CPT

## 2017-08-12 RX ORDER — DOXERCALCIFEROL 4 UG/2ML
4 INJECTION INTRAVENOUS
Status: DISCONTINUED | OUTPATIENT
Start: 2017-08-14 | End: 2017-08-18 | Stop reason: HOSPADM

## 2017-08-12 RX ORDER — CALCIUM CARBONATE 500(1250)
500 TABLET ORAL
Status: DISCONTINUED | OUTPATIENT
Start: 2017-08-12 | End: 2017-08-18 | Stop reason: HOSPADM

## 2017-08-12 RX ADMIN — NYSTATIN: 100000 OINTMENT TOPICAL at 23:32

## 2017-08-12 RX ADMIN — Medication 500 MG: at 14:27

## 2017-08-12 RX ADMIN — ASPIRIN 81 MG 81 MG: 81 TABLET ORAL at 10:14

## 2017-08-12 RX ADMIN — HEPARIN SODIUM 5000 UNITS: 5000 INJECTION, SOLUTION INTRAVENOUS; SUBCUTANEOUS at 23:31

## 2017-08-12 RX ADMIN — HEPARIN SODIUM 5000 UNITS: 5000 INJECTION, SOLUTION INTRAVENOUS; SUBCUTANEOUS at 10:16

## 2017-08-12 RX ADMIN — CARVEDILOL 25 MG: 12.5 TABLET, FILM COATED ORAL at 17:06

## 2017-08-12 RX ADMIN — Medication 500 MG: at 17:06

## 2017-08-12 RX ADMIN — Medication 1 TABLET: at 10:20

## 2017-08-12 RX ADMIN — SIMVASTATIN 20 MG: 20 TABLET, FILM COATED ORAL at 22:16

## 2017-08-12 RX ADMIN — NYSTATIN: 100000 OINTMENT TOPICAL at 10:21

## 2017-08-12 RX ADMIN — HEPARIN SODIUM 5000 UNITS: 5000 INJECTION, SOLUTION INTRAVENOUS; SUBCUTANEOUS at 14:27

## 2017-08-12 RX ADMIN — NYSTATIN: 100000 OINTMENT TOPICAL at 17:07

## 2017-08-12 RX ADMIN — PANTOPRAZOLE SODIUM 40 MG: 40 TABLET, DELAYED RELEASE ORAL at 07:24

## 2017-08-12 RX ADMIN — CARVEDILOL 25 MG: 12.5 TABLET, FILM COATED ORAL at 10:14

## 2017-08-12 NOTE — PROGRESS NOTES
Hospitalist Progress Note    Patient: Tono Dahl MRN: [id number]  CSN: [id number]    YOB: 1965  Age: 49 y.o. Sex: male    DOA: 8/4/2017 LOS:  LOS: 8 days          Chief Complaint:esrd          Assessment/Plan     Looking good. Enjoying time with family. No acute needs. Feels well. Tolerating hemodialysis. DVT study negative.     ARF on Hemodialysis. HTN urgency. Anemia of CKD. Severe prot abril malnutrition. Uremia resolved. Chest pain.          Seeking HD chair outpatient. Continue supportive care.       Patient Active Problem List   Diagnosis Code    Routine general medical examination at a health care facility Z00.00    Unspecified essential hypertension I10    Tobacco abuse Z72.0    PAM (acute kidney injury) ([facility] [address] N17.9    Hypertensive urgency, malignant I16.0    Uremia N19    Severe anemia D64.9    Nonadherence to medical treatment Z91.19    Rash of genital area R21    Pleural effusion, right J90    Severe protein-calorie malnutrition (HCC) E43       Subjective:    Feeling well. No complaints. Wife and children present. Discussed his htn and esrd. Review of systems:    Constitutional: denies fevers, chills, myalgias  Respiratory: denies SOB, cough  Cardiovascular: denies chest pain, palpitations  Gastrointestinal: denies nausea, vomiting, diarrhea      Vital signs/Intake and Output:  Visit Vitals    /88    Pulse 73    Temp 99.3 °F (37.4 °C)    Resp 18    Ht 5' 5\" (1.651 m)    Wt 55 kg (121 lb 4.1 oz)    SpO2 100%    BMI 20.18 kg/m2     Current Shift:     Last three shifts:  08/10 1901 - 08/12 0700  In: 950 [P.O.:950]  Out: 0     Exam:    General: nad  Head/Neck: mmm  CVS:s1s2  Lungs:Clear  Abdomen: Soft, bs+  Extremities: No edema.                 Labs: Results:       Chemistry Recent Labs      08/12/17   0415   GLU  104*   NA  141   K  4.0   CL  104   CO2  32   BUN  38*   CREA  5.15*   CA  7.6*   AGAP  5   BUCR  7*   ALB  2.5*      CBC w/Diff Recent Labs      08/12/17   0415   WBC  10.9   RBC  2.65*   HGB  8.0*   HCT  24.9*   PLT  243      Cardiac Enzymes No results for input(s): CPK, CKND1, RICHARD in the last 72 hours. No lab exists for component: CKRMB, TROIP   Coagulation No results for input(s): PTP, INR, APTT in the last 72 hours. No lab exists for component: INREXT    Lipid Panel Lab Results   Component Value Date/Time    Cholesterol, total 195 11/12/2013 10:53 AM    HDL Cholesterol 42 11/12/2013 10:53 AM    LDL, calculated 121.4 11/12/2013 10:53 AM    VLDL, calculated 31.6 11/12/2013 10:53 AM    Triglyceride 158 11/12/2013 10:53 AM    CHOL/HDL Ratio 4.6 11/12/2013 10:53 AM      BNP No results for input(s): BNPP in the last 72 hours.    Liver Enzymes Recent Labs      08/12/17   0415   ALB  2.5*      Thyroid Studies No results found for: T4, T3U, TSH, TSHEXT     Procedures/imaging: see electronic medical records for all procedures/Xrays and details which were not copied into this note but were reviewed prior to creation of Damien Mack MD

## 2017-08-12 NOTE — PROGRESS NOTES
Problem: Mobility Impaired (Adult and Pediatric)  Goal: *Acute Goals and Plan of Care (Insert Text)  Physical Therapy Goals  Initiated 8/8/2017 and to be accomplished within 3-7 day(s)  1. Patient will move from supine to sit and sit to supine in bed with independence. 2. Patient will transfer from bed to chair and chair to bed with modified independence using the least restrictive device. 3. Patient will perform sit to stand with modified independence. 4. Patient will ambulate with modified independence for 150 feet with the least restrictive device. 5. Patient will ascend/descend 13 stairs with Nae handrail(s) with modified independence. Outcome: Resolved/Met Date Met:  08/12/17  PHYSICAL THERAPY TREATMENT/DISCHARGE     Patient: Tres Gutierrez (72 y.o. male)  Date: 8/12/2017  Diagnosis: PAM (acute kidney injury) ([facility] [address] PAM (acute kidney injury) ([facility] [address]       Precautions: Fall  Chart, physical therapy assessment, plan of care and goals were reviewed. ASSESSMENT:  Pt meets needs for safe home mobilityand is cleared from this level of PT. No dizziness or sharp increase of LE pain noted on this session. Progression toward goals:  [X]      Goals met  [ ]      Improving appropriately and progressing toward goals  [ ]      Improving slowly and progressing toward goals  [ ]      Not making progress toward goals and plan of care will be adjusted       PLAN:  Patient will be discharged from physical therapy at this time. Rationale for discharge:  [X] Goals Achieved  [ ] Clary Banana  [ ] Patient not participating in therapy  [ ] Other:  Discharge Recommendations:  Home Health  Further Equipment Recommendations for Discharge:  N/A       SUBJECTIVE:   Patient stated I'm good.       OBJECTIVE DATA SUMMARY:   Critical Behavior:  Neurologic State: Alert, Appropriate for age  Orientation Level: Oriented X4  Cognition: Appropriate decision making, Appropriate for age attention/concentration, Appropriate safety awareness, Follows commands  Safety/Judgement: Awareness of environment, Fall prevention  Functional Mobility Training:  Bed Mobility:  Rolling: Independent  Supine to Sit: Independent  Sit to Supine: Independent  Transfers:  Sit to Stand: Modified independent  Stand to Sit: Modified independent  Balance:  Sitting: Intact  Standing: Intact; With support  Standing - Static: Good  Standing - Dynamic : Good  Ambulation/Gait Training:  Distance (ft): 700 Feet (ft)  Assistive Device: Gait belt  Ambulation - Level of Assistance: Contact guard assistance  Gait Abnormalities: Decreased step clearance; Path deviations  Base of Support: Narrowed  Speed/Elodia: Slow  Step Length: Left shortened;Right shortened  Stairs:  Number of Stairs Trained: 24 (U/D)  Stairs - Level of Assistance: Supervision;Stand-by asssistance              Rail Use: Both (Alternating )  Pain:  Pain Scale 1: Numeric (0 - 10)  Pain Intensity 1: 0  Activity Tolerance:   Good  Please refer to the flowsheet for vital signs taken during this treatment.   After treatment:   [X] Patient left in no apparent distress sitting up in chair  [ ] Patient left in no apparent distress in bed  [X] Call bell left within reach  [X] Nursing notified  [X] Caregiver present  [ ] Bed alarm activated  Edmund Phillips PTA   Time Calculation: 25 mins

## 2017-08-12 NOTE — PROGRESS NOTES
2056-Assessment complete at this time. Pt A&O x4. Lung sounds clear bilaterally. Pt denies SOB. Pt has +PP bilaterally. Pt denies tingling and numbness in LE's. Pain reported a  0/10 on pain scale. 20G to rt ac patent and infusing. Skin warm and dry. Abdomen soft and non-tender. Bowel sounds active x4 quadrants WDL's. Patient resting with bed in lowest position. Call light in reach. [id number] nurse with pt    0322-Shift reassessment complete at this time. No changes noted to previous assessment. See flow sheet for details. Pt resting with bed in lowest position. Call light in reach. End of shift summary. Pt had uneventful shift. No pain reported through out shift. Pt resting with no needs or signs of distress at this time. Call light in reach.

## 2017-08-12 NOTE — PROGRESS NOTES
Bedside and verbal shift change report given to RAFAEL Rodríguez RN (oncoming nurse) by REHAN Payne RN (offgoing nurse). Report included the following information SBAR, Kardex and MAR.

## 2017-08-12 NOTE — PROGRESS NOTES
Nephrology Progress Note  IMPRESSION   1. Chronic kidney disease --> ESRD. HD initiated on 8/4/17. Had HD yesterday. Has a functional HD catheter  2. Anemia of chronic disease . No evidence of iron deficiency  3. SHPT   4. Hypocalcemia   5. Hypertension , blood pressure is under control      RECOMMENDATIONS   No indication for HD today   Increase EPO with HD   Add oral calcium supplement   Add Hectorol with HD  Check vitamin  D   We will continue to follow      Subjective:     Tono Dahl is a 49 y.o. AA male with progressive renal failure and started dialysis 8/4 in hospital .   Patient had HD last night and tolerated well. Patient has no complaints.  Patient denied CP/SOB/N/V/or pain     Admit Date: 8/4/2017  Patient Active Problem List   Diagnosis Code    Routine general medical examination at a health care facility Z00.00    Unspecified essential hypertension I10    Tobacco abuse Z72.0    PAM (acute kidney injury) ([facility] [address] N17.9    Hypertensive urgency, malignant I16.0    Uremia N19    Severe anemia D64.9    Nonadherence to medical treatment Z91.19    Rash of genital area R21    Pleural effusion, right J90    Severe protein-calorie malnutrition (HCC) E43     Current Facility-Administered Medications   Medication Dose Route Frequency    carvedilol (COREG) tablet 25 mg  25 mg Oral BID WITH MEALS    simvastatin (ZOCOR) tablet 20 mg  20 mg Oral QHS    aspirin chewable tablet 81 mg  81 mg Oral DAILY    0.9% sodium chloride infusion 250 mL  250 mL IntraVENous PRN    vit B Cmplx 3-FA-Vit C-Biotin (NEPHRO FLETCHER RX) tablet 1 Tab  1 Tab Oral DAILY    0.9% sodium chloride infusion 250 mL  250 mL IntraVENous PRN    nystatin (MYCOSTATIN) 100,000 unit/gram ointment   Topical TID    epoetin luann (EPOGEN;PROCRIT) 5,000 Units  5,000 Units IntraVENous DIALYSIS MON, WED & FRI    cloNIDine (CATAPRES) 0.2 mg/24 hr patch 1 Patch  1 Patch TransDERmal Q7D    pantoprazole (PROTONIX) tablet 40 mg  40 mg Oral ACB    ondansetron (ZOFRAN) injection 4 mg  4 mg IntraVENous Q6H PRN    acetaminophen (TYLENOL) tablet 650 mg  650 mg Oral Q6H PRN    heparin (porcine) injection 5,000 Units  5,000 Units SubCUTAneous Q8H    hydrALAZINE (APRESOLINE) 20 mg/mL injection 20 mg  20 mg IntraVENous Q6H PRN    0.9% sodium chloride infusion 250 mL  250 mL IntraVENous PRN       Allergy:   No Known Allergies     Objective:     Visit Vitals    /71    Pulse 72    Temp 98.3 °F (36.8 °C)    Resp 18    Ht 5' 5\" (1.651 m)    Wt 55 kg (121 lb 4.1 oz)    SpO2 100%    BMI 20.18 kg/m2       Intake/Output Summary (Last 24 hours) at 08/12/17 1231  Last data filed at 08/11/17 2056   Gross per 24 hour   Intake              590 ml   Output                0 ml   Net              590 ml       Physical Exam:       General: No acute distress   HENT: Atraumatic and normocephalic   Eyes: Normal conjunctiva   Neck: Supple , TDC at right Upper chest . No bleeding    Cardiovascular: Normal S1 & S2, no m/r/g   Pulmonary/Chest Wall: Clear to auscultation bilaterally   Abdominal: Soft and non-tender   Musculoskeletal: Trace edema at ankles bilaterally   Neurological: No focal deficits     Access:      Data Review:  Recent Labs      08/12/17   0415   NA  141   K  4.0   CL  104   CO2  32   BUN  38*   CREA  5.15*   GLU  104*   PHOS  2.9   CA  7.6*  7.6*     Recent Labs      08/12/17   0415   WBC  10.9   HGB  8.0*   HCT  24.9*   PLT  243     Recent Labs      08/12/17   0415   ALB  2.5*     No results for input(s): INR, PTP, APTT in the last 72 hours. No lab exists for component: INREXT   No results for input(s): IRON, FE, TIBC, IBCT, PSAT, FERR in the last 72 hours. Most recent labs: reviewed.     MD Jesse Montana  [phone]

## 2017-08-12 NOTE — PROGRESS NOTES
0730 Assumed patient care from off going nurse REHAN Martins RN. Patient is alert x 4, resting quietly with family at bedside, and appears to be in no sign of distress. Bed left in lowest position and call bell within reach.

## 2017-08-12 NOTE — PROGRESS NOTES
10 [facility] care of pt at this time. Pt in bed with no signs of distress. Pt left with call light within reach and encouraged to call for assistance. 2000 -  Head to toe assessment performed at this time. Pt denies any chest pain or SOB. Pt denies any numbness or tingling to extremities. Pt encouraged to manage pain and to use the incentive spirometer. Pt educated on the side effects of medications taken. Pt left with call light within reach and bed in low position. Will continue to monitor. Shift summary - Pt had an uneventful night. Pt left in room with no signs of distress.

## 2017-08-13 LAB
ANION GAP BLD CALC-SCNC: 7 MMOL/L (ref 3–18)
BUN SERPL-MCNC: 70 MG/DL (ref 7–18)
BUN/CREAT SERPL: 9 (ref 12–20)
CALCIUM SERPL-MCNC: 7.4 MG/DL (ref 8.5–10.1)
CHLORIDE SERPL-SCNC: 101 MMOL/L (ref 100–108)
CO2 SERPL-SCNC: 31 MMOL/L (ref 21–32)
CREAT SERPL-MCNC: 7.63 MG/DL (ref 0.6–1.3)
ERYTHROCYTE [DISTWIDTH] IN BLOOD BY AUTOMATED COUNT: 15.8 % (ref 11.6–14.5)
GLUCOSE SERPL-MCNC: 96 MG/DL (ref 74–99)
HCT VFR BLD AUTO: 24.5 % (ref 36–48)
HGB BLD-MCNC: 7.8 G/DL (ref 13–16)
MCH RBC QN AUTO: 30.4 PG (ref 24–34)
MCHC RBC AUTO-ENTMCNC: 31.8 G/DL (ref 31–37)
MCV RBC AUTO: 95.3 FL (ref 74–97)
PLATELET # BLD AUTO: 229 K/UL (ref 135–420)
PMV BLD AUTO: 10.7 FL (ref 9.2–11.8)
POTASSIUM SERPL-SCNC: 4.5 MMOL/L (ref 3.5–5.5)
RBC # BLD AUTO: 2.57 M/UL (ref 4.7–5.5)
SODIUM SERPL-SCNC: 139 MMOL/L (ref 136–145)
WBC # BLD AUTO: 12.2 K/UL (ref 4.6–13.2)

## 2017-08-13 PROCEDURE — 80048 BASIC METABOLIC PNL TOTAL CA: CPT | Performed by: INTERNAL MEDICINE

## 2017-08-13 PROCEDURE — 65660000000 HC RM CCU STEPDOWN

## 2017-08-13 PROCEDURE — 74011250637 HC RX REV CODE- 250/637: Performed by: HOSPITALIST

## 2017-08-13 PROCEDURE — 74011250637 HC RX REV CODE- 250/637: Performed by: INTERNAL MEDICINE

## 2017-08-13 PROCEDURE — 85027 COMPLETE CBC AUTOMATED: CPT | Performed by: INTERNAL MEDICINE

## 2017-08-13 PROCEDURE — 36415 COLL VENOUS BLD VENIPUNCTURE: CPT | Performed by: INTERNAL MEDICINE

## 2017-08-13 PROCEDURE — 74011250636 HC RX REV CODE- 250/636: Performed by: HOSPITALIST

## 2017-08-13 RX ADMIN — CARVEDILOL 25 MG: 12.5 TABLET, FILM COATED ORAL at 16:56

## 2017-08-13 RX ADMIN — HEPARIN SODIUM 5000 UNITS: 5000 INJECTION, SOLUTION INTRAVENOUS; SUBCUTANEOUS at 07:07

## 2017-08-13 RX ADMIN — HEPARIN SODIUM 5000 UNITS: 5000 INJECTION, SOLUTION INTRAVENOUS; SUBCUTANEOUS at 22:50

## 2017-08-13 RX ADMIN — SIMVASTATIN 20 MG: 20 TABLET, FILM COATED ORAL at 22:50

## 2017-08-13 RX ADMIN — Medication 500 MG: at 13:16

## 2017-08-13 RX ADMIN — NYSTATIN: 100000 OINTMENT TOPICAL at 09:37

## 2017-08-13 RX ADMIN — ASPIRIN 81 MG 81 MG: 81 TABLET ORAL at 09:37

## 2017-08-13 RX ADMIN — NYSTATIN: 100000 OINTMENT TOPICAL at 16:56

## 2017-08-13 RX ADMIN — HEPARIN SODIUM 5000 UNITS: 5000 INJECTION, SOLUTION INTRAVENOUS; SUBCUTANEOUS at 16:56

## 2017-08-13 RX ADMIN — PANTOPRAZOLE SODIUM 40 MG: 40 TABLET, DELAYED RELEASE ORAL at 07:07

## 2017-08-13 RX ADMIN — Medication 500 MG: at 07:07

## 2017-08-13 RX ADMIN — Medication 500 MG: at 16:56

## 2017-08-13 RX ADMIN — NYSTATIN: 100000 OINTMENT TOPICAL at 22:50

## 2017-08-13 RX ADMIN — Medication 1 TABLET: at 09:37

## 2017-08-13 RX ADMIN — CARVEDILOL 25 MG: 12.5 TABLET, FILM COATED ORAL at 07:07

## 2017-08-13 NOTE — ROUTINE PROCESS
Bedside shift change report given to MONIQUE Coe RN (oncoming nurse) by RAFAEL Vazquez (offgoing nurse). Report included the following information SBAR, Kardex and Intake/Output.

## 2017-08-13 NOTE — ROUTINE PROCESS
Bedside and Verbal shift change report given to Roslyn Randolph RN (oncoming nurse) by RAFAEL Day (offgoing nurse). Report included the following information SBAR, Kardex, Intake/Output and MAR.

## 2017-08-13 NOTE — PROGRESS NOTES
Problem: Falls - Risk of  Goal: *Absence of Falls  Document Jesus Fall Risk and appropriate interventions in the flowsheet.   Outcome: Progressing Towards Goal  Fall Risk Interventions:              Medication Interventions: Assess postural VS orthostatic hypotension

## 2017-08-13 NOTE — PROGRESS NOTES
0303 Assumed patient care from off going nurse MONIQUE Coe RN. Patient is alert x 4 and appears to be in no sign of distress. Whiteboard updated, bed in lowest position, and call bell left within reach.

## 2017-08-13 NOTE — ROUTINE PROCESS
Bedside and Verbal shift change report given to RAFAEL Vazquez (oncoming nurse) by MONIQUE Coe (offgoing nurse). Report included the following information SBAR, Kardex, Intake/Output, MAR and Recent Results.

## 2017-08-13 NOTE — PROGRESS NOTES
Nephrology Progress Note  IMPRESSION   1. Chronic kidney disease --> ESRD. HD initiated on 8/4/17. Had HD yesterday. Has a functional HD catheter  2. Anemia of chronic disease . No evidence of iron deficiency  3. SHPT   4. Hypocalcemia   5. Hypertension , blood pressure is under control       RECOMMENDATIONS   No indication for HD today   Plan for tomorrow   Increase EPO with HD   Add oral calcium supplement   Add Hectorol with HD  Increase dialysate calcium concentration   Pending vitamin  D level   We will continue to follow       Subjective:      Horacio Kiran is a 49 y.o. AA male with progressive renal failure and started dialysis 8/4 in hospital .   Patient had HD Friday  and tolerated well. Patient has no complaints.  Patient denied CP/SOB/N/V/or pain     Admit Date: 8/4/2017  Patient Active Problem List   Diagnosis Code    Routine general medical examination at a health care facility Z00.00    Unspecified essential hypertension I10    Tobacco abuse Z72.0    PAM (acute kidney injury) ([facility] [address] N17.9    Hypertensive urgency, malignant I16.0    Uremia N19    Severe anemia D64.9    Nonadherence to medical treatment Z91.19    Rash of genital area R21    Pleural effusion, right J90    Severe protein-calorie malnutrition (HCC) E43     Current Facility-Administered Medications   Medication Dose Route Frequency    calcium carbonate (OS-NATALIE) tablet 500 mg [elemental]  500 mg Oral TID WITH MEALS    [START ON 8/14/2017] iron sucrose (VENOFER) 100 mg iron/5 mL injection 100 mg  100 mg IntraVENous every Monday    [START ON 8/14/2017] doxercalciferol (HECTOROL) 4 mcg/2 mL injection 4 mcg  4 mcg IntraVENous Q MON, WED & FRI    [START ON 8/14/2017] epoetin luann (EPOGEN;PROCRIT) injection 16,600 Units  300 Units/kg IntraVENous DIALYSIS MON, WED & FRI    carvedilol (COREG) tablet 25 mg  25 mg Oral BID WITH MEALS    simvastatin (ZOCOR) tablet 20 mg  20 mg Oral QHS    aspirin chewable tablet 81 mg  81 mg Oral DAILY  0.9% sodium chloride infusion 250 mL  250 mL IntraVENous PRN    vit B Cmplx 3-FA-Vit C-Biotin (NEPHRO FLETCHER RX) tablet 1 Tab  1 Tab Oral DAILY    0.9% sodium chloride infusion 250 mL  250 mL IntraVENous PRN    nystatin (MYCOSTATIN) 100,000 unit/gram ointment   Topical TID    cloNIDine (CATAPRES) 0.2 mg/24 hr patch 1 Patch  1 Patch TransDERmal Q7D    pantoprazole (PROTONIX) tablet 40 mg  40 mg Oral ACB    ondansetron (ZOFRAN) injection 4 mg  4 mg IntraVENous Q6H PRN    acetaminophen (TYLENOL) tablet 650 mg  650 mg Oral Q6H PRN    heparin (porcine) injection 5,000 Units  5,000 Units SubCUTAneous Q8H    hydrALAZINE (APRESOLINE) 20 mg/mL injection 20 mg  20 mg IntraVENous Q6H PRN    0.9% sodium chloride infusion 250 mL  250 mL IntraVENous PRN       Allergy:   No Known Allergies     Objective:     Visit Vitals    /90 (BP 1 Location: Left arm, BP Patient Position: Sitting)    Pulse 69    Temp 98.8 °F (37.1 °C)    Resp 16    Ht 5' 5\" (1.651 m)    Wt 59 kg (130 lb)    SpO2 100%    BMI 21.63 kg/m2       Intake/Output Summary (Last 24 hours) at 08/13/17 1435  Last data filed at 08/13/17 0800   Gross per 24 hour   Intake             1580 ml   Output                0 ml   Net             1580 ml       Physical Exam:       General: No acute distress   HENT: Atraumatic and normocephalic   Eyes: Normal conjunctiva   Neck: Supple    Cardiovascular: Normal S1 & S2, no m/r/g   Pulmonary/Chest Wall: Clear to auscultation bilaterally   Abdominal: Soft and non-tender   Musculoskeletal: no edema LE bilaterally   Neurological: No focal deficits     Access:  Right IJ TDC site no tender or exudate    Data Review:  Recent Labs      08/13/17   0306  08/12/17   0415   NA  139  141   K  4.5  4.0   CL  101  104   CO2  31  32   BUN  70*  38*   CREA  7.63*  5.15*   GLU  96  104*   PHOS   --   2.9   CA  7.4*  7.6*  7.6*     Recent Labs      08/13/17   0306  08/12/17   0415   WBC  12.2  10.9   HGB  7.8*  8.0*   HCT  24.5* 24.9*   PLT  229  243     Recent Labs      08/12/17   0415   ALB  2.5*     No results for input(s): INR, PTP, APTT in the last 72 hours. No lab exists for component: INREXT   No results for input(s): IRON, FE, TIBC, IBCT, PSAT, FERR in the last 72 hours. Most recent labs: reviewed.     MD Jesse Benoitnison  [phone]

## 2017-08-13 NOTE — PROGRESS NOTES
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 Doubt erythema       migrans. Pleural effusion, right (8/6/2017)      Severe protein-calorie malnutrition ([facility] [address] (8/8/2017)         PLAN:   1. Continue HD 3x weekly. 2.  HTB - BP control. Trend anemia. 3.  Dispo planning. Really does not joe to be in the hospital.  Can leave when safe dc plan in place that includes hemodialysis. CC: SUBJECTIVE:    ROS:    GEN:  No fever or chills. COR:  No cp or palps. Resp:  No cough or sob. GI:  No n/v. No abd pain. No constipation. No diarrhea.             OBJECTIVE:   Vitals:   Visit Vitals    /90 (BP 1 Location: Left arm, BP Patient Position: Sitting)    Pulse 69    Temp 98.8 °F (37.1 °C)    Resp 16    Ht 5' 5\" (1.651 m)    Wt 59 kg (130 lb)    SpO2 100%    BMI 21.63 kg/m2       Gen:  Nad         Labs and Radiology:   Recent Labs      08/13/17   0306  08/12/17   0415   WBC  12.2  10.9   HGB  7.8*  8.0*   HCT  24.5*  24.9*   PLT  229  243     Recent Labs      08/13/17   0306  08/12/17   0415   NA  139  141   K  4.5  4.0   CL  101  104   CO2  31  32   GLU  96  104*   BUN  70*  38*   CREA  7.63*  5.15*   CA  7.4*  7.6*  7.6*              Ivonne Sexton M.D.

## 2017-08-14 LAB
ANION GAP BLD CALC-SCNC: 8 MMOL/L (ref 3–18)
BUN SERPL-MCNC: 95 MG/DL (ref 7–18)
BUN/CREAT SERPL: 10 (ref 12–20)
CALCIUM SERPL-MCNC: 7.8 MG/DL (ref 8.5–10.1)
CHLORIDE SERPL-SCNC: 104 MMOL/L (ref 100–108)
CO2 SERPL-SCNC: 27 MMOL/L (ref 21–32)
CREAT SERPL-MCNC: 9.44 MG/DL (ref 0.6–1.3)
ERYTHROCYTE [DISTWIDTH] IN BLOOD BY AUTOMATED COUNT: 15.4 % (ref 11.6–14.5)
GLUCOSE SERPL-MCNC: 102 MG/DL (ref 74–99)
HCT VFR BLD AUTO: 23.7 % (ref 36–48)
HGB BLD-MCNC: 7.5 G/DL (ref 13–16)
MCH RBC QN AUTO: 29.8 PG (ref 24–34)
MCHC RBC AUTO-ENTMCNC: 31.6 G/DL (ref 31–37)
MCV RBC AUTO: 94 FL (ref 74–97)
PLATELET # BLD AUTO: 220 K/UL (ref 135–420)
PMV BLD AUTO: 10.6 FL (ref 9.2–11.8)
POTASSIUM SERPL-SCNC: 4.6 MMOL/L (ref 3.5–5.5)
RBC # BLD AUTO: 2.52 M/UL (ref 4.7–5.5)
SODIUM SERPL-SCNC: 139 MMOL/L (ref 136–145)
WBC # BLD AUTO: 10.4 K/UL (ref 4.6–13.2)

## 2017-08-14 PROCEDURE — 74011250637 HC RX REV CODE- 250/637: Performed by: INTERNAL MEDICINE

## 2017-08-14 PROCEDURE — 74011250636 HC RX REV CODE- 250/636: Performed by: INTERNAL MEDICINE

## 2017-08-14 PROCEDURE — 74011250637 HC RX REV CODE- 250/637: Performed by: HOSPITALIST

## 2017-08-14 PROCEDURE — 80048 BASIC METABOLIC PNL TOTAL CA: CPT | Performed by: INTERNAL MEDICINE

## 2017-08-14 PROCEDURE — 74011250636 HC RX REV CODE- 250/636: Performed by: HOSPITALIST

## 2017-08-14 PROCEDURE — 85027 COMPLETE CBC AUTOMATED: CPT | Performed by: INTERNAL MEDICINE

## 2017-08-14 PROCEDURE — 36415 COLL VENOUS BLD VENIPUNCTURE: CPT | Performed by: INTERNAL MEDICINE

## 2017-08-14 PROCEDURE — 65660000000 HC RM CCU STEPDOWN

## 2017-08-14 RX ORDER — AMLODIPINE BESYLATE 5 MG/1
5 TABLET ORAL DAILY
Status: DISCONTINUED | OUTPATIENT
Start: 2017-08-14 | End: 2017-08-18 | Stop reason: HOSPADM

## 2017-08-14 RX ADMIN — Medication 500 MG: at 18:50

## 2017-08-14 RX ADMIN — ASPIRIN 81 MG 81 MG: 81 TABLET ORAL at 09:12

## 2017-08-14 RX ADMIN — NYSTATIN: 100000 OINTMENT TOPICAL at 18:50

## 2017-08-14 RX ADMIN — NYSTATIN: 100000 OINTMENT TOPICAL at 22:19

## 2017-08-14 RX ADMIN — SIMVASTATIN 20 MG: 20 TABLET, FILM COATED ORAL at 22:19

## 2017-08-14 RX ADMIN — NYSTATIN: 100000 OINTMENT TOPICAL at 13:34

## 2017-08-14 RX ADMIN — HEPARIN SODIUM 5000 UNITS: 5000 INJECTION, SOLUTION INTRAVENOUS; SUBCUTANEOUS at 16:04

## 2017-08-14 RX ADMIN — CARVEDILOL 25 MG: 12.5 TABLET, FILM COATED ORAL at 09:12

## 2017-08-14 RX ADMIN — DOXERCALCIFEROL 4 MCG: 4 INJECTION, SOLUTION INTRAVENOUS at 15:59

## 2017-08-14 RX ADMIN — IRON SUCROSE 100 MG: 20 INJECTION, SOLUTION INTRAVENOUS at 15:59

## 2017-08-14 RX ADMIN — Medication 500 MG: at 13:33

## 2017-08-14 RX ADMIN — HEPARIN SODIUM 5000 UNITS: 5000 INJECTION, SOLUTION INTRAVENOUS; SUBCUTANEOUS at 22:21

## 2017-08-14 RX ADMIN — PANTOPRAZOLE SODIUM 40 MG: 40 TABLET, DELAYED RELEASE ORAL at 06:48

## 2017-08-14 RX ADMIN — CARVEDILOL 25 MG: 12.5 TABLET, FILM COATED ORAL at 18:49

## 2017-08-14 RX ADMIN — HEPARIN SODIUM 5000 UNITS: 5000 INJECTION, SOLUTION INTRAVENOUS; SUBCUTANEOUS at 06:48

## 2017-08-14 RX ADMIN — Medication 500 MG: at 09:12

## 2017-08-14 RX ADMIN — Medication 1 TABLET: at 09:12

## 2017-08-14 RX ADMIN — AMLODIPINE BESYLATE 5 MG: 5 TABLET ORAL at 18:50

## 2017-08-14 RX ADMIN — ERYTHROPOIETIN 16600 UNITS: 20000 INJECTION, SOLUTION INTRAVENOUS; SUBCUTANEOUS at 15:52

## 2017-08-14 NOTE — PROGRESS NOTES
Hospitalist Progress Note    Patient: Nancy Salvador MRN: [id number]  CSN: [id number]    YOB: 1965  Age: 49 y.o.   Sex: male    DOA: 8/4/2017 LOS:  LOS: 10 days          Chief Complaint:    ESRD      Assessment/Plan     ESRD-new dx to him since admission here-now on dialysis MWF, awaiting contracted chair at [facility] for outpt dialysis  Uncontrolled HTN-add norvasc daily to regimen  Anemia of CKD-epo with dialysis  Severe malnutrition due to months of likely renal disease and anorexia, weight loss, N/V  Uremia resolved    Dispo:outpt dialysis, await acceptance and arrangements made    Patient Active Problem List   Diagnosis Code    Routine general medical examination at a health care facility Z00.00    Unspecified essential hypertension I10    Tobacco abuse Z72.0    PAM (acute kidney injury) ([facility] [address] N17.9    Hypertensive urgency, malignant I16.0    Uremia N19    Severe anemia D64.9    Nonadherence to medical treatment Z91.19    Rash of genital area R21    Pleural effusion, right J90    Severe protein-calorie malnutrition ([facility] [address] E43       Subjective:    Denies CP, SOB, dizziness, HA    Review of systems:    Constitutional: denies fevers, chills, myalgias  Respiratory: denies SOB, cough  Cardiovascular: denies chest pain, palpitations  Gastrointestinal: denies nausea, vomiting, diarrhea      Vital signs/Intake and Output:  Visit Vitals    /73    Pulse 71    Temp 98 °F (36.7 °C)    Resp 17    Ht 5' 5\" (1.651 m)    Wt 62.1 kg (136 lb 14.4 oz)    SpO2 100%    BMI 22.78 kg/m2     Current Shift:  08/14 0701 - 08/14 1900  In: 480 [P.O.:480]  Out: 380 [Urine:380]  Last three shifts:  08/12 1901 - 08/14 0700  In: 1580 [P.O.:1580]  Out: -     Exam:    General: Well developed, alert, NAD, OX3-resting in bed with family watching TV  Head/Neck: NCAT, supple, No masses, No lymphadenopathy  CVS:Regular rate and rhythm, no M/R/G, S1/S2 heard, no thrill  Lungs:Clear to auscultation bilaterally, no wheezes, rhonchi, or rales  Abdomen: Soft, Nontender, No distention, Normal Bowel sounds, No hepatomegaly  Extremities: No C/C/E, pulses palpable 2+  Neuro:grossly normal , follows commands  Psych:appropriate                Labs: Results:       Chemistry Recent Labs      08/14/17 0240 08/13/17 0306 08/12/17 0415   GLU  102*  96  104*   NA  139  139  141   K  4.6  4.5  4.0   CL  104  101  104   CO2  27  31  32   BUN  95*  70*  38*   CREA  9.44*  7.63*  5.15*   CA  7.8*  7.4*  7.6*  7.6*   AGAP  8  7  5   BUCR  10*  9*  7*   ALB   --    --   2.5*      CBC w/Diff Recent Labs      08/14/17 0240 08/13/17 0306 08/12/17 0415   WBC  10.4  12.2  10.9   RBC  2.52*  2.57*  2.65*   HGB  7.5*  7.8*  8.0*   HCT  23.7*  24.5*  24.9*   PLT  220  229  243      Cardiac Enzymes No results for input(s): CPK, CKND1, RICHARD in the last 72 hours. No lab exists for component: CKRMB, TROIP   Coagulation No results for input(s): PTP, INR, APTT in the last 72 hours. No lab exists for component: INREXT    Lipid Panel Lab Results   Component Value Date/Time    Cholesterol, total 195 11/12/2013 10:53 AM    HDL Cholesterol 42 11/12/2013 10:53 AM    LDL, calculated 121.4 11/12/2013 10:53 AM    VLDL, calculated 31.6 11/12/2013 10:53 AM    Triglyceride 158 11/12/2013 10:53 AM    CHOL/HDL Ratio 4.6 11/12/2013 10:53 AM      BNP No results for input(s): BNPP in the last 72 hours.    Liver Enzymes Recent Labs      08/12/17 0415   ALB  2.5*      Thyroid Studies Lab Results   Component Value Date/Time    TSH 0.88 08/12/2017 04:15 AM        Procedures/imaging: see electronic medical records for all procedures/Xrays and details which were not copied into this note but were reviewed prior to creation of Lin Parks MD

## 2017-08-14 NOTE — ROUTINE PROCESS
Bedside and Verbal shift change report given to [facility] by Ruthy Gifford RN. Report included the following information SBAR, Kardex, OR Summary, Intake/Output and MAR.

## 2017-08-14 NOTE — PROGRESS NOTES
65 - Pt resting in bed, wife and daughter in bed with pt. Denies chest pain c/o occasional SOB. A&Ox4, RA. Denies numbness/tingling. Dialysis cath intact with transparent dressing. 20G to right AC intact and patent. Pain rated 0/10. No concerns voiced. Call bell within reach, bed in lowest position.

## 2017-08-14 NOTE — PROGRESS NOTES
0730 Assumed patient care from off going nurse Javier Sherman RN. Patient is alert x 4, washing up in bathroom with family present at bedside. Whiteboard updated and patient instructed to call for assistance.

## 2017-08-14 NOTE — PROGRESS NOTES
Bedside and Verbal shift change report given to REHAN Rodríguez RN (oncoming nurse) by RAFAEL Swan (offgoing nurse). Report included the following information SBAR, Kardex and Intake/Output.

## 2017-08-14 NOTE — PROGRESS NOTES
conducted a Follow up consultation and Spiritual Assessment for [facility], who is a 49 y.o.,male. The  provided the following Interventions:  Continued the relationship of care and support. Listened empathically. Offered prayer and assurance of continued prayer on patients behalf. Chart reviewed. The following outcomes were achieved:  Patient expressed gratitude for pastoral care visit. Assessment:  There are no spiritual or Protestant issues which require intervention at this time. Plan:  Chaplains will continue to follow and will provide pastoral care on an as needed/requested basis.  recommends bedside caregivers page  on duty if patient shows signs of acute spiritual or emotional distress.        Sister Moiséselymika Palomares, [place], [address] [facility]  [phone]

## 2017-08-14 NOTE — PROGRESS NOTES
Nephrology Progress/HD Note    IMPRESSION   1. Advanced Chronic kidney disease, now ESRD and dialysis dependent. HD initiated on 8/4/17. Has a functional HD catheter, tolerating dialysis well  2. Anemia of chronic disease . No evidence of iron deficiency  3. SHPT   4. Hypocalcemia   5. Hypertension, BPs better controlled     RECOMMENDATIONS   Dialysis today for clearance  Increase EPO with HD   Use current access for HD  Cont oral calcium supplement   Cont Hectorol with HD  Increased dialysate calcium concentration   Pending vitamin  D level   D/w pt and dialysis nurse     Subjective:      Ayden Toledo is a 49 y.o. AA male with progressive renal failure and started dialysis 8/4 in hospital .   Seen pt this AM and seen again during dialysis this afternoon.   No new complts    Admit Date: 8/4/2017  Patient Active Problem List   Diagnosis Code    Routine general medical examination at a health care facility Z00.00    Unspecified essential hypertension I10    Tobacco abuse Z72.0    PAM (acute kidney injury) ([facility] [address] N17.9    Hypertensive urgency, malignant I16.0    Uremia N19    Severe anemia D64.9    Nonadherence to medical treatment Z91.19    Rash of genital area R21    Pleural effusion, right J90    Severe protein-calorie malnutrition (HCC) E43     Current Facility-Administered Medications   Medication Dose Route Frequency    calcium carbonate (OS-NATALIE) tablet 500 mg [elemental]  500 mg Oral TID WITH MEALS    iron sucrose (VENOFER) 100 mg iron/5 mL injection 100 mg  100 mg IntraVENous every Monday    doxercalciferol (HECTOROL) 4 mcg/2 mL injection 4 mcg  4 mcg IntraVENous Q MON, WED & FRI    epoetin luann (EPOGEN;PROCRIT) injection 16,600 Units  300 Units/kg IntraVENous DIALYSIS MON, WED & FRI    carvedilol (COREG) tablet 25 mg  25 mg Oral BID WITH MEALS    simvastatin (ZOCOR) tablet 20 mg  20 mg Oral QHS    aspirin chewable tablet 81 mg  81 mg Oral DAILY    0.9% sodium chloride infusion 250 mL  250 mL IntraVENous PRN    vit B Cmplx 3-FA-Vit C-Biotin (NEPHRO FLETCHER RX) tablet 1 Tab  1 Tab Oral DAILY    0.9% sodium chloride infusion 250 mL  250 mL IntraVENous PRN    nystatin (MYCOSTATIN) 100,000 unit/gram ointment   Topical TID    cloNIDine (CATAPRES) 0.2 mg/24 hr patch 1 Patch  1 Patch TransDERmal Q7D    pantoprazole (PROTONIX) tablet 40 mg  40 mg Oral ACB    ondansetron (ZOFRAN) injection 4 mg  4 mg IntraVENous Q6H PRN    acetaminophen (TYLENOL) tablet 650 mg  650 mg Oral Q6H PRN    heparin (porcine) injection 5,000 Units  5,000 Units SubCUTAneous Q8H    hydrALAZINE (APRESOLINE) 20 mg/mL injection 20 mg  20 mg IntraVENous Q6H PRN    0.9% sodium chloride infusion 250 mL  250 mL IntraVENous PRN       Allergy:   No Known Allergies     Objective:     Visit Vitals    /88 (BP 1 Location: Left arm, BP Patient Position: At rest)    Pulse 71    Temp 99 °F (37.2 °C)    Resp 17    Ht 5' 5\" (1.651 m)    Wt 62.1 kg (136 lb 14.4 oz)    SpO2 100%    BMI 22.78 kg/m2       Intake/Output Summary (Last 24 hours) at 08/14/17 1038  Last data filed at 08/14/17 0800   Gross per 24 hour   Intake              480 ml   Output              380 ml   Net              100 ml       Physical Exam:       General: NAD   HENT: Atraumatic and normocephalic   Eyes: Normal conjunctiva   Neck: Supple, has no JVD   Cardiovascular: Normal S1 & S2, no m/r/g   Pulmonary/Chest Wall: Clear to auscultation bilaterally, no w/c   Abdominal: Soft and non-tender   Musculoskeletal: no edema LE bilaterally   Neurological: No focal deficits, AAOx3     Access:  Right IJ TDC with adequate Qb    Data Review:  Recent Labs      08/14/17   0240  08/13/17   0306  08/12/17   0415   NA  139  139  141   K  4.6  4.5  4.0   CL  104  101  104   CO2  27  31  32   BUN  95*  70*  38*   CREA  9.44*  7.63*  5.15*   GLU  102*  96  104*   PHOS   --    --   2.9   CA  7.8*  7.4*  7.6*  7.6*     Recent Labs      08/14/17   0240  08/13/17   0306   WBC  10.4  12.2 HGB  7.5*  7.8*   HCT  23.7*  24.5*   PLT  220  229     Recent Labs      08/12/17   0415   ALB  2.5*     No results for input(s): INR, PTP, APTT in the last 72 hours. No lab exists for component: INREXT, INREXT   No results for input(s): IRON, FE, TIBC, IBCT, PSAT, FERR in the last 72 hours. Most recent labs: reviewed.     Elida Lindsey MD  [facility]  [phone]

## 2017-08-14 NOTE — PROGRESS NOTES
Problem: Falls - Risk of  Goal: *Absence of Falls  Document Jesus Fall Risk and appropriate interventions in the flowsheet.    Outcome: Progressing Towards Goal  Fall Risk Interventions:              Medication Interventions: Patient to call before getting OOB, Teach patient to arise slowly

## 2017-08-14 NOTE — PROGRESS NOTES
NUTRITION FOLLOW-UP    RECOMMENDATIONS/PLAN:   Continue w/ current diet  Monitor labs/lytes, PO Intake, wt, fluid status, and skin integrity           Adult Malnutrition Criteria:       Nutrition assessment was completed by RDN and the patient was found to meet the following malnutrition criteria established by ASPEN/AND:      Adult Malnutrition Guidelines:  SEVERE PROTEIN CALORIE MALNUTRITION IN THE CONTEXT OF CHRONIC ILLNESS  Weight Loss:  >5% x 1 month or >7.5% x 3 months or >10% x 6 months or >20% x 12 months  Energy Intake: <75% energy intake compared to estimated energy needs >1 month  Body Fat:  Severe Depletion  Muscle Mass: Severe Depletion  .      Reta Heart  08/14/17    NUTRITION ASSESSMENT:   Client Update: 49 yrs old Male withAKI on CKD, hypocalcemia. Pt received dialysis on Friday. Pt reports that he is continuing to do well and appetite is good. Desi Acosta      FOOD/NUTRITION INTAKE   Diet Order:  Renal   Supplements: Nepro TID   Food Allergies: NKFA/  Average PO Intake:      Patient Vitals for the past 100 hrs:   % Diet Eaten   08/14/17 0800 100 %   08/13/17 1200 50 %   08/13/17 0800 100 %   08/12/17 1851 100 %   08/12/17 1358 100 %   08/12/17 0800 50 %   08/11/17 2056 100 %   08/11/17 1327 75 %   08/11/17 0703 0 %   08/10/17 1955 100 %      Pertinent Medications:  [x] Reviewed; os-abril, iron sucrose, protonix, nephro    Insulin:  []SSI  []Pre-meal   []Basal    []Drip  [x]None  Cultural/Confucianism Food Preferences: None Identified    BIOCHEMICAL DATA & MEDICAL TESTS  Pertinent Labs:  [x] Reviewed; Ca-7.8   ANTHROPOMETRICS  Height: 5' 5\" (165.1 cm)       Weight: 62.1 kg (136 lb 14.4 oz)         BMI: 22.8 kg/m^2 normal weight (18.5%-24.9% BMI)   Adm Weight:113 lbs                Weight change: +23 lbs pt has had a positive 1.1L net since admission, plus appetite has improved   Adjusted Body Weight: n/a     NUTRITION-FOCUSED PHYSICAL ASSESSMENT  Skin: No pressure injury noted      GI: +BM 8/13/17    NUTRITION PRESCRIPTION  Calories:1149-0612 kcal/day based on 30-35 kcal/kg   Protein: 66 g/day based on 1.2 g/kg  CHO: 207-241 g/day based on 50% of total energy  Fluid: 1653-1929ml/day based on 1 kcal/ml        NUTRITION DIAGNOSES:   1. Unintentional weight loss related to poor appetite as evidenced by wt loss of approx 40lbs x 3 months per pt report      NUTRITION INTERVENTIONS:   INTERVENTIONS:                                                                                                                                                                         GOALS:  1. Continue w/ current diet 1. Continue to meet estimated needs by consuming >75% of PO Intake by next review 3 days                       LEARNING NEEDS (Diet, Supplementation, Food/Nutrient-Drug Interaction):   [] None Identified   [x] Education provided/documented Diet education provided on 8/10/17       Identified and patient: [] Declined   [] Was not appropriate/indicated        NUTRITION MONITORING /EVALUATION:   Follow PO intake  Monitor wt  Monitor renal labs, electrolytes, fluid status  Monitor for additional supplement needs     Previous Recommendations Implemented: yes        Previous Goals Met:  yes -pt PO Intakes continue to improve       [] Participated in Interdisciplinary Rounds    [x] Interdisciplinary Care Plan Reviewed  DISCHARGE NUTRITION RECOMMENDATIONS ADDRESSED:     [x] Yes- recommended renal diet    NUTRITION RISK:           [x] At risk                        [] Not currently at risk        Will follow-up per policy.   Andra Kaur RD  PAGER:  [phone]

## 2017-08-14 NOTE — PROGRESS NOTES
At this time Dialysis contract with [facility] for outpatient dialysis chair is pending

## 2017-08-15 LAB
ANION GAP BLD CALC-SCNC: 3 MMOL/L (ref 3–18)
BUN SERPL-MCNC: 59 MG/DL (ref 7–18)
BUN/CREAT SERPL: 9 (ref 12–20)
CALCIUM SERPL-MCNC: 7.5 MG/DL (ref 8.5–10.1)
CHLORIDE SERPL-SCNC: 103 MMOL/L (ref 100–108)
CO2 SERPL-SCNC: 35 MMOL/L (ref 21–32)
CREAT SERPL-MCNC: 6.39 MG/DL (ref 0.6–1.3)
ERYTHROCYTE [DISTWIDTH] IN BLOOD BY AUTOMATED COUNT: 15.5 % (ref 11.6–14.5)
GLUCOSE SERPL-MCNC: 94 MG/DL (ref 74–99)
HCT VFR BLD AUTO: 24 % (ref 36–48)
HGB BLD-MCNC: 7.7 G/DL (ref 13–16)
MCH RBC QN AUTO: 30.6 PG (ref 24–34)
MCHC RBC AUTO-ENTMCNC: 32.1 G/DL (ref 31–37)
MCV RBC AUTO: 95.2 FL (ref 74–97)
PLATELET # BLD AUTO: 210 K/UL (ref 135–420)
PMV BLD AUTO: 11.1 FL (ref 9.2–11.8)
POTASSIUM SERPL-SCNC: 4.8 MMOL/L (ref 3.5–5.5)
RBC # BLD AUTO: 2.52 M/UL (ref 4.7–5.5)
SODIUM SERPL-SCNC: 141 MMOL/L (ref 136–145)
WBC # BLD AUTO: 9.2 K/UL (ref 4.6–13.2)

## 2017-08-15 PROCEDURE — 36415 COLL VENOUS BLD VENIPUNCTURE: CPT | Performed by: INTERNAL MEDICINE

## 2017-08-15 PROCEDURE — 65660000000 HC RM CCU STEPDOWN

## 2017-08-15 PROCEDURE — 74011250637 HC RX REV CODE- 250/637: Performed by: INTERNAL MEDICINE

## 2017-08-15 PROCEDURE — 74011250637 HC RX REV CODE- 250/637: Performed by: HOSPITALIST

## 2017-08-15 PROCEDURE — 85027 COMPLETE CBC AUTOMATED: CPT | Performed by: INTERNAL MEDICINE

## 2017-08-15 PROCEDURE — 80048 BASIC METABOLIC PNL TOTAL CA: CPT | Performed by: INTERNAL MEDICINE

## 2017-08-15 PROCEDURE — 74011250636 HC RX REV CODE- 250/636: Performed by: HOSPITALIST

## 2017-08-15 RX ORDER — CLOTRIMAZOLE AND BETAMETHASONE DIPROPIONATE 10; .64 MG/G; MG/G
CREAM TOPICAL 3 TIMES DAILY
Status: DISCONTINUED | OUTPATIENT
Start: 2017-08-15 | End: 2017-08-18 | Stop reason: HOSPADM

## 2017-08-15 RX ADMIN — SIMVASTATIN 20 MG: 20 TABLET, FILM COATED ORAL at 22:07

## 2017-08-15 RX ADMIN — CARVEDILOL 25 MG: 12.5 TABLET, FILM COATED ORAL at 09:05

## 2017-08-15 RX ADMIN — Medication 500 MG: at 09:05

## 2017-08-15 RX ADMIN — CLOTRIMAZOLE AND BETAMETHASONE DIPROPIONATE: 10; .5 CREAM TOPICAL at 17:20

## 2017-08-15 RX ADMIN — HEPARIN SODIUM 5000 UNITS: 5000 INJECTION, SOLUTION INTRAVENOUS; SUBCUTANEOUS at 06:35

## 2017-08-15 RX ADMIN — ASPIRIN 81 MG 81 MG: 81 TABLET ORAL at 09:05

## 2017-08-15 RX ADMIN — CLOTRIMAZOLE AND BETAMETHASONE DIPROPIONATE: 10; .5 CREAM TOPICAL at 13:28

## 2017-08-15 RX ADMIN — Medication 1 TABLET: at 09:05

## 2017-08-15 RX ADMIN — HEPARIN SODIUM 5000 UNITS: 5000 INJECTION, SOLUTION INTRAVENOUS; SUBCUTANEOUS at 17:19

## 2017-08-15 RX ADMIN — Medication 500 MG: at 12:01

## 2017-08-15 RX ADMIN — PANTOPRAZOLE SODIUM 40 MG: 40 TABLET, DELAYED RELEASE ORAL at 06:36

## 2017-08-15 RX ADMIN — AMLODIPINE BESYLATE 5 MG: 5 TABLET ORAL at 09:05

## 2017-08-15 RX ADMIN — CARVEDILOL 25 MG: 12.5 TABLET, FILM COATED ORAL at 17:19

## 2017-08-15 RX ADMIN — NYSTATIN: 100000 OINTMENT TOPICAL at 09:07

## 2017-08-15 RX ADMIN — HEPARIN SODIUM 5000 UNITS: 5000 INJECTION, SOLUTION INTRAVENOUS; SUBCUTANEOUS at 23:20

## 2017-08-15 RX ADMIN — Medication 500 MG: at 17:19

## 2017-08-15 NOTE — PROGRESS NOTES
Nephrology Progress    IMPRESSION   1. Advanced Chronic kidney disease, now ESRD and dialysis dependent. HD initiated on 8/4/17. Has a functional HD catheter, tolerating dialysis well  2. Anemia of chronic disease . No evidence of iron deficiency  3. SHPT   4. Hypocalcemia   5.  Hypertension, BPs better controlled     RECOMMENDATIONS   Dialysis tomorrow for clearance  Increase EPO with HD   Use current access for HD  Cont oral calcium supplement   Cont Hectorol with HD  D/w pt      Subjective:      Pao Venegas is a 49 y.o. AA male with progressive renal failure and started dialysis 8/4 in hospital .  No new complts    Admit Date: 8/4/2017  Patient Active Problem List   Diagnosis Code    Routine general medical examination at a health care facility Z00.00    Unspecified essential hypertension I10    Tobacco abuse Z72.0    PAM (acute kidney injury) ([facility] [address] N17.9    Hypertensive urgency, malignant I16.0    Uremia N19    Severe anemia D64.9    Nonadherence to medical treatment Z91.19    Rash of genital area R21    Pleural effusion, right J90    Severe protein-calorie malnutrition (HCC) E43     Current Facility-Administered Medications   Medication Dose Route Frequency    amLODIPine (NORVASC) tablet 5 mg  5 mg Oral DAILY    calcium carbonate (OS-NATALIE) tablet 500 mg [elemental]  500 mg Oral TID WITH MEALS    iron sucrose (VENOFER) 100 mg iron/5 mL injection 100 mg  100 mg IntraVENous every Monday    doxercalciferol (HECTOROL) 4 mcg/2 mL injection 4 mcg  4 mcg IntraVENous Q MON, WED & FRI    epoetin luann (EPOGEN;PROCRIT) injection 16,600 Units  300 Units/kg IntraVENous DIALYSIS MON, WED & FRI    carvedilol (COREG) tablet 25 mg  25 mg Oral BID WITH MEALS    simvastatin (ZOCOR) tablet 20 mg  20 mg Oral QHS    aspirin chewable tablet 81 mg  81 mg Oral DAILY    0.9% sodium chloride infusion 250 mL  250 mL IntraVENous PRN    vit B Cmplx 3-FA-Vit C-Biotin (NEPHRO FLETCHER RX) tablet 1 Tab  1 Tab Oral DAILY    0.9% sodium chloride infusion 250 mL  250 mL IntraVENous PRN    nystatin (MYCOSTATIN) 100,000 unit/gram ointment   Topical TID    cloNIDine (CATAPRES) 0.2 mg/24 hr patch 1 Patch  1 Patch TransDERmal Q7D    pantoprazole (PROTONIX) tablet 40 mg  40 mg Oral ACB    ondansetron (ZOFRAN) injection 4 mg  4 mg IntraVENous Q6H PRN    acetaminophen (TYLENOL) tablet 650 mg  650 mg Oral Q6H PRN    heparin (porcine) injection 5,000 Units  5,000 Units SubCUTAneous Q8H    hydrALAZINE (APRESOLINE) 20 mg/mL injection 20 mg  20 mg IntraVENous Q6H PRN    0.9% sodium chloride infusion 250 mL  250 mL IntraVENous PRN       Allergy:   No Known Allergies     Objective:     Visit Vitals    /75 (BP 1 Location: Left arm, BP Patient Position: At rest)    Pulse 68    Temp 98.5 °F (36.9 °C)    Resp 17    Ht 5' 5\" (1.651 m)    Wt 60.4 kg (133 lb 2.5 oz)    SpO2 100%    BMI 22.16 kg/m2       Intake/Output Summary (Last 24 hours) at 08/15/17 1051  Last data filed at 08/15/17 0845   Gross per 24 hour   Intake              480 ml   Output                0 ml   Net              480 ml       Physical Exam:       General: NAD   HENT: Atraumatic and normocephalic   Eyes: Normal conjunctiva   Neck: Supple, has no JVD   Cardiovascular: Normal S1 & S2, no m/r/g   Pulmonary/Chest Wall: Clear to auscultation bilaterally, no w/c   Abdominal: Soft and non-tender   Musculoskeletal: no edema LE bilaterally   Neurological: No focal deficits, AAOx3     Access:  Right IJ TDC with adequate Qb    Data Review:  Recent Labs      08/15/17   0213  08/14/17   0240   NA  141  139   K  4.8  4.6   CL  103  104   CO2  35*  27   BUN  59*  95*   CREA  6.39*  9.44*   GLU  94  102*   CA  7.5*  7.8*     Recent Labs      08/15/17   0213  08/14/17   0240   WBC  9.2  10.4   HGB  7.7*  7.5*   HCT  24.0*  23.7*   PLT  210  220     No results for input(s): SGOT, GPT, AP, TBIL, TP, ALB, GLOB, GGT in the last 72 hours.   No results for input(s): INR, PTP, APTT in the last 72 hours. No lab exists for component: INREXT, INREXT   No results for input(s): IRON, FE, TIBC, IBCT, PSAT, FERR in the last 72 hours. Most recent labs: reviewed.     MD Jesse Ha  [phone]

## 2017-08-15 NOTE — PROGRESS NOTES
0748 Assumed care of pt from [address] [place] [address]. Pt resting quietly in bed , no signs of distress, call bell within reach. 3752 Assessment completed. Pt AOx 4. On RA lung sounds clear, pt up ad calvin, skin intact, IV access 20g rt ac, TDC catheter, Rates pain 0 out of 10. Will continue to monitor.   -pt states that Nystatin cream has not helped in the 10 days that he has been taking it, still itches periodically, will ask if Nystatin powder would be appropriate? Shift Summary- Pt rested throughout shift, did not complain of pain, shortness of breath or discomfort. Pt able to ambulate with no assistance to bathroom.

## 2017-08-15 NOTE — PROGRESS NOTES
Bedside and Verbal shift change report given toG Mesfin Ward RN (oncoming nurse) by Mando Thurman RN (offgoing nurse). Report included the following information SBAR, Kardex, OR Summary, Procedure Summary, Intake/Output, MAR, Accordion, Recent Results and Med Rec Status.

## 2017-08-15 NOTE — PROGRESS NOTES
Bedside and Verbal shift change report given to Jitendra Chan (oncoming nurse) by Raissa Tavarez RN   (offgoing nurse). Report included the following information SBAR, Kardex and MAR.

## 2017-08-15 NOTE — PROGRESS NOTES
Hospitalist Progress Note    Patient: Braydon Rader MRN: [id number]  CSN: [id number]    YOB: 1965  Age: 49 y.o.   Sex: male    DOA: 8/4/2017 LOS:  LOS: 11 days          Chief Complaint:      PAM, HTN    Assessment/Plan     PAM to ESRD dependent on dialysis-MWF schedule, await chair contracted at [facility] for outpatient dialysis  Anemia of CKD-s/p iron on Monday and epo dose increased with MWF dialysis-no hemodynamic parameters met to transfuse blood  HTN-improved control on current regimen  Inguinal rash-thickened and lichenified-nystatin did not help, add lotrisone cream    Continue current meds  Dialysis again tomorrow  Ready for d/c once outpt plans set    C/o leg cramps and tightness, but duplex neg for DVT    Patient Active Problem List   Diagnosis Code    Routine general medical examination at a health care facility Z00.00    Unspecified essential hypertension I10    Tobacco abuse Z72.0    PAM (acute kidney injury) ([address] N17.9    Hypertensive urgency, malignant I16.0    Uremia N19    Severe anemia D64.9    Nonadherence to medical treatment Z91.19    Rash of genital area R21    Pleural effusion, right J90    Severe protein-calorie malnutrition ([address] E43       Subjective:  Denies dizziness, near syncope, CP, or SOB  C/o leg tightness  Vision intermittent blurry-needs his reading glasses      Review of systems:    Constitutional: denies fevers, chills, myalgias  Respiratory: denies SOB, cough  Cardiovascular: denies chest pain, palpitations  Gastrointestinal: denies nausea, vomiting, diarrhea      Vital signs/Intake and Output:  Visit Vitals    /75 (BP 1 Location: Left arm, BP Patient Position: At rest)    Pulse 67    Temp 98 °F (36.7 °C)    Resp 17    Ht 5' 5\" (1.651 m)    Wt 60.4 kg (133 lb 2.5 oz)    SpO2 100%    BMI 22.16 kg/m2     Current Shift:     Last three shifts:  08/13 1901 - 08/15 0700  In: 720 [P.O.:720]  Out: 380 [Urine:380]    Exam:    General: Well developed, alert, NAD, OX3  Head/Neck: NCAT, supple, No masses, No lymphadenopathy  CVS:Regular rate and rhythm, no M/R/G, S1/S2 heard, no thrill  Lungs:Clear to auscultation bilaterally, no wheezes, rhonchi, or rales  Abdomen: Soft, Nontender, No distention, Normal Bowel sounds, No hepatomegaly  Extremities: No C/C/E, pulses palpable 2+  Skin:lichenified thickened rash inguinal areas bilaterally, no redness or adenopathy, normal penis and scrotum  Neuro:grossly normal , follows commands  Psych:appropriate                Labs: Results:       Chemistry Recent Labs      08/15/17   0213  08/14/17   0240  08/13/17   0306   GLU  94  102*  96   NA  141  139  139   K  4.8  4.6  4.5   CL  103  104  101   CO2  35*  27  31   BUN  59*  95*  70*   CREA  6.39*  9.44*  7.63*   CA  7.5*  7.8*  7.4*   AGAP  3  8  7   BUCR  9*  10*  9*      CBC w/Diff Recent Labs      08/15/17   0213  08/14/17   0240  08/13/17   0306   WBC  9.2  10.4  12.2   RBC  2.52*  2.52*  2.57*   HGB  7.7*  7.5*  7.8*   HCT  24.0*  23.7*  24.5*   PLT  210  220  229      Cardiac Enzymes No results for input(s): CPK, CKND1, RICHARD in the last 72 hours. No lab exists for component: CKRMB, TROIP   Coagulation No results for input(s): PTP, INR, APTT in the last 72 hours. No lab exists for component: INREXT    Lipid Panel Lab Results   Component Value Date/Time    Cholesterol, total 195 11/12/2013 10:53 AM    HDL Cholesterol 42 11/12/2013 10:53 AM    LDL, calculated 121.4 11/12/2013 10:53 AM    VLDL, calculated 31.6 11/12/2013 10:53 AM    Triglyceride 158 11/12/2013 10:53 AM    CHOL/HDL Ratio 4.6 11/12/2013 10:53 AM      BNP No results for input(s): BNPP in the last 72 hours. Liver Enzymes No results for input(s): TP, ALB, TBIL, AP, SGOT, GPT in the last 72 hours.     No lab exists for component: DBIL   Thyroid Studies Lab Results   Component Value Date/Time    TSH 0.88 08/12/2017 04:15 AM        Procedures/imaging: see electronic medical records for all procedures/Xrays and details which were not copied into this note but were reviewed prior to creation of Nikki Lehman MD

## 2017-08-16 PROCEDURE — 74011250636 HC RX REV CODE- 250/636: Performed by: HOSPITALIST

## 2017-08-16 PROCEDURE — 74011250637 HC RX REV CODE- 250/637: Performed by: INTERNAL MEDICINE

## 2017-08-16 PROCEDURE — 65660000000 HC RM CCU STEPDOWN

## 2017-08-16 PROCEDURE — 74011250637 HC RX REV CODE- 250/637: Performed by: HOSPITALIST

## 2017-08-16 PROCEDURE — 74011250636 HC RX REV CODE- 250/636: Performed by: INTERNAL MEDICINE

## 2017-08-16 RX ORDER — GUAIFENESIN 100 MG/5ML
81 LIQUID (ML) ORAL DAILY
Qty: 30 TAB | Refills: 1 | Status: SHIPPED | OUTPATIENT
Start: 2017-08-16

## 2017-08-16 RX ORDER — CLOTRIMAZOLE AND BETAMETHASONE DIPROPIONATE 10; .64 MG/G; MG/G
CREAM TOPICAL
Qty: 60 G | Refills: 0 | Status: SHIPPED | OUTPATIENT
Start: 2017-08-16 | End: 2017-11-03

## 2017-08-16 RX ORDER — SIMVASTATIN 20 MG/1
20 TABLET, FILM COATED ORAL
Qty: 30 TAB | Refills: 1 | Status: SHIPPED | OUTPATIENT
Start: 2017-08-16

## 2017-08-16 RX ORDER — CLONIDINE 0.2 MG/24H
1 PATCH, EXTENDED RELEASE TRANSDERMAL
Qty: 4 PATCH | Refills: 1 | Status: SHIPPED | OUTPATIENT
Start: 2017-08-16 | End: 2017-11-03

## 2017-08-16 RX ORDER — CALCIUM CARBONATE 500(1250)
1 TABLET ORAL
Qty: 90 TAB | Refills: 0 | Status: SHIPPED | OUTPATIENT
Start: 2017-08-16 | End: 2018-01-23

## 2017-08-16 RX ORDER — CARVEDILOL 25 MG/1
25 TABLET ORAL 2 TIMES DAILY WITH MEALS
Qty: 60 TAB | Refills: 1 | Status: SHIPPED | OUTPATIENT
Start: 2017-08-16 | End: 2017-11-27

## 2017-08-16 RX ORDER — AMLODIPINE BESYLATE 5 MG/1
5 TABLET ORAL DAILY
Qty: 30 TAB | Refills: 1 | Status: SHIPPED | OUTPATIENT
Start: 2017-08-16 | End: 2017-11-27

## 2017-08-16 RX ADMIN — HEPARIN SODIUM 5000 UNITS: 5000 INJECTION, SOLUTION INTRAVENOUS; SUBCUTANEOUS at 22:24

## 2017-08-16 RX ADMIN — HYDRALAZINE HYDROCHLORIDE 20 MG: 20 INJECTION INTRAMUSCULAR; INTRAVENOUS at 08:13

## 2017-08-16 RX ADMIN — CARVEDILOL 25 MG: 12.5 TABLET, FILM COATED ORAL at 17:29

## 2017-08-16 RX ADMIN — ERYTHROPOIETIN 16600 UNITS: 20000 INJECTION, SOLUTION INTRAVENOUS; SUBCUTANEOUS at 09:00

## 2017-08-16 RX ADMIN — CLOTRIMAZOLE AND BETAMETHASONE DIPROPIONATE: 10; .5 CREAM TOPICAL at 08:18

## 2017-08-16 RX ADMIN — PANTOPRAZOLE SODIUM 40 MG: 40 TABLET, DELAYED RELEASE ORAL at 06:37

## 2017-08-16 RX ADMIN — AMLODIPINE BESYLATE 5 MG: 5 TABLET ORAL at 17:29

## 2017-08-16 RX ADMIN — CLOTRIMAZOLE AND BETAMETHASONE DIPROPIONATE: 10; .5 CREAM TOPICAL at 12:00

## 2017-08-16 RX ADMIN — Medication 500 MG: at 17:28

## 2017-08-16 RX ADMIN — HEPARIN SODIUM 5000 UNITS: 5000 INJECTION, SOLUTION INTRAVENOUS; SUBCUTANEOUS at 06:36

## 2017-08-16 RX ADMIN — ACETAMINOPHEN 650 MG: 325 TABLET ORAL at 17:28

## 2017-08-16 RX ADMIN — SIMVASTATIN 20 MG: 20 TABLET, FILM COATED ORAL at 22:24

## 2017-08-16 RX ADMIN — CLOTRIMAZOLE AND BETAMETHASONE DIPROPIONATE: 10; .5 CREAM TOPICAL at 16:00

## 2017-08-16 RX ADMIN — HEPARIN SODIUM 5000 UNITS: 5000 INJECTION, SOLUTION INTRAVENOUS; SUBCUTANEOUS at 17:31

## 2017-08-16 RX ADMIN — DOXERCALCIFEROL 4 MCG: 4 INJECTION, SOLUTION INTRAVENOUS at 17:00

## 2017-08-16 NOTE — PROGRESS NOTES
Hospitalist Progress Note    Patient: Mindy Trevino MRN: [id number]  CSN: [id number]    YOB: 1965  Age: 49 y.o.   Sex: male    DOA: 8/4/2017 LOS:  LOS: 12 days          Chief Complaint:    ESRD      Assessment/Plan     ESRD-dialysis today (MWF)  HTN-stable  Anemia of CKD  Uremia resolved  Anorexia resolved    Ready for d/c home with outpt dialysis, due to lack of insurance, we await contrcat to be enacted between dialysis and hospital for chair  Med rx ready  Await ok per  for d/c order    Patient Active Problem List   Diagnosis Code    Routine general medical examination at a health care facility Z00.00    Unspecified essential hypertension I10    Tobacco abuse Z72.0    PAM (acute kidney injury) ([facility] [address] N17.9    Hypertensive urgency, malignant I16.0    Uremia N19    Severe anemia D64.9    Nonadherence to medical treatment Z91.19    Rash of genital area R21    Pleural effusion, right J90    Severe protein-calorie malnutrition ([facility] [address] E43       Subjective:    Denies new issue  Feeling well    Review of systems:    Constitutional: denies fevers, chills, myalgias  Respiratory: denies SOB, cough  Cardiovascular: denies chest pain, palpitations  Gastrointestinal: denies nausea, vomiting, diarrhea      Vital signs/Intake and Output:  Visit Vitals    /88    Pulse 80    Temp 98.5 °F (36.9 °C)    Resp 18    Ht 5' 5\" (1.651 m)    Wt 56 kg (123 lb 7.3 oz)    SpO2 100%    BMI 20.54 kg/m2     Current Shift:     Last three shifts:  08/14 1901 - 08/16 0700  In: 960 [P.O.:960]  Out: 0     Exam:    General: Well developed, alert, NAD, OX3  Lungs:Clear to auscultation bilaterally, no wheezes, rhonchi, or rales  Abdomen: Soft, Nontender, No distention, Normal Bowel sounds, No hepatomegaly  Extremities: No C/C/E, pulses palpable 2+  Neuro:grossly normal , follows commands  Psych:appropriate                Labs: Results:       Chemistry Recent Labs      08/15/17   0213  08/14/17   0240 GLU  94  102*   NA  141  139   K  4.8  4.6   CL  103  104   CO2  35*  27   BUN  59*  95*   CREA  6.39*  9.44*   CA  7.5*  7.8*   AGAP  3  8   BUCR  9*  10*      CBC w/Diff Recent Labs      08/15/17   0213  08/14/17   0240   WBC  9.2  10.4   RBC  2.52*  2.52*   HGB  7.7*  7.5*   HCT  24.0*  23.7*   PLT  210  220      Cardiac Enzymes No results for input(s): CPK, CKND1, RICHARD in the last 72 hours. No lab exists for component: CKRMB, TROIP   Coagulation No results for input(s): PTP, INR, APTT in the last 72 hours. No lab exists for component: INREXT    Lipid Panel Lab Results   Component Value Date/Time    Cholesterol, total 195 11/12/2013 10:53 AM    HDL Cholesterol 42 11/12/2013 10:53 AM    LDL, calculated 121.4 11/12/2013 10:53 AM    VLDL, calculated 31.6 11/12/2013 10:53 AM    Triglyceride 158 11/12/2013 10:53 AM    CHOL/HDL Ratio 4.6 11/12/2013 10:53 AM      BNP No results for input(s): BNPP in the last 72 hours. Liver Enzymes No results for input(s): TP, ALB, TBIL, AP, SGOT, GPT in the last 72 hours.     No lab exists for component: DBIL   Thyroid Studies Lab Results   Component Value Date/Time    TSH 0.88 08/12/2017 04:15 AM        Procedures/imaging: see electronic medical records for all procedures/Xrays and details which were not copied into this note but were reviewed prior to creation of Aleks Bhatt MD

## 2017-08-16 NOTE — ROUTINE PROCESS
The documentation for this period is being entered following the guidelines as defined in the [address] downtime policy by Thad Heard RN.

## 2017-08-16 NOTE — PROGRESS NOTES
Call received from [phone] spoke with Scarlet,informed cm that pharmacy received d/c med list from  and that pt will need assistance with medication,cm did initiate Last [address] this time cm still waiting on dialysis contract to be finalized,cm will hold on to last resort paperwork until pt is discharged and pt has dialysis chair just to make sure no other medications need to be added on.

## 2017-08-16 NOTE — PROGRESS NOTES
0730 Assumed care of pt from Rosa Maria Mota. Pt resting quietly in bed , no signs of distress, call bell within reach. Shift Summary- Shift uneventful. Pt rested throughout shift, had dialysis this afternoon tolerated well. Pt was given one time dose of PRN Tylenol after dialysis for a headache, as well as PRN Hydralazine for sbp over 170. Pt denied chest pain, shortness of breath or discomfort.

## 2017-08-16 NOTE — PROGRESS NOTES
Nephrology Progress    IMPRESSION   1. Advanced Chronic kidney disease, now ESRD and dialysis dependent. HD initiated on 8/4/17. Has a functional HD catheter, tolerating dialysis well  2. Anemia of chronic disease . No evidence of iron deficiency  3. SHPT   4. Hypocalcemia   5.  Hypertension, BPs better controlled     RECOMMENDATIONS   Dialysis today for clearance  Increase EPO with HD   Use current access for HD  Cont oral calcium supplement   Cont Hectorol with HD  Still working on contract for [facility] [employer] HD  D/w pt      Subjective:      Catherine Marks is a 49 y.o. AA male with progressive renal failure and started dialysis 8/4 in hospital .  No new complts    Admit Date: 8/4/2017  Patient Active Problem List   Diagnosis Code    Routine general medical examination at a health care facility Z00.00    Unspecified essential hypertension I10    Tobacco abuse Z72.0    PAM (acute kidney injury) ([facility] [address] N17.9    Hypertensive urgency, malignant I16.0    Uremia N19    Severe anemia D64.9    Nonadherence to medical treatment Z91.19    Rash of genital area R21    Pleural effusion, right J90    Severe protein-calorie malnutrition (HCC) E43     Current Facility-Administered Medications   Medication Dose Route Frequency    clotrimazole-betamethasone (LOTRISONE) 1-0.05 % cream   Topical TID    amLODIPine (NORVASC) tablet 5 mg  5 mg Oral DAILY    calcium carbonate (OS-NATALIE) tablet 500 mg [elemental]  500 mg Oral TID WITH MEALS    iron sucrose (VENOFER) 100 mg iron/5 mL injection 100 mg  100 mg IntraVENous every Monday    doxercalciferol (HECTOROL) 4 mcg/2 mL injection 4 mcg  4 mcg IntraVENous Q MON, WED & FRI    epoetin luann (EPOGEN;PROCRIT) injection 16,600 Units  300 Units/kg IntraVENous DIALYSIS MON, WED & FRI    carvedilol (COREG) tablet 25 mg  25 mg Oral BID WITH MEALS    simvastatin (ZOCOR) tablet 20 mg  20 mg Oral QHS    aspirin chewable tablet 81 mg  81 mg Oral DAILY    0.9% sodium chloride infusion 250 mL 250 mL IntraVENous PRN    vit B Cmplx 3-FA-Vit C-Biotin (NEPHRO FLETCHER RX) tablet 1 Tab  1 Tab Oral DAILY    0.9% sodium chloride infusion 250 mL  250 mL IntraVENous PRN    cloNIDine (CATAPRES) 0.2 mg/24 hr patch 1 Patch  1 Patch TransDERmal Q7D    pantoprazole (PROTONIX) tablet 40 mg  40 mg Oral ACB    ondansetron (ZOFRAN) injection 4 mg  4 mg IntraVENous Q6H PRN    acetaminophen (TYLENOL) tablet 650 mg  650 mg Oral Q6H PRN    heparin (porcine) injection 5,000 Units  5,000 Units SubCUTAneous Q8H    hydrALAZINE (APRESOLINE) 20 mg/mL injection 20 mg  20 mg IntraVENous Q6H PRN    0.9% sodium chloride infusion 250 mL  250 mL IntraVENous PRN       Allergy:   No Known Allergies     Objective:     Visit Vitals    /79 (BP 1 Location: Left arm, BP Patient Position: At rest)    Pulse 77    Temp 97.8 °F (36.6 °C)    Resp 18    Ht 5' 5\" (1.651 m)    Wt 56 kg (123 lb 7.3 oz)    SpO2 100%    BMI 20.54 kg/m2       Intake/Output Summary (Last 24 hours) at 08/16/17 1216  Last data filed at 08/16/17 0800   Gross per 24 hour   Intake              480 ml   Output                0 ml   Net              480 ml       Physical Exam:       General: NAD   HENT: Atraumatic and normocephalic   Eyes: Normal conjunctiva   Neck: Supple, has no JVD   Cardiovascular: Normal S1 & S2, no m/r/g   Pulmonary/Chest Wall: Clear to auscultation bilaterally, no w/c   Abdominal: Soft and non-tender   Musculoskeletal: no edema LE bilaterally   Neurological: No focal deficits, AAOx3     Access:  Right IJ TDC with adequate Qb    Data Review:  Recent Labs      08/15/17   0213  08/14/17   0240   NA  141  139   K  4.8  4.6   CL  103  104   CO2  35*  27   BUN  59*  95*   CREA  6.39*  9.44*   GLU  94  102*   CA  7.5*  7.8*     Recent Labs      08/15/17   0213  08/14/17   0240   WBC  9.2  10.4   HGB  7.7*  7.5*   HCT  24.0*  23.7*   PLT  210  220     No results for input(s): SGOT, GPT, AP, TBIL, TP, ALB, GLOB, GGT in the last 72 hours.   No results for input(s): INR, PTP, APTT in the last 72 hours. No lab exists for component: INREXT, INREXT   No results for input(s): IRON, FE, TIBC, IBCT, PSAT, FERR in the last 72 hours. Most recent labs: reviewed.     Napoleon Laguerre MD  Jesse Perezon  [phone]

## 2017-08-16 NOTE — ROUTINE PROCESS
Bedside and Verbal shift change report given to Kandis Horowitz RN (oncoming nurse) by Cande Mcgrath RN (offgoing nurse). Report included the following information SBAR and Kardex.

## 2017-08-17 PROCEDURE — 74011250637 HC RX REV CODE- 250/637: Performed by: HOSPITALIST

## 2017-08-17 PROCEDURE — 74011250637 HC RX REV CODE- 250/637: Performed by: INTERNAL MEDICINE

## 2017-08-17 PROCEDURE — 74011250636 HC RX REV CODE- 250/636: Performed by: HOSPITALIST

## 2017-08-17 PROCEDURE — 65270000029 HC RM PRIVATE

## 2017-08-17 RX ADMIN — Medication 500 MG: at 17:23

## 2017-08-17 RX ADMIN — ASPIRIN 81 MG 81 MG: 81 TABLET ORAL at 09:00

## 2017-08-17 RX ADMIN — Medication 500 MG: at 09:59

## 2017-08-17 RX ADMIN — AMLODIPINE BESYLATE 5 MG: 5 TABLET ORAL at 09:58

## 2017-08-17 RX ADMIN — Medication 1 TABLET: at 09:58

## 2017-08-17 RX ADMIN — CLOTRIMAZOLE AND BETAMETHASONE DIPROPIONATE: 10; .5 CREAM TOPICAL at 16:00

## 2017-08-17 RX ADMIN — Medication 500 MG: at 12:07

## 2017-08-17 RX ADMIN — PANTOPRAZOLE SODIUM 40 MG: 40 TABLET, DELAYED RELEASE ORAL at 06:38

## 2017-08-17 RX ADMIN — CARVEDILOL 25 MG: 12.5 TABLET, FILM COATED ORAL at 17:23

## 2017-08-17 RX ADMIN — CARVEDILOL 25 MG: 12.5 TABLET, FILM COATED ORAL at 09:58

## 2017-08-17 RX ADMIN — HEPARIN SODIUM 5000 UNITS: 5000 INJECTION, SOLUTION INTRAVENOUS; SUBCUTANEOUS at 22:47

## 2017-08-17 RX ADMIN — HEPARIN SODIUM 5000 UNITS: 5000 INJECTION, SOLUTION INTRAVENOUS; SUBCUTANEOUS at 06:39

## 2017-08-17 RX ADMIN — CLOTRIMAZOLE AND BETAMETHASONE DIPROPIONATE: 10; .5 CREAM TOPICAL at 12:00

## 2017-08-17 RX ADMIN — CLOTRIMAZOLE AND BETAMETHASONE DIPROPIONATE: 10; .5 CREAM TOPICAL at 08:00

## 2017-08-17 RX ADMIN — HYDRALAZINE HYDROCHLORIDE 20 MG: 20 INJECTION INTRAMUSCULAR; INTRAVENOUS at 09:59

## 2017-08-17 RX ADMIN — HYDRALAZINE HYDROCHLORIDE 20 MG: 20 INJECTION INTRAMUSCULAR; INTRAVENOUS at 19:32

## 2017-08-17 RX ADMIN — SIMVASTATIN 20 MG: 20 TABLET, FILM COATED ORAL at 22:47

## 2017-08-17 RX ADMIN — HEPARIN SODIUM 5000 UNITS: 5000 INJECTION, SOLUTION INTRAVENOUS; SUBCUTANEOUS at 15:20

## 2017-08-17 NOTE — PROGRESS NOTES
Nephrology Progress    IMPRESSION   1. Advanced Chronic kidney disease, now ESRD and dialysis dependent. HD initiated on 8/4/17. Has a functional HD catheter, pending placement  2. Anemia of chronic disease, stable  3. SHPT   4. Hypocalcemia improving  5.  Hypertension, BPs better controlled     RECOMMENDATIONS   Next HD tomorrow for clearance  Increase EPO with HD   Use current access for HD  Cont oral calcium supplement   Cont Hectorol with HD  Still working on contract for [employer] HD  Cont current care     Subjective:      Mary Grace Bustillos is a 49 y.o. AA male with progressive renal failure and started dialysis 8/4 in hospital . Doing well overnight, tolerated HD yesterday    Admit Date: 8/4/2017  Patient Active Problem List   Diagnosis Code    Routine general medical examination at a health care facility Z00.00    Unspecified essential hypertension I10    Tobacco abuse Z72.0    PAM (acute kidney injury) ([facility] [address] N17.9    Hypertensive urgency, malignant I16.0    Uremia N19    Severe anemia D64.9    Nonadherence to medical treatment Z91.19    Rash of genital area R21    Pleural effusion, right J90    Severe protein-calorie malnutrition ([facility] [address] E43     Current Facility-Administered Medications   Medication Dose Route Frequency    clotrimazole-betamethasone (LOTRISONE) 1-0.05 % cream   Topical TID    amLODIPine (NORVASC) tablet 5 mg  5 mg Oral DAILY    calcium carbonate (OS-NATALIE) tablet 500 mg [elemental]  500 mg Oral TID WITH MEALS    iron sucrose (VENOFER) 100 mg iron/5 mL injection 100 mg  100 mg IntraVENous every Monday    doxercalciferol (HECTOROL) 4 mcg/2 mL injection 4 mcg  4 mcg IntraVENous Q MON, WED & FRI    epoetin luann (EPOGEN;PROCRIT) injection 16,600 Units  300 Units/kg IntraVENous DIALYSIS MON, WED & FRI    carvedilol (COREG) tablet 25 mg  25 mg Oral BID WITH MEALS    simvastatin (ZOCOR) tablet 20 mg  20 mg Oral QHS    aspirin chewable tablet 81 mg  81 mg Oral DAILY    0.9% sodium chloride infusion 250 mL  250 mL IntraVENous PRN    vit B Cmplx 3-FA-Vit C-Biotin (NEPHRO FLETCHER RX) tablet 1 Tab  1 Tab Oral DAILY    0.9% sodium chloride infusion 250 mL  250 mL IntraVENous PRN    cloNIDine (CATAPRES) 0.2 mg/24 hr patch 1 Patch  1 Patch TransDERmal Q7D    pantoprazole (PROTONIX) tablet 40 mg  40 mg Oral ACB    ondansetron (ZOFRAN) injection 4 mg  4 mg IntraVENous Q6H PRN    acetaminophen (TYLENOL) tablet 650 mg  650 mg Oral Q6H PRN    heparin (porcine) injection 5,000 Units  5,000 Units SubCUTAneous Q8H    hydrALAZINE (APRESOLINE) 20 mg/mL injection 20 mg  20 mg IntraVENous Q6H PRN    0.9% sodium chloride infusion 250 mL  250 mL IntraVENous PRN       Allergy:   No Known Allergies     Objective:     Visit Vitals    /68    Pulse 87    Temp 98.1 °F (36.7 °C)    Resp 18    Ht 5' 5\" (1.651 m)    Wt 59.4 kg (131 lb)    SpO2 100%    BMI 21.8 kg/m2       Intake/Output Summary (Last 24 hours) at 08/17/17 1229  Last data filed at 08/17/17 1000   Gross per 24 hour   Intake             1280 ml   Output                0 ml   Net             1280 ml       Physical Exam:       General: Comfortable    HENT: Atraumatic and normocephalic   Eyes: Normal conjunctiva   Neck: Supple, has no JVD   Cardiovascular: Normal S1 & S2, no m/r/g   Pulmonary/Chest Wall: Clear to auscultation bilaterally, no w/c   Abdominal: Soft and non-tender   Musculoskeletal: no edema LE bilaterally   Neurological: No focal deficits, no changed     Access:  Right IJ TDC in place    Data Review:  Recent Labs      08/15/17   0213   NA  141   K  4.8   CL  103   CO2  35*   BUN  59*   CREA  6.39*   GLU  94   CA  7.5*     Recent Labs      08/15/17   0213   WBC  9.2   HGB  7.7*   HCT  24.0*   PLT  210     No results for input(s): SGOT, GPT, AP, TBIL, TP, ALB, GLOB, GGT in the last 72 hours. No results for input(s): INR, PTP, APTT in the last 72 hours.     No lab exists for component: INREXT, INREXT   No results for input(s): IRON, FE, TIBC, IBCT, PSAT, FERR in the last 72 hours. Most recent labs: reviewed.     MD Jesse Rolle [place]  [phone]

## 2017-08-17 NOTE — PROGRESS NOTES
36 Pt received from offgoing nurse without any signs or symptoms of distress. Pt vitals are stable and within normal limits. Pt bed in low position with wheels locked and call bell within reach. 1734 Assessment completed and documented in flow sheet. Pt denies any further needs at this time. Pt in NAD with bed in low position, wheels locked and call bell within reach. 1830 Shift summary: Patient spent uneventful shift. No issues noted. See flow sheet and MAR.  1905 TRANSFER - OUT REPORT:    Verbal report given to Chanda ARNOLD(name) on [employer]  being transferred to medical (unit) for routine progression of care       Report consisted of patients Situation, Background, Assessment and   Recommendations(SBAR). Information from the following report(s) SBAR, Procedure Summary, Intake/Output and MAR was reviewed with the receiving nurse. Lines:   Peripheral IV 08/04/17 Right Antecubital (Active)   Site Assessment Drainage (comment) 8/17/2017  8:04 AM   Phlebitis Assessment 0 8/17/2017  8:04 AM   Infiltration Assessment 0 8/17/2017  8:04 AM   Dressing Status Intact; Old drainage 8/17/2017  8:04 AM   Dressing Type Transparent 8/17/2017  8:04 AM   Hub Color/Line Status Pink;Flushed;Patent 8/17/2017  8:04 AM   Action Taken Open ports on tubing capped 8/17/2017  8:04 AM   Alcohol Cap Used Yes 8/17/2017  8:04 AM        Opportunity for questions and clarification was provided.       Patient transported with:   Registered Nurse

## 2017-08-17 NOTE — PROGRESS NOTES
Bedside and Verbal shift change report given to Carmen Kamara RN (oncoming nurse) by Zoila Daniel RN (offgoing nurse). Report included the following information SBAR, Kardex, Procedure Summary, Intake/Output, MAR, Accordion, Recent Results and Med Rec Status.

## 2017-08-17 NOTE — PROGRESS NOTES
Hospitalist Progress Note    Patient: Ayden Toledo MRN: [id number]  CSN: [id number]    YOB: 1965  Age: 49 y.o. 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 Doubt erythema migrans. * (Principal)PAM (acute kidney injury) ([facility] ICD-10-CM: N17.9  ICD-9-CM: 584.9  8/4/2017 Yes    Overview Signed 8/4/2017 11:47 PM by Brissa Danielle MD     Although clinical history is lacking, this is likely acute on chronic kidney disease             Hypertensive urgency, malignant ICD-10-CM: I16.0  ICD-9-CM: 401.0  8/4/2017 Yes        Uremia ICD-10-CM: N19  ICD-9-CM: 939  8/4/2017 Yes        Severe anemia ICD-10-CM: D64.9  ICD-9-CM: 285.9  8/4/2017 Yes        Nonadherence to medical treatment (Chronic) ICD-10-CM: Z91.19  ICD-9-CM: V15.81  8/4/2017 Yes              Medically stable for discharge. Waiting on HD contract to ensure he has proper outpatient follow up. Appreciate nephrology assistance. Continue HD per their orders. CM for DC planning    Dispo: Continue inpatient until safe DC plan. Medically stable for discharge.       Subjective:    No complaints  Interval summary: No acute events overnight; AFVSS    Review of systems:    Constitutional: denies fevers, chills, myalgias, diaphoresis  Respiratory: denies shortness of breath, cough, wheezing  Cardiovascular: denies chest pain, palpitations  Gastrointestinal: denies nausea, vomiting, diarrhea, constipation      Vital signs/Intake and Output:  Visit Vitals    /68    Pulse 87    Temp 98.1 °F (36.7 °C)    Resp 18    Ht 5' 5\" (1.651 m)    Wt 59.4 kg (131 lb)    SpO2 100%    BMI 21.8 kg/m2     Current Shift:  08/17 0701 - 08/17 1900  In: 680 [P.O.:680]  Out: -   Last three shifts:  08/15 1901 - 08/17 0700  In: 840 [P.O.:840]  Out: 0     Exam:    General: Awake and alert, no acute distress, resting comfortably in bed  Head: Atraumatic, normocephalic  Eyes: PERRLA, EOMI, sclerae non-icteric  ENT: mucous membranes moist, palate elevates evenly, tongue midline  Neck: supple, no masses, no lymphadenopathy, no rigidity   CVS:Regular rate and rhythm, no murmurs, rubs, gallops, or clicks  Lungs:Clear to auscultation bilaterally, no wheezes, rales, or rhonchi  Abdomen: Soft, Nontender, nondistended, bowel sounds normal throughout  Extremities: atraumatic, pulses 2+ all ext; strength 5/5 all ext, no edema  Skin: warm, dry, well perfused, without rash; no cyanosis or clubbing  Neuro: CN II-XII grossly intact, no focal neurologic deficits  Psych: awake, alert, and oriented x 3; full range of mood and affect                Labs: Results:       Chemistry Recent Labs      08/15/17   0213   GLU  94   NA  141   K  4.8   CL  103   CO2  35*   BUN  59*   CREA  6.39*   CA  7.5*   AGAP  3   BUCR  9*      CBC w/Diff Recent Labs      08/15/17   0213   WBC  9.2   RBC  2.52*   HGB  7.7*   HCT  24.0*   PLT  210      Cardiac Enzymes No results for input(s): CPK, CKND1, RICHARD in the last 72 hours. No lab exists for component: CKRMB, TROIP   Coagulation No results for input(s): PTP, INR, APTT in the last 72 hours.     No lab exists for component: INREXT    Lipid Panel Lab Results   Component Value Date/Time    Cholesterol, total 195 11/12/2013 10:53 AM    HDL Cholesterol 42 11/12/2013 10:53 AM    LDL, calculated 121.4 11/12/2013 10:53 AM    VLDL, calculated 31.6 11/12/2013 10:53 AM    Triglyceride 158 11/12/2013 10:53 AM    CHOL/HDL Ratio 4.6 11/12/2013 10:53 AM BNP No results for input(s): BNPP in the last 72 hours. Liver Enzymes No results for input(s): TP, ALB, TBIL, AP, SGOT, GPT in the last 72 hours.     No lab exists for component: DBIL   Thyroid Studies Lab Results   Component Value Date/Time    TSH 0.88 08/12/2017 04:15 AM        Procedures/imaging: see electronic medical records for all procedures/Xrays and details which were not copied into this note but were reviewed prior to creation of [id number], DO

## 2017-08-17 NOTE — WOUND CARE
Wound care in to see patient for monthly prevalence. No skin issues noted at this time with skin intact.

## 2017-08-17 NOTE — PROGRESS NOTES
1946 Pt arrived to rm 324. No acute distress. Able to ambulate from wheelchair to bed with minimal assist. Informed him that his family may visit. SHIFT SUMMARY: Uneventful shift. BP at 0400 was 150/68, HR 78. Family at bedside through the night. Denies pain.

## 2017-08-17 NOTE — PROGRESS NOTES
NUTRITION FOLLOW-UP    RECOMMENDATIONS/PLAN:   Continue w/ POC  Monitor labs/lytes, wt, fluid status, PO Intake, skin integrity     NUTRITION ASSESSMENT:   Client Update: 49 yrs old Male with PAM on CKD, hypocalcemia. Pt is awaiting placement in outpatient dialysis center per [facility] note. Pt continues to do well nutritionally. Followed up to make sure that pt did not need anymore edu and pt stated he was fine. Recc continue w/ POC    FOOD/NUTRITION INTAKE   Diet Order:  Renal   Supplements: Nepro TID   Food Allergies: NKFA/  Average PO Intake:      Patient Vitals for the past 100 hrs:   % Diet Eaten   08/17/17 0900 100 %   08/16/17 1859 100 %   08/16/17 1243 100 %   08/16/17 0800 0 %   08/16/17 0056 100 %   08/15/17 1538 75 %   08/15/17 1111 100 %   08/15/17 0845 0 %   08/14/17 1948 100 %   08/14/17 1200 100 %   08/14/17 0930 100 %   08/13/17 1200 50 %   08/13/17 0800 100 %      Pertinent Medications:  [x] Reviewed; Os-Tyrone, Venofer, Protonix, Nephro     Insulin:  []SSI  []Pre-meal   []Basal    []Drip  [x]None  Cultural/Sabianism Food Preferences: None Identified    BIOCHEMICAL DATA & MEDICAL TESTS  Pertinent Labs:  [x] Reviewed; Ca-9   ANTHROPOMETRICS  Height: 5' 5\" (165.1 cm)       Weight: 59.4 kg (131 lb)         BMI: 21.8 kg/m^2 normal weight (18.5%-24.9% BMI)   Adm Weight: 113 lbs                Weight change: +18lbs pt appetite has improved and 3.6+L net in I/o since admission  Adjusted Body Weight:n/a    NUTRITION-FOCUSED PHYSICAL ASSESSMENT  Skin: no pressure injury noted       GI: +BM 8/16/17    NUTRITION PRESCRIPTION  Calories:3846-1391 kcal/day based on 30-35 kcal/kg   Protein: 66 g/day based on 1.2 g/kg  CHO: 207-241 g/day based on 50% of total energy  Fluid: 1653-1929ml/day based on 1 kcal/ml        NUTRITION DIAGNOSES:   1. No Nutritional Problems at this time    NUTRITION INTERVENTIONS:   INTERVENTIONS:        GOALS:  1. Continue w/ POC 1.  Continue to meet estimated needs by consuming >75% PO intake by next review 7 days             LEARNING NEEDS (Diet, Supplementation, Food/Nutrient-Drug Interaction):   [] None Identified   [x] Education provided/documented      Identified and patient: [] Declined   [] Was not appropriate/indicated        NUTRITION MONITORING /EVALUATION:   Follow PO intake  Monitor wt  Monitor renal labs, electrolytes, fluid status  Monitor for additional supplement needs     Previous Recommendations Implemented: yes        Previous Goals Met:  yes -pt continues to exceed >75% PO Intake      [] Participated in Interdisciplinary Rounds    [x] Interdisciplinary Care Plan Reviewed  DISCHARGE NUTRITION RECOMMENDATIONS ADDRESSED:     [x] Yes- recommended renal diet     NUTRITION RISK:           [] At risk                        [x] Not currently at risk        Will follow-up per policy.   Bryanna Calvillo RD  PAGER:  [phone]

## 2017-08-17 NOTE — PROGRESS NOTES
2004: Assumed care of pt.; Arrived to find pt. resting in bed; No sign or symptoms of distress present; status stable    0631: Uneventful Shift; Vital signs remained stable; PM meds tolerated without difficult; Pt. Hopeful for discharge today. 0719: Shift Summary: Bedside shift change report given to ELIGIO Rincon (oncoming nurse) by Rosi Paez (offgoing nurse). Report included the following information SBAR and Kardex.

## 2017-08-17 NOTE — ROUTINE PROCESS
TRANSFER - IN REPORT:    Verbal report received from MARCOS Morin RN(name) on [employer]  being received from  [employer]) for routine post - op      Report consisted of patients Situation, Background, Assessment and   Recommendations(SBAR). Information from the following report(s) SBAR, Kardex, Intake/Output and MAR was reviewed with the receiving nurse. Opportunity for questions and clarification was provided. Assessment completed upon patients arrival to unit and care assumed.

## 2017-08-18 VITALS
TEMPERATURE: 98.3 F | WEIGHT: 128.2 LBS | HEART RATE: 74 BPM | OXYGEN SATURATION: 100 % | HEIGHT: 65 IN | DIASTOLIC BLOOD PRESSURE: 92 MMHG | RESPIRATION RATE: 16 BRPM | BODY MASS INDEX: 21.36 KG/M2 | SYSTOLIC BLOOD PRESSURE: 188 MMHG

## 2017-08-18 LAB
ANION GAP SERPL CALC-SCNC: 8 MMOL/L (ref 3–18)
BUN SERPL-MCNC: 60 MG/DL (ref 7–18)
BUN/CREAT SERPL: 8 (ref 12–20)
CALCIUM SERPL-MCNC: 8.4 MG/DL (ref 8.5–10.1)
CHLORIDE SERPL-SCNC: 105 MMOL/L (ref 100–108)
CO2 SERPL-SCNC: 28 MMOL/L (ref 21–32)
CREAT SERPL-MCNC: 7.84 MG/DL (ref 0.6–1.3)
ERYTHROCYTE [DISTWIDTH] IN BLOOD BY AUTOMATED COUNT: 15.6 % (ref 11.6–14.5)
GLUCOSE SERPL-MCNC: 112 MG/DL (ref 74–99)
HCT VFR BLD AUTO: 28 % (ref 36–48)
HGB BLD-MCNC: 8.7 G/DL (ref 13–16)
MCH RBC QN AUTO: 30.5 PG (ref 24–34)
MCHC RBC AUTO-ENTMCNC: 31.1 G/DL (ref 31–37)
MCV RBC AUTO: 98.2 FL (ref 74–97)
PLATELET # BLD AUTO: 223 K/UL (ref 135–420)
PMV BLD AUTO: 11.1 FL (ref 9.2–11.8)
POTASSIUM SERPL-SCNC: 5.4 MMOL/L (ref 3.5–5.5)
RBC # BLD AUTO: 2.85 M/UL (ref 4.7–5.5)
SODIUM SERPL-SCNC: 141 MMOL/L (ref 136–145)
WBC # BLD AUTO: 15.2 K/UL (ref 4.6–13.2)

## 2017-08-18 PROCEDURE — 80048 BASIC METABOLIC PNL TOTAL CA: CPT | Performed by: INTERNAL MEDICINE

## 2017-08-18 PROCEDURE — 85027 COMPLETE CBC AUTOMATED: CPT | Performed by: INTERNAL MEDICINE

## 2017-08-18 PROCEDURE — 74011250636 HC RX REV CODE- 250/636: Performed by: INTERNAL MEDICINE

## 2017-08-18 PROCEDURE — 36415 COLL VENOUS BLD VENIPUNCTURE: CPT | Performed by: INTERNAL MEDICINE

## 2017-08-18 PROCEDURE — 74011250637 HC RX REV CODE- 250/637: Performed by: INTERNAL MEDICINE

## 2017-08-18 PROCEDURE — 74011250636 HC RX REV CODE- 250/636: Performed by: HOSPITALIST

## 2017-08-18 PROCEDURE — 74011250637 HC RX REV CODE- 250/637: Performed by: HOSPITALIST

## 2017-08-18 PROCEDURE — 90935 HEMODIALYSIS ONE EVALUATION: CPT

## 2017-08-18 RX ORDER — HEPARIN SODIUM 1000 [USP'U]/ML
2500 INJECTION, SOLUTION INTRAVENOUS; SUBCUTANEOUS
Status: DISCONTINUED | OUTPATIENT
Start: 2017-08-18 | End: 2017-08-18 | Stop reason: HOSPADM

## 2017-08-18 RX ORDER — ALBUMIN HUMAN 250 G/1000ML
SOLUTION INTRAVENOUS
Status: DISCONTINUED
Start: 2017-08-18 | End: 2017-08-18 | Stop reason: HOSPADM

## 2017-08-18 RX ADMIN — CLOTRIMAZOLE AND BETAMETHASONE DIPROPIONATE: 10; .5 CREAM TOPICAL at 16:00

## 2017-08-18 RX ADMIN — Medication 500 MG: at 17:12

## 2017-08-18 RX ADMIN — DOXERCALCIFEROL 4 MCG: 4 INJECTION, SOLUTION INTRAVENOUS at 11:46

## 2017-08-18 RX ADMIN — HYDRALAZINE HYDROCHLORIDE 20 MG: 20 INJECTION INTRAMUSCULAR; INTRAVENOUS at 09:52

## 2017-08-18 RX ADMIN — CLOTRIMAZOLE AND BETAMETHASONE DIPROPIONATE: 10; .5 CREAM TOPICAL at 09:44

## 2017-08-18 RX ADMIN — CLOTRIMAZOLE AND BETAMETHASONE DIPROPIONATE: 10; .5 CREAM TOPICAL at 12:00

## 2017-08-18 RX ADMIN — HEPARIN SODIUM 5000 UNITS: 5000 INJECTION, SOLUTION INTRAVENOUS; SUBCUTANEOUS at 09:40

## 2017-08-18 RX ADMIN — HEPARIN SODIUM 5000 UNITS: 5000 INJECTION, SOLUTION INTRAVENOUS; SUBCUTANEOUS at 15:59

## 2017-08-18 RX ADMIN — ERYTHROPOIETIN 16600 UNITS: 20000 INJECTION, SOLUTION INTRAVENOUS; SUBCUTANEOUS at 11:43

## 2017-08-18 RX ADMIN — CARVEDILOL 25 MG: 12.5 TABLET, FILM COATED ORAL at 17:12

## 2017-08-18 RX ADMIN — AMLODIPINE BESYLATE 5 MG: 5 TABLET ORAL at 09:40

## 2017-08-18 RX ADMIN — Medication 500 MG: at 09:40

## 2017-08-18 RX ADMIN — ASPIRIN 81 MG 81 MG: 81 TABLET ORAL at 09:41

## 2017-08-18 RX ADMIN — PANTOPRAZOLE SODIUM 40 MG: 40 TABLET, DELAYED RELEASE ORAL at 07:29

## 2017-08-18 RX ADMIN — Medication 1 TABLET: at 09:40

## 2017-08-18 RX ADMIN — CARVEDILOL 25 MG: 12.5 TABLET, FILM COATED ORAL at 09:40

## 2017-08-18 RX ADMIN — ACETAMINOPHEN 650 MG: 325 TABLET ORAL at 15:59

## 2017-08-18 RX ADMIN — HEPARIN SODIUM 2500 UNITS: 1000 INJECTION, SOLUTION INTRAVENOUS; SUBCUTANEOUS at 14:00

## 2017-08-18 NOTE — DISCHARGE SUMMARY
Discharge Summary    Patient: Tono Dahl               Sex: male          DOA: 8/4/2017         YOB: 1965      Age:  49 y.o.        LOS:  LOS: 14 days                Admit Date: 8/4/2017    Discharge Date: 8/18/2017    Admission Diagnoses: PAM (acute kidney injury) [facility])    Discharge Diagnoses:    Problem List as of 8/18/2017  Date Reviewed: 8/6/2017          Codes Class Noted - Resolved    Severe protein-calorie malnutrition ([facility] ICD-10-CM: E43  ICD-9-CM: 262  8/8/2017 - Present        Pleural effusion, right ICD-10-CM: J90  ICD-9-CM: 511.9  8/6/2017 - Present        Rash of genital area ICD-10-CM: R21  ICD-9-CM: 782.1  8/5/2017 - Present    Overview Addendum 8/5/2017 12:25 AM by Oanh Wang MD     Possibly lymphogranuloma venereum or even sarcoidosis. Doubt erythema migrans. * (Principal)PAM (acute kidney injury) ([facility] ICD-10-CM: N17.9  ICD-9-CM: 584.9  8/4/2017 - Present    Overview Signed 8/4/2017 11:47 PM by Oanh Wang MD     Although clinical history is lacking, this is likely acute on chronic kidney disease             Hypertensive urgency, malignant ICD-10-CM: I16.0  ICD-9-CM: 401.0  8/4/2017 - Present        Uremia ICD-10-CM: N19  ICD-9-CM: 471  8/4/2017 - Present        Severe anemia ICD-10-CM: D64.9  ICD-9-CM: 285.9  8/4/2017 - Present        Nonadherence to medical treatment (Chronic) ICD-10-CM: Z91.19  ICD-9-CM: V15.81  8/4/2017 - Present        Tobacco abuse ICD-10-CM: Z72.0  ICD-9-CM: 305.1  11/22/2013 - Present    Overview Signed 8/4/2017 11:47 PM by Oanh Wang MD     Should get PFTs as an outpatient             Routine general medical examination at a health care facility ICD-10-CM: Z00.00  ICD-9-CM: V70.0  11/12/2013 - Present        Unspecified essential hypertension ICD-10-CM: I10  ICD-9-CM: 401.9  11/12/2013 - Present        RESOLVED: Cervical strain, acute ICD-10-CM: S16. 1XXA  ICD-9-CM: 847.0  11/12/2013 - 8/8/2017              Discharge Condition: Stable    Hospital Course: The patient was admitted with new onset end stage renal disease likely secondary to longstanding uncontrolled hypertension. HD was initiated MWF per nephrology. He was severely malnourished due to anorexia from ESRD. Uremia resolved w/ HD. Fluid and electrolyte balance established. Nutritional supplements added. He has been stable for discharge for days but has been held due to inability to secure a dialysis contract (uninsured pt). This has finally been established today (8/18/17). He has been dialyzed today. He has nephrology follow up. He has Celi Shah [address] in place and functioning. At this point he is medically stable for discharge and has reached the maximum benefit of his hospitalization.       Physical Exam:  Visit Vitals    BP (!) 178/95    Pulse 73    Temp 98.3 °F (36.8 °C)    Resp 16    Ht 5' 5\" (1.651 m)    Wt 58.2 kg (128 lb 3.2 oz)    SpO2 100%    BMI 21.33 kg/m2     General: Awake and alert, no acute distress, resting comfortably in bed  Head: Atraumatic, normocephalic  Eyes: PERRLA, EOMI, sclerae non-icteric  ENT: mucous membranes moist, palate elevates evenly, tongue midline  Neck: supple, no masses, no lymphadenopathy, no rigidity   CVS:Regular rate and rhythm, no murmurs, rubs, gallops, or clicks  Lungs:Clear to auscultation bilaterally, no wheezes, rales, or rhonchi  Abdomen: Soft, Nontender, nondistended, bowel sounds normal throughout  Extremities: atraumatic, pulses 2+ all ext; strength 5/5 all ext, no edema  Skin: warm, dry, well perfused, without rash; no cyanosis or clubbing  Neuro: CN II-XII grossly intact, no focal neurologic deficits  Psych: awake, alert, and oriented x 3; full range of mood and affect    Labs: Results:       Chemistry Recent Labs      08/18/17   0802   GLU  112*   NA  141   K  5.4   CL  105   CO2  28   BUN  60*   CREA  7.84*   CA  8.4*   AGAP  8   BUCR  8*      CBC w/Diff Recent Labs      08/18/17   0802   WBC  15.2*   RBC 2.85*   HGB  8.7*   HCT  28.0*   PLT  223      Cardiac Enzymes No results for input(s): CPK, CKND1, RICHARD in the last 72 hours. No lab exists for component: CKRMB, TROIP   Coagulation No results for input(s): PTP, INR, APTT in the last 72 hours. No lab exists for component: INREXT    Lipid Panel Lab Results   Component Value Date/Time    Cholesterol, total 195 11/12/2013 10:53 AM    HDL Cholesterol 42 11/12/2013 10:53 AM    LDL, calculated 121.4 11/12/2013 10:53 AM    VLDL, calculated 31.6 11/12/2013 10:53 AM    Triglyceride 158 11/12/2013 10:53 AM    CHOL/HDL Ratio 4.6 11/12/2013 10:53 AM      BNP No results for input(s): BNPP in the last 72 hours. Liver Enzymes No results for input(s): TP, ALB, TBIL, AP, SGOT, GPT in the last 72 hours. No lab exists for component: DBIL   Thyroid Studies Lab Results   Component Value Date/Time    TSH 0.88 08/12/2017 04:15 AM          Consults: Nephrology and Vascular Surgery  Treatment Team: Attending Provider: Juanita Llamas MD; Consulting Provider: Juanita Llamas MD; Consulting Provider: Bertha Grewal MD; Consulting Provider: Carolin Emery MD; Utilization Review: Marychuy Carnes RN; Care Manager: Jac Huerta  Significant Diagnostic Studies: as above    Discharge Medications:     Current Discharge Medication List      START taking these medications    Details   amLODIPine (NORVASC) 5 mg tablet Take 1 Tab by mouth daily. Qty: 30 Tab, Refills: 1      aspirin 81 mg chewable tablet Take 1 Tab by mouth daily. Qty: 30 Tab, Refills: 1      calcium carbonate (OS-NATALIE) 500 mg calcium (1,250 mg) tablet Take 1 Tab by mouth three (3) times daily (with meals). Qty: 90 Tab, Refills: 0      carvedilol (COREG) 25 mg tablet Take 1 Tab by mouth two (2) times daily (with meals). Indications: hypertension  Qty: 60 Tab, Refills: 1      cloNIDine (CATAPRES) 0.2 mg/24 hr patch 1 Patch by TransDERmal route every seven (7) days.   Qty: 4 Patch, Refills: 1 clotrimazole-betamethasone (LOTRISONE) topical cream Apply to groin BID  Qty: 60 g, Refills: 0      simvastatin (ZOCOR) 20 mg tablet Take 1 Tab by mouth nightly. Qty: 30 Tab, Refills: 1      vit B Cmplx 3-FA-Vit C-Biotin (NEPHRO FLETCHER RX) 1- mg-mg-mcg tablet Take 1 Tab by mouth daily. Qty: 30 Tab, Refills: 1         STOP taking these medications       lisinopril-hydroCHLOROthiazide (PRINZIDE, ZESTORETIC) 20-25 mg per tablet Comments:   Reason for Stopping:         aspirin-acetaminophen-caffeine (GOODY'S EXTRA STRENGTH) 250-250-65 mg per tablet Comments:   Reason for Stopping:               Activity: Activity as tolerated    Diet: Renal Diet    Wound Care: None needed    Follow-up: with PCP in 3-5 days; JOSSELIN M,W,F    Time spent coordinating discharge: greater than 30 minutes.     Sun Kaba DO

## 2017-08-18 NOTE — PROGRESS NOTES
Case management was notified by ORION Renee that contract has been signed and faxed to [facility],fax confirmation received that receiving party did receive fax,waiting for return call from Bari Zarate from [facility].

## 2017-08-18 NOTE — ROUTINE PROCESS
Bedside and Verbal shift change report given to MARCELINO Chaidez RN (oncoming nurse) by Cher Poon RN  (offgoing nurse). Report included the following information SBAR, Kardex, Intake/Output and MAR.

## 2017-08-18 NOTE — INTERDISCIPLINARY ROUNDS
Interdisciplinary Round Note   Patient Information: [facility]                                      324/01 Reason for Admission: PAM (acute kidney injury) [facility])  Quality Measure: {Quality Measures:75384}            Attending Provider:   Coleman Byrne MD  Primary Care Physician:       None       None  Consulting:  IP CONSULT TO HOSPITALIST  IP CONSULT TO NEPHROLOGY  IP CONSULT TO INTENSIVIST  IDR Rounding Physician:  Past Medical History:   Past Medical History:   Diagnosis Date    Hypertension     Non compliance w medication regimen     noncompliant with HTN meds        Hospital day: 14                          4d 12h  Estimated discharge date:   RRAT Score: Low Risk            11       Total Score        2 . Living with Significant Other. Assisted Living. LTAC. SNF. or   Rehab    3 Patient Length of Stay (>5 days = 3)    4 Pt.  Coverage (Medicare=5 , Medicaid, or Self-Pay=4)    2 Charlson Comorbidity Score (Age + Comorbid Conditions)        Criteria that do not apply:    Has Seen PCP in Last 6 Months (Yes=3, No=0)    IP Visits Last 12 Months (1-3=4, 4=9, >4=11)         Primary Goals for Today: Case management - placement, Hemodialysis, Monitor BP    Secondary Goals for Today: ***    Overnight Events: none    Neal: no    Central Line: HD cath right chest    Isolation: no       IV Antibiotics? no       When started: ***  Current Medication List:          Current Facility-Administered Medications   Medication Dose Route Frequency    clotrimazole-betamethasone (LOTRISONE) 1-0.05 % cream   Topical TID    amLODIPine (NORVASC) tablet 5 mg  5 mg Oral DAILY    calcium carbonate (OS-NATALIE) tablet 500 mg [elemental]  500 mg Oral TID WITH MEALS    iron sucrose (VENOFER) 100 mg iron/5 mL injection 100 mg  100 mg IntraVENous every Monday    doxercalciferol (HECTOROL) 4 mcg/2 mL injection 4 mcg  4 mcg IntraVENous Q MON, WED & FRI    epoetin luann (EPOGEN;PROCRIT) injection 16,600 Units  300 Units/kg IntraVENous DIALYSIS MON, WED & FRI    carvedilol (COREG) tablet 25 mg  25 mg Oral BID WITH MEALS    simvastatin (ZOCOR) tablet 20 mg  20 mg Oral QHS    aspirin chewable tablet 81 mg  81 mg Oral DAILY    0.9% sodium chloride infusion 250 mL  250 mL IntraVENous PRN    vit B Cmplx 3-FA-Vit C-Biotin (NEPHRO FLETCHER RX) tablet 1 Tab  1 Tab Oral DAILY    0.9% sodium chloride infusion 250 mL  250 mL IntraVENous PRN    cloNIDine (CATAPRES) 0.2 mg/24 hr patch 1 Patch  1 Patch TransDERmal Q7D    pantoprazole (PROTONIX) tablet 40 mg  40 mg Oral ACB    ondansetron (ZOFRAN) injection 4 mg  4 mg IntraVENous Q6H PRN    acetaminophen (TYLENOL) tablet 650 mg  650 mg Oral Q6H PRN    heparin (porcine) injection 5,000 Units  5,000 Units SubCUTAneous Q8H    hydrALAZINE (APRESOLINE) 20 mg/mL injection 20 mg  20 mg IntraVENous Q6H PRN    0.9% sodium chloride infusion 250 mL  250 mL IntraVENous PRN      VTE Prophylaxis                                 Lines, Drains, & Airways  [REMOVED] Hemodialysis Catheter 08/04/17-Site Assessment: Clean, dry, & intact  Peripheral IV 08/04/17 Right Antecubital-Site Assessment: Drainage (comment)  Hemodialysis Catheter 08/07/17-Site Assessment: Clean, dry, & intact     Vital signs:  Visit Vitals    /68 (BP 1 Location: Right arm, BP Patient Position: At rest)    Pulse 78    Temp 98.8 °F (37.1 °C)    Resp 20    Ht 5' 5\" (1.651 m)    Wt 59.4 kg (131 lb)    SpO2 100%    BMI 21.8 kg/m2        Intake and Output:   08/16 0701 - 08/17 1900  In: 1520 [P.O.:1520]  Out: -      Last Bowel Movement Date: 08/17/17  Stool Color: Polo  Stool Appearance: Soft  Stool Amount: Medium  Stool Source/Status: Rectum     Current Diet: DIET NUTRITIONAL SUPPLEMENTS Breakfast, Lunch, Dinner; [place]  DIET RENAL Regular       Abdominal   Last Bowel Movement Date: 08/17/17  Stool Appearance: Soft  Abdominal Assessment: Intact  Nutrition  Chewing/Swallowing Problems: No  Difficulty with Secretions: No  Speech Slurred/Thick/Garbled: No    NGT: no    Feeding Tube: no   Recent Glucose Results: No results found for: GLU, GLUPOC, GLUCPOC GI Prophylaxis: yes        Type: ***        Activity Level: Activity Level: Up ad calvin    Needs assistance with ADLs: no  PT Consult Status: ***  Equipment: ***  Surgical/Ortho Notes: ***   Current Immunizations: There is no immunization history on file for this patient.   RRAT Score:     Readmit Risk Tool  Support Systems: Spouse/Significant Other/Partner, Family member(s)  Relationship with Primary Physician Group: Not seen within the past 12 months   Recommendations:       Discharge Disposition: TBD, pending progress    Needs for Discharge: ***           Recommendations from IDR team: ***    Other Notes: ***

## 2017-08-18 NOTE — PROGRESS NOTES
Case management received E-mail from CAMILA Petty for administration to move forward with dialysis contract,once contract is signed and faxed to [facility] cm informed administration via E-Mail to contact tam turcios From [facility] dialysis aiming for signed contract and dialysis chair assignment for today.

## 2017-08-18 NOTE — PROGRESS NOTES
Page received- pt states he does not have ride to 6:15 HD appt Monday. CM spoke with pt on phone, asked if pt had friend, relative he could contact for ride at least for first week and clinic could likely reschedule his time or SW at clinic could assist with transportation. Pt states he will find ride, states he needs to contact friend from home since he does not have phone number. CM stressed importance of keeping appointment to avoid possible rehospitalization, pt verbalized understanding. Addendum- pt verbalized understanding but declined to remain to see if CM could find someone at HD clinic to reschedule his appt, which meant pt may have had to remain until Monday, stated as above he would find ride to clinic.

## 2017-08-18 NOTE — PROGRESS NOTES
Pt alert and awake with no c/o pain or distress. Assessment complete. Call light in reach, safety and comfort measures are in place.

## 2017-08-18 NOTE — DIALYSIS
Pt in bed, a+o x 4, no s/s of distress noted. AVF right IJ per protocol. Tx initiated at 1123, [facility] flowing with ease. For hemodynamic stability UF goal 1500ml initiated. Tx completed at 1454, tolerated well. 1500 ml removed. De-accessed per protocol. Heparin indwell 1.6ml in venous, and 1.6ml in arterial catheter. Unit nurse given report. BVP 75.6.

## 2017-08-18 NOTE — PROGRESS NOTES
Hospitalist Progress Note    Patient: Nat Palomo MRN: [id number]  CSN: [id number]    YOB: 1965  Age: 49 y.o. 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 Doubt erythema migrans. * (Principal)PAM (acute kidney injury) ([facility] 75.) ICD-10-CM: N17.9  ICD-9-CM: 584.9  8/4/2017 Yes    Overview Signed 8/4/2017 11:47 PM by Kera Jhaveri MD     Although clinical history is lacking, this is likely acute on chronic kidney disease             Hypertensive urgency, malignant ICD-10-CM: I16.0  ICD-9-CM: 401.0  8/4/2017 Yes        Uremia ICD-10-CM: N19  ICD-9-CM: 423  8/4/2017 Yes        Severe anemia ICD-10-CM: D64.9  ICD-9-CM: 285.9  8/4/2017 Yes        Nonadherence to medical treatment (Chronic) ICD-10-CM: Z91.19  ICD-9-CM: V15.81  8/4/2017 Yes              Medically stable for discharge. Waiting on HD contract to ensure he has proper outpatient follow up. Appreciate nephrology assistance. Continue HD per their orders. BP up today--needs HD today--scheduled    CM for DC planning    Dispo: Continue inpatient until safe DC plan. Medically stable for discharge.       Subjective:    No complaints  Interval summary: No acute events overnight; AFVSS    Review of systems:    Constitutional: denies fevers, chills, myalgias, diaphoresis  Respiratory: denies shortness of breath, cough, wheezing  Cardiovascular: denies chest pain, palpitations  Gastrointestinal: denies nausea, vomiting, diarrhea, constipation      Vital signs/Intake and Output:  Visit Vitals    BP (!) 190/112    Pulse 73    Temp 98.3 °F (36.8 °C)    Resp 18    Ht 5' 5\" (1.651 m)    Wt 58.2 kg (128 lb 3.2 oz)    SpO2 100%    BMI 21.33 kg/m2     Current Shift:     Last three shifts:  08/16 1901 - 08/18 0700  In: 1040 [P.O.:1040]  Out: -     Exam:    General: Awake and alert, no acute distress, resting comfortably in bed  Head: Atraumatic, normocephalic  Eyes: PERRLA, EOMI, sclerae non-icteric  ENT: mucous membranes moist, palate elevates evenly, tongue midline  Neck: supple, no masses, no lymphadenopathy, no rigidity   CVS:Regular rate and rhythm, no murmurs, rubs, gallops, or clicks  Lungs:Clear to auscultation bilaterally, no wheezes, rales, or rhonchi  Abdomen: Soft, Nontender, nondistended, bowel sounds normal throughout  Extremities: atraumatic, pulses 2+ all ext; strength 5/5 all ext, no edema  Skin: warm, dry, well perfused, without rash; no cyanosis or clubbing  Neuro: CN II-XII grossly intact, no focal neurologic deficits  Psych: awake, alert, and oriented x 3; full range of mood and affect                Labs: Results:       Chemistry Recent Labs      08/18/17   0802   GLU  112*   NA  141   K  5.4   CL  105   CO2  28   BUN  60*   CREA  7.84*   CA  8.4*   AGAP  8   BUCR  8*      CBC w/Diff Recent Labs      08/18/17   0802   WBC  15.2*   RBC  2.85*   HGB  8.7*   HCT  28.0*   PLT  223      Cardiac Enzymes No results for input(s): CPK, CKND1, RICHARD in the last 72 hours. No lab exists for component: CKRMB, TROIP   Coagulation No results for input(s): PTP, INR, APTT in the last 72 hours.     No lab exists for component: INREXT, INREXT    Lipid Panel Lab Results   Component Value Date/Time    Cholesterol, total 195 11/12/2013 10:53 AM    HDL Cholesterol 42 11/12/2013 10:53 AM    LDL, calculated 121.4 11/12/2013 10:53 AM    VLDL, calculated 31.6 11/12/2013 10:53 AM    Triglyceride 158 11/12/2013 10:53 AM    CHOL/HDL Ratio 4.6 11/12/2013 10:53 AM      BNP No results for input(s): BNPP in the last 72 hours. Liver Enzymes No results for input(s): TP, ALB, TBIL, AP, SGOT, GPT in the last 72 hours.     No lab exists for component: DBIL   Thyroid Studies Lab Results   Component Value Date/Time    TSH 0.88 08/12/2017 04:15 AM        Procedures/imaging: see electronic medical records for all procedures/Xrays and details which were not copied into this note but were reviewed prior to creation of [id number], DO

## 2017-08-18 NOTE — PROGRESS NOTES
Nephrology Progress    IMPRESSION   1. Advanced Chronic kidney disease, now ESRD and dialysis dependent. HD initiated on 8/4/17. Has a functional HD catheter, Outpatient HD placement pending  2. Anemia of chronic disease, stable  3. SHPT   4. Hypocalcemia improving  5. Hypertension, BPs better controlled     RECOMMENDATIONS   HD today  EPO with HD   Use current access for HD  Check BMP, CBC today.   Cont oral calcium supplement   Cont Hectorol with HD  Still working on contract for [facility]  Cont current care     Subjective:      Kenzie Franklin is a 49 y.o. AA male with progressive renal failure and started dialysis 8/4 in hospital . Doing well overnight, tolerated HD yesterday    Admit Date: 8/4/2017  Patient Active Problem List   Diagnosis Code    Routine general medical examination at a health care facility Z00.00    Unspecified essential hypertension I10    Tobacco abuse Z72.0    PAM (acute kidney injury) ([facility] N17.9    Hypertensive urgency, malignant I16.0    Uremia N19    Severe anemia D64.9    Nonadherence to medical treatment Z91.19    Rash of genital area R21    Pleural effusion, right J90    Severe protein-calorie malnutrition ([facility] E43     Current Facility-Administered Medications   Medication Dose Route Frequency    clotrimazole-betamethasone (LOTRISONE) 1-0.05 % cream   Topical TID    amLODIPine (NORVASC) tablet 5 mg  5 mg Oral DAILY    calcium carbonate (OS-NATALIE) tablet 500 mg [elemental]  500 mg Oral TID WITH MEALS    iron sucrose (VENOFER) 100 mg iron/5 mL injection 100 mg  100 mg IntraVENous every Monday    doxercalciferol (HECTOROL) 4 mcg/2 mL injection 4 mcg  4 mcg IntraVENous Q MON, WED & FRI    epoetin luann (EPOGEN;PROCRIT) injection 16,600 Units  300 Units/kg IntraVENous DIALYSIS MON, WED & FRI    carvedilol (COREG) tablet 25 mg  25 mg Oral BID WITH MEALS    simvastatin (ZOCOR) tablet 20 mg  20 mg Oral QHS    aspirin chewable tablet 81 mg  81 mg Oral DAILY    0.9% sodium chloride infusion 250 mL  250 mL IntraVENous PRN    vit B Cmplx 3-FA-Vit C-Biotin (NEPHRO FLETCHER RX) tablet 1 Tab  1 Tab Oral DAILY    0.9% sodium chloride infusion 250 mL  250 mL IntraVENous PRN    cloNIDine (CATAPRES) 0.2 mg/24 hr patch 1 Patch  1 Patch TransDERmal Q7D    pantoprazole (PROTONIX) tablet 40 mg  40 mg Oral ACB    ondansetron (ZOFRAN) injection 4 mg  4 mg IntraVENous Q6H PRN    acetaminophen (TYLENOL) tablet 650 mg  650 mg Oral Q6H PRN    heparin (porcine) injection 5,000 Units  5,000 Units SubCUTAneous Q8H    hydrALAZINE (APRESOLINE) 20 mg/mL injection 20 mg  20 mg IntraVENous Q6H PRN    0.9% sodium chloride infusion 250 mL  250 mL IntraVENous PRN       Allergy:   No Known Allergies     Objective:     Visit Vitals    /68 (BP 1 Location: Right arm, BP Patient Position: At rest)    Pulse 78    Temp 98.8 °F (37.1 °C)    Resp 20    Ht 5' 5\" (1.651 m)    Wt 59.4 kg (131 lb)    SpO2 100%    BMI 21.8 kg/m2       Intake/Output Summary (Last 24 hours) at 08/18/17 0856  Last data filed at 08/17/17 1300   Gross per 24 hour   Intake              920 ml   Output                0 ml   Net              920 ml       Physical Exam:       General: Comfortable    HENT: Atraumatic and normocephalic   Eyes: Normal conjunctiva   Neck: Supple, has no JVD   Cardiovascular: Normal S1 & S2, no m/r/g   Pulmonary/Chest Wall: Clear to auscultation bilaterally, no w/c   Abdominal: Soft and non-tender   Musculoskeletal: no edema LE bilaterally   Neurological: No focal deficits, no changed     Access:  Right IJ TDC in place    Data Review:  No results for input(s): NA, K, CL, CO2, BUN, CREA, GLU, PHOS, CA, PTH in the last 72 hours. Recent Labs      08/18/17   0802   WBC  15.2*   HGB  8.7*   HCT  28.0*   PLT  223     No results for input(s): SGOT, GPT, AP, TBIL, TP, ALB, GLOB, GGT in the last 72 hours. No results for input(s): INR, PTP, APTT in the last 72 hours.     No lab exists for component: INREXT, INREXT No results for input(s): IRON, FE, TIBC, IBCT, PSAT, FERR in the last 72 hours. Most recent labs: reviewed.     MD Jesse Frankel  [phone]

## 2017-08-18 NOTE — PROGRESS NOTES
Call was received from Jay Jay Cohen from [facility], dialysis chair time is for Monday 8-21-17 @ 0630am M-W and Friday's patient will need to be at first appointment at [address] [place] [address],  brought d/c medications assisted thru [employer] to bedside nurse,dialysis physician will follow patients care and Chiquita Monterroso information provided until medicaid is finalized,unit secretary and bedside nurse made aware that pt will need taxi voucher home for transportation.

## 2017-08-18 NOTE — PROGRESS NOTES
Patient discharged and returning back home. Care Management Interventions  PCP Verified by CM: Yes  Palliative Care Consult (Criteria: CHF and RRAT>21): No  Reason for No Palliative Care Consult: Other (see comment)  Mode of Transport at Discharge: Self (taxi)  Transition of Care Consult (CM Consult):  Other ([facility])  Discharge Durable Medical Equipment: No  Health Maintenance Reviewed: Yes  Physical Therapy Consult: Yes  Occupational Therapy Consult: Yes  Speech Therapy Consult: No  Current Support Network: Lives with Spouse  Confirm Follow Up Transport: Cab  Plan discussed with Pt/Family/Caregiver: Yes  Discharge Location  Discharge Placement: Home with outpatient services

## 2017-09-06 ENCOUNTER — OFFICE VISIT (OUTPATIENT)
Dept: VASCULAR SURGERY | Age: 52
End: 2017-09-06

## 2017-09-06 VITALS
HEART RATE: 68 BPM | BODY MASS INDEX: 21.33 KG/M2 | RESPIRATION RATE: 18 BRPM | HEIGHT: 65 IN | SYSTOLIC BLOOD PRESSURE: 100 MMHG | WEIGHT: 128 LBS | DIASTOLIC BLOOD PRESSURE: 72 MMHG

## 2017-09-06 DIAGNOSIS — Z99.2 ESRD ON HEMODIALYSIS (HCC): Primary | ICD-10-CM

## 2017-09-06 DIAGNOSIS — N18.6 ESRD ON HEMODIALYSIS (HCC): Primary | ICD-10-CM

## 2017-09-06 NOTE — PROGRESS NOTES
[address]    Chief Complaint   Patient presents with    End Stage Renal Disease       History and Physical    Mr. Tim Nguyen presents to our office today for discussion of long-term dialysis access. He has no new complaints and states he is on dialysis Monday Wednesday and Friday and is right-handed. Past Medical History:   Diagnosis Date    Hypertension     Non compliance w medication regimen     noncompliant with HTN meds     Patient Active Problem List   Diagnosis Code    Routine general medical examination at a health care facility Z00.00    Unspecified essential hypertension I10    Tobacco abuse Z72.0    PAM (acute kidney injury) ([facility] N17.9    Hypertensive urgency, malignant I16.0    Uremia N19    Severe anemia D64.9    Nonadherence to medical treatment Z91.19    Rash of genital area R21    Pleural effusion, right J90    Severe protein-calorie malnutrition ([facility] E43     History reviewed. No pertinent surgical history. Current Outpatient Prescriptions   Medication Sig Dispense Refill    amLODIPine (NORVASC) 5 mg tablet Take 1 Tab by mouth daily. 30 Tab 1    aspirin 81 mg chewable tablet Take 1 Tab by mouth daily. 30 Tab 1    calcium carbonate (OS-NATALIE) 500 mg calcium (1,250 mg) tablet Take 1 Tab by mouth three (3) times daily (with meals). 90 Tab 0    carvedilol (COREG) 25 mg tablet Take 1 Tab by mouth two (2) times daily (with meals). Indications: hypertension 60 Tab 1    cloNIDine (CATAPRES) 0.2 mg/24 hr patch 1 Patch by TransDERmal route every seven (7) days. 4 Patch 1    clotrimazole-betamethasone (LOTRISONE) topical cream Apply to groin BID 60 g 0    simvastatin (ZOCOR) 20 mg tablet Take 1 Tab by mouth nightly. 30 Tab 1    vit B Cmplx 3-FA-Vit C-Biotin (NEPHRO FLETCHER RX) 1- mg-mg-mcg tablet Take 1 Tab by mouth daily.  30 Tab 1     No Known Allergies  Social History     Social History    Marital status: SINGLE     Spouse name: N/A    Number of children: N/A    Years of education: N/A     Occupational History    Not on file. Social History Main Topics    Smoking status: Former Smoker     Packs/day: 0.50     Years: 7.00     Types: Cigarettes    Smokeless tobacco: Never Used    Alcohol use Yes      Comment: every day beer 20ounces    Drug use: Yes     Special: Marijuana    Sexual activity: Yes     Partners: Female     Other Topics Concern    Not on file     Social History Narrative      Family History   Problem Relation Age of Onset    Cancer Mother        Review of Systems    Review of Systems   Constitutional: Negative for chills, diaphoresis, fever, malaise/fatigue and weight loss. HENT: Negative for hearing loss and sore throat. Eyes: Negative for blurred vision, photophobia and redness. Respiratory: Negative for cough, hemoptysis, shortness of breath and wheezing. Cardiovascular: Negative for chest pain, palpitations and orthopnea. Gastrointestinal: Negative for abdominal pain, blood in stool, constipation, diarrhea, heartburn, nausea and vomiting. Genitourinary: Negative for dysuria, frequency, hematuria and urgency. Musculoskeletal: Negative for back pain and myalgias. Skin: Negative for itching and rash. Neurological: Negative for dizziness, speech change, focal weakness, weakness and headaches. Endo/Heme/Allergies: Does not bruise/bleed easily. Psychiatric/Behavioral: Negative for depression and suicidal ideas.             Physical Exam:    Visit Vitals    /72 (BP 1 Location: Left arm, BP Patient Position: Sitting)    Pulse 68    Resp 18    Ht 5' 5\" (1.651 m)    Wt 128 lb (58.1 kg)    BMI 21.3 kg/m2      Physical Examination: General appearance - alert, well appearing, and in no distress  Mental status - alert, oriented to person, place, and time  Eyes - sclera anicteric, left eye normal, right eye normal  Ears - right ear normal, left ear normal  Nose - normal and patent, no erythema, discharge or polyps  Mouth - mucous membranes moist, pharynx normal without lesions  Neck - supple, no significant adenopathy  Lymphatics - no palpable lymphadenopathy  Chest - clear to auscultation, no wheezes, rales or rhonchi, symmetric air entry  Heart - normal rate and regular rhythm  Abdomen - soft, nontender, nondistended, no masses or organomegaly  Extremities -2+ radial pulse on the left negative Prosper's test.  Diminutive cephalic vein in the forearm area probably too small for radiocephalic fistula. Visible antecubital vein. Impression and Plan:    ICD-10-CM ICD-9-CM    1. ESRD on hemodialysis (HCC) N18.6 585.6     Z99.2 V45.11      We will schedule Mr. Rashida Tomlin for left upper extremity access soon as feasible. We medical clearance for the procedure. We discussed risks and benefits of the procedure Mr. Rashida Tomlin is agreed to proceed. Follow-up Disposition:  Return in about 4 weeks (around 10/4/2017). The treatment plan was reviewed with the patient in detail. The patient voiced understanding of this plan and all questions and concerns were addressed. The patient agrees with this plan. We discussed the signs and symptoms that would require earlier attention or intervention. The patient was given educational material related to his/her visit and the patient has voiced understanding of the material.     I appreciate the opportunity to participate in the care of your patient. I will be sure to keep you informed of any subsequent changes in the treatment plan. If you have any questions or concerns, please feel free to contact me. Rehan Chung MD    PLEASE NOTE:  This document has been produced using voice recognition software. Unrecognized errors in transcription may be present.

## 2017-09-06 NOTE — MR AVS SNAPSHOT
Visit Information Date & Time Provider Department Dept. Phone Encounter #  
 9/6/2017  1:00 PM Sherman Dunn MD [facility] [address] [id number] Upcoming Health Maintenance Date Due Pneumococcal 19-64 Highest Risk (1 of 3 - PCV13) 9/15/1985 DTaP/Tdap/Td series (1 - Tdap) 9/15/1987 FOBT Q 1 YEAR AGE 50-75 9/15/2016 INFLUENZA AGE 9 TO ADULT 8/1/2017 Allergies as of 9/6/2017  Review Complete On: 9/6/2017 By: Didier Flores RN No Known Allergies Current Immunizations  Never Reviewed No immunizations on file. Not reviewed this visit Vitals BP Pulse Resp Height(growth percentile) Weight(growth percentile) BMI  
 100/72 (BP 1 Location: Left arm, BP Patient Position: Sitting) 68 18 5' 5\" (1.651 m) 128 lb (58.1 kg) 21.3 kg/m2 Smoking Status Former Smoker BMI and BSA Data Body Mass Index Body Surface Area  
 21.3 kg/m 2 1.63 m 2 Preferred Pharmacy Pharmacy Name Phone [id number] [place] [id number] [phone] Your Updated Medication List  
  
   
This list is accurate as of: 9/6/17  1:08 PM.  Always use your most recent med list. amLODIPine 5 mg tablet Commonly known as:  Tahir Emerald Take 1 Tab by mouth daily. aspirin 81 mg chewable tablet Take 1 Tab by mouth daily. calcium carbonate 500 mg calcium (1,250 mg) tablet Commonly known as:  OS-NATAILE Take 1 Tab by mouth three (3) times daily (with meals). carvedilol 25 mg tablet Commonly known as:  Pink Parrot Take 1 Tab by mouth two (2) times daily (with meals). Indications: hypertension  
  
 cloNIDine 0.2 mg/24 hr patch Commonly known as:  CATAPRES  
1 Patch by TransDERmal route every seven (7) days. clotrimazole-betamethasone topical cream  
Commonly known as:  Joseph [place] Apply to groin BID  
  
 simvastatin 20 mg tablet Commonly known as:  ZOCOR  
 Take 1 Tab by mouth nightly. vit B Cmplx 3-FA-Vit C-Biotin 1- mg-mg-mcg tablet Commonly known as:  NEPHRO FLETCHER RX Take 1 Tab by mouth daily. Please provide this summary of care documentation to your next provider. Your primary care clinician is listed as NONE. If you have any questions after today's visit, please call [phone].

## 2017-09-07 ENCOUNTER — TELEPHONE (OUTPATIENT)
Dept: VASCULAR SURGERY | Age: 52
End: 2017-09-07

## 2017-10-06 ENCOUNTER — TELEPHONE (OUTPATIENT)
Dept: VASCULAR SURGERY | Age: 52
End: 2017-10-06

## 2017-10-06 NOTE — TELEPHONE ENCOUNTER
----- Message from Higinio Kulkarni LPN sent at 6/64/8426  2:30 PM EDT -----  Jarred Angulo, the nurse from the dialysis unit, [facility], called and stated that patient needs medical clearance for upcoming surgery for new access but patient does not have a PCP because does not have insurance. I spoke with Dr. Jace Hernández and he said he was fine with nephrology giving the clearance. Jarred Angulo is going to have the paperwork filled out and send it to you when the doctor comes back through for rounds and she said it would be Dr. Tahmina Young.

## 2017-10-06 NOTE — TELEPHONE ENCOUNTER
Called and spoke with Nelia at dialysis and per her nephrology would not do clearance for the patient and that the patient just got medicaid and  is trying to get the patient a PCP and would let me know so that paper work for clearance can be faxed to them.

## 2017-10-17 ENCOUNTER — OFFICE VISIT (OUTPATIENT)
Dept: VASCULAR SURGERY | Age: 52
End: 2017-10-17

## 2017-10-17 VITALS
BODY MASS INDEX: 21.33 KG/M2 | RESPIRATION RATE: 16 BRPM | HEART RATE: 70 BPM | WEIGHT: 128 LBS | DIASTOLIC BLOOD PRESSURE: 60 MMHG | SYSTOLIC BLOOD PRESSURE: 104 MMHG | HEIGHT: 65 IN

## 2017-10-17 DIAGNOSIS — N18.6 ESRD ON HEMODIALYSIS (HCC): Primary | ICD-10-CM

## 2017-10-17 DIAGNOSIS — Z99.2 ESRD ON HEMODIALYSIS (HCC): Primary | ICD-10-CM

## 2017-10-17 NOTE — MR AVS SNAPSHOT
Visit Information Date & Time Provider Department Dept. Phone Encounter #  
 10/17/2017  1:30 PM Frances Azar MD [facility] [address] [id number] Upcoming Health Maintenance Date Due Pneumococcal 19-64 Highest Risk (1 of 3 - PCV13) 9/15/1985 DTaP/Tdap/Td series (1 - Tdap) 9/15/1987 FOBT Q 1 YEAR AGE 50-75 9/15/2016 INFLUENZA AGE 9 TO ADULT 8/1/2017 Allergies as of 10/17/2017  Review Complete On: 10/17/2017 By: Johana Enrique RN No Known Allergies Current Immunizations  Never Reviewed No immunizations on file. Not reviewed this visit Vitals BP Pulse Resp Height(growth percentile) Weight(growth percentile) BMI  
 104/60 (BP 1 Location: Left arm, BP Patient Position: Sitting) 70 16 5' 5\" (1.651 m) 128 lb (58.1 kg) 21.3 kg/m2 Smoking Status Former Smoker BMI and BSA Data Body Mass Index Body Surface Area  
 21.3 kg/m 2 1.63 m 2 Preferred Pharmacy Pharmacy Name Phone [id number] [phone] Your Updated Medication List  
  
   
This list is accurate as of: 10/17/17  2:42 PM.  Always use your most recent med list. amLODIPine 5 mg tablet Commonly known as:  Baires Mullet Take 1 Tab by mouth daily. aspirin 81 mg chewable tablet Take 1 Tab by mouth daily. calcium carbonate 500 mg calcium (1,250 mg) tablet Commonly known as:  OS-NATALIE Take 1 Tab by mouth three (3) times daily (with meals). carvedilol 25 mg tablet Commonly known as:  Lars Been Take 1 Tab by mouth two (2) times daily (with meals). Indications: hypertension  
  
 cloNIDine 0.2 mg/24 hr patch Commonly known as:  CATAPRES  
1 Patch by TransDERmal route every seven (7) days. clotrimazole-betamethasone topical cream  
Commonly known as:  Melany Lackey Apply to groin BID  
  
 simvastatin 20 mg tablet Commonly known as:  ZOCOR Take 1 Tab by mouth nightly. vit B Cmplx 3-FA-Vit C-Biotin 1- mg-mg-mcg tablet Commonly known as:  NEPHRO FLETCHER RX Take 1 Tab by mouth daily. Please provide this summary of care documentation to your next provider. Your primary care clinician is listed as NONE. If you have any questions after today's visit, please call [phone].

## 2017-10-18 NOTE — PROGRESS NOTES
[address]    Chief Complaint   Patient presents with    End Stage Renal Disease       History and Physical    Mr. Bruna Bullock returns to our office for continued discussion of his long term dialysis access. He states his insurance is not completely in place just yet and as such he has not been able to get a primary care physician. Because of this we have not been able to obtain medical clearance for access. He states that occasionally on dialysis he will get shoulder pain or chills. This is very inconsistent. He does not have any fevers or chills currently. Past Medical History:   Diagnosis Date    Chronic kidney disease     Hypertension     Non compliance w medication regimen     noncompliant with HTN meds     Patient Active Problem List   Diagnosis Code    Routine general medical examination at a health care facility Z00.00    Unspecified essential hypertension I10    Tobacco abuse Z72.0    PAM (acute kidney injury) ([facility] N17.9    Hypertensive urgency, malignant I16.0    Uremia N19    Severe anemia D64.9    Nonadherence to medical treatment Z91.19    Rash of genital area R21    Pleural effusion, right J90    Severe protein-calorie malnutrition ([facility] E43     History reviewed. No pertinent surgical history. Current Outpatient Prescriptions   Medication Sig Dispense Refill    amLODIPine (NORVASC) 5 mg tablet Take 1 Tab by mouth daily. 30 Tab 1    aspirin 81 mg chewable tablet Take 1 Tab by mouth daily. 30 Tab 1    calcium carbonate (OS-NATALIE) 500 mg calcium (1,250 mg) tablet Take 1 Tab by mouth three (3) times daily (with meals). 90 Tab 0    carvedilol (COREG) 25 mg tablet Take 1 Tab by mouth two (2) times daily (with meals). Indications: hypertension 60 Tab 1    cloNIDine (CATAPRES) 0.2 mg/24 hr patch 1 Patch by TransDERmal route every seven (7) days.  4 Patch 1    clotrimazole-betamethasone (LOTRISONE) topical cream Apply to groin BID 60 g 0    simvastatin (ZOCOR) 20 mg tablet Take 1 Tab by mouth nightly. 30 Tab 1    vit B Cmplx 3-FA-Vit C-Biotin (NEPHRO FLETCHER RX) 1- mg-mg-mcg tablet Take 1 Tab by mouth daily. 30 Tab 1     No Known Allergies  Social History     Social History    Marital status: SINGLE     Spouse name: N/A    Number of children: N/A    Years of education: N/A     Occupational History    Not on file. Social History Main Topics    Smoking status: Former Smoker     Packs/day: 0.50     Years: 7.00     Types: Cigarettes    Smokeless tobacco: Never Used    Alcohol use Yes      Comment: every day beer 20ounces    Drug use: Yes     Special: Marijuana    Sexual activity: Yes     Partners: Female     Other Topics Concern    Not on file     Social History Narrative      Family History   Problem Relation Age of Onset    Cancer Mother        Review of Systems    Review of Systems   Constitutional: Negative for chills, diaphoresis, fever, malaise/fatigue and weight loss. HENT: Negative for hearing loss and sore throat. Eyes: Negative for blurred vision, photophobia and redness. Respiratory: Negative for cough, hemoptysis, shortness of breath and wheezing. Cardiovascular: Negative for chest pain, palpitations and orthopnea. Gastrointestinal: Negative for abdominal pain, blood in stool, constipation, diarrhea, heartburn, nausea and vomiting. Genitourinary: Negative for dysuria, frequency, hematuria and urgency. Musculoskeletal: Negative for back pain and myalgias. Skin: Negative for itching and rash. Neurological: Negative for dizziness, speech change, focal weakness, weakness and headaches. Endo/Heme/Allergies: Does not bruise/bleed easily. Psychiatric/Behavioral: Negative for depression and suicidal ideas.             Physical Exam:    Visit Vitals    /60 (BP 1 Location: Left arm, BP Patient Position: Sitting)    Pulse 70    Resp 16    Ht 5' 5\" (1.651 m)    Wt 128 lb (58.1 kg)    BMI 21.3 kg/m2      Physical Examination: General appearance - alert, well appearing, and in no distress  Mental status - alert, oriented to person, place, and time  Eyes - sclera anicteric, left eye normal, right eye normal  Ears - right ear normal, left ear normal  Nose - normal and patent, no erythema, discharge or polyps  Mouth - mucous membranes moist, pharynx normal without lesions  Neck - supple, no significant adenopathy  Lymphatics - no palpable lymphadenopathy  Chest - clear to auscultation, no wheezes, rales or rhonchi, symmetric air entry, TDC site without erythema or signs of infection. NTTP along tract of catheter  Heart - normal rate and regular rhythm  Abdomen - soft, nontender, nondistended, no masses or organomegaly  Extremities - peripheral pulses normal, no pedal edema, no clubbing or cyanosis      Impression and Plan:    ICD-10-CM ICD-9-CM    1. [facility] N18.6 585.6     Z99.2 V45.11      I told Mr. Marissa Lara that we are awaiting medical clearance for his access placement, but at this point we will try and see if anesthesia will allow us to proceed without it. This may result in a cancellation, but I believe Mr. Marissa Lara needs an access placed in the near future. We will get him schedule for the procedure and continue to work on clearance in the interim. The treatment plan was reviewed with the patient in detail. The patient voiced understanding of this plan and all questions and concerns were addressed. The patient agrees with this plan. We discussed the signs and symptoms that would require earlier attention or intervention. The patient was given educational material related to his/her visit and the patient has voiced understanding of the material.     I appreciate the opportunity to participate in the care of your patient. I will be sure to keep you informed of any subsequent changes in the treatment plan. If you have any questions or concerns, please feel free to contact me.   Monty Cruz MD    PLEASE NOTE:  This document has been produced using voice recognition software. Unrecognized errors in transcription may be present.

## 2017-11-03 ENCOUNTER — TELEPHONE (OUTPATIENT)
Dept: VASCULAR SURGERY | Age: 52
End: 2017-11-03

## 2017-11-03 NOTE — TELEPHONE ENCOUNTER
Called and left message for the patient regarding surgery Monday and then reached out to emergency contact ,  Donovan Reis, advised the patient needed to be at [facility] no later than 0530 for surgery , NPO after midnight , and may take heart and BP meds with small sip of water in the am. She verbally understood and will pass on the info to patient. Also contacted the patients dialysis center and let them know, spoke with roman , she will also fax over labs tht the pateint had there on Monday . Lab work faxed to [facility] , received confirmation from the fax.

## 2017-11-04 ENCOUNTER — HOSPITAL ENCOUNTER (OUTPATIENT)
Dept: PREADMISSION TESTING | Age: 52
Discharge: HOME | End: 2017-11-04
Payer: MEDICAID

## 2017-11-04 LAB
ABO + RH BLD: NORMAL
ALBUMIN SERPL-MCNC: 3.8 G/DL (ref 3.4–5)
ALBUMIN/GLOB SERPL: 0.9 {RATIO} (ref 0.8–1.7)
ALP SERPL-CCNC: 106 U/L (ref 45–117)
ALT SERPL-CCNC: 26 U/L (ref 16–61)
ANION GAP SERPL CALC-SCNC: 17 MMOL/L (ref 3–18)
AST SERPL-CCNC: 24 U/L (ref 15–37)
BASOPHILS # BLD: 0 K/UL (ref 0–0.1)
BASOPHILS NFR BLD: 0 % (ref 0–3)
BILIRUB SERPL-MCNC: 0.7 MG/DL (ref 0.2–1)
BLOOD GROUP ANTIBODIES SERPL: NORMAL
BUN SERPL-MCNC: 46 MG/DL (ref 7–18)
BUN/CREAT SERPL: 4 (ref 12–20)
CALCIUM SERPL-MCNC: 9.2 MG/DL (ref 8.5–10.1)
CHLORIDE SERPL-SCNC: 98 MMOL/L (ref 100–108)
CO2 SERPL-SCNC: 28 MMOL/L (ref 21–32)
CREAT SERPL-MCNC: 11.06 MG/DL (ref 0.6–1.3)
DIFFERENTIAL METHOD BLD: ABNORMAL
EOSINOPHIL # BLD: 0.4 K/UL (ref 0–0.4)
EOSINOPHIL NFR BLD: 3 % (ref 0–5)
ERYTHROCYTE [DISTWIDTH] IN BLOOD BY AUTOMATED COUNT: 13.9 % (ref 11.6–14.5)
GLOBULIN SER CALC-MCNC: 4.1 G/DL (ref 2–4)
GLUCOSE SERPL-MCNC: 83 MG/DL (ref 74–99)
HCT VFR BLD AUTO: 35.5 % (ref 36–48)
HGB BLD-MCNC: 11.5 G/DL (ref 13–16)
LYMPHOCYTES # BLD: 3.1 K/UL (ref 0.8–3.5)
LYMPHOCYTES NFR BLD: 26 % (ref 20–51)
MCH RBC QN AUTO: 31.3 PG (ref 24–34)
MCHC RBC AUTO-ENTMCNC: 32.4 G/DL (ref 31–37)
MCV RBC AUTO: 96.5 FL (ref 74–97)
MONOCYTES # BLD: 1 K/UL (ref 0–1)
MONOCYTES NFR BLD: 8 % (ref 2–9)
NEUTS SEG # BLD: 7.4 K/UL (ref 1.8–8)
NEUTS SEG NFR BLD: 63 % (ref 42–75)
PLATELET # BLD AUTO: 274 K/UL (ref 135–420)
PLATELET COMMENTS,PCOM: ABNORMAL
PMV BLD AUTO: 10.2 FL (ref 9.2–11.8)
POTASSIUM SERPL-SCNC: 4.6 MMOL/L (ref 3.5–5.5)
PROT SERPL-MCNC: 7.9 G/DL (ref 6.4–8.2)
RBC # BLD AUTO: 3.68 M/UL (ref 4.7–5.5)
RBC MORPH BLD: ABNORMAL
SODIUM SERPL-SCNC: 143 MMOL/L (ref 136–145)
SPECIMEN EXP DATE BLD: NORMAL
WBC # BLD AUTO: 11.9 K/UL (ref 4.6–13.2)

## 2017-11-04 PROCEDURE — 80053 COMPREHEN METABOLIC PANEL: CPT | Performed by: SURGERY

## 2017-11-04 PROCEDURE — 86900 BLOOD TYPING SEROLOGIC ABO: CPT | Performed by: SURGERY

## 2017-11-04 PROCEDURE — 36415 COLL VENOUS BLD VENIPUNCTURE: CPT | Performed by: SURGERY

## 2017-11-04 PROCEDURE — 85025 COMPLETE CBC W/AUTO DIFF WBC: CPT | Performed by: SURGERY

## 2017-11-05 ENCOUNTER — ANESTHESIA EVENT (OUTPATIENT)
Dept: SURGERY | Age: 52
End: 2017-11-05
Payer: MEDICAID

## 2017-11-06 ENCOUNTER — ANESTHESIA (OUTPATIENT)
Dept: SURGERY | Age: 52
End: 2017-11-06
Payer: MEDICAID

## 2017-11-06 ENCOUNTER — HOSPITAL ENCOUNTER (OUTPATIENT)
Age: 52
Setting detail: OUTPATIENT SURGERY
Discharge: HOME | End: 2017-11-06
Attending: SURGERY | Admitting: SURGERY
Payer: MEDICAID

## 2017-11-06 VITALS
DIASTOLIC BLOOD PRESSURE: 70 MMHG | SYSTOLIC BLOOD PRESSURE: 128 MMHG | TEMPERATURE: 97.2 F | BODY MASS INDEX: 22.49 KG/M2 | OXYGEN SATURATION: 100 % | RESPIRATION RATE: 16 BRPM | HEART RATE: 74 BPM | HEIGHT: 65 IN | WEIGHT: 135 LBS

## 2017-11-06 LAB — POTASSIUM SERPL-SCNC: 4.4 MMOL/L (ref 3.5–5.5)

## 2017-11-06 PROCEDURE — 77030031139 HC SUT VCRL2 J&J -A: Performed by: SURGERY

## 2017-11-06 PROCEDURE — 74011250636 HC RX REV CODE- 250/636

## 2017-11-06 PROCEDURE — 76942 ECHO GUIDE FOR BIOPSY: CPT | Performed by: SURGERY

## 2017-11-06 PROCEDURE — 77030020989 HC NDL NRV BLK ARRO -B: Performed by: ANESTHESIOLOGY

## 2017-11-06 PROCEDURE — 77030020782 HC GWN BAIR PAWS FLX 3M -B: Performed by: SURGERY

## 2017-11-06 PROCEDURE — 84132 ASSAY OF SERUM POTASSIUM: CPT | Performed by: SURGERY

## 2017-11-06 PROCEDURE — 36415 COLL VENOUS BLD VENIPUNCTURE: CPT | Performed by: SURGERY

## 2017-11-06 PROCEDURE — 76210000006 HC OR PH I REC 0.5 TO 1 HR: Performed by: SURGERY

## 2017-11-06 PROCEDURE — 76210000021 HC REC RM PH II 0.5 TO 1 HR: Performed by: SURGERY

## 2017-11-06 PROCEDURE — 77030002996 HC SUT SLK J&J -A: Performed by: SURGERY

## 2017-11-06 PROCEDURE — 77030002924 HC SUT GORTX WLGO -B: Performed by: SURGERY

## 2017-11-06 PROCEDURE — 74011250636 HC RX REV CODE- 250/636: Performed by: SURGERY

## 2017-11-06 PROCEDURE — 77030011640 HC PAD GRND REM COVD -A: Performed by: SURGERY

## 2017-11-06 PROCEDURE — 74011000250 HC RX REV CODE- 250

## 2017-11-06 PROCEDURE — L3650 SO 8 ABD RESTRAINT PRE OTS: HCPCS | Performed by: SURGERY

## 2017-11-06 PROCEDURE — C1768 GRAFT, VASCULAR: HCPCS | Performed by: SURGERY

## 2017-11-06 PROCEDURE — 77030002935 HC SUT MCRYL J&J -C: Performed by: SURGERY

## 2017-11-06 PROCEDURE — 77030032490 HC SLV COMPR SCD KNE COVD -B: Performed by: SURGERY

## 2017-11-06 PROCEDURE — 76060000034 HC ANESTHESIA 1.5 TO 2 HR: Performed by: SURGERY

## 2017-11-06 PROCEDURE — 76010000153 HC OR TIME 1.5 TO 2 HR: Performed by: SURGERY

## 2017-11-06 PROCEDURE — 77030018719 HC DRSG PTCH ANTIMIC J&J -A: Performed by: SURGERY

## 2017-11-06 PROCEDURE — 77030010512 HC APPL CLP LIG J&J -C: Performed by: SURGERY

## 2017-11-06 PROCEDURE — 64418 NJX AA&/STRD SPRSCAP NRV: CPT | Performed by: ANESTHESIOLOGY

## 2017-11-06 DEVICE — GRAFT VASC L45CM DIA4-7MM PTFE CBAS HEP SURF STD WALLED: Type: IMPLANTABLE DEVICE | Site: ARM | Status: FUNCTIONAL

## 2017-11-06 RX ORDER — PROPOFOL 10 MG/ML
INJECTION, EMULSION INTRAVENOUS AS NEEDED
Status: DISCONTINUED | OUTPATIENT
Start: 2017-11-06 | End: 2017-11-06 | Stop reason: HOSPADM

## 2017-11-06 RX ORDER — HEPARIN SODIUM 1000 [USP'U]/ML
INJECTION, SOLUTION INTRAVENOUS; SUBCUTANEOUS AS NEEDED
Status: DISCONTINUED | OUTPATIENT
Start: 2017-11-06 | End: 2017-11-06 | Stop reason: HOSPADM

## 2017-11-06 RX ORDER — MAGNESIUM SULFATE 100 %
4 CRYSTALS MISCELLANEOUS AS NEEDED
Status: DISCONTINUED | OUTPATIENT
Start: 2017-11-06 | End: 2017-11-06 | Stop reason: HOSPADM

## 2017-11-06 RX ORDER — MIDAZOLAM HYDROCHLORIDE 1 MG/ML
INJECTION, SOLUTION INTRAMUSCULAR; INTRAVENOUS
Status: DISCONTINUED
Start: 2017-11-06 | End: 2017-11-06 | Stop reason: HOSPADM

## 2017-11-06 RX ORDER — LIDOCAINE HYDROCHLORIDE 20 MG/ML
INJECTION, SOLUTION EPIDURAL; INFILTRATION; INTRACAUDAL; PERINEURAL AS NEEDED
Status: DISCONTINUED | OUTPATIENT
Start: 2017-11-06 | End: 2017-11-06 | Stop reason: HOSPADM

## 2017-11-06 RX ORDER — SODIUM CHLORIDE, SODIUM LACTATE, POTASSIUM CHLORIDE, CALCIUM CHLORIDE 600; 310; 30; 20 MG/100ML; MG/100ML; MG/100ML; MG/100ML
1000 INJECTION, SOLUTION INTRAVENOUS CONTINUOUS
Status: DISCONTINUED | OUTPATIENT
Start: 2017-11-06 | End: 2017-11-06 | Stop reason: HOSPADM

## 2017-11-06 RX ORDER — INSULIN LISPRO 100 [IU]/ML
INJECTION, SOLUTION INTRAVENOUS; SUBCUTANEOUS ONCE
Status: DISCONTINUED | OUTPATIENT
Start: 2017-11-06 | End: 2017-11-06 | Stop reason: HOSPADM

## 2017-11-06 RX ORDER — NALOXONE HYDROCHLORIDE 0.4 MG/ML
0.1 INJECTION, SOLUTION INTRAMUSCULAR; INTRAVENOUS; SUBCUTANEOUS AS NEEDED
Status: DISCONTINUED | OUTPATIENT
Start: 2017-11-06 | End: 2017-11-06 | Stop reason: HOSPADM

## 2017-11-06 RX ORDER — HYDROMORPHONE HYDROCHLORIDE 2 MG/ML
0.5 INJECTION, SOLUTION INTRAMUSCULAR; INTRAVENOUS; SUBCUTANEOUS
Status: DISCONTINUED | OUTPATIENT
Start: 2017-11-06 | End: 2017-11-06 | Stop reason: HOSPADM

## 2017-11-06 RX ORDER — PROPOFOL 10 MG/ML
INJECTION, EMULSION INTRAVENOUS
Status: DISCONTINUED | OUTPATIENT
Start: 2017-11-06 | End: 2017-11-06 | Stop reason: HOSPADM

## 2017-11-06 RX ORDER — OXYCODONE AND ACETAMINOPHEN 5; 325 MG/1; MG/1
2 TABLET ORAL
Qty: 42 TAB | Refills: 0 | Status: ON HOLD | OUTPATIENT
Start: 2017-11-06 | End: 2017-12-24

## 2017-11-06 RX ORDER — SODIUM CHLORIDE 0.9 % (FLUSH) 0.9 %
5-10 SYRINGE (ML) INJECTION AS NEEDED
Status: DISCONTINUED | OUTPATIENT
Start: 2017-11-06 | End: 2017-11-06 | Stop reason: HOSPADM

## 2017-11-06 RX ORDER — SODIUM CHLORIDE 9 MG/ML
500 INJECTION, SOLUTION INTRAVENOUS CONTINUOUS
Status: DISCONTINUED | OUTPATIENT
Start: 2017-11-06 | End: 2017-11-06 | Stop reason: HOSPADM

## 2017-11-06 RX ORDER — DEXTROSE 50 % IN WATER (D50W) INTRAVENOUS SYRINGE
25-50 AS NEEDED
Status: DISCONTINUED | OUTPATIENT
Start: 2017-11-06 | End: 2017-11-06 | Stop reason: HOSPADM

## 2017-11-06 RX ORDER — MIDAZOLAM HYDROCHLORIDE 1 MG/ML
INJECTION, SOLUTION INTRAMUSCULAR; INTRAVENOUS AS NEEDED
Status: DISCONTINUED | OUTPATIENT
Start: 2017-11-06 | End: 2017-11-06 | Stop reason: HOSPADM

## 2017-11-06 RX ORDER — FLUMAZENIL 0.1 MG/ML
0.2 INJECTION INTRAVENOUS
Status: DISCONTINUED | OUTPATIENT
Start: 2017-11-06 | End: 2017-11-06 | Stop reason: HOSPADM

## 2017-11-06 RX ORDER — CEFAZOLIN SODIUM 2 G/50ML
2 SOLUTION INTRAVENOUS ONCE
Status: COMPLETED | OUTPATIENT
Start: 2017-11-06 | End: 2017-11-06

## 2017-11-06 RX ADMIN — HEPARIN SODIUM 4000 UNITS: 1000 INJECTION, SOLUTION INTRAVENOUS; SUBCUTANEOUS at 08:11

## 2017-11-06 RX ADMIN — PROPOFOL 50 MCG/KG/MIN: 10 INJECTION, EMULSION INTRAVENOUS at 07:37

## 2017-11-06 RX ADMIN — LIDOCAINE HYDROCHLORIDE 40 MG: 20 INJECTION, SOLUTION EPIDURAL; INFILTRATION; INTRACAUDAL; PERINEURAL at 07:34

## 2017-11-06 RX ADMIN — SODIUM CHLORIDE 500 ML: 9 INJECTION, SOLUTION INTRAVENOUS at 06:35

## 2017-11-06 RX ADMIN — PROPOFOL 20 MG: 10 INJECTION, EMULSION INTRAVENOUS at 07:34

## 2017-11-06 RX ADMIN — MIDAZOLAM HYDROCHLORIDE 2 MG: 1 INJECTION, SOLUTION INTRAMUSCULAR; INTRAVENOUS at 07:01

## 2017-11-06 RX ADMIN — CEFAZOLIN SODIUM 2 G: 2 SOLUTION INTRAVENOUS at 07:36

## 2017-11-06 NOTE — ANESTHESIA POSTPROCEDURE EVALUATION
Post-Anesthesia Evaluation & Assessment    Visit Vitals    /76    Pulse 74    Temp 36.1 °C (96.9 °F)    Resp 12    Ht 5' 5\" (1.651 m)    Wt 61.2 kg (135 lb)    SpO2 100%    BMI 22.47 kg/m2       No untreated/active PONV    Post-operative hydration adequate. Adequate post-operative analgesia per PACU discharge criteria    Mental status & level of consciousness: alert and oriented x 3    Respiratory status: patent unassisted airway     No apparent anesthetic complications requiring additional post-anesthetic care    Patient has met all discharge requirements.             Isaias Matias MD

## 2017-11-06 NOTE — PERIOP NOTES
TRANSFER - OUT REPORT:    Verbal report given to BREANNA Fagan on [address]  being transferred to phase 2 (unit) for routine post - op       Report consisted of patients Situation, Background, Assessment and   Recommendations(SBAR). Information from the following report(s) SBAR, Intake/Output and MAR was reviewed with the receiving nurse. Lines:   Peripheral IV 11/06/17 Right Hand (Active)   Site Assessment Clean, dry, & intact 11/6/2017  9:48 AM   Phlebitis Assessment 0 11/6/2017  9:48 AM   Infiltration Assessment 0 11/6/2017  9:48 AM   Dressing Status Clean, dry, & intact 11/6/2017  9:48 AM   Dressing Type Tape;Transparent 11/6/2017  9:48 AM   Hub Color/Line Status Infusing 11/6/2017  9:48 AM        Opportunity for questions and clarification was provided.       Patient transported with:   Registered Nurse

## 2017-11-06 NOTE — BRIEF OP NOTE
BRIEF OPERATIVE NOTE    Date of Procedure: 11/6/2017   Preoperative Diagnosis: END STAGE RENAL DISEASE  Postoperative Diagnosis: END STAGE RENAL DISEASE    Procedure(s):  CREATION OF  ARTERIAL VENOUS GRAFT LEFT EXTREMITY  Surgeon(s) and Role:     * Dawit Lazo MD - Primary         Assistant Staff:       Surgical Staff:  Circ-1: Cyrus Cohn RN  Scrub Tech-1: Sarah Villanueva  Surg Asst-1: Fabienne Lee  Event Time In   Incision Start 0747   Incision Close      Anesthesia: Other   Estimated Blood Loss: 50mL  Specimens: * No specimens in log *   Findings: Faint thrill at completion of the procedure   Complications: None  Implants:   Implant Name Type Inv.  Item Serial No.  Lot No. LRB No. Used Action   GRAFT EPTFE-HEPRN TAPR 4-7X10 -- PROPATEN - [id number]   GRAFT EPTFE-HEPRN TAPR 4-7X10 -- PROPATEN [id number]  [employer]   Left 1 Implanted

## 2017-11-06 NOTE — DISCHARGE INSTRUCTIONS
Hemodialysis Access: What to Expect at [facility]  Hemodialysis is a way to remove wastes from the blood when your kidneys can no longer do the job. It is not a cure, but it can help you live longer and feel better. It is a lifesaving treatment when you have kidney failure. Hemodialysis is often called dialysis. Your doctor created a place (called an access) in your arm for your blood to flow in and out of your body during your dialysis sessions. Your arm will probably be bruised and swollen. It may hurt. The cut (incision) may bleed. The pain and bleeding will get better over several days. You will probably need only over-the-counter pain medicine. You can reduce swelling by propping your arm on 1 or 2 pillows and keeping your elbow straight. You will have stitches. These may dissolve on their own, or your doctor will tell you when to come in to have them removed. You should also be able to return to work in a few days. You may feel some coolness or numbness in your hand. These feelings usually go away in a few weeks. Your doctor may suggest squeezing a soft object. This will strengthen your access and may make hemodialysis faster and easier. You should always be able to feel blood rushing through the fistula or graft. It feels like a slight vibration when you put your fingers on the skin over the fistula or graft. This feeling is called a thrill or pulse. This care sheet gives you a general idea about how long it will take for you to recover. But each person recovers at a different pace. Follow the steps below to get better as quickly as possible. How can you care for yourself at home? Activity  ? · Rest when you feel tired. Getting enough sleep will help you recover. Do not lie on or sleep on the arm with the access. ? · Avoid activities such as washing windows or gardening that put stress on the arm with the access.    ? · You may use your arm, but do not lift anything that weighs more than about 15 pounds. This may include a child, heavy grocery bags, a heavy briefcase or backpack, cat litter or dog food bags, or a vacuum . ? · You can shower, but keep the access dry for the first 2 days. Cover the area with a plastic bag to keep it dry. ? · Do not soak or scrub the incision until it has healed. ? · Wear an arm guard to protect the area if you play sports or work with your arms. ? · You may drive when your doctor says it is okay. This is usually in 1 to 2 days. ? · Most people are able to return to work about 1 or 2 days after surgery. Diet  ? · Follow an eating plan that is good for your kidneys. A registered dietitian can help you make a meal plan that is right for you. You may need to limit protein, salt, fluids, and certain foods. Medicines  ? · Your doctor will tell you if and when you can restart your medicines. He or she will also give you instructions about taking any new medicines. ? · If you take blood thinners, such as warfarin (Coumadin), clopidogrel (Plavix), or aspirin, be sure to talk to your doctor. He or she will tell you if and when to start taking those medicines again. Make sure that you understand exactly what your doctor wants you to do. ? · Take pain medicines exactly as directed. ¨ If the doctor gave you a prescription medicine for pain, take it as prescribed. ¨ If you are not taking a prescription pain medicine, ask your doctor if you can take acetaminophen (Tylenol). Do not take ibuprofen (Advil, Motrin) or naproxen (Aleve), or similar medicines, unless your doctor tells you to. They may make chronic kidney disease worse. ¨ Do not take two or more pain medicines at the same time unless the doctor told you to. Many pain medicines have acetaminophen, which is Tylenol. Too much acetaminophen (Tylenol) can be harmful.    ? · If you think your pain medicine is making you sick to your stomach:  ¨ Take your medicine after meals (unless your doctor has told you not to). ¨ Ask your doctor for a different pain medicine. ? · If your doctor prescribed antibiotics, take them as directed. Do not stop taking them just because you feel better. You need to take the full course of antibiotics. Incision care  ? · Keep the area dry for 2 days. After 2 days, wash the area with soap and water every day, and always before dialysis. ? · Do not soak or scrub the incision until it has healed. ? · If you have a bandage, change it every day or as your doctor recommends. Your doctor will tell you when you can remove it. Exercise  ? · Squeeze a soft ball or other object as your doctor tells you. This will help blood flow through the access and help prevent blood clots. ? Elevation  ? · Prop up the sore arm on a pillow anytime you sit or lie down during the next 3 days. Try to keep it above the level of your heart. This will help reduce swelling. Other instructions  ? · Every day, check your access for a pulse or thrill in the fistula or graft area. A thrill is a vibration. To feel a pulse or thrill, place the first two fingers of your hand over the access. ? · Do not bump your arm. ? · Do not wear tight clothing, jewelry, or anything else that may squeeze the access. ? · Use your other arm to have blood drawn or blood pressure taken. ? · Do not put cream or lotion on or near the access. ? · Make sure all doctors you deal with know you have a vascular access. Follow-up care is a key part of your treatment and safety. Be sure to make and go to all appointments, and call your doctor if you are having problems. It's also a good idea to know your test results and keep a list of the medicines you take. When should you call for help? Call 911 anytime you think you may need emergency care. For example, call if:  ? · You passed out (lost consciousness). ? · You have chest pain, are short of breath, or cough up blood.    ?Call your doctor now or seek immediate medical care if:  ? · Your hand or arm is cold or dark-colored. ? · You have no pulse in your access. ? · You have nausea or you vomit. ? · You have pain that does not get better after you take pain medicine. ? · You have loose stitches, or your incision comes open. ? · You are bleeding from the incision. ? · You have signs of infection, such as:  ¨ Increased pain, swelling, warmth, or redness. ¨ Red streaks leading from the area. ¨ Pus draining from the area. ¨ A fever. ? · You have signs of a blood clot in your leg (called a deep vein thrombosis), such as:  ¨ Pain in your calf, back of the knee, thigh, or groin. ¨ Redness or swelling in your leg. ? Watch closely for changes in your health, and be sure to contact your doctor if you have any problems. Where can you learn more? Go to http://jacinto-mike.info/. Enter P616 in the search box to learn more about \"Hemodialysis Access: What to Expect at Home. \"  Current as of: May 12, 2017  Content Version: 11.4  © 6519-9930 [employer]. Care instructions adapted under license by [employer] (which disclaims liability or warranty for this information). If you have questions about a medical condition or this instruction, always ask your healthcare professional. [address] any warranty or liability for your use of this information. DISCHARGE SUMMARY from Nurse    PATIENT INSTRUCTIONS:    After general anesthesia or intravenous sedation, for 24 hours or while taking prescription Narcotics:  · Limit your activities  · Do not drive and operate hazardous machinery  · Do not make important personal or business decisions  · Do  not drink alcoholic beverages  · If you have not urinated within 8 hours after discharge, please contact your surgeon on call.     Report the following to your surgeon:  · Excessive pain, swelling, redness or odor of or around the surgical area  · Temperature over 100.5  · Nausea and vomiting lasting longer than 4 hours or if unable to take medications  · Any signs of decreased circulation or nerve impairment to extremity: change in color, persistent  numbness, tingling, coldness or increase pain  · Any questions    What to do at Home:  Recommended activity: Ambulate in house, No lifting, Driving, or Strenuous exercise until advised and No driving while on analgesics,     If you experience any of the following symptoms fever, chills, uncontrollable pain, active bleeding or drainage , circulation changes, please follow up with Dr. Ivania Adler. *  Please give a list of your current medications to your Primary Care Provider. *  Please update this list whenever your medications are discontinued, doses are      changed, or new medications (including over-the-counter products) are added. *  Please carry medication information at all times in case of emergency situations. These are general instructions for a healthy lifestyle:    No smoking/ No tobacco products/ Avoid exposure to second hand smoke  Surgeon General's Warning:  Quitting smoking now greatly reduces serious risk to your health. Obesity, smoking, and sedentary lifestyle greatly increases your risk for illness    A healthy diet, regular physical exercise & weight monitoring are important for maintaining a healthy lifestyle    You may be retaining fluid if you have a history of heart failure or if you experience any of the following symptoms:  Weight gain of 3 pounds or more overnight or 5 pounds in a week, increased swelling in our hands or feet or shortness of breath while lying flat in bed. Please call your doctor as soon as you notice any of these symptoms; do not wait until your next office visit.     Recognize signs and symptoms of STROKE:    F-face looks uneven    A-arms unable to move or move unevenly    S-speech slurred or non-existent    T-time-call 911 as soon as signs and symptoms begin-DO NOT go Back to bed or wait to see if you get better-TIME IS BRAIN. Warning Signs of HEART ATTACK     Call 911 if you have these symptoms:   Chest discomfort. Most heart attacks involve discomfort in the center of the chest that lasts more than a few minutes, or that goes away and comes back. It can feel like uncomfortable pressure, squeezing, fullness, or pain.  Discomfort in other areas of the upper body. Symptoms can include pain or discomfort in one or both arms, the back, neck, jaw, or stomach.  Shortness of breath with or without chest discomfort.  Other signs may include breaking out in a cold sweat, nausea, or lightheadedness. Don't wait more than five minutes to call 911 - MINUTES MATTER! Fast action can save your life. Calling 911 is almost always the fastest way to get lifesaving treatment. Emergency Medical Services staff can begin treatment when they arrive -- up to an hour sooner than if someone gets to the hospital by car. Patient armband removed and shredded  The discharge information has been reviewed with the patient and caregiver. The patient and caregiver verbalized understanding. Discharge medications reviewed with the patient and caregiver and appropriate educational materials and side effects teaching were provided.   ___________________________________________________________________________________________________________________________________

## 2017-11-06 NOTE — PERIOP NOTES
TRANSFER - IN REPORT:    Verbal report received from ORN [employer] CRNA on [employer]  being received from OR (unit) for routine post - op      Report consisted of patients Situation, Background, Assessment and   Recommendations(SBAR). Information from the following report(s) SBAR, Intake/Output and MAR was reviewed with the receiving nurse. Opportunity for questions and clarification was provided. Assessment completed upon patients arrival to unit and care assumed.

## 2017-11-06 NOTE — H&P (VIEW-ONLY)
[address]    Chief Complaint   Patient presents with    End Stage Renal Disease       History and Physical    Mr. Gisselle Drew returns to our office for continued discussion of his long term dialysis access. He states his insurance is not completely in place just yet and as such he has not been able to get a primary care physician. Because of this we have not been able to obtain medical clearance for access. He states that occasionally on dialysis he will get shoulder pain or chills. This is very inconsistent. He does not have any fevers or chills currently. Past Medical History:   Diagnosis Date    Chronic kidney disease     Hypertension     Non compliance w medication regimen     noncompliant with HTN meds     Patient Active Problem List   Diagnosis Code    Routine general medical examination at a health care facility Z00.00    Unspecified essential hypertension I10    Tobacco abuse Z72.0    PAM (acute kidney injury) ([facility] N17.9    Hypertensive urgency, malignant I16.0    Uremia N19    Severe anemia D64.9    Nonadherence to medical treatment Z91.19    Rash of genital area R21    Pleural effusion, right J90    Severe protein-calorie malnutrition ([facility] E43     History reviewed. No pertinent surgical history. Current Outpatient Prescriptions   Medication Sig Dispense Refill    amLODIPine (NORVASC) 5 mg tablet Take 1 Tab by mouth daily. 30 Tab 1    aspirin 81 mg chewable tablet Take 1 Tab by mouth daily. 30 Tab 1    calcium carbonate (OS-NATALIE) 500 mg calcium (1,250 mg) tablet Take 1 Tab by mouth three (3) times daily (with meals). 90 Tab 0    carvedilol (COREG) 25 mg tablet Take 1 Tab by mouth two (2) times daily (with meals). Indications: hypertension 60 Tab 1    cloNIDine (CATAPRES) 0.2 mg/24 hr patch 1 Patch by TransDERmal route every seven (7) days.  4 Patch 1    clotrimazole-betamethasone (LOTRISONE) topical cream Apply to groin BID 60 g 0    simvastatin (ZOCOR) 20 mg tablet Take 1 Tab by mouth nightly. 30 Tab 1    vit B Cmplx 3-FA-Vit C-Biotin (NEPHRO FLETCHER RX) 1- mg-mg-mcg tablet Take 1 Tab by mouth daily. 30 Tab 1     No Known Allergies  Social History     Social History    Marital status: SINGLE     Spouse name: N/A    Number of children: N/A    Years of education: N/A     Occupational History    Not on file. Social History Main Topics    Smoking status: Former Smoker     Packs/day: 0.50     Years: 7.00     Types: Cigarettes    Smokeless tobacco: Never Used    Alcohol use Yes      Comment: every day beer 20ounces    Drug use: Yes     Special: Marijuana    Sexual activity: Yes     Partners: Female     Other Topics Concern    Not on file     Social History Narrative      Family History   Problem Relation Age of Onset    Cancer Mother        Review of Systems    Review of Systems   Constitutional: Negative for chills, diaphoresis, fever, malaise/fatigue and weight loss. HENT: Negative for hearing loss and sore throat. Eyes: Negative for blurred vision, photophobia and redness. Respiratory: Negative for cough, hemoptysis, shortness of breath and wheezing. Cardiovascular: Negative for chest pain, palpitations and orthopnea. Gastrointestinal: Negative for abdominal pain, blood in stool, constipation, diarrhea, heartburn, nausea and vomiting. Genitourinary: Negative for dysuria, frequency, hematuria and urgency. Musculoskeletal: Negative for back pain and myalgias. Skin: Negative for itching and rash. Neurological: Negative for dizziness, speech change, focal weakness, weakness and headaches. Endo/Heme/Allergies: Does not bruise/bleed easily. Psychiatric/Behavioral: Negative for depression and suicidal ideas.             Physical Exam:    Visit Vitals    /60 (BP 1 Location: Left arm, BP Patient Position: Sitting)    Pulse 70    Resp 16    Ht 5' 5\" (1.651 m)    Wt 128 lb (58.1 kg)    BMI 21.3 kg/m2      Physical Examination: General appearance - alert, well appearing, and in no distress  Mental status - alert, oriented to person, place, and time  Eyes - sclera anicteric, left eye normal, right eye normal  Ears - right ear normal, left ear normal  Nose - normal and patent, no erythema, discharge or polyps  Mouth - mucous membranes moist, pharynx normal without lesions  Neck - supple, no significant adenopathy  Lymphatics - no palpable lymphadenopathy  Chest - clear to auscultation, no wheezes, rales or rhonchi, symmetric air entry, TDC site without erythema or signs of infection. NTTP along tract of catheter  Heart - normal rate and regular rhythm  Abdomen - soft, nontender, nondistended, no masses or organomegaly  Extremities - peripheral pulses normal, no pedal edema, no clubbing or cyanosis      Impression and Plan:    ICD-10-CM ICD-9-CM    1. ESRD on hemodialysis ([facility] N18.6 585.6     Z99.2 V45.11      I told Mr. Iwona Solares that we are awaiting medical clearance for his access placement, but at this point we will try and see if anesthesia will allow us to proceed without it. This may result in a cancellation, but I believe Mr. Iwona Solares needs an access placed in the near future. We will get him schedule for the procedure and continue to work on clearance in the interim. The treatment plan was reviewed with the patient in detail. The patient voiced understanding of this plan and all questions and concerns were addressed. The patient agrees with this plan. We discussed the signs and symptoms that would require earlier attention or intervention. The patient was given educational material related to his/her visit and the patient has voiced understanding of the material.     I appreciate the opportunity to participate in the care of your patient. I will be sure to keep you informed of any subsequent changes in the treatment plan. If you have any questions or concerns, please feel free to contact me.   Sofi Mancini MD    PLEASE NOTE:  This document has been produced using voice recognition software. Unrecognized errors in transcription may be present.

## 2017-11-06 NOTE — ANESTHESIA PROCEDURE NOTES
Peripheral Block    Start time: 11/6/2017 7:01 AM  End time: 11/6/2017 7:07 AM  Performed by: Purnima Massed by: [place] Square:    Indications: at surgeon's request and procedure for pain    Preanesthetic Checklist: patient identified, risks and benefits discussed, site marked, timeout performed, anesthesia consent given and patient being monitored    Timeout Time: 07:01          Block Type:   Block Type:  Supraclavicular  Laterality:  Left  Monitoring:  Standard ASA monitoring, continuous pulse ox, frequent vital sign checks, heart rate, responsive to questions and oxygen  Injection Technique:  Single shot  Procedures: ultrasound guided    Patient Position: seated  Prep: chlorhexidine    Location:  Interscalene  Needle Type:  Stimuplex  Needle Gauge:  22 G  Needle Localization:  Anatomical landmarks, ultrasound guidance and nerve stimulator  Motor Response: minimal motor response >0.4 mA    Medication Injected:  0.5%  ropivacaine  Volume (mL):  15  Add'l Medication Injected:  2.0%  mepivacaine  Volume (mL):  10    Assessment:  Number of attempts:  1  Injection Assessment:  Incremental injection every 5 mL, local visualized surrounding nerve on ultrasound, negative aspiration for CSF, negative aspiration for blood, no paresthesia, no intravascular symptoms and ultrasound image on chart  Patient tolerance:  Patient tolerated the procedure well with no immediate complications

## 2017-11-06 NOTE — PERIOP NOTES
Pharmacy called - prescription $22 to fill - pt states \" I will pick it up tomorrow - is that after my insurance\" - confirmed with pharmacy - states does not have Insurance info on file - ask pt to provide - states \" I will pick it up tomorrow\"

## 2017-11-06 NOTE — PERIOP NOTES
Patient is very drowsy yet able to follow commands. Left arm in a sling from OR dressing dermabond clean dry and intact. Left hand is warm pulse ox reads 100% on the left hand strong left radial pulse and able to hear a bruit.

## 2017-11-06 NOTE — PERIOP NOTES
Pts cab ride is here - return call per Dr. Stewart January - pt states \" will take Tylenol at home \"

## 2017-11-06 NOTE — PERIOP NOTES
AVS med list reviewed and verified - no duplicates with meds -common med side effects handout to pt - discharge instructions completed - opportunity for questions - verbalizes understanding about block in left arm

## 2017-11-06 NOTE — OP NOTES
[address] [place] [address]  OPERATIVE REPORT    Name:  Gerhard Cranker  MR#:  [id number]  :  1965  Account #:  [de-identified]  Date of Adm:  2017  Date of Surgery:  2017      PREOPERATIVE DIAGNOSIS: End-stage renal disease on  hemodialysis. POSTOPERATIVE DIAGNOSIS: End-stage renal disease on  hemodialysis. PROCEDURES PERFORMED: Left forearm loop arteriovenous graft. SURGEON: Fei Mcdonald MD    ESTIMATED BLOOD LOSS: 50mL    SPECIMENS REMOVED: None    ANESTHESIA: Regional with monitored anesthesia care. PACKS AND DRAINS: None. IMPLANTS: A 4-7 mm tapered Propaten graft. COMPLICATIONS: None. CONDITION: To recovery stable. FINDINGS: A faint palpable thrill throughout the graft with palpable  radial pulse at completion of procedure. INDICATIONS FOR PROCEDURE: The patient is a 55-year-old  gentleman with end-stage renal disease on dialysis via right IJ  tunneled dialysis catheter. Vein mapping showed the patient's cephalic  vein was on the diminutive side and the patient had an antecubital vein  that may be usable for a loop forearm graft. Given these findings,  informed consent was obtained. DESCRIPTION OF PROCEDURE: On 2017, the patient  presented to the operating room, identified by name and ID bracelet by  myself and entire operative team. Once this was done, the patient was  placed on the operating table in supine position. After appropriate  depth of anesthesia obtained, the patient was prepped and draped and  time-out performed. At this point, an incision was made 2  fingerbreadths above the antecubital fossa and using blunt and sharp  dissection, we dissected out the radial artery and the antecubital vein. Once these were dissected free, we tunneled our graft with the 4 mm  end toward our radial artery and the 7 mm end towards our antecubital  vein by making a counter incision on the distal forearm.  Once this was  done, we then heparinized the patient appropriately and occluded the  radial artery. Made a longitudinal arteriotomy and made an end-to-side  anastomosis between the 4 mm end of the graft in the radial artery  using a CV-6 suture. Once this was completed, we flushed the artery  to the graft and return flow to the hand. We then occluded the  antecubital vein, made an end-to-side anastomosis between the 7 mm  end of the graft and the antecubital vein. Prior to finishing we flushed  the graft, we back bled the vein and then instilled heparinized saline,  we then completed the anastomosis. At completion, we had palpable  thrill within the graft. Happy with the results, we then irrigated out the  incision and closed with interrupted Vicryl, followed by running 4-0  Monocryl for the skin, Dermabond. At the end of the procedure, all  counts were correct x2. I was present and scrubbed for the entire  procedure.         MD Nick Hoffmann / CINDY  D:  11/06/2017   10:39  T:  11/06/2017   18:48  Job #:  [id number]

## 2017-11-06 NOTE — INTERVAL H&P NOTE
H&P Update:  [facility] was seen and examined. History and physical has been reviewed. The patient has been examined.  There have been no significant clinical changes since the completion of the originally dated History and Physical.    Signed By: Yahaira Quan MD     November 6, 2017 7:11 AM

## 2017-11-22 ENCOUNTER — OFFICE VISIT (OUTPATIENT)
Dept: VASCULAR SURGERY | Age: 52
End: 2017-11-22

## 2017-11-22 VITALS
WEIGHT: 135 LBS | DIASTOLIC BLOOD PRESSURE: 70 MMHG | SYSTOLIC BLOOD PRESSURE: 130 MMHG | HEART RATE: 74 BPM | HEIGHT: 65 IN | RESPIRATION RATE: 18 BRPM | BODY MASS INDEX: 22.49 KG/M2

## 2017-11-22 DIAGNOSIS — N18.6 ESRD ON HEMODIALYSIS (HCC): Primary | ICD-10-CM

## 2017-11-22 DIAGNOSIS — Z99.2 ESRD ON HEMODIALYSIS (HCC): Primary | ICD-10-CM

## 2017-11-22 NOTE — PROGRESS NOTES
Melanie    Chief Complaint   Patient presents with    End Stage Renal Disease       History and Physical    Mr. Ravinder Dunn presents for follow up evaluation of his left forearm loop arteriovenous graft placed on 11/6. He reports dry skin and some occasional mild tingling in his left hand and fingers but denies any other complaints. Dialysis is still using his [facility] without problems until they receive permission to use his graft for dialysis. Mr. Ravinder Dunn is currently waiting for an issue with his Medicaid to be fixed so he can see a PCP and he would like a referral to a PCP in the area. Past Medical History:   Diagnosis Date    Chronic kidney disease     ESRD- Dialysis- [facility] Hypertension 07/2017    Non compliance w medication regimen     noncompliant with HTN meds     Patient Active Problem List   Diagnosis Code    Routine general medical examination at a health care facility Z00.00    Unspecified essential hypertension I10    Tobacco abuse Z72.0    PAM (acute kidney injury) ([address] N17.9    Hypertensive urgency, malignant I16.0    Uremia N19    Severe anemia D64.9    Nonadherence to medical treatment Z91.19    Rash of genital area R21    Pleural effusion, right J90    Severe protein-calorie malnutrition ([address] E43    ESRD on hemodialysis ([facility] N18.6, Z99.2     Past Surgical History:   Procedure Laterality Date    HX UROLOGICAL  07/2017    cath on right chest for dialysis     Current Outpatient Prescriptions   Medication Sig Dispense Refill    oxyCODONE-acetaminophen (PERCOCET) 5-325 mg per tablet Take 2 Tabs by mouth every six (6) hours as needed for Pain. Max Daily Amount: 8 Tabs. 42 Tab 0    CLONIDINE HCL PO Take 2.5 mg by mouth daily. Indications: HTN      amLODIPine (NORVASC) 5 mg tablet Take 1 Tab by mouth daily. 30 Tab 1    aspirin 81 mg chewable tablet Take 1 Tab by mouth daily.  30 Tab 1    calcium carbonate (OS-NATALIE) 500 mg calcium (1,250 mg) tablet Take 1 Tab by mouth three (3) times daily (with meals). 90 Tab 0    carvedilol (COREG) 25 mg tablet Take 1 Tab by mouth two (2) times daily (with meals). Indications: hypertension 60 Tab 1    simvastatin (ZOCOR) 20 mg tablet Take 1 Tab by mouth nightly. 30 Tab 1    vit B Cmplx 3-FA-Vit C-Biotin (NEPHRO FLETCHER RX) 1- mg-mg-mcg tablet Take 1 Tab by mouth daily. 30 Tab 1     No Known Allergies  Social History     Social History    Marital status: SINGLE     Spouse name: N/A    Number of children: N/A    Years of education: N/A     Occupational History    Not on file. Social History Main Topics    Smoking status: Former Smoker     Packs/day: 0.50     Years: 7.00     Types: Cigarettes     Quit date: 7/26/2017    Smokeless tobacco: Never Used    Alcohol use No      Comment: stopped 7-2017    Drug use: Yes     Special: Marijuana      Comment: 7-2017    Sexual activity: Yes     Partners: Female     Other Topics Concern    Not on file     Social History Narrative      Family History   Problem Relation Age of Onset    Cancer Mother        Review of Systems    Review of Systems   Constitutional: Negative for chills, fever, malaise/fatigue and weight loss. HENT: Negative for ear pain and hearing loss. Eyes: Negative for blurred vision, pain and discharge. Respiratory: Negative for cough, hemoptysis and sputum production. Cardiovascular: Negative for chest pain, palpitations and orthopnea. Gastrointestinal: Negative for heartburn, nausea and vomiting. Genitourinary: Negative for dysuria and urgency. Neurological: Negative for dizziness, tingling, weakness and headaches. Endo/Heme/Allergies: Does not bruise/bleed easily. Psychiatric/Behavioral: Negative for depression.             Physical Exam:    Visit Vitals    /70 (BP 1 Location: Right arm, BP Patient Position: Sitting)    Pulse 74    Resp 18    Ht 5' 5\" (1.651 m)    Wt 135 lb (61.2 kg)    BMI 22.47 kg/m2      Physical Examination: General appearance - alert, well appearing, and in no distress  Mental status - alert, oriented to person, place, and time, normal mood, behavior, speech, dress, motor activity, and thought processes  Eyes - left eye normal, right eye normal  Mouth - mucous membranes moist  Chest - no wheezes  Heart - regular rhythm, increased rate  Musculoskeletal - no muscular tenderness noted  Extremities - peripheral pulses normal, no clubbing or cyanosis, left forearm graft with palpable thrill  Skin - skin is dry, normal coloration and turgor, no rashes, no suspicious skin lesions noted, surgical incisions on left forearm are well-healed, small amount of glue remains on surgical incisions     Impression and Plan:    ICD-10-CM ICD-9-CM    1. ESRD on hemodialysis ([facility] N18.6 585.6 REFERRAL TO FAMILY PRACTICE    Z99.2 V45.11      Orders Placed This Encounter    REFERRAL TO FAMILY PRACTICE     Mr. Cassius Flores' graft looks good, the glue should fall off by itself in the next week or so. It is okay to apply lotion to the forearm around the incisions. As requested we are referring Mr. Cassius Flores to family practice to manage his medical issues once his Medicaid issues are resolved. We will re-check the graft in one week and see if it will be ready to use on dialysis. Follow-up Disposition:  Return in about 1 week (around 11/29/2017). The treatment plan was reviewed with the patient in detail. The patient voiced understanding of this plan and all questions and concerns were addressed. The patient agrees with this plan. We discussed the signs and symptoms that would require earlier attention or intervention. The patient was given educational material related to his/her visit and the patient has voiced understanding of the material.     I appreciate the opportunity to participate in the care of your patient. I will be sure to keep you informed of any subsequent changes in the treatment plan.   If you have any questions or concerns, please feel free to contact me. Krystin Drew NP    PLEASE NOTE:  This document has been produced using voice recognition software. Unrecognized errors in transcription may be present.

## 2017-11-27 ENCOUNTER — HOSPITAL ENCOUNTER (EMERGENCY)
Age: 52
Discharge: HOME | End: 2017-11-27
Attending: EMERGENCY MEDICINE
Payer: MEDICAID

## 2017-11-27 ENCOUNTER — APPOINTMENT (OUTPATIENT)
Dept: CT IMAGING | Age: 52
End: 2017-11-27
Attending: EMERGENCY MEDICINE
Payer: MEDICAID

## 2017-11-27 VITALS
OXYGEN SATURATION: 100 % | HEIGHT: 65 IN | RESPIRATION RATE: 17 BRPM | SYSTOLIC BLOOD PRESSURE: 168 MMHG | BODY MASS INDEX: 21.3 KG/M2 | DIASTOLIC BLOOD PRESSURE: 86 MMHG | HEART RATE: 82 BPM | WEIGHT: 127.87 LBS | TEMPERATURE: 98.6 F

## 2017-11-27 DIAGNOSIS — R00.2 PALPITATIONS: Primary | ICD-10-CM

## 2017-11-27 DIAGNOSIS — R55 POSTURAL DIZZINESS WITH PRESYNCOPE: ICD-10-CM

## 2017-11-27 DIAGNOSIS — J20.9 ACUTE BRONCHITIS, UNSPECIFIED ORGANISM: ICD-10-CM

## 2017-11-27 DIAGNOSIS — R42 POSTURAL DIZZINESS WITH PRESYNCOPE: ICD-10-CM

## 2017-11-27 DIAGNOSIS — R04.2 HEMOPTYSIS: ICD-10-CM

## 2017-11-27 LAB
ALBUMIN SERPL-MCNC: 3.7 G/DL (ref 3.4–5)
ALBUMIN/GLOB SERPL: 0.8 {RATIO} (ref 0.8–1.7)
ALP SERPL-CCNC: 88 U/L (ref 45–117)
ALT SERPL-CCNC: 20 U/L (ref 16–61)
ANION GAP SERPL CALC-SCNC: 13 MMOL/L (ref 3–18)
AST SERPL-CCNC: 15 U/L (ref 15–37)
BASOPHILS # BLD: 0.1 K/UL (ref 0–0.06)
BASOPHILS NFR BLD: 0 % (ref 0–2)
BILIRUB SERPL-MCNC: 0.5 MG/DL (ref 0.2–1)
BNP SERPL-MCNC: ABNORMAL PG/ML (ref 0–900)
BUN SERPL-MCNC: 29 MG/DL (ref 7–18)
BUN/CREAT SERPL: 3 (ref 12–20)
CALCIUM SERPL-MCNC: 9.2 MG/DL (ref 8.5–10.1)
CHLORIDE SERPL-SCNC: 96 MMOL/L (ref 100–108)
CO2 SERPL-SCNC: 31 MMOL/L (ref 21–32)
CREAT SERPL-MCNC: 8.32 MG/DL (ref 0.6–1.3)
DIFFERENTIAL METHOD BLD: ABNORMAL
EOSINOPHIL # BLD: 0.2 K/UL (ref 0–0.4)
EOSINOPHIL NFR BLD: 2 % (ref 0–5)
ERYTHROCYTE [DISTWIDTH] IN BLOOD BY AUTOMATED COUNT: 13.4 % (ref 11.6–14.5)
GLOBULIN SER CALC-MCNC: 4.9 G/DL (ref 2–4)
GLUCOSE SERPL-MCNC: 85 MG/DL (ref 74–99)
HCT VFR BLD AUTO: 32.6 % (ref 36–48)
HGB BLD-MCNC: 10.9 G/DL (ref 13–16)
INR PPP: 1 (ref 0.8–1.2)
LYMPHOCYTES # BLD: 2.2 K/UL (ref 0.9–3.6)
LYMPHOCYTES NFR BLD: 16 % (ref 21–52)
MAGNESIUM SERPL-MCNC: 2 MG/DL (ref 1.6–2.6)
MCH RBC QN AUTO: 31.4 PG (ref 24–34)
MCHC RBC AUTO-ENTMCNC: 33.4 G/DL (ref 31–37)
MCV RBC AUTO: 93.9 FL (ref 74–97)
MONOCYTES # BLD: 1.5 K/UL (ref 0.05–1.2)
MONOCYTES NFR BLD: 11 % (ref 3–10)
NEUTS SEG # BLD: 9.8 K/UL (ref 1.8–8)
NEUTS SEG NFR BLD: 71 % (ref 40–73)
PLATELET # BLD AUTO: 370 K/UL (ref 135–420)
PMV BLD AUTO: 10.3 FL (ref 9.2–11.8)
POTASSIUM SERPL-SCNC: 3.4 MMOL/L (ref 3.5–5.5)
PROT SERPL-MCNC: 8.6 G/DL (ref 6.4–8.2)
PROTHROMBIN TIME: 12.2 SEC (ref 11.5–15.2)
RBC # BLD AUTO: 3.47 M/UL (ref 4.7–5.5)
SODIUM SERPL-SCNC: 140 MMOL/L (ref 136–145)
TROPONIN I SERPL-MCNC: 0.05 NG/ML (ref 0–0.06)
WBC # BLD AUTO: 13.7 K/UL (ref 4.6–13.2)

## 2017-11-27 PROCEDURE — 80053 COMPREHEN METABOLIC PANEL: CPT | Performed by: EMERGENCY MEDICINE

## 2017-11-27 PROCEDURE — 84484 ASSAY OF TROPONIN QUANT: CPT | Performed by: EMERGENCY MEDICINE

## 2017-11-27 PROCEDURE — 85025 COMPLETE CBC W/AUTO DIFF WBC: CPT | Performed by: EMERGENCY MEDICINE

## 2017-11-27 PROCEDURE — 99284 EMERGENCY DEPT VISIT MOD MDM: CPT

## 2017-11-27 PROCEDURE — 74011636320 HC RX REV CODE- 636/320: Performed by: EMERGENCY MEDICINE

## 2017-11-27 PROCEDURE — 83880 ASSAY OF NATRIURETIC PEPTIDE: CPT | Performed by: EMERGENCY MEDICINE

## 2017-11-27 PROCEDURE — 71275 CT ANGIOGRAPHY CHEST: CPT

## 2017-11-27 PROCEDURE — 93005 ELECTROCARDIOGRAM TRACING: CPT

## 2017-11-27 PROCEDURE — 83735 ASSAY OF MAGNESIUM: CPT | Performed by: EMERGENCY MEDICINE

## 2017-11-27 PROCEDURE — 85610 PROTHROMBIN TIME: CPT | Performed by: EMERGENCY MEDICINE

## 2017-11-27 RX ORDER — AMLODIPINE BESYLATE 5 MG/1
5 TABLET ORAL DAILY
Qty: 30 TAB | Refills: 0 | Status: SHIPPED | OUTPATIENT
Start: 2017-11-27 | End: 2017-12-27

## 2017-11-27 RX ORDER — CARVEDILOL 25 MG/1
25 TABLET ORAL 2 TIMES DAILY WITH MEALS
Qty: 60 TAB | Refills: 0 | Status: SHIPPED | OUTPATIENT
Start: 2017-11-27 | End: 2019-05-24

## 2017-11-27 RX ORDER — AZITHROMYCIN 250 MG/1
TABLET, FILM COATED ORAL
Qty: 6 TAB | Refills: 0 | Status: SHIPPED | OUTPATIENT
Start: 2017-11-27 | End: 2017-12-02

## 2017-11-27 RX ADMIN — IOPAMIDOL 80 ML: 755 INJECTION, SOLUTION INTRAVENOUS at 15:48

## 2017-11-27 NOTE — ED TRIAGE NOTES
C/o dizziness, heart beating fast, pt out of his b/p medications 2 weeks ago, MWF dialysis pt, states he had dialysis today. Denies chest pain, states he can't afford his medications. Sepsis Screening completed    (  )Patient meets SIRS criteria. (x  )Patient does not meet SIRS criteria.       SIRS Criteria is achieved when two or more of the following are present   Temperature < 96.8°F (36°C) or > 100.9°F (38.3°C)   Heart Rate > 90 beats per minute   Respiratory Rate > 20 breaths per minute   WBC count > 12,000 or <4,000 or > 10% bands

## 2017-11-27 NOTE — DISCHARGE INSTRUCTIONS
Bronchitis: Care Instructions  Your Care Instructions    Bronchitis is inflammation of the bronchial tubes, which carry air to the lungs. The tubes swell and produce mucus, or phlegm. The mucus and inflamed bronchial tubes make you cough. You may have trouble breathing. Most cases of bronchitis are caused by viruses like those that cause colds. Antibiotics usually do not help and they may be harmful. Bronchitis usually develops rapidly and lasts about 2 to 3 weeks in otherwise healthy people. Follow-up care is a key part of your treatment and safety. Be sure to make and go to all appointments, and call your doctor if you are having problems. It's also a good idea to know your test results and keep a list of the medicines you take. How can you care for yourself at home? · Take all medicines exactly as prescribed. Call your doctor if you think you are having a problem with your medicine. · Get some extra rest.  · Take an over-the-counter pain medicine, such as acetaminophen (Tylenol), ibuprofen (Advil, Motrin), or naproxen (Aleve) to reduce fever and relieve body aches. Read and follow all instructions on the label. · Do not take two or more pain medicines at the same time unless the doctor told you to. Many pain medicines have acetaminophen, which is Tylenol. Too much acetaminophen (Tylenol) can be harmful. · Take an over-the-counter cough medicine that contains dextromethorphan to help quiet a dry, hacking cough so that you can sleep. Avoid cough medicines that have more than one active ingredient. Read and follow all instructions on the label. · Breathe moist air from a humidifier, hot shower, or sink filled with hot water. The heat and moisture will thin mucus so you can cough it out. · Do not smoke. Smoking can make bronchitis worse. If you need help quitting, talk to your doctor about stop-smoking programs and medicines. These can increase your chances of quitting for good.   When should you call for help? Call 911 anytime you think you may need emergency care. For example, call if:  ? · You have severe trouble breathing. ?Call your doctor now or seek immediate medical care if:  ? · You have new or worse trouble breathing. ? · You cough up dark brown or bloody mucus (sputum). ? · You have a new or higher fever. ? · You have a new rash. ? Watch closely for changes in your health, and be sure to contact your doctor if:  ? · You cough more deeply or more often, especially if you notice more mucus or a change in the color of your mucus. ? · You are not getting better as expected. Where can you learn more? Go to http://jacinto-mike.info/. Enter H333 in the search box to learn more about \"Bronchitis: Care Instructions. \"  Current as of: May 12, 2017  Content Version: 11.4  © 4894-6719 [employer]. Care instructions adapted under license by [employer] (which disclaims liability or warranty for this information). If you have questions about a medical condition or this instruction, always ask your healthcare professional. [place] [address] any warranty or liability for your use of this information. Cardiac Arrhythmia: Care Instructions  Your Care Instructions    A cardiac arrhythmia is a change in the normal rhythm of the heart. Your heart may beat too fast or too slow or beat with an irregular or skipping rhythm. A change in the heart's rhythm may feel like a really strong heartbeat or a fluttering in your chest. A severe heart rhythm problem can keep the body from getting the blood it needs. This can result in shortness of breath, lightheadedness, and fainting. You may take medicine to treat your condition. Your doctor may recommend a pacemaker or recommend catheter ablation to destroy small parts of the heart that are causing a rhythm problem. Another possible treatment is an implantable cardioverter-defibrillator (ICD).  An ICD is a device that gives the heart a shock to return the heart to a normal rhythm. Follow-up care is a key part of your treatment and safety. Be sure to make and go to all appointments, and call your doctor if you are having problems. It's also a good idea to know your test results and keep a list of the medicines you take. How can you care for yourself at home? ?General care  ? · Be safe with medicines. Take your medicines exactly as prescribed. Call your doctor if you think you are having a problem with your medicine. You will get more details on the specific medicines your doctor prescribes. ? · If you received a pacemaker or an ICD, you will get a fact sheet about it. ? · Wear medical alert jewelry that says you have an abnormal heart rhythm. You can buy this at most drugstores. ? Lifestyle changes  ? · Eat a heart-healthy diet. ? · Stay at a healthy weight. Lose weight if you need to. ? · Avoid nicotine, too much alcohol, and illegal drugs (meth, speed, and cocaine). Also, get enough sleep and do not overeat. ? · Ask your doctor whether you can take over-the-counter medicines (such as decongestants). These can make your heart beat fast.   ? · Talk to your doctor about any limits to activities, such as driving, or tasks where you use power tools or ladders. Activity  ? · Start light exercise if your doctor says you can. Even a small amount will help you get stronger, have more energy, and manage your stress. ? · Get regular exercise. Try for 30 minutes on most days of the week. Ask your doctor what level of exercise is safe for you. If activity is not likely to cause health problems, you probably do not have limits on the type or level of activity that you can do. You may want to walk, swim, bike, or do other activities. ? · When you exercise, watch for signs that your heart is working too hard. You are pushing too hard if you cannot talk while you exercise.  If you become short of breath or dizzy or have chest pain, sit down and rest.   ? · Check your pulse daily. Place two fingers on the artery at the palm side of your wrist, in line with your thumb. If your heartbeat seems uneven, talk to your doctor. When should you call for help? Call 911 anytime you think you may need emergency care. For example, call if:  ? · You have symptoms of sudden heart failure. These may include:  ¨ Severe trouble breathing. ¨ A fast or irregular heartbeat. ¨ Coughing up pink, foamy mucus. ¨ You passed out. ? · You have signs of a stroke. These include:  ¨ Sudden numbness, paralysis, or weakness in your face, arm, or leg, especially on only one side of your body. ¨ New problems with walking or balance. ¨ Sudden vision changes. ¨ Drooling or slurred speech. ¨ New problems speaking or understanding simple statements, or feeling confused. ¨ A sudden, severe headache that is different from past headaches. ?Call your doctor now or seek immediate medical care if:  ? · You have new or changed symptoms of heart failure, such as:  ¨ New or increased shortness of breath. ¨ New or worse swelling in your legs, ankles, or feet. ¨ Sudden weight gain, such as more than 2 to 3 pounds in a day or 5 pounds in a week. (Your doctor may suggest a different range of weight gain.)  ¨ Feeling dizzy or lightheaded or like you may faint. ¨ Feeling so tired or weak that you cannot do your usual activities. ¨ Not sleeping well. Shortness of breath wakes you at night. You need extra pillows to prop yourself up to breathe easier. ? Watch closely for changes in your health, and be sure to contact your doctor if:  ? · You do not get better as expected. Where can you learn more? Go to http://jacinto-mike.info/. Enter D836 in the search box to learn more about \"Cardiac Arrhythmia: Care Instructions. \"  Current as of: September 21, 2016  Content Version: 11.4  © 1821-8541 [employer].  Care instructions adapted under license by [employer] (which disclaims liability or warranty for this information). If you have questions about a medical condition or this instruction, always ask your healthcare professional. [address] any warranty or liability for your use of this information. Dizziness: Care Instructions  Your Care Instructions  Dizziness is the feeling of unsteadiness or fuzziness in your head. It is different than having vertigo, which is a feeling that the room is spinning or that you are moving or falling. It is also different from lightheadedness, which is the feeling that you are about to faint. It can be hard to know what causes dizziness. Some people feel dizzy when they have migraine headaches. Sometimes bouts of flu can make you feel dizzy. Some medical conditions, such as heart problems or high blood pressure, can make you feel dizzy. Many medicines can cause dizziness, including medicines for high blood pressure, pain, or anxiety. If a medicine causes your symptoms, your doctor may recommend that you stop or change the medicine. If it is a problem with your heart, you may need medicine to help your heart work better. If there is no clear reason for your symptoms, your doctor may suggest watching and waiting for a while to see if the dizziness goes away on its own. Follow-up care is a key part of your treatment and safety. Be sure to make and go to all appointments, and call your doctor if you are having problems. It's also a good idea to know your test results and keep a list of the medicines you take. How can you care for yourself at home? · If your doctor recommends or prescribes medicine, take it exactly as directed. Call your doctor if you think you are having a problem with your medicine. · Do not drive while you feel dizzy. · Try to prevent falls.  Steps you can take include:  ¨ Using nonskid mats, adding grab bars near the tub, and using night-lights. ¨ Clearing your home so that walkways are free of anything you might trip on. ¨ Letting family and friends know that you have been feeling dizzy. This will help them know how to help you. When should you call for help? Call 911 anytime you think you may need emergency care. For example, call if:  ? · You passed out (lost consciousness). ? · You have dizziness along with symptoms of a heart attack. These may include:  ¨ Chest pain or pressure, or a strange feeling in the chest.  ¨ Sweating. ¨ Shortness of breath. ¨ Nausea or vomiting. ¨ Pain, pressure, or a strange feeling in the back, neck, jaw, or upper belly or in one or both shoulders or arms. ¨ Lightheadedness or sudden weakness. ¨ A fast or irregular heartbeat. ? · You have symptoms of a stroke. These may include:  ¨ Sudden numbness, tingling, weakness, or loss of movement in your face, arm, or leg, especially on only one side of your body. ¨ Sudden vision changes. ¨ Sudden trouble speaking. ¨ Sudden confusion or trouble understanding simple statements. ¨ Sudden problems with walking or balance. ¨ A sudden, severe headache that is different from past headaches. ?Call your doctor now or seek immediate medical care if:  ? · You feel dizzy and have a fever, headache, or ringing in your ears. ? · You have new or increased nausea and vomiting. ? · Your dizziness does not go away or comes back. ? Watch closely for changes in your health, and be sure to contact your doctor if:  ? · You do not get better as expected. Where can you learn more? Go to http://jacinto-mike.info/. Enter B704 in the search box to learn more about \"Dizziness: Care Instructions. \"  Current as of: March 20, 2017  Content Version: 11.4  © 3570-5221 [employer]. Care instructions adapted under license by [employer] (which disclaims liability or warranty for this information).  If you have questions about a medical condition or this instruction, always ask your healthcare professional. [address] any warranty or liability for your use of this information. Fainting: Care Instructions  Your Care Instructions    When you faint, or pass out, you lose consciousness for a short time. A brief drop in blood flow to the brain often causes it. When you fall or lie down, more blood flows to your brain and you regain consciousness. Emotional stress, pain, or overheating-especially if you have been standing-can make you faint. In these cases, fainting is usually not serious. But fainting can be a sign of a more serious problem. Your doctor may want you to have more tests to rule out other causes. The treatment you need depends on the reason why you fainted. The doctor has checked you carefully, but problems can develop later. If you notice any problems or new symptoms, get medical treatment right away. Follow-up care is a key part of your treatment and safety. Be sure to make and go to all appointments, and call your doctor if you are having problems. It's also a good idea to know your test results and keep a list of the medicines you take. How can you care for yourself at home? · Drink plenty of fluids to prevent dehydration. If you have kidney, heart, or liver disease and have to limit fluids, talk with your doctor before you increase your fluid intake. When should you call for help? Call 911 anytime you think you may need emergency care. For example, call if:  ? · You have symptoms of a heart problem. These may include:  ¨ Chest pain or pressure. ¨ Severe trouble breathing. ¨ A fast or irregular heartbeat. ¨ Lightheadedness or sudden weakness. ¨ Coughing up pink, foamy mucus. ¨ Passing out. After you call 911, the  may tell you to chew 1 adult-strength or 2 to 4 low-dose aspirin. Wait for an ambulance. Do not try to drive yourself. ? · You have symptoms of a stroke.  These may include:  ¨ Sudden numbness, tingling, weakness, or loss of movement in your face, arm, or leg, especially on only one side of your body. ¨ Sudden vision changes. ¨ Sudden trouble speaking. ¨ Sudden confusion or trouble understanding simple statements. ¨ Sudden problems with walking or balance. ¨ A sudden, severe headache that is different from past headaches. ? · You passed out (lost consciousness) again. ? Watch closely for changes in your health, and be sure to contact your doctor if:  ? · You do not get better as expected. Where can you learn more? Go to http://jacinto-mike.info/. Enter B637 in the search box to learn more about \"Fainting: Care Instructions. \"  Current as of: March 20, 2017  Content Version: 11.4  © 6366-5598 [employer]. Care instructions adapted under license by [employer] (which disclaims liability or warranty for this information). If you have questions about a medical condition or this instruction, always ask your healthcare professional. [place] [address] any warranty or liability for your use of this information. Coughing Up Blood: Care Instructions  Your Care Instructions    Coughing up blood can be frightening. The blood may come from the lungs, stomach, or throat. You may cough up a few thin streaks of bright red blood. This probably is not a cause for concern. Coughing up large amounts of bright red blood or rust-colored mucus from the lungs can be a symptom of a more serious condition. Several conditions can make you cough up blood from the lungs. These include bronchitis and pneumonia, or more serious problems such as cancer or a blood clot in the lung (pulmonary embolus). Depending on what is causing your cough, it may go away after the illness is treated. Your doctor may tell you not to suppress the cough with cough medicine if it is better for you to cough up the blood and spit it out.   Follow-up care is a key part of your treatment and safety. Be sure to make and go to all appointments, and call your doctor if you are having problems. It's also a good idea to know your test results and keep a list of the medicines you take. How can you care for yourself at home? · Make a note of when and for how long you cough up blood. Also note if you are coughing up spit with a small amount of blood, or mostly blood. Take this information to your next appointment with your doctor. · Increase your fluid intake to at least 8 to 10 glasses of water every day. This helps keep the mucus thin and helps you cough it up. If you have kidney, heart, or liver disease and have to limit fluids, talk with your doctor before you increase your fluid intake. · If your doctor prescribed antibiotics, take them as directed. Do not stop taking them just because you feel better. You need to take the full course of antibiotics. · Do not take cough medicine without your doctor's guidance. They can cause problems if you have other health problems. They can also interact with other medicine. · Do not smoke or use other forms of tobacco, especially while you have a cough. Smoking can make coughing worse. If you need help quitting, talk to your doctor about stop-smoking programs and medicines. These can increase your chances of quitting for good. · Avoid exposure to smoke, dust, or other pollutants. When should you call for help? Call 911 anytime you think you may need emergency care. For example, call if:  ? · You have sudden chest pain and shortness of breath. ? · You have severe trouble breathing. ?Call your doctor now or seek immediate medical care if:  ? · You have wheezing and difficulty breathing. ? · You are dizzy or lightheaded, or you feel like you may faint. ? · You cough up clots of blood. ? Watch closely for changes in your health, and be sure to contact your doctor if:  ? · You do not get better as expected.    ? · You have any new symptoms, such as chest pain with difficulty breathing or a fever. Where can you learn more? Go to http://jacinto-mike.info/. Enter M550 in the search box to learn more about \"Coughing Up Blood: Care Instructions. \"  Current as of: March 20, 2017  Content Version: 11.4  © 0140-9095 [employer]'. Care instructions adapted under license by [employer] (which disclaims liability or warranty for this information). If you have questions about a medical condition or this instruction, always ask your healthcare professional. [place] [address] any warranty or liability for your use of this information. Palpitations: Care Instructions  Your Care Instructions    Heart palpitations are the uncomfortable sensation that your heart is beating fast or irregularly. You might feel pounding or fluttering in your chest. It might feel like your heart is skipping a beat. Although palpitations may be caused by a heart problem, they also occur because of stress, fatigue, or use of alcohol, caffeine, or nicotine. Many medicines, including diet pills, antihistamines, decongestants, and some herbal products, can cause heart palpitations. Nearly everyone has palpitations from time to time. Depending on your symptoms, your doctor may need to do more tests to try to find the cause of your palpitations. Follow-up care is a key part of your treatment and safety. Be sure to make and go to all appointments, and call your doctor if you are having problems. It's also a good idea to know your test results and keep a list of the medicines you take. How can you care for yourself at home? · Avoid caffeine, nicotine, and excess alcohol. · Do not take illegal drugs, such as methamphetamines and cocaine. · Do not take weight loss or diet medicines unless you talk with your doctor first.  · Get plenty of sleep. · Do not overeat.   · If you have palpitations again, take deep breaths and try to relax. This may slow a racing heart. · If you start to feel lightheaded, lie down to avoid injuries that might result if you pass out and fall down. · Keep a record of your palpitations and bring it to your next doctor's appointment. Write down:  ¨ The date and time. ¨ Your pulse. (If your heart is beating fast, it may be hard to count your pulse.)  ¨ What you were doing when the palpitations started. ¨ How long the palpitations lasted. ¨ Any other symptoms. · If an activity causes palpitations, slow down or stop. Talk to your doctor before you do that activity again. · Take your medicines exactly as prescribed. Call your doctor if you think you are having a problem with your medicine. When should you call for help? Call 911 anytime you think you may need emergency care. For example, call if:  ? · You passed out (lost consciousness). ? · You have symptoms of a heart attack. These may include:  ¨ Chest pain or pressure, or a strange feeling in the chest.  ¨ Sweating. ¨ Shortness of breath. ¨ Pain, pressure, or a strange feeling in the back, neck, jaw, or upper belly or in one or both shoulders or arms. ¨ Lightheadedness or sudden weakness. ¨ A fast or irregular heartbeat. After you call 911, the  may tell you to chew 1 adult-strength or 2 to 4 low-dose aspirin. Wait for an ambulance. Do not try to drive yourself. ? · You have symptoms of a stroke. These may include:  ¨ Sudden numbness, tingling, weakness, or loss of movement in your face, arm, or leg, especially on only one side of your body. ¨ Sudden vision changes. ¨ Sudden trouble speaking. ¨ Sudden confusion or trouble understanding simple statements. ¨ Sudden problems with walking or balance. ¨ A sudden, severe headache that is different from past headaches.    ?Call your doctor now or seek immediate medical care if:  ? · You have heart palpitations and:  ¨ Are dizzy or lightheaded, or you feel like you may faint. ¨ Have new or increased shortness of breath. ? Watch closely for changes in your health, and be sure to contact your doctor if:  ? · You continue to have heart palpitations. Where can you learn more? Go to http://jacinto-mike.info/. Enter R508 in the search box to learn more about \"Palpitations: Care Instructions. \"  Current as of: September 21, 2016  Content Version: 11.4  © 9216-8877 [employer]. Care instructions adapted under license by [employer] (which disclaims liability or warranty for this information). If you have questions about a medical condition or this instruction, always ask your healthcare professional. [place] [address] any warranty or liability for your use of this information.

## 2017-11-27 NOTE — ED PROVIDER NOTES
EMERGENCY DEPARTMENT HISTORY AND PHYSICAL EXAM    Date: 11/27/2017  Patient Name: Severo Fermo    History of Presenting Illness     Chief Complaint   Patient presents with    Hypertension         History Provided By: Patient    Chief Complaint: palpitations  Duration: 2 Days  Timing:  Acute  Location: chest  Quality: N/A  Severity: Mild  Modifying Factors: Not taking medication worsens sxs. Associated Symptoms: congestion, hemoptysis, chills    Additional History (Context):   12:24 PM   Severo Fermo is a 47 y.o. male with PMHX non compliance with HTN medication, HTN, and ESRD (MWF dialysis with last dialysis today) who presents with to the emergency department C/O palpitations and elevated blood pressure onset 2 days ago. Pt reporting sx of difficulty swallowing, cough with hemoptysis onset 2 days ago, congestion onset 2 days ago, and chills. Pt currently out of blood pressure medications and reports he is unable to afford medication. Pt sometimes makes urine. Pt not expecting to be off of dialysis. Pt is a former smoker and a non EtOH user. NKDA. Pt reports a recent surgery on right arm on 11/6/2017 with Dr. Siva Eduardo. PSHx of left arm dialysis shunt and cath in right chest for dialysis. Pt denies chest pain, SOB, fever, leg swelling,and any other sxs or complaints. PCP: None    Current Outpatient Prescriptions   Medication Sig Dispense Refill    azithromycin (ZITHROMAX Z-CALI) 250 mg tablet 2 tablets on Day 1, and then 1 tablet on Day 2-5 6 Tab 0    carvedilol (COREG) 25 mg tablet Take 1 Tab by mouth two (2) times daily (with meals). 60 Tab 0    amLODIPine (NORVASC) 5 mg tablet Take 1 Tab by mouth daily for 30 days. 30 Tab 0    oxyCODONE-acetaminophen (PERCOCET) 5-325 mg per tablet Take 2 Tabs by mouth every six (6) hours as needed for Pain. Max Daily Amount: 8 Tabs. 42 Tab 0    CLONIDINE HCL PO Take 2.5 mg by mouth daily.  Indications: HTN      aspirin 81 mg chewable tablet Take 1 Tab by mouth daily. 30 Tab 1    calcium carbonate (OS-NATALIE) 500 mg calcium (1,250 mg) tablet Take 1 Tab by mouth three (3) times daily (with meals). 90 Tab 0    simvastatin (ZOCOR) 20 mg tablet Take 1 Tab by mouth nightly. 30 Tab 1    vit B Cmplx 3-FA-Vit C-Biotin (NEPHRO FLETCHER RX) 1- mg-mg-mcg tablet Take 1 Tab by mouth daily. 30 Tab 1       Past History     Past Medical History:  Past Medical History:   Diagnosis Date    Chronic kidney disease     ESRD- Dialysis- [facility] Hypertension 07/2017    Non compliance w medication regimen     noncompliant with HTN meds       Past Surgical History:  Past Surgical History:   Procedure Laterality Date    HX OTHER SURGICAL      left arm dialysis shunt    HX UROLOGICAL  07/2017    cath on right chest for dialysis       Family History:  Family History   Problem Relation Age of Onset    Cancer Mother        Social History:  Social History   Substance Use Topics    Smoking status: Former Smoker     Packs/day: 0.50     Years: 7.00     Types: Cigarettes     Quit date: 7/26/2017    Smokeless tobacco: Never Used    Alcohol use No      Comment: stopped 7-2017       Allergies:  No Known Allergies      Review of Systems   Review of Systems   Constitutional: Positive for chills. Negative for fever. HENT: Positive for congestion and trouble swallowing. Respiratory: Positive for cough (hemoptysis). Negative for shortness of breath. Cardiovascular: Positive for palpitations. Negative for chest pain. All other systems reviewed and are negative. Physical Exam     Vitals:    11/27/17 1500 11/27/17 1515 11/27/17 1530 11/27/17 1615   BP: 175/83 178/86 170/88 168/86   Pulse: 84 83 93 82   Resp: 12 8 15 17   Temp:       SpO2: 100% 100% 100% 100%   Weight:       Height:            Physical Exam   Nursing note and vitals reviewed.     Vital signs and nursing notes reviewed    CONSTITUTIONAL: Alert, in no apparent distress; well-developed; well-nourished. HEAD:  Normocephalic, atraumatic  EYES: PERRL; EOM's intact. ENTM: Nose: no rhinorrhea; Throat: no erythema or exudate, mucous membranes moist  Neck:  No JVD, supple without lymphadenopathy. Right sided subclavian catheter for dialysis. RESP: Chest clear, equal breath sounds. CV: S1 and S2 WNL; No murmurs, gallops or rubs. GI: Normal bowel sounds, abdomen soft and non-tender. No masses or organomegaly. : No costo-vertebral angle tenderness. BACK:  Non-tender  UPPER EXT:   Incision clean and dry to left forearm with good thrill to the graft. LOWER EXT: No edema, no calf tenderness. Distal pulses intact. NEURO: CN intact, reflexes 2/4 and sym, strength 5/5 and sym, sensation intact. SKIN: No rashes; Normal for age and stage. PSYCH:  Alert and oriented, normal affect.          Diagnostic Study Results     Labs -     Recent Results (from the past 12 hour(s))   EKG, 12 LEAD, INITIAL    Collection Time: 11/27/17 12:17 PM   Result Value Ref Range    Ventricular Rate 97 BPM    Atrial Rate 97 BPM    P-R Interval 144 ms    QRS Duration 96 ms    Q-T Interval 384 ms    QTC Calculation (Bezet) 487 ms    Calculated P Axis 59 degrees    Calculated R Axis 47 degrees    Calculated T Axis 45 degrees    Diagnosis       Normal sinus rhythm  Left atrial enlargement  Left ventricular hypertrophy  Prolonged QT  Abnormal ECG  When compared with ECG of 08-AUG-2017 03:57,  No significant change was found     CBC WITH AUTOMATED DIFF    Collection Time: 11/27/17  1:40 PM   Result Value Ref Range    WBC 13.7 (H) 4.6 - 13.2 K/uL    RBC 3.47 (L) 4.70 - 5.50 M/uL    HGB 10.9 (L) 13.0 - 16.0 g/dL    HCT 32.6 (L) 36.0 - 48.0 %    MCV 93.9 74.0 - 97.0 FL    MCH 31.4 24.0 - 34.0 PG    MCHC 33.4 31.0 - 37.0 g/dL    RDW 13.4 11.6 - 14.5 %    PLATELET 168 847 - 682 K/uL    MPV 10.3 9.2 - 11.8 FL    NEUTROPHILS 71 40 - 73 %    LYMPHOCYTES 16 (L) 21 - 52 %    MONOCYTES 11 (H) 3 - 10 %    EOSINOPHILS 2 0 - 5 %    BASOPHILS 0 0 - 2 %    ABS. NEUTROPHILS 9.8 (H) 1.8 - 8.0 K/UL    ABS. LYMPHOCYTES 2.2 0.9 - 3.6 K/UL    ABS. MONOCYTES 1.5 (H) 0.05 - 1.2 K/UL    ABS. EOSINOPHILS 0.2 0.0 - 0.4 K/UL    ABS. BASOPHILS 0.1 (H) 0.0 - 0.06 K/UL    DF AUTOMATED     PROTHROMBIN TIME + INR    Collection Time: 11/27/17  1:40 PM   Result Value Ref Range    Prothrombin time 12.2 11.5 - 15.2 sec    INR 1.0 0.8 - 1.2     METABOLIC PANEL, COMPREHENSIVE    Collection Time: 11/27/17  1:40 PM   Result Value Ref Range    Sodium 140 136 - 145 mmol/L    Potassium 3.4 (L) 3.5 - 5.5 mmol/L    Chloride 96 (L) 100 - 108 mmol/L    CO2 31 21 - 32 mmol/L    Anion gap 13 3.0 - 18 mmol/L    Glucose 85 74 - 99 mg/dL    BUN 29 (H) 7.0 - 18 MG/DL    Creatinine 8.32 (H) 0.6 - 1.3 MG/DL    BUN/Creatinine ratio 3 (L) 12 - 20      GFR est AA 8 (L) >60 ml/min/1.73m2    GFR est non-AA 7 (L) >60 ml/min/1.73m2    Calcium 9.2 8.5 - 10.1 MG/DL    Bilirubin, total 0.5 0.2 - 1.0 MG/DL    ALT (SGPT) 20 16 - 61 U/L    AST (SGOT) 15 15 - 37 U/L    Alk. phosphatase 88 45 - 117 U/L    Protein, total 8.6 (H) 6.4 - 8.2 g/dL    Albumin 3.7 3.4 - 5.0 g/dL    Globulin 4.9 (H) 2.0 - 4.0 g/dL    A-G Ratio 0.8 0.8 - 1.7     MAGNESIUM    Collection Time: 11/27/17  1:40 PM   Result Value Ref Range    Magnesium 2.0 1.6 - 2.6 mg/dL   NT-PRO BNP    Collection Time: 11/27/17  1:40 PM   Result Value Ref Range    NT pro-BNP 69675 (H) 0 - 900 PG/ML   TROPONIN I    Collection Time: 11/27/17  1:40 PM   Result Value Ref Range    Troponin-I, Qt. 0.05 0.00 - 0.06 NG/ML       Radiologic Studies -   CTA CHEST W OR W WO CONT   Final Result   IMPRESSION:       No CT evidence of pulmonary thromboembolism. As read by the radiologist.      Bethany Srinivasan Results  (Last 48 hours)               11/27/17 1601  CTA CHEST W OR W WO CONT Final result    Impression:  IMPRESSION:       No CT evidence of pulmonary thromboembolism.                    Narrative:  CTA CHEST       Indication: Post dialysis hemoptysis, shortness of breath Comparison: None. Technique: Axial imaging was obtained dynamically through the pulmonary arteries   using high-resolution CT angiographic protocol, without complications. In   addition, coronal and sagittal reconstructed MIP arteriograms were performed, to   better evaluate the pulmonary vasculature, and to increase sensitivity for   detection of pulmonary emboli. One or more dose reduction techniques were used on this CT: automated exposure   control, adjustment of the mAs and/or kVp according to patient's size, and   iterative reconstruction techniques. The specific techniques utilized on this CT   exam have been documented in the patient's electronic medical record. CT angiogram quality parameters:   1. Overall exam quality - satisfactory: adequate    2. Adequacy of pulmonary enhancement-adequate: Sufficient enhancement. Main PA   density 190-250 HU    3. Adequacy of breath-hold-optimal: No respiratory artifact     4. There are no  significant noise, beam hardening, streak  artifacts affecting   scan quality.       ============       PULMONARY ARTERIES: The pulmonary arteries are opacified completely, without   evidence of filling defects to suggest pulmonary thromboembolism. AORTA:  The aorta is normal in caliber; no aneurysm or dissection, allowing for   optimal contrast opacification directed to the pulmonary arteries and in the   setting of moderate cardiac motion artifact. LUNGS: Left and right basilar dependent atelectasis. No suspicious pulmonary   nodule, mass, or opacity. PLEURA: Normal, with no effusion or pneumothorax. AIRWAY: Unremarkable. MEDIASTINUM: Normal heart size. No pericardial effusion. LYMPH NODES: No mediastinal, hilar or axillary lymphadenopathy. UPPER ABDOMEN: No acute abnormality.        OTHER: No acute osseous abnormality.       ============               CXR Results  (Last 48 hours)    None            Medical Decision Making   I am the first provider for this patient. I reviewed the vital signs, available nursing notes, past medical history, past surgical history, family history and social history. Vital Signs-Reviewed the patient's vital signs. Pulse Oximetry Analysis - 99% on room air. Cardiac Monitor:  Rate: 97 bpm  Rhythm: NSR    EKG interpretation: (Preliminary)  NSR at 97 bpm. KS interval at 144 ms. QRS duration at 96 ms. Prolonged QT. Negative ischemia and negative STEMI. EKG read by Brien Guevara MD at 12:17 PM     Records Reviewed: Nursing Notes    Provider Notes (Medical Decision Making): CHF, PE, Influenza like illness, PNA, electrolyte abnormalities, dehydration, anemia. Procedures:  Procedures    ED Course:   12:24 PM Initial assessment performed. The patients presenting problems have been discussed, and they are in agreement with the care plan formulated and outlined with them. I have encouraged them to ask questions as they arise throughout their visit. 3:19 PM Spoke with Nallely Stephens from [facility] dialysis clinic. Will get a hold of [facility] to set up a chair for dialysis as well.    3:52 PM Tisha Bailey from dialysis called reporting that nephrologist states pt can get normal dialysis treatment on Wednesday 11/29/2017 instead of tomorrow after talking with nephrologist.    4:45 PM Pt states he is out of BP medication for 2 week. Will refill it today. Diagnosis and Disposition       Discharge Note:  4:45 PM  Enriuqe Mckee's  results have been reviewed with him. He has been counseled regarding his diagnosis, treatment, and plan. He verbally conveys understanding and agreement of the signs, symptoms, diagnosis, treatment and prognosis and additionally agrees to follow up as discussed. He also agrees with the care-plan and conveys that all of his questions have been answered.   I have also provided discharge instructions for him that include: educational information regarding their diagnosis and treatment, and list of reasons why they would want to return to the ED prior to their follow-up appointment, should his condition change. He has been provided with education for proper emergency department utilization. Clinical Impression:    1. Palpitations    2. Hemoptysis    3. Postural dizziness with presyncope    4. Acute bronchitis, unspecified organism        Plan:  1. D/C Home  2. Current Discharge Medication List      START taking these medications    Details   azithromycin (ZITHROMAX Z-CALI) 250 mg tablet 2 tablets on Day 1, and then 1 tablet on Day 2-5  Qty: 6 Tab, Refills: 0         CONTINUE these medications which have CHANGED    Details   carvedilol (COREG) 25 mg tablet Take 1 Tab by mouth two (2) times daily (with meals). Qty: 60 Tab, Refills: 0      amLODIPine (NORVASC) 5 mg tablet Take 1 Tab by mouth daily for 30 days. Qty: 30 Tab, Refills: 0           3. Follow-up Information     Follow up With Details Comments Contact Info    [facility] Schedule an appointment as soon as possible for a visit For follow up with [facility] [address] [place] [address], [address] [facility]  [phone]    YOUR NEPHROLOGIST Schedule an appointment as soon as possible for a visit For follow up with nephrology     [facility] EMERGENCY DEPT Go to As needed, if symptoms worsen [address] [id number]  [phone]        Neg CT PE. Neg trop. Well appearing. No pna. Will treat for atypical pna/bronchitis with hemoptysis. Well appearing. NO distress. No signs of volume overload. Follow-up with PCM. _______________________________    Attestations: This note is prepared by Izabella Hernandez, Clyde Mota, and Yolanda Stewart, acting as Scribe for Komal Vogt MD.    Komal Vogt MD:  The scribe's documentation has been prepared under my direction and personally reviewed by me in its entirety.   I confirm that the note above accurately reflects all work, treatment, procedures, and medical decision making performed by me.  _______________________________

## 2017-11-28 ENCOUNTER — OFFICE VISIT (OUTPATIENT)
Dept: VASCULAR SURGERY | Age: 52
End: 2017-11-28

## 2017-11-28 VITALS
DIASTOLIC BLOOD PRESSURE: 70 MMHG | RESPIRATION RATE: 18 BRPM | BODY MASS INDEX: 21.16 KG/M2 | HEART RATE: 80 BPM | HEIGHT: 65 IN | SYSTOLIC BLOOD PRESSURE: 148 MMHG | WEIGHT: 127 LBS

## 2017-11-28 DIAGNOSIS — N18.6 ESRD ON HEMODIALYSIS (HCC): Primary | ICD-10-CM

## 2017-11-28 DIAGNOSIS — Z99.2 ESRD ON HEMODIALYSIS (HCC): Primary | ICD-10-CM

## 2017-11-28 NOTE — PROGRESS NOTES
[address]    Chief Complaint   Patient presents with    End Stage Renal Disease       History and Physical    Mr. Vu Banks returns for follow up evaluation of his left forearm AV graft placed on 11/6. He has no new complaints. Past Medical History:   Diagnosis Date    Chronic kidney disease     ESRD- Dialysis- [facility] Tamra Fus Hypertension 07/2017    Non compliance w medication regimen     noncompliant with HTN meds     Patient Active Problem List   Diagnosis Code    Routine general medical examination at a health care facility Z00.00    Unspecified essential hypertension I10    Tobacco abuse Z72.0    PAM (acute kidney injury) ([address] N17.9    Hypertensive urgency, malignant I16.0    Uremia N19    Severe anemia D64.9    Nonadherence to medical treatment Z91.19    Rash of genital area R21    Pleural effusion, right J90    Severe protein-calorie malnutrition ([facility] E43    ESRD on hemodialysis ([facility] N18.6, Z99.2     Past Surgical History:   Procedure Laterality Date    HX OTHER SURGICAL      left arm dialysis shunt    HX UROLOGICAL  07/2017    cath on right chest for dialysis     Current Outpatient Prescriptions   Medication Sig Dispense Refill    azithromycin (ZITHROMAX Z-CALI) 250 mg tablet 2 tablets on Day 1, and then 1 tablet on Day 2-5 6 Tab 0    carvedilol (COREG) 25 mg tablet Take 1 Tab by mouth two (2) times daily (with meals). 60 Tab 0    amLODIPine (NORVASC) 5 mg tablet Take 1 Tab by mouth daily for 30 days. 30 Tab 0    oxyCODONE-acetaminophen (PERCOCET) 5-325 mg per tablet Take 2 Tabs by mouth every six (6) hours as needed for Pain. Max Daily Amount: 8 Tabs. 42 Tab 0    CLONIDINE HCL PO Take 2.5 mg by mouth daily. Indications: HTN      aspirin 81 mg chewable tablet Take 1 Tab by mouth daily. 30 Tab 1    calcium carbonate (OS-NATALIE) 500 mg calcium (1,250 mg) tablet Take 1 Tab by mouth three (3) times daily (with meals).  90 Tab 0    simvastatin (ZOCOR) 20 mg tablet Take 1 Tab by mouth nightly. 30 Tab 1    vit B Cmplx 3-FA-Vit C-Biotin (NEPHRO FLETCHER RX) 1- mg-mg-mcg tablet Take 1 Tab by mouth daily. 30 Tab 1     No Known Allergies  Social History     Social History    Marital status: SINGLE     Spouse name: N/A    Number of children: N/A    Years of education: N/A     Occupational History    Not on file. Social History Main Topics    Smoking status: Former Smoker     Packs/day: 0.50     Years: 7.00     Types: Cigarettes     Quit date: 7/26/2017    Smokeless tobacco: Never Used    Alcohol use No      Comment: stopped 7-2017    Drug use: Yes     Special: Marijuana      Comment: 7-2017    Sexual activity: Yes     Partners: Female     Other Topics Concern    Not on file     Social History Narrative      Family History   Problem Relation Age of Onset    Cancer Mother        Review of Systems    Review of Systems   Constitutional: Negative for chills, fever, malaise/fatigue and weight loss. HENT: Negative for ear pain and hearing loss. Eyes: Negative for blurred vision, pain and discharge. Respiratory: Negative for cough, hemoptysis and sputum production. Cardiovascular: Negative for chest pain, palpitations and orthopnea. Gastrointestinal: Negative for heartburn, nausea and vomiting. Genitourinary: Negative for dysuria and urgency. Skin: Negative for itching and rash. Neurological: Negative for dizziness, weakness and headaches. Endo/Heme/Allergies: Does not bruise/bleed easily. Psychiatric/Behavioral: Negative for depression.        Physical Exam:    Visit Vitals    /70 (BP 1 Location: Right arm, BP Patient Position: Sitting)    Pulse 80    Resp 18    Ht 5' 5\" (1.651 m)    Wt 127 lb (57.6 kg)    BMI 21.13 kg/m2      Physical Examination: General appearance - alert, well appearing, and in no distress  Mental status - alert, oriented to person, place, and time, normal mood, behavior, speech, dress, motor activity, and thought processes  Eyes - left eye normal, right eye normal  Ears - right ear normal, left ear normal  Mouth - mucous membranes moist  Chest- no wheezes, TDC right chest  Heart- regular rate and rhythm  Extremities- upper extremity radial pulses palpable, left forearm AV graft with palpable thrill, brisk capillary refill  Skin- dry, warm, intact, left forearm incisions healing well    Impression and Plan:    ICD-10-CM ICD-9-CM    1. ESRD on hemodialysis (HCC) N18.6 585.6     Z99.2 V45.11      No orders of the defined types were placed in this encounter. Mr. Giuliano Hart AV graft has healed nicely and is ready to be used. We will send a prescription to his dialysis center. We will see him in the office in two weeks for [facility] removal if there are no issues with use of the graft. Follow-up Disposition:  Return in about 2 weeks (around 12/12/2017) for [facility] removal.      The treatment plan was reviewed with the patient in detail. The patient voiced understanding of this plan and all questions and concerns were addressed. The patient agrees with this plan. We discussed the signs and symptoms that would require earlier attention or intervention. The patient was given educational material related to his/her visit and the patient has voiced understanding of the material.     I appreciate the opportunity to participate in the care of your patient. I will be sure to keep you informed of any subsequent changes in the treatment plan. If you have any questions or concerns, please feel free to contact me. José Miguel Ceja MD    PLEASE NOTE:  This document has been produced using voice recognition software. Unrecognized errors in transcription may be present.

## 2017-12-01 LAB
ATRIAL RATE: 97 BPM
CALCULATED P AXIS, ECG09: 59 DEGREES
CALCULATED R AXIS, ECG10: 47 DEGREES
CALCULATED T AXIS, ECG11: 45 DEGREES
DIAGNOSIS, 93000: NORMAL
P-R INTERVAL, ECG05: 144 MS
Q-T INTERVAL, ECG07: 384 MS
QRS DURATION, ECG06: 96 MS
QTC CALCULATION (BEZET), ECG08: 487 MS
VENTRICULAR RATE, ECG03: 97 BPM

## 2017-12-12 ENCOUNTER — OFFICE VISIT (OUTPATIENT)
Dept: VASCULAR SURGERY | Age: 52
End: 2017-12-12

## 2017-12-12 VITALS
BODY MASS INDEX: 21.16 KG/M2 | HEIGHT: 65 IN | HEART RATE: 80 BPM | RESPIRATION RATE: 18 BRPM | SYSTOLIC BLOOD PRESSURE: 132 MMHG | WEIGHT: 127 LBS | DIASTOLIC BLOOD PRESSURE: 70 MMHG

## 2017-12-12 DIAGNOSIS — Z99.2 ESRD ON HEMODIALYSIS (HCC): Primary | ICD-10-CM

## 2017-12-12 DIAGNOSIS — N18.6 ESRD ON HEMODIALYSIS (HCC): Primary | ICD-10-CM

## 2017-12-12 NOTE — PROGRESS NOTES
History of Present Illness: Mr. Dulce Maria Munguia is here today for a follow-up on his end stage renal disease. Procedure Note: the skin was cleansed with Betadine solution. The anchoring sutures were removed. Then, approximately 7 cc of 1% Lidocaine were injected around the catheter site. Under blunt dissection, the cuff of the catheter was freed from the skin edges. The catheter was removed in it's entirety with the cuff intact. Direct pressure was applied for approximately 10 minutes followed by pressure dressing. He tolerated the procedure well. Assessment/Plan:  He was given follow-up care instructions and told to call with any problems, questions or concerns.     Buzz Dotson MD

## 2017-12-12 NOTE — MR AVS SNAPSHOT
Visit Information Date & Time Provider Department Dept. Phone Encounter #  
 12/12/2017  1:30 PM Enedelia Mcdaniels MD [facility] [address] [id number] Upcoming Health Maintenance Date Due Pneumococcal 19-64 Highest Risk (1 of 3 - PCV13) 9/15/1985 DTaP/Tdap/Td series (1 - Tdap) 9/15/1987 FOBT Q 1 YEAR AGE 50-75 9/15/2016 Influenza Age 5 to Adult 8/1/2017 Allergies as of 12/12/2017  Review Complete On: 12/12/2017 By: Enedelia Mcdaniels MD  
 No Known Allergies Current Immunizations  Never Reviewed No immunizations on file. Not reviewed this visit You Were Diagnosed With   
  
 Codes Comments ESRD [facility] hemodialysis ([facility]    -  Primary ICD-10-CM: N18.6, Z99.2 ICD-9-CM: 585.6, V45.11 Vitals BP Pulse Resp Height(growth percentile) Weight(growth percentile) BMI  
 132/70 (BP 1 Location: Right arm, BP Patient Position: Sitting) 80 18 5' 5\" (1.651 m) 127 lb (57.6 kg) 21.13 kg/m2 Smoking Status Former Smoker BMI and BSA Data Body Mass Index Body Surface Area  
 21.13 kg/m 2 1.63 m 2 Preferred Pharmacy [id number] [address] [phone] Your Updated Medication List  
  
   
This list is accurate as of: 12/12/17  2:19 PM.  Always use your most recent med list. amLODIPine 5 mg tablet Commonly known as:  Emmy Nguyen Take 1 Tab by mouth daily for 30 days. aspirin 81 mg chewable tablet Take 1 Tab by mouth daily. calcium carbonate 500 mg calcium (1,250 mg) tablet Commonly known as:  OS-NATALIE Take 1 Tab by mouth three (3) times daily (with meals). carvedilol 25 mg tablet Commonly known as:  Aroldo Roque Take 1 Tab by mouth two (2) times daily (with meals). CLONIDINE HCL PO Take 2.5 mg by mouth daily. Indications: HTN  
  
 oxyCODONE-acetaminophen 5-325 mg per tablet Commonly known as:  PERCOCET Take 2 Tabs by mouth every six (6) hours as needed for Pain. Max Daily Amount: 8 Tabs. simvastatin 20 mg tablet Commonly known as:  ZOCOR Take 1 Tab by mouth nightly. vit B Cmplx 3-FA-Vit C-Biotin 1- mg-mg-mcg tablet Commonly known as:  NEPHRO FLETCHER RX Take 1 Tab by mouth daily. We Performed the Following GA REMOVAL TUNNELED CV CATH W/O SC PUMP [02190 CPT(R)] Patient Instructions Post Catheter Removal Instructions 1. Remove bandage the following day. 2.  You are allowed to shower the following day after your procedure 3. Keep the area clean and covered until scabbed. 4.  If bleeding occurs, please call the office. 5.  Use an ice pack for discomfort. 6.  No heavy lifting or straining for 24 hours after removal. 
7.  If on blood thinner - you can resume taking your medication in 24 hours Please provide this summary of care documentation to your next provider. Your primary care clinician is listed as NONE. If you have any questions after today's visit, please call [phone].

## 2017-12-24 ENCOUNTER — APPOINTMENT (OUTPATIENT)
Dept: INTERVENTIONAL RADIOLOGY/VASCULAR | Age: 52
End: 2017-12-24
Attending: SURGERY
Payer: MEDICAID

## 2017-12-24 ENCOUNTER — HOSPITAL ENCOUNTER (OUTPATIENT)
Age: 52
Setting detail: OBSERVATION
Discharge: HOME | End: 2017-12-24
Attending: INTERNAL MEDICINE | Admitting: SURGERY
Payer: MEDICAID

## 2017-12-24 VITALS
OXYGEN SATURATION: 100 % | HEIGHT: 65 IN | WEIGHT: 127.87 LBS | RESPIRATION RATE: 14 BRPM | HEART RATE: 80 BPM | BODY MASS INDEX: 21.3 KG/M2 | TEMPERATURE: 98.6 F | DIASTOLIC BLOOD PRESSURE: 72 MMHG | SYSTOLIC BLOOD PRESSURE: 133 MMHG

## 2017-12-24 DIAGNOSIS — T82.898A ARTERIOVENOUS FISTULA OCCLUSION, INITIAL ENCOUNTER (HCC): Primary | ICD-10-CM

## 2017-12-24 PROBLEM — N18.9 CRF (CHRONIC RENAL FAILURE): Status: ACTIVE | Noted: 2017-12-24

## 2017-12-24 LAB
ANION GAP SERPL CALC-SCNC: 11 MMOL/L (ref 3–18)
APTT PPP: 29.9 SEC (ref 23–36.4)
BASOPHILS # BLD: 0.1 K/UL (ref 0–0.06)
BASOPHILS NFR BLD: 1 % (ref 0–2)
BUN SERPL-MCNC: 57 MG/DL (ref 7–18)
BUN/CREAT SERPL: 4 (ref 12–20)
CALCIUM SERPL-MCNC: 9.1 MG/DL (ref 8.5–10.1)
CHLORIDE SERPL-SCNC: 101 MMOL/L (ref 100–108)
CO2 SERPL-SCNC: 32 MMOL/L (ref 21–32)
CREAT SERPL-MCNC: 14.29 MG/DL (ref 0.6–1.3)
DIFFERENTIAL METHOD BLD: ABNORMAL
EOSINOPHIL # BLD: 0.2 K/UL (ref 0–0.4)
EOSINOPHIL NFR BLD: 2 % (ref 0–5)
ERYTHROCYTE [DISTWIDTH] IN BLOOD BY AUTOMATED COUNT: 13.7 % (ref 11.6–14.5)
GLUCOSE SERPL-MCNC: 80 MG/DL (ref 74–99)
HCT VFR BLD AUTO: 32.2 % (ref 36–48)
HGB BLD-MCNC: 10.1 G/DL (ref 13–16)
INR PPP: 1.1 (ref 0.8–1.2)
LYMPHOCYTES # BLD: 2.6 K/UL (ref 0.9–3.6)
LYMPHOCYTES NFR BLD: 28 % (ref 21–52)
MAGNESIUM SERPL-MCNC: 2.3 MG/DL (ref 1.6–2.6)
MCH RBC QN AUTO: 31.3 PG (ref 24–34)
MCHC RBC AUTO-ENTMCNC: 31.4 G/DL (ref 31–37)
MCV RBC AUTO: 99.7 FL (ref 74–97)
MONOCYTES # BLD: 0.5 K/UL (ref 0.05–1.2)
MONOCYTES NFR BLD: 6 % (ref 3–10)
NEUTS SEG # BLD: 5.9 K/UL (ref 1.8–8)
NEUTS SEG NFR BLD: 63 % (ref 40–73)
PLATELET # BLD AUTO: 325 K/UL (ref 135–420)
PMV BLD AUTO: 10.2 FL (ref 9.2–11.8)
POTASSIUM SERPL-SCNC: 4.5 MMOL/L (ref 3.5–5.5)
PROTHROMBIN TIME: 13.2 SEC (ref 11.5–15.2)
RBC # BLD AUTO: 3.23 M/UL (ref 4.7–5.5)
SODIUM SERPL-SCNC: 144 MMOL/L (ref 136–145)
WBC # BLD AUTO: 9.3 K/UL (ref 4.6–13.2)

## 2017-12-24 PROCEDURE — 83735 ASSAY OF MAGNESIUM: CPT | Performed by: INTERNAL MEDICINE

## 2017-12-24 PROCEDURE — 74011000250 HC RX REV CODE- 250: Performed by: SURGERY

## 2017-12-24 PROCEDURE — 85730 THROMBOPLASTIN TIME PARTIAL: CPT | Performed by: INTERNAL MEDICINE

## 2017-12-24 PROCEDURE — 99218 HC RM OBSERVATION: CPT

## 2017-12-24 PROCEDURE — 99283 EMERGENCY DEPT VISIT LOW MDM: CPT

## 2017-12-24 PROCEDURE — 74011250636 HC RX REV CODE- 250/636: Performed by: SURGERY

## 2017-12-24 PROCEDURE — 74011250637 HC RX REV CODE- 250/637: Performed by: SURGERY

## 2017-12-24 PROCEDURE — 76937 US GUIDE VASCULAR ACCESS: CPT

## 2017-12-24 PROCEDURE — 80048 BASIC METABOLIC PNL TOTAL CA: CPT | Performed by: INTERNAL MEDICINE

## 2017-12-24 PROCEDURE — 85610 PROTHROMBIN TIME: CPT | Performed by: INTERNAL MEDICINE

## 2017-12-24 PROCEDURE — 74011250636 HC RX REV CODE- 250/636: Performed by: INTERNAL MEDICINE

## 2017-12-24 PROCEDURE — 87340 HEPATITIS B SURFACE AG IA: CPT | Performed by: INTERNAL MEDICINE

## 2017-12-24 PROCEDURE — 99152 MOD SED SAME PHYS/QHP 5/>YRS: CPT

## 2017-12-24 PROCEDURE — 85025 COMPLETE CBC W/AUTO DIFF WBC: CPT | Performed by: INTERNAL MEDICINE

## 2017-12-24 RX ORDER — NALOXONE HYDROCHLORIDE 0.4 MG/ML
0.2 INJECTION, SOLUTION INTRAMUSCULAR; INTRAVENOUS; SUBCUTANEOUS AS NEEDED
Status: DISCONTINUED | OUTPATIENT
Start: 2017-12-24 | End: 2017-12-24 | Stop reason: HOSPADM

## 2017-12-24 RX ORDER — FENTANYL CITRATE 50 UG/ML
25-200 INJECTION, SOLUTION INTRAMUSCULAR; INTRAVENOUS
Status: DISCONTINUED | OUTPATIENT
Start: 2017-12-24 | End: 2017-12-24 | Stop reason: HOSPADM

## 2017-12-24 RX ORDER — HEPARIN SODIUM 1000 [USP'U]/ML
5000 INJECTION, SOLUTION INTRAVENOUS; SUBCUTANEOUS ONCE
Status: COMPLETED | OUTPATIENT
Start: 2017-12-24 | End: 2017-12-24

## 2017-12-24 RX ORDER — GUAIFENESIN 100 MG/5ML
81 LIQUID (ML) ORAL DAILY
Status: DISCONTINUED | OUTPATIENT
Start: 2017-12-25 | End: 2017-12-24 | Stop reason: HOSPADM

## 2017-12-24 RX ORDER — LIDOCAINE HYDROCHLORIDE 10 MG/ML
1-20 INJECTION INFILTRATION; PERINEURAL
Status: COMPLETED | OUTPATIENT
Start: 2017-12-24 | End: 2017-12-24

## 2017-12-24 RX ORDER — SODIUM CHLORIDE 9 MG/ML
25 INJECTION, SOLUTION INTRAVENOUS CONTINUOUS
Status: DISCONTINUED | OUTPATIENT
Start: 2017-12-24 | End: 2017-12-24 | Stop reason: HOSPADM

## 2017-12-24 RX ORDER — HEPARIN SODIUM 200 [USP'U]/100ML
500 INJECTION, SOLUTION INTRAVENOUS
Status: COMPLETED | OUTPATIENT
Start: 2017-12-24 | End: 2017-12-24

## 2017-12-24 RX ORDER — CLONIDINE HYDROCHLORIDE 0.1 MG/1
0.1 TABLET ORAL ONCE
Status: COMPLETED | OUTPATIENT
Start: 2017-12-24 | End: 2017-12-24

## 2017-12-24 RX ORDER — MIDAZOLAM HYDROCHLORIDE 1 MG/ML
.5-4 INJECTION, SOLUTION INTRAMUSCULAR; INTRAVENOUS
Status: DISCONTINUED | OUTPATIENT
Start: 2017-12-24 | End: 2017-12-24

## 2017-12-24 RX ORDER — HEPARIN SODIUM (PORCINE) LOCK FLUSH IV SOLN 100 UNIT/ML 100 UNIT/ML
500 SOLUTION INTRAVENOUS
Status: DISCONTINUED | OUTPATIENT
Start: 2017-12-24 | End: 2017-12-24

## 2017-12-24 RX ORDER — AMLODIPINE BESYLATE 5 MG/1
5 TABLET ORAL DAILY
Status: DISCONTINUED | OUTPATIENT
Start: 2017-12-25 | End: 2017-12-24 | Stop reason: HOSPADM

## 2017-12-24 RX ORDER — HEPARIN SODIUM 1000 [USP'U]/ML
10000 INJECTION, SOLUTION INTRAVENOUS; SUBCUTANEOUS
Status: COMPLETED | OUTPATIENT
Start: 2017-12-24 | End: 2017-12-24

## 2017-12-24 RX ORDER — CARVEDILOL 12.5 MG/1
25 TABLET ORAL 2 TIMES DAILY WITH MEALS
Status: DISCONTINUED | OUTPATIENT
Start: 2017-12-24 | End: 2017-12-24 | Stop reason: HOSPADM

## 2017-12-24 RX ORDER — SIMVASTATIN 20 MG/1
20 TABLET, FILM COATED ORAL
Status: DISCONTINUED | OUTPATIENT
Start: 2017-12-24 | End: 2017-12-24 | Stop reason: HOSPADM

## 2017-12-24 RX ORDER — FLUMAZENIL 0.1 MG/ML
0.2 INJECTION INTRAVENOUS
Status: DISCONTINUED | OUTPATIENT
Start: 2017-12-24 | End: 2017-12-24 | Stop reason: HOSPADM

## 2017-12-24 RX ADMIN — CLONIDINE HYDROCHLORIDE 0.1 MG: 0.1 TABLET ORAL at 18:34

## 2017-12-24 RX ADMIN — SODIUM CHLORIDE 25 ML/HR: 900 INJECTION, SOLUTION INTRAVENOUS at 11:20

## 2017-12-24 RX ADMIN — CARVEDILOL 25 MG: 12.5 TABLET, FILM COATED ORAL at 18:34

## 2017-12-24 RX ADMIN — ERYTHROPOIETIN 4000 UNITS: 4000 INJECTION, SOLUTION INTRAVENOUS; SUBCUTANEOUS at 16:52

## 2017-12-24 RX ADMIN — LIDOCAINE HYDROCHLORIDE 20 ML: 10 INJECTION, SOLUTION INFILTRATION; PERINEURAL at 11:32

## 2017-12-24 RX ADMIN — HEPARIN SODIUM 5000 UNITS: 1000 INJECTION, SOLUTION INTRAVENOUS; SUBCUTANEOUS at 18:34

## 2017-12-24 RX ADMIN — HEPARIN SODIUM 4000 UNITS: 1000 INJECTION, SOLUTION INTRAVENOUS; SUBCUTANEOUS at 11:46

## 2017-12-24 RX ADMIN — HEPARIN SODIUM IN SODIUM CHLORIDE 1000 UNITS: 200 INJECTION INTRAVENOUS at 11:20

## 2017-12-24 RX ADMIN — FENTANYL CITRATE 25 MCG: 50 INJECTION, SOLUTION INTRAMUSCULAR; INTRAVENOUS at 11:29

## 2017-12-24 NOTE — PROGRESS NOTES
TRANSFER - IN REPORT:    Verbal report received from [address] [place] [address] Extension RN(name) on [employer]  being received from Angio(unit) for ordered procedure      Report consisted of patients Situation, Background, Assessment and   Recommendations(SBAR). Information from the following report(s) SBAR, Kardex, MAR and Accordion was reviewed with the receiving nurse. Opportunity for questions and clarification was provided. Assessment completed upon patients arrival to unit and care assumed.

## 2017-12-24 NOTE — IP AVS SNAPSHOT
Nimesh Geiger 
 
 
 [address] [facility] [address] 
[phone] Patient: Amanda Amaya MRN: [id number] About your hospitalization You were admitted on:  December 24, 2017 You last received care in the:  [address] [place] [address] You were discharged on:  December 24, 2017 Why you were hospitalized Your primary diagnosis was:  Not on File Your diagnoses also included:  Crf (Chronic Renal Failure), Av Fistula Occlusion (Hcc) Things You Need To Do (next 8 weeks) Follow up with None Where:  None (395) Patient stated that they have no PCP Discharge Orders None A check ofelia indicates which time of day the medication should be taken. My Medications TAKE these medications as instructed Instructions Each Dose to Equal  
 Morning Noon Evening Bedtime  
 amLODIPine 5 mg tablet Commonly known as:  Tad Page Your last dose was: Your next dose is: Take 1 Tab by mouth daily for 30 days. 5 mg  
    
   
   
   
  
 aspirin 81 mg chewable tablet Your last dose was: Your next dose is: Take 1 Tab by mouth daily. 81 mg  
    
   
   
   
  
 calcium carbonate 500 mg calcium (1,250 mg) tablet Commonly known as:  OS-NATALIE Your last dose was: Your next dose is: Take 1 Tab by mouth three (3) times daily (with meals). 1 Tab  
    
   
   
   
  
 carvedilol 25 mg tablet Commonly known as:  Justine Ronny Your last dose was: Your next dose is: Take 1 Tab by mouth two (2) times daily (with meals). 25 mg CLONIDINE HCL PO Your last dose was: Your next dose is: Take 2.5 mg by mouth daily. Indications: HTN  
 2.5 mg  
    
   
   
   
  
 simvastatin 20 mg tablet Commonly known as:  ZOCOR Your last dose was: Your next dose is: Take 1 Tab by mouth nightly. 20 mg  
    
   
   
   
  
 vit B Cmplx 3-FA-Vit C-Biotin 1- mg-mg-mcg tablet Commonly known as:  NEPHRO FLETCHER RX Your last dose was: Your next dose is: Take 1 Tab by mouth daily. 1 Tab Discharge Instructions Hemodialysis Access: What to Expect at [facility] Your Recovery Hemodialysis is a way to remove wastes from the blood when your kidneys can no longer do the job. It is not a cure, but it can help you live longer and feel better. It is a lifesaving treatment when you have kidney failure. Hemodialysis is often called dialysis. Your doctor created a place (called an access) in your arm for your blood to flow in and out of your body during your dialysis sessions. Your arm will probably be bruised and swollen. It may hurt. The cut (incision) may bleed. The pain and bleeding will get better over several days. You will probably need only over-the-counter pain medicine. You can reduce swelling by propping your arm on 1 or 2 pillows and keeping your elbow straight. You will have stitches. These may dissolve on their own, or your doctor will tell you when to come in to have them removed. You should also be able to return to work in a few days. You may feel some coolness or numbness in your hand. These feelings usually go away in a few weeks. Your doctor may suggest squeezing a soft object. This will strengthen your access and may make hemodialysis faster and easier. You should always be able to feel blood rushing through the fistula or graft. It feels like a slight vibration when you put your fingers on the skin over the fistula or graft. This feeling is called a thrill or pulse. This care sheet gives you a general idea about how long it will take for you to recover. But each person recovers at a different pace. Follow the steps below to get better as quickly as possible. How can you care for yourself at home? Activity ? · Rest when you feel tired. Getting enough sleep will help you recover. Do not lie on or sleep on the arm with the access. ? · Avoid activities such as washing windows or gardening that put stress on the arm with the access. ? · You may use your arm, but do not lift anything that weighs more than about 15 pounds. This may include a child, heavy grocery bags, a heavy briefcase or backpack, cat litter or dog food bags, or a vacuum . ? · You can shower, but keep the access dry for the first 2 days. Cover the area with a plastic bag to keep it dry. ? · Do not soak or scrub the incision until it has healed. ? · Wear an arm guard to protect the area if you play sports or work with your arms. ? · You may drive when your doctor says it is okay. This is usually in 1 to 2 days. ? · Most people are able to return to work about 1 or 2 days after surgery. Diet ? · Follow an eating plan that is good for your kidneys. A registered dietitian can help you make a meal plan that is right for you. You may need to limit protein, salt, fluids, and certain foods. Medicines ? · Your doctor will tell you if and when you can restart your medicines. He or she will also give you instructions about taking any new medicines. ? · If you take blood thinners, such as warfarin (Coumadin), clopidogrel (Plavix), or aspirin, be sure to talk to your doctor. He or she will tell you if and when to start taking those medicines again. Make sure that you understand exactly what your doctor wants you to do. ? · Take pain medicines exactly as directed. ¨ If the doctor gave you a prescription medicine for pain, take it as prescribed. ¨ If you are not taking a prescription pain medicine, ask your doctor if you can take acetaminophen (Tylenol).  Do not take ibuprofen (Advil, Motrin) or naproxen (Aleve), or similar medicines, unless your doctor tells you to. They may make chronic kidney disease worse. ¨ Do not take two or more pain medicines at the same time unless the doctor told you to. Many pain medicines have acetaminophen, which is Tylenol. Too much acetaminophen (Tylenol) can be harmful. ? · If you think your pain medicine is making you sick to your stomach: 
¨ Take your medicine after meals (unless your doctor has told you not to). ¨ Ask your doctor for a different pain medicine. ? · If your doctor prescribed antibiotics, take them as directed. Do not stop taking them just because you feel better. You need to take the full course of antibiotics. Incision care ? · Keep the area dry for 2 days. After 2 days, wash the area with soap and water every day, and always before dialysis. ? · Do not soak or scrub the incision until it has healed. ? · If you have a bandage, change it every day or as your doctor recommends. Your doctor will tell you when you can remove it. Exercise ? · Squeeze a soft ball or other object as your doctor tells you. This will help blood flow through the access and help prevent blood clots. ? Elevation ? · Prop up the sore arm on a pillow anytime you sit or lie down during the next 3 days. Try to keep it above the level of your heart. This will help reduce swelling. Other instructions ? · Every day, check your access for a pulse or thrill in the fistula or graft area. A thrill is a vibration. To feel a pulse or thrill, place the first two fingers of your hand over the access. ? · Do not bump your arm. ? · Do not wear tight clothing, jewelry, or anything else that may squeeze the access. ? · Use your other arm to have blood drawn or blood pressure taken. ? · Do not put cream or lotion on or near the access. ? · Make sure all doctors you deal with know you have a vascular access. Follow-up care is a key part of your treatment and safety.  Be sure to make and go to all appointments, and call your doctor if you are having problems. It's also a good idea to know your test results and keep a list of the medicines you take. When should you call for help? Call 911 anytime you think you may need emergency care. For example, call if: 
? · You passed out (lost consciousness). ? · You have chest pain, are short of breath, or cough up blood. ?Call your doctor now or seek immediate medical care if: 
? · Your hand or arm is cold or dark-colored. ? · You have no pulse in your access. ? · You have nausea or you vomit. ? · You have pain that does not get better after you take pain medicine. ? · You have loose stitches, or your incision comes open. ? · You are bleeding from the incision. ? · You have signs of infection, such as: 
¨ Increased pain, swelling, warmth, or redness. ¨ Red streaks leading from the area. ¨ Pus draining from the area. ¨ A fever. ? · You have signs of a blood clot in your leg (called a deep vein thrombosis), such as: 
¨ Pain in your calf, back of the knee, thigh, or groin. ¨ Redness or swelling in your leg. ? Watch closely for changes in your health, and be sure to contact your doctor if you have any problems. Where can you learn more? Go to http://jacinto-mike.info/. Enter P616 in the search box to learn more about \"Hemodialysis Access: What to Expect at Home. \" Current as of: May 12, 2017 Content Version: 11.4 © 5825-1880 [employer]. Care instructions adapted under license by [employer] (which disclaims liability or warranty for this information). If you have questions about a medical condition or this instruction, always ask your healthcare professional. [place] [address] any warranty or liability for your use of this information. DISCHARGE SUMMARY from Nurse PATIENT INSTRUCTIONS: 
 
 
F-face looks uneven A-arms unable to move or move unevenly S-speech slurred or non-existent T-time-call 911 as soon as signs and symptoms begin-DO NOT go Back to bed or wait to see if you get better-TIME IS BRAIN. Warning Signs of HEART ATTACK Call 911 if you have these symptoms: 
? Chest discomfort. Most heart attacks involve discomfort in the center of the chest that lasts more than a few minutes, or that goes away and comes back. It can feel like uncomfortable pressure, squeezing, fullness, or pain. ? Discomfort in other areas of the upper body. Symptoms can include pain or discomfort in one or both arms, the back, neck, jaw, or stomach. ? Shortness of breath with or without chest discomfort. ? Other signs may include breaking out in a cold sweat, nausea, or lightheadedness. Don't wait more than five minutes to call [address]! Fast action can save your life. Calling 911 is almost always the fastest way to get lifesaving treatment. Emergency Medical Services staff can begin treatment when they arrive  up to an hour sooner than if someone gets to the hospital by car. The discharge information has been reviewed with the patient. The patient verbalized understanding. Discharge medications reviewed with the patient and appropriate educational materials and side effects teaching were provided. Patient armband removed and shredded 
 
___________________________________________________________________________________________________________________________________ Unresulted Labs-Please follow up with your PCP about these lab tests Order Current Status HEP B SURFACE AG In process IR INSERT TUNL CVC W/O PORT OVER 5 YR In process Providers Seen During Your Hospitalization Provider Specialty Primary office phone Gege Law MD Emergency Medicine [phone] Becky Saldana MD Vascular Surgery [phone] Your Primary Care Physician (PCP) Primary Care Physician Office Phone Office Fax NONE ** None ** ** None ** You are allergic to the following No active allergies Recent Documentation Height Weight BMI Smoking Status 1.651 m 58 kg 21.28 kg/m2 Former Smoker Emergency Contacts Name Discharge Info Relation Home Work Mobile Rahel Cruz DISCHARGE CAREGIVER [3] Friend [5] [phone] Patient Belongings The following personal items are in your possession at time of discharge: 
     Visual Aid: None Please provide this summary of care documentation to your next provider. Signatures-by signing, you are acknowledging that this After Visit Summary has been reviewed with you and you have received a copy. Patient Signature:  ____________________________________________________________ Date:  ____________________________________________________________  
  
[facility] Provider Signature:  ____________________________________________________________ Date:  ____________________________________________________________

## 2017-12-24 NOTE — PROGRESS NOTES
Nephrology Progress note    Subjective:     Cedrick Lamb is a 47 y.o. male   with a past medical history significant for HTN, ESRD maintained on HD at [address] [facility] schedule who was noted to have a thrombosed Left arm AVG earlier today and referred to the ED. Patient was seen by [facility] surgery and underwent placement of RIJ TDC to enable resumption of HD. He is scheduled for Thrombectomy on Tues 12/26. Seen on HD, tolerating procedure well. Denies CP/SOB. Admit Date: 12/24/2017  Active Problems:    CRF (chronic renal failure) (12/24/2017)      AV fistula occlusion (HCC) (12/24/2017)      Current Facility-Administered Medications   Medication Dose Route Frequency    0.9% sodium chloride infusion  25 mL/hr IntraVENous CONTINUOUS    fentaNYL citrate (PF) injection  mcg   mcg IntraVENous Rad Multiple    flumazenil (ROMAZICON) 0.1 mg/mL injection 0.2 mg  0.2 mg IntraVENous Rad Multiple    naloxone (NARCAN) injection 0.2 mg  0.2 mg IntraVENous PRN    [START ON 12/25/2017] amLODIPine (NORVASC) tablet 5 mg  5 mg Oral DAILY    [START ON 12/25/2017] aspirin chewable tablet 81 mg  81 mg Oral DAILY    carvedilol (COREG) tablet 25 mg  25 mg Oral BID WITH MEALS    . PHARMACY TO SUBSTITUTE PER PROTOCOL    Per Protocol    simvastatin (ZOCOR) tablet 20 mg  20 mg Oral QHS    heparin (porcine) 100 unit/mL injection 500 Units  500 Units InterCATHeter DIALYSIS ONCE         Allergy:   No Known Allergies     Objective:     Visit Vitals    /88 (BP 1 Location: Right arm)    Pulse 64    Temp 98.2 °F (36.8 °C)    Resp 12    Ht 5' 5\" (1.651 m)    Wt 58 kg (127 lb 13.9 oz)    SpO2 100%    BMI 21.28 kg/m2       No intake or output data in the 24 hours ending 12/24/17 1612    Physical Exam:       General: No acute distress   HENT: Atraumatic and normocephalic   Eyes: Normal conjunctiva   Neck: Supple    Cardiovascular: Normal S1 & S2, no m/r/g   Pulmonary/Chest Wall: Clear to auscultation bilaterally   Abdominal: Soft and non-tender   Musculoskeletal: No edema   Neurological: No focal deficits     Left arm AVG without bruit. No erythema or induration    [facility] TD in place    Data Review:  Lab Results   Component Value Date/Time    Sodium 144 12/24/2017 07:50 AM    Potassium 4.5 12/24/2017 07:50 AM    Chloride 101 12/24/2017 07:50 AM    CO2 32 12/24/2017 07:50 AM    Anion gap 11 12/24/2017 07:50 AM    Glucose 80 12/24/2017 07:50 AM    BUN 57 12/24/2017 07:50 AM    Creatinine 14.29 12/24/2017 07:50 AM    BUN/Creatinine ratio 4 12/24/2017 07:50 AM    GFR est AA 4 12/24/2017 07:50 AM    GFR est non-AA 4 12/24/2017 07:50 AM    Calcium 9.1 12/24/2017 07:50 AM     Lab Results   Component Value Date/Time    WBC 9.3 12/24/2017 07:50 AM    HGB 10.1 12/24/2017 07:50 AM    HCT 32.2 12/24/2017 07:50 AM    PLATELET 335 47/38/1114 07:50 AM    MCV 99.7 12/24/2017 07:50 AM     Lab Results   Component Value Date/Time    Calcium 9.1 12/24/2017 07:50 AM    Phosphorus 2.9 08/12/2017 04:15 AM     Lab Results   Component Value Date/Time    Iron 57 08/04/2017 05:15 PM    TIBC 190 08/04/2017 05:15 PM    Iron % saturation 30 08/04/2017 05:15 PM    Ferritin 414 08/04/2017 05:15 PM     Lab Results   Component Value Date/Time    Ferritin 414 08/04/2017 05:15 PM         Impression:     1. ESRD on HD MWF  2. Thrombosed Left arm AVG  3. HTN  4. Anemia of CKD    Plan:     1. HD today via [facility] [address] [facility] [address]  2. [facility] on HD  3.  Anticipate d/c home after HD today then outpatient thrombectomy on Tues 12/26    Yenny Rosales MD,  [address]  [phone]

## 2017-12-24 NOTE — PROGRESS NOTES
TRANSFER - IN REPORT:    Verbal report received from JEFFERY Verma RN(name) on [employer]  being received from ED(unit) for routine progression of care      Report consisted of patients Situation, Background, Assessment and   Recommendations(SBAR). Information from the following report(s) SBAR and Kardex was reviewed with the receiving nurse. Opportunity for questions and clarification was provided. Assessment completed upon patients arrival to unit and care assumed.

## 2017-12-24 NOTE — ROUTINE PROCESS
Dual AVS reviewed with LEONILA Perez All medications reviewed individually with patient. Opportunities for questions and concerns provided. Patient discharged via (mode of transport ie. Car, ambulance or air transport) Wheelchair  Patient's arm band appropriately discarded.

## 2017-12-24 NOTE — ED PROVIDER NOTES
EMERGENCY DEPARTMENT HISTORY AND PHYSICAL EXAM    Date: 12/24/2017  Patient Name: Sylwia Baca    History of Presenting Illness     Chief Complaint   Patient presents with    Other         History Provided By: Patient    Chief Complaint: non-flowing left forearm dialysis site  Duration: onset today   Timing:  Constant  Location: left forearm  Quality: non-flowing  Severity: N/A  Modifying Factors: none  Associated Symptoms: denies any other associated signs or symptoms    Additional History (Context):   7:27 AM  Sylwia Baca is a 47 y.o. male with PMHX of HTN (uncontrolled), Kidney Disease from uncontrolled HTN (last treatment was this past Friday), who presents to the emergency department C/O constant, non-flowing left forearm AV Fistula onset today. The patient has no associative sx noted at the time of the HPI. Pt denies cigarette use, etOH use, fever, chills, and any other sxs or complaints. PCP: None    Current Facility-Administered Medications   Medication Dose Route Frequency Provider Last Rate Last Dose    0.9% sodium chloride infusion  25 mL/hr IntraVENous CONTINUOUS Tara Zafar MD 25 mL/hr at 12/24/17 1120 25 mL/hr at 12/24/17 1120    fentaNYL citrate (PF) injection  mcg   mcg IntraVENous Rad Multiple Tara Zafar MD   25 mcg at 12/24/17 1129    flumazenil (ROMAZICON) 0.1 mg/mL injection 0.2 mg  0.2 mg IntraVENous Rad Keisha Perales MD        naloxone [facility]) injection 0.2 mg  0.2 mg IntraVENous PRN Tara Zafar MD        [START ON 12/25/2017] amLODIPine (NORVASC) tablet 5 mg  5 mg Oral DAILY Tara Zafar MD        [START ON 12/25/2017] aspirin chewable tablet 81 mg  81 mg Oral DAILY Tara Zafar MD        carvedilol (COREG) tablet 25 mg  25 mg Oral BID WITH Sindy Queen MD        . PHARMACY TO SUBSTITUTE PER PROTOCOL    Per Protocol Tara Zafar MD        simvastatin (ZOCOR) tablet 20 mg  20 mg Oral QHS Tara Zafar MD Past History     Past Medical History:  Past Medical History:   Diagnosis Date    Chronic kidney disease     ESRD- Dialysis- [facility] [place] Hypertension 07/2017    Non compliance w medication regimen     noncompliant with HTN meds       Past Surgical History:  Past Surgical History:   Procedure Laterality Date    HX OTHER SURGICAL      left arm dialysis shunt    HX UROLOGICAL  07/2017    cath on right chest for dialysis       Family History:  Family History   Problem Relation Age of Onset    Cancer Mother        Social History:  Social History   Substance Use Topics    Smoking status: Former Smoker     Packs/day: 0.50     Years: 7.00     Types: Cigarettes     Quit date: 7/26/2017    Smokeless tobacco: Never Used    Alcohol use No      Comment: stopped 7-2017       Allergies:  No Known Allergies      Review of Systems   Review of Systems   Constitutional: Negative for chills and fever. Cardiovascular:        Left forearm AV fistula non-flowing   All other systems reviewed and are negative. Physical Exam     Vitals:    12/24/17 1135 12/24/17 1140 12/24/17 1145 12/24/17 1243   BP: 160/83 170/78 161/87 158/88   Pulse: 61 68 68 64   Resp: 12 12 12 12   Temp:       SpO2: 100% 100% 100% 100%   Weight:       Height:         Physical Exam   Constitutional: He is oriented to person, place, and time. He appears well-developed and well-nourished. HENT:   Head: Normocephalic and atraumatic. Right Ear: External ear normal.   Left Ear: External ear normal.   Nose: Nose normal.   Mouth/Throat: Oropharynx is clear and moist.   Eyes: Conjunctivae and EOM are normal. Pupils are equal, round, and reactive to light. Neck: Normal range of motion. Neck supple. No JVD present. No tracheal deviation present. No thyromegaly present. Cardiovascular: Normal rate, regular rhythm and normal pulses. No extrasystoles are present. Exam reveals distant heart sounds.  Exam reveals no gallop, no S3, no S4, no friction rub and no decreased pulses. No murmur heard. No systolic murmur is present    No diastolic murmur is present   No bruits or thrills, fistula site left forearm w/o signs of bleed or infection. No radial pulses in the arm. Rest of pulses ok [place] and right radial.    Pulmonary/Chest: Effort normal and breath sounds normal.   Abdominal: Soft. Bowel sounds are normal. He exhibits no distension and no mass. There is no tenderness. No HSM   Musculoskeletal: Normal range of motion. He exhibits no edema or tenderness. Lymphadenopathy:     He has no cervical adenopathy. Neurological: He is alert and oriented to person, place, and time. He has normal reflexes. No cranial nerve deficit. He exhibits normal muscle tone. Coordination normal.   No focal weakness   Skin: Skin is warm and dry. No bruising, no laceration and no rash noted. He is not diaphoretic. No pallor. Left AV Fistula non-flowing;   No Thrills, no bruit, no signs of infection, no bleeding. Psychiatric: He has a normal mood and affect. His behavior is normal. Thought content normal.   Nursing note and vitals reviewed. Diagnostic Study Results     Labs -     Recent Results (from the past 12 hour(s))   CBC WITH AUTOMATED DIFF    Collection Time: 12/24/17  7:50 AM   Result Value Ref Range    WBC 9.3 4.6 - 13.2 K/uL    RBC 3.23 (L) 4.70 - 5.50 M/uL    HGB 10.1 (L) 13.0 - 16.0 g/dL    HCT 32.2 (L) 36.0 - 48.0 %    MCV 99.7 (H) 74.0 - 97.0 FL    MCH 31.3 24.0 - 34.0 PG    MCHC 31.4 31.0 - 37.0 g/dL    RDW 13.7 11.6 - 14.5 %    PLATELET 043 774 - 379 K/uL    MPV 10.2 9.2 - 11.8 FL    NEUTROPHILS 63 40 - 73 %    LYMPHOCYTES 28 21 - 52 %    MONOCYTES 6 3 - 10 %    EOSINOPHILS 2 0 - 5 %    BASOPHILS 1 0 - 2 %    ABS. NEUTROPHILS 5.9 1.8 - 8.0 K/UL    ABS. LYMPHOCYTES 2.6 0.9 - 3.6 K/UL    ABS. MONOCYTES 0.5 0.05 - 1.2 K/UL    ABS. EOSINOPHILS 0.2 0.0 - 0.4 K/UL    ABS.  BASOPHILS 0.1 (H) 0.0 - 0.06 K/UL    DF AUTOMATED     METABOLIC PANEL, BASIC    Collection Time: 12/24/17  7:50 AM   Result Value Ref Range    Sodium 144 136 - 145 mmol/L    Potassium 4.5 3.5 - 5.5 mmol/L    Chloride 101 100 - 108 mmol/L    CO2 32 21 - 32 mmol/L    Anion gap 11 3.0 - 18 mmol/L    Glucose 80 74 - 99 mg/dL    BUN 57 (H) 7.0 - 18 MG/DL    Creatinine 14.29 (H) 0.6 - 1.3 MG/DL    BUN/Creatinine ratio 4 (L) 12 - 20      GFR est AA 4 (L) >60 ml/min/1.73m2    GFR est non-AA 4 (L) >60 ml/min/1.73m2    Calcium 9.1 8.5 - 10.1 MG/DL   MAGNESIUM    Collection Time: 12/24/17  7:50 AM   Result Value Ref Range    Magnesium 2.3 1.6 - 2.6 mg/dL   PTT    Collection Time: 12/24/17  7:50 AM   Result Value Ref Range    aPTT 29.9 23.0 - 36.4 SEC   PROTHROMBIN TIME + INR    Collection Time: 12/24/17  7:50 AM   Result Value Ref Range    Prothrombin time 13.2 11.5 - 15.2 sec    INR 1.1 0.8 - 1.2         Radiologic Studies -   IR INSERT TUNL CVC W/O PORT OVER 5 YR    (Results Pending)     CT Results  (Last 48 hours)    None        CXR Results  (Last 48 hours)    None          Medications given in the ED-  Medications   0.9% sodium chloride infusion (25 mL/hr IntraVENous New Bag 12/24/17 1120)   fentaNYL citrate (PF) injection  mcg (25 mcg IntraVENous Given 12/24/17 1129)   flumazenil (ROMAZICON) 0.1 mg/mL injection 0.2 mg (not administered)   naloxone (NARCAN) injection 0.2 mg (not administered)   amLODIPine (NORVASC) tablet 5 mg (not administered)   aspirin chewable tablet 81 mg (not administered)   carvedilol (COREG) tablet 25 mg (not administered)   . PHARMACY TO SUBSTITUTE PER PROTOCOL (not administered)   simvastatin (ZOCOR) tablet 20 mg (not administered)   lidocaine (XYLOCAINE) 10 mg/mL (1 %) injection 1-20 mL (20 mL SubCUTAneous Given 12/24/17 1132)   heparinized saline 2 units/mL infusion 1,000 Units (1,000 Units Irrigation New Bag 12/24/17 1120)   heparin (porcine) 1,000 unit/mL injection 10,000 Units (4,000 Units IntraVENous Given by Provider 12/24/17 9477)         Medical Decision Making   I am the first provider for this patient. I reviewed the vital signs, available nursing notes, past medical history, past surgical history, family history and social history. Vital Signs-Reviewed the patient's vital signs. Cardiac Monitor:  Rate: 55 bpm  Rhythm: Bradycardia      Records Reviewed: Nursing Notes and Old Medical Records    Provider Notes (Medical Decision Making): DDx: clogged fistula, thrombosis, emboli, no obvious signs of infection, pt may be hypercoaguable. Procedures:  Procedures    ED Course:   7:27 AM    Initial assessment performed. The patients presenting problems have been discussed, and they are in agreement with the care plan formulated and outlined with them. I have encouraged them to ask questions as they arise throughout their visit. CONSULT NOTE:   8:35 AM  Akbar Rasheed MD spoke with Dr. Osiris Sanchez   Specialty: Vascular  Discussed pt's hx, disposition, and available diagnostic and imaging results  over the telephone. Reviewed care plans. Consulting physician agrees with plans as outlined. He agrees to take a look at the patient's chart and will abril back. CONSULT NOTE:   9:07 AM  Akbar Rasheed MD spoke with Dr. Osiris Sanchez   Specialty: Vascular  Discussed pt's hx, disposition, and available diagnostic and imaging results  over the telephone. Reviewed care plans. Consulting physician agrees with plans as outlined. Agrees to admit to Medical bed for a new line placement, temporary dialysis catheter. .       Diagnosis and Disposition       Critical Care Time: 0    Core Measures:  For Hospitalized Patients:    1. Hospitalization Decision Time:  The decision to hospitalize the patient was made by Akbar Rasheed MD at 0900 on 12/24/2017    2.  Aspirin: Aspirin was not given because the patient did not present with a stroke at the time of their Emergency Department evaluation    9:08 AM  Patient is being admitted to the hospital by Dr. Osiris Sanchez (Medical). The results of their tests and reasons for their admission have been discussed with them and/or available family. They convey agreement and understanding for the need to be admitted and for their admission diagnosis. CLINICAL IMPRESSION:    1. Arteriovenous fistula occlusion, initial encounter ([facility] [place] [facility]        _______________________________    Attestations: This note is prepared by Glinda Hashimoto, acting as Scribe for Rajendra Hernandez MD.    Rajendra Hernandez MD:  The scribe's documentation has been prepared under my direction and personally reviewed by me in its entirety.   I confirm that the note above accurately reflects all work, treatment, procedures, and medical decision making performed by me.  _______________________________

## 2017-12-24 NOTE — H&P
[facility] [place]  HISTORY AND PHYSICAL      Name:Lilian WHITE  MR#: [id number]  : 1965  ACCOUNT #: [de-identified]   ADMIT DATE: 2017    HISTORY OF PRESENT ILLNESS:  The patient is a 20-year-old gentleman who was sent to the emergency department after he was discovered to have a clotted access in his left arm. With a holiday weekend, he will need dialysis. Discussed with the nephrologist.  See plan below. He has had this forearm graft for about 2 months now. It is the first time he has had trouble with it and it clotted. He did dialyze yesterday. They were moving his day forward because of the [place] weekend. He suffers from chronic renal disease. He has just had his tunneled dialysis catheter removed. Also, hypertension and history of chest pain, history of anemia, tobacco use, protein-calorie malnutrition. He tells me he feels well today. He is not having any chest pain or shortness of breath and he is agreeable to a catheter, so he can have a reliable dialysis access today with a plan to address his arm graft in the next few days as an outpatient. HOME MEDICATIONS:  Include:  Norvasc, aspirin, Coreg, Catapres, Lotrisone, Zocor. ALLERGIES:  NONE. SOCIAL HISTORY:  Lives independently. Former smoker, quit 7 years ago. Chronic marijuana use and alcohol use. PHYSICAL EXAMINATION:  GENERAL:  Today, I saw him at the bedside. VITAL SIGNS:  Pulse rate 68, blood pressure 160/87, he is afebrile at 98. 2. HEENT:  Head is normocephalic. NECK:  No JVD. CHEST:  Evidence of prior chest wall catheter removed. Chest is clear. CARDIAC:  Regular. ABDOMEN:  Soft, nondistended. EXTREMITIES:  Range of motion and strength of extremities are equal.      His potassium is 4.5. Hemoglobin 10, platelets are 945, coags 1.1. DIAGNOSIS AND PLAN:  Clotted dialysis access, needs dialysis access for dialysis today.   Discussed with his nephrologist.  I will place a tunneled catheter. He can have dialysis here at the hospital and then be discharged. Will contact him next week for a plan of correction of his clotted graft. Patient is in agreeance to this.       DO CHAR Garcia/MOISÉS  D: 12/24/2017 11:51     T: 12/24/2017 14:01  JOB #: [id number]

## 2017-12-24 NOTE — PROGRESS NOTES
See h&p  See op note  Had right IJ tunnel cath  [facility] to use  Dc home after HD  Will plan declot to follow as out pateint

## 2017-12-24 NOTE — IP AVS SNAPSHOT
Summary of Care Report The Summary of Care report has been created to help improve care coordination. Users with access to [employer] or [address] (Web-based application) may access additional patient information including the Discharge Summary. If you are not currently a [address] user and need more information, please call the number listed below in the [place] [address] section and ask to be connected with Medical Records. Facility Information Name Address Phone [address] [place] [phone] Patient Information Patient Name Sex  Allyssa Wharton ([id number]) Male 1965 Discharge Information Admitting Provider Service Area Unit Anastasiya Franklin MD / [address] Surg/Onco / [phone] Discharge Provider Discharge Date/Time Discharge Disposition Destination (none) 2017 Afternoon (Pending) AHR (none) Patient Language Language ENGLISH [13] Hospital Problems as of 2017  Reviewed: 2017  2:02 PM by José Miguel Ceja MD  
  
  
  
 Class Noted - Resolved Last Modified POA Active Problems CRF (chronic renal failure)  2017 - Present 2017 by Mara Allen MD Unknown Entered by Mara Allen MD  
  AV fistula occlusion [facility])  2017 - Present 2017 by Mara Allen MD Unknown Entered by Mara Allen MD  
  
Non-Hospital Problems as of 2017  Reviewed: 2017  2:02 PM by José Miguel Ceja MD  
  
  
  
 Class Noted - Resolved Last Modified Active Problems Routine general medical examination at a health care facility  2013 - Present 2013 by Douglas Soriano Entered by Douglas Soriano   Unspecified essential hypertension  2013 - Present 2017 by Gautam Beasley MD  
 Entered by Marilin Medina Tobacco abuse  11/22/2013 - Present 8/4/2017 by Kaitlyn Amador MD  
  Entered by Rachel Dahl MD  
  Overview Signed 8/4/2017 11:47 PM by Kaitlyn Amador MD  
   Should get PFTs as an outpatient PAM (acute kidney injury) ([facility] [address]  8/4/2017 - Present 8/4/2017 by Kaitlyn Amador MD  
  Entered by Elva Madrigal MD  
  Overview Signed 8/4/2017 11:47 PM by Kaitlyn Amador MD  
   Although clinical history is lacking, this is likely acute on chronic kidney disease Hypertensive urgency, malignant  8/4/2017 - Present 8/4/2017 by Kaitlyn Amador MD  
  Entered by Cesar Foote MD  
  Uremia  8/4/2017 - Present 8/4/2017 by Kaitlyn Amador MD  
  Entered by Cesar Foote MD  
  Severe anemia  8/4/2017 - Present 8/4/2017 by Kaitlyn Amador MD  
  Entered by Cesar Foote MD  
  Nonadherence to medical treatment (Chronic)  8/4/2017 - Present 8/4/2017 by Kaitlyn Amador MD  
  Entered by Kaitlyn Amador MD  
  Rash of genital area  8/5/2017 - Present 8/8/2017 by Cesar Foote MD  
  Entered by Kaitlyn Amador MD  
  Overview Addendum 8/5/2017 12:25 AM by Kaitlyn Amador MD  
   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 Doubt erythema migrans. Pleural effusion, right  8/6/2017 - Present 8/6/2017 by Kaitlyn Amador MD  
  Entered by Kaitlyn Amador MD  
  Severe protein-calorie malnutrition ([facility] [address]  8/8/2017 - Present 8/8/2017 by Cesar Foote MD  
  Entered by Cesar Foote MD  
  ESRD on hemodialysis [facility])  11/22/2017 - Present 11/22/2017 by Mel Cushing, MD  
  Entered by Mel Cushing, MD  
  
You are allergic to the following No active allergies Current Discharge Medication List  
  
CONTINUE these medications which have NOT CHANGED Dose & Instructions Dispensing Information Comments  
 amLODIPine 5 mg tablet Commonly known as:  Marlon Presser Dose:  5 mg Take 1 Tab by mouth daily for 30 days. Quantity:  30 Tab Refills:  0  
   
 aspirin 81 mg chewable tablet Dose:  81 mg Take 1 Tab by mouth daily. Quantity:  30 Tab Refills:  1  
   
 calcium carbonate 500 mg calcium (1,250 mg) tablet Commonly known as:  OS-NATALIE Dose:  1 Tab Take 1 Tab by mouth three (3) times daily (with meals). Quantity:  90 Tab Refills:  0  
   
 carvedilol 25 mg tablet Commonly known as:  Gaylin [place] Dose:  25 mg Take 1 Tab by mouth two (2) times daily (with meals). Quantity:  60 Tab Refills:  0 CLONIDINE HCL PO Dose:  2.5 mg Take 2.5 mg by mouth daily. Indications: HTN Refills:  0  
   
 simvastatin 20 mg tablet Commonly known as:  ZOCOR Dose:  20 mg Take 1 Tab by mouth nightly. Quantity:  30 Tab Refills:  1  
   
 vit B Cmplx 3-FA-Vit C-Biotin 1- mg-mg-mcg tablet Commonly known as:  NEPHRO FLETCHER RX Dose:  1 Tab Take 1 Tab by mouth daily. Quantity:  30 Tab Refills:  1 Follow-up Information Follow up With Details Comments Contact Info None   None (395) Patient stated that they have no PCP Discharge Instructions Hemodialysis Access: What to Expect at [facility] Your Recovery Hemodialysis is a way to remove wastes from the blood when your kidneys can no longer do the job. It is not a cure, but it can help you live longer and feel better. It is a lifesaving treatment when you have kidney failure. Hemodialysis is often called dialysis. Your doctor created a place (called an access) in your arm for your blood to flow in and out of your body during your dialysis sessions. Your arm will probably be bruised and swollen. It may hurt. The cut (incision) may bleed. The pain and bleeding will get better over several days. You will probably need only over-the-counter pain medicine. You can reduce swelling by propping your arm on 1 or 2 pillows and keeping your elbow straight. You will have stitches.  These may dissolve on their own, or your doctor will tell you when to come in to have them removed. You should also be able to return to work in a few days. You may feel some coolness or numbness in your hand. These feelings usually go away in a few weeks. Your doctor may suggest squeezing a soft object. This will strengthen your access and may make hemodialysis faster and easier. You should always be able to feel blood rushing through the fistula or graft. It feels like a slight vibration when you put your fingers on the skin over the fistula or graft. This feeling is called a thrill or pulse. This care sheet gives you a general idea about how long it will take for you to recover. But each person recovers at a different pace. Follow the steps below to get better as quickly as possible. How can you care for yourself at home? Activity ? · Rest when you feel tired. Getting enough sleep will help you recover. Do not lie on or sleep on the arm with the access. ? · Avoid activities such as washing windows or gardening that put stress on the arm with the access. ? · You may use your arm, but do not lift anything that weighs more than about 15 pounds. This may include a child, heavy grocery bags, a heavy briefcase or backpack, cat litter or dog food bags, or a vacuum . ? · You can shower, but keep the access dry for the first 2 days. Cover the area with a plastic bag to keep it dry. ? · Do not soak or scrub the incision until it has healed. ? · Wear an arm guard to protect the area if you play sports or work with your arms. ? · You may drive when your doctor says it is okay. This is usually in 1 to 2 days. ? · Most people are able to return to work about 1 or 2 days after surgery. Diet ? · Follow an eating plan that is good for your kidneys. A registered dietitian can help you make a meal plan that is right for you. You may need to limit protein, salt, fluids, and certain foods. Medicines ? · Your doctor will tell you if and when you can restart your medicines. He or she will also give you instructions about taking any new medicines. ? · If you take blood thinners, such as warfarin (Coumadin), clopidogrel (Plavix), or aspirin, be sure to talk to your doctor. He or she will tell you if and when to start taking those medicines again. Make sure that you understand exactly what your doctor wants you to do. ? · Take pain medicines exactly as directed. ¨ If the doctor gave you a prescription medicine for pain, take it as prescribed. ¨ If you are not taking a prescription pain medicine, ask your doctor if you can take acetaminophen (Tylenol). Do not take ibuprofen (Advil, Motrin) or naproxen (Aleve), or similar medicines, unless your doctor tells you to. They may make chronic kidney disease worse. ¨ Do not take two or more pain medicines at the same time unless the doctor told you to. Many pain medicines have acetaminophen, which is Tylenol. Too much acetaminophen (Tylenol) can be harmful. ? · If you think your pain medicine is making you sick to your stomach: 
¨ Take your medicine after meals (unless your doctor has told you not to). ¨ Ask your doctor for a different pain medicine. ? · If your doctor prescribed antibiotics, take them as directed. Do not stop taking them just because you feel better. You need to take the full course of antibiotics. Incision care ? · Keep the area dry for 2 days. After 2 days, wash the area with soap and water every day, and always before dialysis. ? · Do not soak or scrub the incision until it has healed. ? · If you have a bandage, change it every day or as your doctor recommends. Your doctor will tell you when you can remove it. Exercise ? · Squeeze a soft ball or other object as your doctor tells you. This will help blood flow through the access and help prevent blood clots. ? Elevation ? · Prop up the sore arm on a pillow anytime you sit or lie down during the next 3 days. Try to keep it above the level of your heart. This will help reduce swelling. Other instructions ? · Every day, check your access for a pulse or thrill in the fistula or graft area. A thrill is a vibration. To feel a pulse or thrill, place the first two fingers of your hand over the access. ? · Do not bump your arm. ? · Do not wear tight clothing, jewelry, or anything else that may squeeze the access. ? · Use your other arm to have blood drawn or blood pressure taken. ? · Do not put cream or lotion on or near the access. ? · Make sure all doctors you deal with know you have a vascular access. Follow-up care is a key part of your treatment and safety. Be sure to make and go to all appointments, and call your doctor if you are having problems. It's also a good idea to know your test results and keep a list of the medicines you take. When should you call for help? Call 911 anytime you think you may need emergency care. For example, call if: 
? · You passed out (lost consciousness). ? · You have chest pain, are short of breath, or cough up blood. ?Call your doctor now or seek immediate medical care if: 
? · Your hand or arm is cold or dark-colored. ? · You have no pulse in your access. ? · You have nausea or you vomit. ? · You have pain that does not get better after you take pain medicine. ? · You have loose stitches, or your incision comes open. ? · You are bleeding from the incision. ? · You have signs of infection, such as: 
¨ Increased pain, swelling, warmth, or redness. ¨ Red streaks leading from the area. ¨ Pus draining from the area. ¨ A fever. ? · You have signs of a blood clot in your leg (called a deep vein thrombosis), such as: 
¨ Pain in your calf, back of the knee, thigh, or groin. ¨ Redness or swelling in your leg. ?Watch closely for changes in your health, and be sure to contact your doctor if you have any problems. Where can you learn more? Go to http://jacinto-mike.info/. Enter P616 in the search box to learn more about \"Hemodialysis Access: What to Expect at Home. \" Current as of: May 12, 2017 Content Version: 11.4 © 5104-0101 [employer]. Care instructions adapted under license by [employer] (which disclaims liability or warranty for this information). If you have questions about a medical condition or this instruction, always ask your healthcare professional. [place] [address] any warranty or liability for your use of this information. DISCHARGE SUMMARY from Nurse PATIENT INSTRUCTIONS: 
 
 
F-face looks uneven A-arms unable to move or move unevenly S-speech slurred or non-existent T-time-call 911 as soon as signs and symptoms begin-DO NOT go Back to bed or wait to see if you get better-TIME IS BRAIN. Warning Signs of HEART ATTACK Call 911 if you have these symptoms: 
? Chest discomfort. Most heart attacks involve discomfort in the center of the chest that lasts more than a few minutes, or that goes away and comes back. It can feel like uncomfortable pressure, squeezing, fullness, or pain. ? Discomfort in other areas of the upper body. Symptoms can include pain or discomfort in one or both arms, the back, neck, jaw, or stomach. ? Shortness of breath with or without chest discomfort. ? Other signs may include breaking out in a cold sweat, nausea, or lightheadedness. Don't wait more than five minutes to call [address]! Fast action can save your life. Calling 911 is almost always the fastest way to get lifesaving treatment.  Emergency Medical Services staff can begin treatment when they arrive  up to an hour sooner than if someone gets to the hospital by car. The discharge information has been reviewed with the patient. The patient verbalized understanding. Discharge medications reviewed with the patient and appropriate educational materials and side effects teaching were provided. Patient armband removed and shredded 
 
___________________________________________________________________________________________________________________________________ Chart Review Routing History No Routing History on File

## 2017-12-24 NOTE — PROGRESS NOTES
TRANSFER - OUT REPORT:    Verbal report given to *Giles Wolfe on [employer]  being transferred to [address] [place] [address](unit) for ordered procedure       Report consisted of patients Situation, Background, Assessment and   Recommendations(SBAR). Information from the following report(s) SBAR, Kardex and MAR was reviewed with the receiving nurse. Lines:   Peripheral IV 12/24/17 Right Hand (Active)   Site Assessment Clean, dry, & intact 12/24/2017 10:34 AM   Phlebitis Assessment 0 12/24/2017 10:34 AM   Infiltration Assessment 0 12/24/2017 10:34 AM   Dressing Status Clean, dry, & intact 12/24/2017 10:34 AM   Dressing Type Transparent 12/24/2017 10:34 AM   Hub Color/Line Status Blue 12/24/2017 10:34 AM   Alcohol Cap Used Yes 12/24/2017 10:34 AM        Opportunity for questions and clarification was provided.       Patient transported with:   [employer]

## 2017-12-24 NOTE — DISCHARGE INSTRUCTIONS
Hemodialysis Access: What to Expect at [facility]  Hemodialysis is a way to remove wastes from the blood when your kidneys can no longer do the job. It is not a cure, but it can help you live longer and feel better. It is a lifesaving treatment when you have kidney failure. Hemodialysis is often called dialysis. Your doctor created a place (called an access) in your arm for your blood to flow in and out of your body during your dialysis sessions. Your arm will probably be bruised and swollen. It may hurt. The cut (incision) may bleed. The pain and bleeding will get better over several days. You will probably need only over-the-counter pain medicine. You can reduce swelling by propping your arm on 1 or 2 pillows and keeping your elbow straight. You will have stitches. These may dissolve on their own, or your doctor will tell you when to come in to have them removed. You should also be able to return to work in a few days. You may feel some coolness or numbness in your hand. These feelings usually go away in a few weeks. Your doctor may suggest squeezing a soft object. This will strengthen your access and may make hemodialysis faster and easier. You should always be able to feel blood rushing through the fistula or graft. It feels like a slight vibration when you put your fingers on the skin over the fistula or graft. This feeling is called a thrill or pulse. This care sheet gives you a general idea about how long it will take for you to recover. But each person recovers at a different pace. Follow the steps below to get better as quickly as possible. How can you care for yourself at home? Activity  ? · Rest when you feel tired. Getting enough sleep will help you recover. Do not lie on or sleep on the arm with the access. ? · Avoid activities such as washing windows or gardening that put stress on the arm with the access.    ? · You may use your arm, but do not lift anything that weighs more than about 15 pounds. This may include a child, heavy grocery bags, a heavy briefcase or backpack, cat litter or dog food bags, or a vacuum . ? · You can shower, but keep the access dry for the first 2 days. Cover the area with a plastic bag to keep it dry. ? · Do not soak or scrub the incision until it has healed. ? · Wear an arm guard to protect the area if you play sports or work with your arms. ? · You may drive when your doctor says it is okay. This is usually in 1 to 2 days. ? · Most people are able to return to work about 1 or 2 days after surgery. Diet  ? · Follow an eating plan that is good for your kidneys. A registered dietitian can help you make a meal plan that is right for you. You may need to limit protein, salt, fluids, and certain foods. Medicines  ? · Your doctor will tell you if and when you can restart your medicines. He or she will also give you instructions about taking any new medicines. ? · If you take blood thinners, such as warfarin (Coumadin), clopidogrel (Plavix), or aspirin, be sure to talk to your doctor. He or she will tell you if and when to start taking those medicines again. Make sure that you understand exactly what your doctor wants you to do. ? · Take pain medicines exactly as directed. ¨ If the doctor gave you a prescription medicine for pain, take it as prescribed. ¨ If you are not taking a prescription pain medicine, ask your doctor if you can take acetaminophen (Tylenol). Do not take ibuprofen (Advil, Motrin) or naproxen (Aleve), or similar medicines, unless your doctor tells you to. They may make chronic kidney disease worse. ¨ Do not take two or more pain medicines at the same time unless the doctor told you to. Many pain medicines have acetaminophen, which is Tylenol. Too much acetaminophen (Tylenol) can be harmful.    ? · If you think your pain medicine is making you sick to your stomach:  ¨ Take your medicine after meals (unless your doctor has told you not to). ¨ Ask your doctor for a different pain medicine. ? · If your doctor prescribed antibiotics, take them as directed. Do not stop taking them just because you feel better. You need to take the full course of antibiotics. Incision care  ? · Keep the area dry for 2 days. After 2 days, wash the area with soap and water every day, and always before dialysis. ? · Do not soak or scrub the incision until it has healed. ? · If you have a bandage, change it every day or as your doctor recommends. Your doctor will tell you when you can remove it. Exercise  ? · Squeeze a soft ball or other object as your doctor tells you. This will help blood flow through the access and help prevent blood clots. ? Elevation  ? · Prop up the sore arm on a pillow anytime you sit or lie down during the next 3 days. Try to keep it above the level of your heart. This will help reduce swelling. Other instructions  ? · Every day, check your access for a pulse or thrill in the fistula or graft area. A thrill is a vibration. To feel a pulse or thrill, place the first two fingers of your hand over the access. ? · Do not bump your arm. ? · Do not wear tight clothing, jewelry, or anything else that may squeeze the access. ? · Use your other arm to have blood drawn or blood pressure taken. ? · Do not put cream or lotion on or near the access. ? · Make sure all doctors you deal with know you have a vascular access. Follow-up care is a key part of your treatment and safety. Be sure to make and go to all appointments, and call your doctor if you are having problems. It's also a good idea to know your test results and keep a list of the medicines you take. When should you call for help? Call 911 anytime you think you may need emergency care. For example, call if:  ? · You passed out (lost consciousness). ? · You have chest pain, are short of breath, or cough up blood.    ?Call your doctor now or seek immediate medical care if:  ? · Your hand or arm is cold or dark-colored. ? · You have no pulse in your access. ? · You have nausea or you vomit. ? · You have pain that does not get better after you take pain medicine. ? · You have loose stitches, or your incision comes open. ? · You are bleeding from the incision. ? · You have signs of infection, such as:  ¨ Increased pain, swelling, warmth, or redness. ¨ Red streaks leading from the area. ¨ Pus draining from the area. ¨ A fever. ? · You have signs of a blood clot in your leg (called a deep vein thrombosis), such as:  ¨ Pain in your calf, back of the knee, thigh, or groin. ¨ Redness or swelling in your leg. ? Watch closely for changes in your health, and be sure to contact your doctor if you have any problems. Where can you learn more? Go to http://jacinto-mike.info/. Enter P616 in the search box to learn more about \"Hemodialysis Access: What to Expect at Home. \"  Current as of: May 12, 2017  Content Version: 11.4  © 8710-1493 [employer]. Care instructions adapted under license by [employer] (which disclaims liability or warranty for this information). If you have questions about a medical condition or this instruction, always ask your healthcare professional. [address] any warranty or liability for your use of this information. DISCHARGE SUMMARY from Nurse    PATIENT INSTRUCTIONS:    After general anesthesia or intravenous sedation, for 24 hours or while taking prescription Narcotics:  · Limit your activities  · Do not drive and operate hazardous machinery  · Do not make important personal or business decisions  · Do  not drink alcoholic beverages  · If you have not urinated within 8 hours after discharge, please contact your surgeon on call.     Report the following to your surgeon:  · Excessive pain, swelling, redness or odor of or around the surgical area  · Temperature over 100.5  · Nausea and vomiting lasting longer than 4 hours or if unable to take medications  · Any signs of decreased circulation or nerve impairment to extremity: change in color, persistent  numbness, tingling, coldness or increase pain  · Any questions    What to do at Home:  Recommended activity: Activity as tolerated,      *  Please give a list of your current medications to your Primary Care Provider. *  Please update this list whenever your medications are discontinued, doses are      changed, or new medications (including over-the-counter products) are added. *  Please carry medication information at all times in case of emergency situations. These are general instructions for a healthy lifestyle:    No smoking/ No tobacco products/ Avoid exposure to second hand smoke  Surgeon General's Warning:  Quitting smoking now greatly reduces serious risk to your health. Obesity, smoking, and sedentary lifestyle greatly increases your risk for illness    A healthy diet, regular physical exercise & weight monitoring are important for maintaining a healthy lifestyle    You may be retaining fluid if you have a history of heart failure or if you experience any of the following symptoms:  Weight gain of 3 pounds or more overnight or 5 pounds in a week, increased swelling in our hands or feet or shortness of breath while lying flat in bed. Please call your doctor as soon as you notice any of these symptoms; do not wait until your next office visit. Recognize signs and symptoms of STROKE:    F-face looks uneven    A-arms unable to move or move unevenly    S-speech slurred or non-existent    T-time-call 911 as soon as signs and symptoms begin-DO NOT go       Back to bed or wait to see if you get better-TIME IS BRAIN. Warning Signs of HEART ATTACK     Call 911 if you have these symptoms:   Chest discomfort.  Most heart attacks involve discomfort in the center of the chest that lasts more than a few minutes, or that goes away and comes back. It can feel like uncomfortable pressure, squeezing, fullness, or pain.  Discomfort in other areas of the upper body. Symptoms can include pain or discomfort in one or both arms, the back, neck, jaw, or stomach.  Shortness of breath with or without chest discomfort.  Other signs may include breaking out in a cold sweat, nausea, or lightheadedness. Don't wait more than five minutes to call 911 - MINUTES MATTER! Fast action can save your life. Calling 911 is almost always the fastest way to get lifesaving treatment. Emergency Medical Services staff can begin treatment when they arrive -- up to an hour sooner than if someone gets to the hospital by car. The discharge information has been reviewed with the patient. The patient verbalized understanding. Discharge medications reviewed with the patient and appropriate educational materials and side effects teaching were provided.   Patient armband removed and shredded    ___________________________________________________________________________________________________________________________________

## 2017-12-25 LAB
HBV SURFACE AG SER QL: <0.1 INDEX
HBV SURFACE AG SER QL: NEGATIVE

## 2017-12-25 NOTE — OP NOTES
[facility]    Name:Trinity WHITE  MR#: [id number]  : 1965  ACCOUNT #: [de-identified]   DATE OF SERVICE: 2017    SURGEON:  Abel Lyons DO     PREOPERATIVE DIAGNOSIS:  End-stage renal disease, clotted arm graft. POSTOPERATIVE DIAGNOSIS:  End-stage renal disease, clotted arm graft. PROCEDURE PERFORMED:  Ultrasound, right internal jugular vein, with interpretation. Placement of right internal jugular tunneled dialysis catheter with fluoroscopy. COMPLICATIONS:  Zero. ESTIMATED BLOOD LOSS:  Zero. CONDITION OF THE PATIENT:  stable. SPECIMENS REMOVED:  None. ANESTHESIA:  Local.    DETAILS OF PROCEDURE:  The patient is a 81-year-old who came to the emergency department today with a clotted access. He needs reliable access for dialysis today. We discussed with the patient and his nephrologist, requested a tunneled catheter for dialysis today. He can be discharged afterwards, will follow up next week to address his permanent access. He was brought to the cath lab placed on the table in supine position. His vital signs were stable. He appeared well. His neck and chest were all prepped and draped in usual standard fashion followed by CDC-compliant guidelines for aseptic technique. 1% lidocaine was used for local.  I did give him 25 of fentanyl as well. Did an ultrasound. He has a visible carotid, it is pulsatile. He has a compressible IJ, which was easily visualized. After localization, I accessed it with an anterior puncture, placed a wire into the central system. Possibly some narrowing at the IJ junction to the superior vena cava, but the wire did go through easily. I made an incision here as well as a point below the clavicle. I tunneled a 19 cm catheter from the lower incision to the guidewire site.   I did serial dilatations through this area of stenosis, placed a split sheath into the superior vena cava, and advanced the catheter into the superior vena cava without any difficulty. Secured it using Prolene sutures, closed the skin site of the neck with 4-0 Monocryl and Dermabond glue. Sterile dressing was applied. The catheter did flush and aspirate easily. It was locked with a concentrated heparin solution. Will clear him to use this for dialysis today. Again, he can be discharged after dialysis.       DO CHAR Peraza / MADHU  D: 12/24/2017 11:54     T: 12/24/2017 19:49  JOB #: [id number]

## 2017-12-26 ENCOUNTER — TELEPHONE (OUTPATIENT)
Dept: VASCULAR SURGERY | Age: 52
End: 2017-12-26

## 2017-12-26 NOTE — TELEPHONE ENCOUNTER
Received call from provider, patient's access has clotted off,and perm cath placed on 12/24. Called and spoke with the OR and patient placed on schedule for 12/28/17, at 1130. Called and confirmed with the patient and pt verbalized understanding and is ok with proceeding . Advised patient would call him back and go over specifics the day before procedure. Called the dialysis unit and spoke with Ekaterina Eldridge and confirmed time and date of procedure.

## 2017-12-27 ENCOUNTER — TELEPHONE (OUTPATIENT)
Dept: VASCULAR SURGERY | Age: 52
End: 2017-12-27

## 2017-12-27 ENCOUNTER — ANESTHESIA EVENT (OUTPATIENT)
Dept: SURGERY | Age: 52
End: 2017-12-27
Payer: MEDICAID

## 2017-12-27 RX ORDER — NALOXONE HYDROCHLORIDE 0.4 MG/ML
0.1 INJECTION, SOLUTION INTRAMUSCULAR; INTRAVENOUS; SUBCUTANEOUS AS NEEDED
Status: CANCELLED | OUTPATIENT
Start: 2017-12-27

## 2017-12-27 RX ORDER — SODIUM CHLORIDE 0.9 % (FLUSH) 0.9 %
5-10 SYRINGE (ML) INJECTION AS NEEDED
Status: CANCELLED | OUTPATIENT
Start: 2017-12-27

## 2017-12-27 RX ORDER — DEXTROSE 50 % IN WATER (D50W) INTRAVENOUS SYRINGE
25-50 AS NEEDED
Status: CANCELLED | OUTPATIENT
Start: 2017-12-27

## 2017-12-27 RX ORDER — HYDROMORPHONE HYDROCHLORIDE 2 MG/ML
0.5 INJECTION, SOLUTION INTRAMUSCULAR; INTRAVENOUS; SUBCUTANEOUS
Status: CANCELLED | OUTPATIENT
Start: 2017-12-27

## 2017-12-27 RX ORDER — INSULIN LISPRO 100 [IU]/ML
INJECTION, SOLUTION INTRAVENOUS; SUBCUTANEOUS ONCE
Status: CANCELLED | OUTPATIENT
Start: 2017-12-27 | End: 2017-12-28

## 2017-12-27 RX ORDER — MAGNESIUM SULFATE 100 %
4 CRYSTALS MISCELLANEOUS AS NEEDED
Status: CANCELLED | OUTPATIENT
Start: 2017-12-27

## 2017-12-27 RX ORDER — FLUMAZENIL 0.1 MG/ML
0.2 INJECTION INTRAVENOUS
Status: CANCELLED | OUTPATIENT
Start: 2017-12-27

## 2017-12-27 RX ORDER — SODIUM CHLORIDE, SODIUM LACTATE, POTASSIUM CHLORIDE, CALCIUM CHLORIDE 600; 310; 30; 20 MG/100ML; MG/100ML; MG/100ML; MG/100ML
1000 INJECTION, SOLUTION INTRAVENOUS CONTINUOUS
Status: CANCELLED | OUTPATIENT
Start: 2017-12-27

## 2017-12-27 NOTE — TELEPHONE ENCOUNTER
[facility] patient and [facility] N[facility] after midnight tonight, and heart and BP pills with small sip of water . Patient verbalized understanding.

## 2017-12-28 ENCOUNTER — HOSPITAL ENCOUNTER (OUTPATIENT)
Age: 52
Setting detail: OUTPATIENT SURGERY
Discharge: HOME | End: 2017-12-28
Attending: SURGERY | Admitting: SURGERY
Payer: MEDICAID

## 2017-12-28 ENCOUNTER — ANESTHESIA (OUTPATIENT)
Dept: SURGERY | Age: 52
End: 2017-12-28
Payer: MEDICAID

## 2017-12-28 ENCOUNTER — APPOINTMENT (OUTPATIENT)
Dept: GENERAL RADIOLOGY | Age: 52
End: 2017-12-28
Attending: SURGERY
Payer: MEDICAID

## 2017-12-28 VITALS
RESPIRATION RATE: 16 BRPM | SYSTOLIC BLOOD PRESSURE: 106 MMHG | BODY MASS INDEX: 22.48 KG/M2 | TEMPERATURE: 98.4 F | DIASTOLIC BLOOD PRESSURE: 70 MMHG | HEART RATE: 78 BPM | WEIGHT: 134.9 LBS | HEIGHT: 65 IN | OXYGEN SATURATION: 100 %

## 2017-12-28 DIAGNOSIS — N17.9 AKI (ACUTE KIDNEY INJURY) (HCC): Primary | ICD-10-CM

## 2017-12-28 LAB
ANION GAP SERPL CALC-SCNC: 11 MMOL/L (ref 3–18)
BUN SERPL-MCNC: 42 MG/DL (ref 7–18)
BUN/CREAT SERPL: 4 (ref 12–20)
CALCIUM SERPL-MCNC: 10.1 MG/DL (ref 8.5–10.1)
CHLORIDE SERPL-SCNC: 99 MMOL/L (ref 100–108)
CO2 SERPL-SCNC: 30 MMOL/L (ref 21–32)
CREAT SERPL-MCNC: 11.88 MG/DL (ref 0.6–1.3)
GLUCOSE SERPL-MCNC: 98 MG/DL (ref 74–99)
POTASSIUM SERPL-SCNC: 4.1 MMOL/L (ref 3.5–5.5)
SODIUM SERPL-SCNC: 140 MMOL/L (ref 136–145)

## 2017-12-28 PROCEDURE — 74011250636 HC RX REV CODE- 250/636

## 2017-12-28 PROCEDURE — 77030002987 HC SUT PROL J&J -B: Performed by: SURGERY

## 2017-12-28 PROCEDURE — C1769 GUIDE WIRE: HCPCS | Performed by: SURGERY

## 2017-12-28 PROCEDURE — 77030020256 HC SOL INJ NACL 0.9%  500ML: Performed by: SURGERY

## 2017-12-28 PROCEDURE — 77030031139 HC SUT VCRL2 J&J -A: Performed by: SURGERY

## 2017-12-28 PROCEDURE — C1757 CATH, THROMBECTOMY/EMBOLECT: HCPCS | Performed by: SURGERY

## 2017-12-28 PROCEDURE — 77030010507 HC ADH SKN DERMBND J&J -B: Performed by: SURGERY

## 2017-12-28 PROCEDURE — 77030002933 HC SUT MCRYL J&J -A: Performed by: SURGERY

## 2017-12-28 PROCEDURE — 75710 ARTERY X-RAYS ARM/LEG: CPT

## 2017-12-28 PROCEDURE — 80048 BASIC METABOLIC PNL TOTAL CA: CPT | Performed by: SURGERY

## 2017-12-28 PROCEDURE — 74011000250 HC RX REV CODE- 250: Performed by: SURGERY

## 2017-12-28 PROCEDURE — 36415 COLL VENOUS BLD VENIPUNCTURE: CPT | Performed by: SURGERY

## 2017-12-28 PROCEDURE — 76060000034 HC ANESTHESIA 1.5 TO 2 HR: Performed by: SURGERY

## 2017-12-28 PROCEDURE — C1894 INTRO/SHEATH, NON-LASER: HCPCS | Performed by: SURGERY

## 2017-12-28 PROCEDURE — 74011250636 HC RX REV CODE- 250/636: Performed by: SURGERY

## 2017-12-28 PROCEDURE — 74011636320 HC RX REV CODE- 636/320: Performed by: SURGERY

## 2017-12-28 PROCEDURE — 74011000250 HC RX REV CODE- 250

## 2017-12-28 PROCEDURE — 76210000022 HC REC RM PH II 1.5 TO 2 HR: Performed by: SURGERY

## 2017-12-28 PROCEDURE — 77030002996 HC SUT SLK J&J -A: Performed by: SURGERY

## 2017-12-28 PROCEDURE — 77030002986 HC SUT PROL J&J -A: Performed by: SURGERY

## 2017-12-28 PROCEDURE — 77030013516 HC DEV INFL ANGI MRTM -B: Performed by: SURGERY

## 2017-12-28 PROCEDURE — 77030011267 HC ELECTRD BLD COVD -A: Performed by: SURGERY

## 2017-12-28 PROCEDURE — 77030010512 HC APPL CLP LIG J&J -C: Performed by: SURGERY

## 2017-12-28 PROCEDURE — 77030020782 HC GWN BAIR PAWS FLX 3M -B: Performed by: SURGERY

## 2017-12-28 PROCEDURE — 76010000153 HC OR TIME 1.5 TO 2 HR: Performed by: SURGERY

## 2017-12-28 PROCEDURE — 77030018836 HC SOL IRR NACL ICUM -A: Performed by: SURGERY

## 2017-12-28 PROCEDURE — C1725 CATH, TRANSLUMIN NON-LASER: HCPCS | Performed by: SURGERY

## 2017-12-28 RX ORDER — CEFAZOLIN SODIUM 2 G/50ML
2 SOLUTION INTRAVENOUS ONCE
Status: COMPLETED | OUTPATIENT
Start: 2017-12-28 | End: 2017-12-28

## 2017-12-28 RX ORDER — SODIUM CHLORIDE 9 MG/ML
50 INJECTION, SOLUTION INTRAVENOUS CONTINUOUS
Status: DISCONTINUED | OUTPATIENT
Start: 2017-12-28 | End: 2017-12-28

## 2017-12-28 RX ORDER — HEPARIN SODIUM 1000 [USP'U]/ML
INJECTION, SOLUTION INTRAVENOUS; SUBCUTANEOUS AS NEEDED
Status: DISCONTINUED | OUTPATIENT
Start: 2017-12-28 | End: 2017-12-28 | Stop reason: HOSPADM

## 2017-12-28 RX ORDER — MIDAZOLAM HYDROCHLORIDE 1 MG/ML
INJECTION, SOLUTION INTRAMUSCULAR; INTRAVENOUS AS NEEDED
Status: DISCONTINUED | OUTPATIENT
Start: 2017-12-28 | End: 2017-12-28 | Stop reason: HOSPADM

## 2017-12-28 RX ORDER — EPHEDRINE SULFATE/0.9% NACL/PF 25 MG/5 ML
SYRINGE (ML) INTRAVENOUS AS NEEDED
Status: DISCONTINUED | OUTPATIENT
Start: 2017-12-28 | End: 2017-12-28 | Stop reason: HOSPADM

## 2017-12-28 RX ORDER — HYDROCODONE BITARTRATE AND ACETAMINOPHEN 5; 325 MG/1; MG/1
1 TABLET ORAL
Qty: 40 TAB | Refills: 0 | Status: SHIPPED | OUTPATIENT
Start: 2017-12-28 | End: 2018-01-04

## 2017-12-28 RX ORDER — SODIUM CHLORIDE 9 MG/ML
500 INJECTION, SOLUTION INTRAVENOUS CONTINUOUS
Status: DISCONTINUED | OUTPATIENT
Start: 2017-12-28 | End: 2017-12-28 | Stop reason: HOSPADM

## 2017-12-28 RX ORDER — LIDOCAINE HYDROCHLORIDE 10 MG/ML
INJECTION INFILTRATION; PERINEURAL AS NEEDED
Status: DISCONTINUED | OUTPATIENT
Start: 2017-12-28 | End: 2017-12-28 | Stop reason: HOSPADM

## 2017-12-28 RX ORDER — PROPOFOL 10 MG/ML
INJECTION, EMULSION INTRAVENOUS
Status: DISCONTINUED | OUTPATIENT
Start: 2017-12-28 | End: 2017-12-28 | Stop reason: HOSPADM

## 2017-12-28 RX ORDER — LIDOCAINE HYDROCHLORIDE 20 MG/ML
INJECTION, SOLUTION EPIDURAL; INFILTRATION; INTRACAUDAL; PERINEURAL AS NEEDED
Status: DISCONTINUED | OUTPATIENT
Start: 2017-12-28 | End: 2017-12-28 | Stop reason: HOSPADM

## 2017-12-28 RX ORDER — GLYCOPYRROLATE 0.2 MG/ML
INJECTION INTRAMUSCULAR; INTRAVENOUS AS NEEDED
Status: DISCONTINUED | OUTPATIENT
Start: 2017-12-28 | End: 2017-12-28 | Stop reason: HOSPADM

## 2017-12-28 RX ORDER — FENTANYL CITRATE 50 UG/ML
INJECTION, SOLUTION INTRAMUSCULAR; INTRAVENOUS AS NEEDED
Status: DISCONTINUED | OUTPATIENT
Start: 2017-12-28 | End: 2017-12-28 | Stop reason: HOSPADM

## 2017-12-28 RX ADMIN — FENTANYL CITRATE 25 MCG: 50 INJECTION, SOLUTION INTRAMUSCULAR; INTRAVENOUS at 12:24

## 2017-12-28 RX ADMIN — CEFAZOLIN SODIUM 2 G: 2 SOLUTION INTRAVENOUS at 12:06

## 2017-12-28 RX ADMIN — HEPARIN SODIUM 3000 UNITS: 1000 INJECTION, SOLUTION INTRAVENOUS; SUBCUTANEOUS at 12:32

## 2017-12-28 RX ADMIN — FENTANYL CITRATE 25 MCG: 50 INJECTION, SOLUTION INTRAMUSCULAR; INTRAVENOUS at 12:17

## 2017-12-28 RX ADMIN — Medication 10 MG: at 12:53

## 2017-12-28 RX ADMIN — LIDOCAINE HYDROCHLORIDE 40 MG: 20 INJECTION, SOLUTION EPIDURAL; INFILTRATION; INTRACAUDAL; PERINEURAL at 12:24

## 2017-12-28 RX ADMIN — MIDAZOLAM HYDROCHLORIDE 1 MG: 1 INJECTION, SOLUTION INTRAMUSCULAR; INTRAVENOUS at 13:00

## 2017-12-28 RX ADMIN — PROPOFOL 150 MCG/KG/MIN: 10 INJECTION, EMULSION INTRAVENOUS at 12:06

## 2017-12-28 RX ADMIN — GLYCOPYRROLATE 0.2 MG: 0.2 INJECTION INTRAMUSCULAR; INTRAVENOUS at 12:35

## 2017-12-28 RX ADMIN — MIDAZOLAM HYDROCHLORIDE 2 MG: 1 INJECTION, SOLUTION INTRAMUSCULAR; INTRAVENOUS at 11:58

## 2017-12-28 RX ADMIN — HEPARIN SODIUM 2000 UNITS: 1000 INJECTION, SOLUTION INTRAVENOUS; SUBCUTANEOUS at 12:54

## 2017-12-28 RX ADMIN — MIDAZOLAM HYDROCHLORIDE 1 MG: 1 INJECTION, SOLUTION INTRAMUSCULAR; INTRAVENOUS at 12:23

## 2017-12-28 RX ADMIN — SODIUM CHLORIDE 500 ML: 900 INJECTION, SOLUTION INTRAVENOUS at 10:34

## 2017-12-28 NOTE — IP AVS SNAPSHOT
[address] [address] Patient: Tatiana Ha MRN: [id number] LWM:[id number] About your hospitalization You were admitted on:  December 28, 2017 You last received care in the:  [facility] PHASE 2 RECOVERY You were discharged on:  December 28, 2017 Why you were hospitalized Your primary diagnosis was:  Not on File Things You Need To Do (next 8 weeks) Follow up with None Where:  None (395) Patient stated that they have no PCP Discharge Orders None A check ofelia indicates which time of day the medication should be taken. My Medications TAKE these medications as instructed Instructions Each Dose to Equal  
 Morning Noon Evening Bedtime  
 aspirin 81 mg chewable tablet Your last dose was: Your next dose is: Take 1 Tab by mouth daily. 81 mg  
    
   
   
   
  
 calcium carbonate 500 mg calcium (1,250 mg) tablet Commonly known as:  OS-NATALIE Your last dose was: Your next dose is: Take 1 Tab by mouth three (3) times daily (with meals). 1 Tab  
    
   
   
   
  
 carvedilol 25 mg tablet Commonly known as:  Jorgito Black Your last dose was: Your next dose is: Take 1 Tab by mouth two (2) times daily (with meals). 25 mg CLONIDINE HCL PO Your last dose was: Your next dose is: Take 2.5 mg by mouth daily. Indications: HTN  
 2.5 mg  
    
   
   
   
  
 HYDROcodone-acetaminophen 5-325 mg per tablet Commonly known as:  Melani Alberto Your last dose was: Your next dose is: Take 1 Tab by mouth every four (4) hours as needed for Pain. Max Daily Amount: 6 Tabs. 1 Tab  
    
   
   
   
  
 simvastatin 20 mg tablet Commonly known as:  ZOCOR Your last dose was: Your next dose is: Take 1 Tab by mouth nightly. 20 mg  
    
   
   
   
  
 vit B Cmplx 3-FA-Vit C-Biotin 1- mg-mg-mcg tablet Commonly known as:  NEPHRO FLETCHER RX Your last dose was: Your next dose is: Take 1 Tab by mouth daily. 1 Tab ASK your physician about these medications Instructions Each Dose to Equal  
 Morning Noon Evening Bedtime  
 amLODIPine 5 mg tablet Commonly known as:  Delmis Belle Ask about: Should I take this medication? Your last dose was: Your next dose is: Take 1 Tab by mouth daily for 30 days. 5 mg Where to Get Your Medications Information on where to get these meds will be given to you by the nurse or doctor. ! Ask your nurse or doctor about these medications HYDROcodone-acetaminophen 5-325 mg per tablet Discharge Instructions [address] Department of Interventional Radiology [facility] 
[phone] Office 
[phone] Fax THROMBECTOMY/FISTULAPLASTY DISCHARGE INSTRUCTIONS General Information: This procedure has been done in order to improve blood flow through your dialysis access. The procedure is designed to remove clots and/or to enlarge the narrowed areas of your dialysis access. Home Care Instructions: You can resume your regular diet and medication regimen. Do not drink alcohol, drive, or make any important legal decisions in the next 24 hours. Watch the site carefully for signs of infection. You may have some tenderness at your graft site. You make take Tylenol (acetaminophen) for this discomfort. Do not carry anything heavier than a gallon of milk. Do not wear any tight clothing on this arm, including elastic cuffs and/or tight watches.  Do not allow anyone to take a blood pressure or draw blood from this extremity. You should elevate this arm on pillows to help with any swelling. Do not sleep on this arm with the graft, or fold it under your head while you sleep. Feel your graft every day to see if you can feel a thrill (pulsation). Call If: 
   You should call your Physician and/or the Radiology Nurse if you have any signs of infection like fever, drainage, redness, or increased pain at the graft site. Call your doctor or dialysis center immediately if you do not feel a thrill on your graft/fistula, or if you have a change in color of this extremity. Call 911 and seek immediate medical attention if you start bleeding from your graft or fistula. Apply pressure to the graft, but only enough pressure to control the bleeding. [place] the time you start holding pressure and let medical personnel know. Follow-Up Instructions: Please see your ordering doctor as he/she has requested. You may not go to dialysis today, except with special written permission from you doctor. You may go to dialysis tomorrow, and resume your normal dialysis schedule. To Reach Us:   
 
 
Patient Signature: 
Date: 12/28/2017 Discharging Nurse: Richard Joseph RN 
 
DISCHARGE SUMMARY from Nurse PATIENT INSTRUCTIONS: 
 
 
F-face looks uneven A-arms unable to move or move unevenly S-speech slurred or non-existent T-time-call 911 as soon as signs and symptoms begin-DO NOT go Back to bed or wait to see if you get better-TIME IS BRAIN. Warning Signs of HEART ATTACK Call 911 if you have these symptoms: 
? Chest discomfort.  Most heart attacks involve discomfort in the center of the chest that lasts more than a few minutes, or that goes away and comes back. It can feel like uncomfortable pressure, squeezing, fullness, or pain. ? Discomfort in other areas of the upper body. Symptoms can include pain or discomfort in one or both arms, the back, neck, jaw, or stomach. ? Shortness of breath with or without chest discomfort. ? Other signs may include breaking out in a cold sweat, nausea, or lightheadedness. Don't wait more than five minutes to call [address]! Fast action can save your life. Calling 911 is almost always the fastest way to get lifesaving treatment. Emergency Medical Services staff can begin treatment when they arrive  up to an hour sooner than if someone gets to the hospital by car. Patient armband removed and shredded. The discharge information has been reviewed with the patient and caregiver. The patient and caregiver verbalized understanding. Discharge medications reviewed with the patient and caregiver and appropriate educational materials and side effects teaching were provided. ___________________________________________________________________________________________________________________________________ Unresulted Labs-Please follow up with your PCP about these lab tests Order Current Status XR ANGIO EXTREMITY UNI SI In process Providers Seen During Your Hospitalization Provider Specialty Primary office phone Carolina Rogers MD Vascular Surgery [phone] Your Primary Care Physician (PCP) Primary Care Physician Office Phone Office Fax NONE ** None ** ** None ** You are allergic to the following No active allergies Recent Documentation Height Weight BMI Smoking Status 1.651 m 61.2 kg 22.45 kg/m2 Former Smoker Emergency Contacts Name Discharge Info Relation Home Work Mobile Rahel Cruz DISCHARGE CAREGIVER [3] Friend [5]   [phone] Patient Belongings The following personal items are in your possession at time of discharge: 
     Visual Aid: None      Home Medications: None   Jewelry: None  Clothing: Footwear, Jacket/Coat, Pants, Hat, Shirt, [employer], Undergarments (locker 5)    Other Valuables: None Please provide this summary of care documentation to your next provider. Signatures-by signing, you are acknowledging that this After Visit Summary has been reviewed with you and you have received a copy. Patient Signature:  ____________________________________________________________ Date:  ____________________________________________________________  
  
[facility] Signature:  ____________________________________________________________ Date:  ____________________________________________________________

## 2017-12-28 NOTE — H&P
Surgery History and Physical    Subjective:      [facility] Yann Rand is a 47 y.o.  male who presents with clotted avg. Patient Active Problem List    Diagnosis Date Noted    CRF (chronic renal failure) 12/24/2017    AV fistula occlusion ([facility] 12/24/2017    ESRD on hemodialysis ([facility] 11/22/2017    Severe protein-calorie malnutrition ([facility] 08/08/2017    Pleural effusion, right 08/06/2017    Rash of genital area 08/05/2017    PAM (acute kidney injury) ([facility] 08/04/2017    Hypertensive urgency, malignant 08/04/2017    Uremia 08/04/2017    Severe anemia 08/04/2017    Nonadherence to medical treatment 08/04/2017    Tobacco abuse 11/22/2013    Routine general medical examination at a health care facility 11/12/2013    Unspecified essential hypertension 11/12/2013     Past Medical History:   Diagnosis Date    Chronic kidney disease     ESRD- Dialysis- [facility] Hypertension 07/2017    Non compliance w medication regimen     noncompliant with HTN meds      Past Surgical History:   Procedure Laterality Date    HX OTHER SURGICAL      left arm dialysis shunt    HX UROLOGICAL  07/2017    cath on right chest for dialysis    HX VASCULAR ACCESS  11/2017    AV  graft left arm      Social History   Substance Use Topics    Smoking status: Former Smoker     Packs/day: 0.50     Years: 7.00     Types: Cigarettes     Quit date: 7/26/2017    Smokeless tobacco: Never Used    Alcohol use No      Comment: stopped 7-2017      Family History   Problem Relation Age of Onset    Cancer Mother       Prior to Admission medications    Medication Sig Start Date End Date Taking? Authorizing Provider   carvedilol (COREG) 25 mg tablet Take 1 Tab by mouth two (2) times daily (with meals). 11/27/17   Cristine Monreal MD   amLODIPine (NORVASC) 5 mg tablet Take 1 Tab by mouth daily for 30 days. 11/27/17 12/27/17  Cristine Monreal MD   CLONIDINE HCL PO Take 2.5 mg by mouth daily.  Indications: HTN Historical Provider   aspirin 81 mg chewable tablet Take 1 Tab by mouth daily. 17   Kristel Rubio MD   calcium carbonate (OS-NATALIE) 500 mg calcium (1,250 mg) tablet Take 1 Tab by mouth three (3) times daily (with meals). 17   Kristel Rubio MD   simvastatin (ZOCOR) 20 mg tablet Take 1 Tab by mouth nightly. 17   Kristel Rubio MD   vit B Cmplx 3-FA-Vit C-Biotin (NEPHRO FLETCHER RX) 1- mg-mg-mcg tablet Take 1 Tab by mouth daily. 17   Kristel Rubio MD     No Known Allergies      Review of Systems:    A comprehensive review of systems was negative except for that written in the History of Present Illness. Objective:     Patient Vitals for the past 8 hrs:   BP Temp Pulse Resp SpO2 Height Weight   17 0922 130/81 98.3 °F (36.8 °C) 69 16 100 % 5' 5\" (1.651 m) 134 lb 14.4 oz (61.2 kg)       Temp (24hrs), Av.3 °F (36.8 °C), Min:98.3 °F (36.8 °C), Max:98.3 °F (36.8 °C)      Physical Exam:  LUNG: clear to auscultation bilaterally, HEART: S1, S2 normal, ABDOMEN: soft, non-tender. Bowel sounds normal. No masses,  no organomegaly, EXTREMITIES:  extremities normal, atraumatic, no cyanosis or edema, no edema, clotted avg left arm    Labs: No results found for this or any previous visit (from the past 24 hour(s)). Data Review:    BMP:   Lab Results   Component Value Date/Time    Glucose 80 2017 07:50 AM    Sodium 144 2017 07:50 AM    Potassium 4.5 2017 07:50 AM    Chloride 101 2017 07:50 AM    CO2 32 2017 07:50 AM    BUN 57 2017 07:50 AM    Creatinine 14.29 2017 07:50 AM    Calcium 9.1 2017 07:50 AM       Assessment:     Active Problems:    * No active hospital problems. *      Plan: Thrombectomy avg.  Possible revision     Signed By: Sneha Moreno MD     2017

## 2017-12-28 NOTE — ANESTHESIA PREPROCEDURE EVALUATION
Anesthetic History   No history of anesthetic complications            Review of Systems / Medical History  Patient summary reviewed, nursing notes reviewed and pertinent labs reviewed    Pulmonary  Within defined limits                 Neuro/Psych   Within defined limits           Cardiovascular  Within defined limits  Hypertension: well controlled              Exercise tolerance: >4 METS     GI/Hepatic/Renal  Within defined limits              Endo/Other  Within defined limits           Other Findings              Physical Exam    Airway  Mallampati: I  TM Distance: 4 - 6 cm  Neck ROM: normal range of motion   Mouth opening: Normal     Cardiovascular    Rhythm: regular  Rate: normal         Dental      Comments: Overbite and missing teeth   Pulmonary  Breath sounds clear to auscultation               Abdominal  GI exam deferred       Other Findings            Anesthetic Plan    ASA: 4  Anesthesia type: MAC and general - backup          Induction: Intravenous  Anesthetic plan and risks discussed with: Patient

## 2017-12-28 NOTE — IP AVS SNAPSHOT
Summary of Care Report The Summary of Care report has been created to help improve care coordination. Users with access to [employer] (FIG) or [facility] (Web-based application) may access additional patient information including the Discharge Summary. If you are not currently a [address] user and need more information, please call the number listed below in the [place] [address] section and ask to be connected with Medical Records. Facility Information Name Address Phone [address] [place] [id number] [phone] Patient Information Patient Name Sex  Contreras Lee ([id number]) Male 1965 Discharge Information Admitting Provider Service Area Unit Jason Mack MD / [address] [facility] Phase 2 Recovery Discharge Provider Discharge Date/Time Discharge Disposition Destination (none) 2017 (Pending) AHR (none) Patient Language Language ENGLISH [13] Hospital Problems as of 2017  Reviewed: 2017 10:56 AM by Ortiz Jordan MD  
 None Non-Hospital Problems as of 2017  Reviewed: 2017 10:56 AM by Ortiz Jordan MD  
  
  
  
 Class Noted - Resolved Last Modified Active Problems Routine general medical examination at a health care facility  2013 - Present 2013 by Norm Mitchell Entered by Norm Mitchell Unspecified essential hypertension  2013 - Present 2017 by Manuel Ho MD  
  Entered by Norm Mitchell Tobacco abuse  2013 - Present 2017 by Manuel Ho MD  
  Entered by Vernell Barnett MD  
  Overview Signed 2017 11:47 PM by Manuel Ho MD  
   Should get PFTs as an outpatient   PAM (acute kidney injury) ([facility]  2017 - Present 2017 by Manuel Ho MD  
 Entered by Tien Camacho MD  
  Overview Signed 8/4/2017 11:47 PM by Ashley Norris MD  
   Although clinical history is lacking, this is likely acute on chronic kidney disease Hypertensive urgency, malignant  8/4/2017 - Present 8/4/2017 by Ashley Norris MD  
  Entered by Hayley Aviles MD  
  Uremia  8/4/2017 - Present 8/4/2017 by Ashley Norris MD  
  Entered by Hayley Aviles MD  
  Severe anemia  8/4/2017 - Present 8/4/2017 by Ashley Norris MD  
  Entered by Hayley Aviles MD  
  Nonadherence to medical treatment (Chronic)  8/4/2017 - Present 8/4/2017 by Ashley Norris MD  
  Entered by Ashley Norris MD  
  Rash of genital area  8/5/2017 - Present 8/8/2017 by Hayley Aviles MD  
  Entered by Ashley Norris MD  
  Overview Addendum 8/5/2017 12:25 AM by Ashley Norris MD  
   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 Doubt erythema migrans. Pleural effusion, right  8/6/2017 - Present 8/6/2017 by Ashley Norris MD  
  Entered by Ashley Norris MD  
  Severe protein-calorie malnutrition ([address]  8/8/2017 - Present 8/8/2017 by Hayley Aviles MD  
  Entered by Hayley Aviles MD  
  ESRD on hemodialysis [facility])  11/22/2017 - Present 11/22/2017 by Trey Ortiz MD  
  Entered by Trey Ortiz MD  
  CRF (chronic renal failure)  12/24/2017 - Present 12/24/2017 by Jatinder Hendrickson MD  
  Entered by Jatinder Hendrickson MD  
  AV fistula occlusion [facility])  12/24/2017 - Present 12/24/2017 by Jatinder Hendrickson MD  
  Entered by Jatinder Hendrickson MD  
  
You are allergic to the following No active allergies Current Discharge Medication List  
  
START taking these medications Dose & Instructions Dispensing Information Comments HYDROcodone-acetaminophen 5-325 mg per tablet Commonly known as:  Abdiaziz Fraction Dose:  1 Tab Take 1 Tab by mouth every four (4) hours as needed for Pain. Max Daily Amount: 6 Tabs. Quantity:  40 Tab Refills:  0 CONTINUE these medications which have NOT CHANGED Dose & Instructions Dispensing Information Comments  
 aspirin 81 mg chewable tablet Dose:  81 mg Take 1 Tab by mouth daily. Quantity:  30 Tab Refills:  1  
   
 calcium carbonate 500 mg calcium (1,250 mg) tablet Commonly known as:  OS-NATALIE Dose:  1 Tab Take 1 Tab by mouth three (3) times daily (with meals). Quantity:  90 Tab Refills:  0  
   
 carvedilol 25 mg tablet Commonly known as:  Hussain Brandy Dose:  25 mg Take 1 Tab by mouth two (2) times daily (with meals). Quantity:  60 Tab Refills:  0 CLONIDINE HCL PO Dose:  2.5 mg Take 2.5 mg by mouth daily. Indications: HTN Refills:  0  
   
 simvastatin 20 mg tablet Commonly known as:  ZOCOR Dose:  20 mg Take 1 Tab by mouth nightly. Quantity:  30 Tab Refills:  1  
   
 vit B Cmplx 3-FA-Vit C-Biotin 1- mg-mg-mcg tablet Commonly known as:  NEPHRO FLETCHER RX Dose:  1 Tab Take 1 Tab by mouth daily. Quantity:  30 Tab Refills:  1 ASK your doctor about these medications Dose & Instructions Dispensing Information Comments  
 amLODIPine 5 mg tablet Commonly known as:  Ирина Baker Ask about: Should I take this medication? Dose:  5 mg Take 1 Tab by mouth daily for 30 days. Quantity:  30 Tab Refills:  0 Surgery Information ID Date/Time Status Primary Surgeon All Procedures Location [id number] 12/28/2017 1200 Unposted Parish Regalado MD LEFT FOREARM LOOP GRAFT THROMBECTOMY W/C-ARM shuntogram and angioplasty of artery \"SPEC POP\"   [facility] MAIN OR Follow-up Information Follow up With Details Comments Contact Info None   None ([phone]) Patient stated that they have no PCP Discharge Instructions [address] Department of Interventional Radiology [facility] 
[phone] Office 
[phone] Fax THROMBECTOMY/FISTULAPLASTY DISCHARGE INSTRUCTIONS General Information: This procedure has been done in order to improve blood flow through your dialysis access. The procedure is designed to remove clots and/or to enlarge the narrowed areas of your dialysis access. Home Care Instructions: You can resume your regular diet and medication regimen. Do not drink alcohol, drive, or make any important legal decisions in the next 24 hours. Watch the site carefully for signs of infection. You may have some tenderness at your graft site. You make take Tylenol (acetaminophen) for this discomfort. Do not carry anything heavier than a gallon of milk. Do not wear any tight clothing on this arm, including elastic cuffs and/or tight watches. Do not allow anyone to take a blood pressure or draw blood from this extremity. You should elevate this arm on pillows to help with any swelling. Do not sleep on this arm with the graft, or fold it under your head while you sleep. Feel your graft every day to see if you can feel a thrill (pulsation). Call If: 
   You should call your Physician and/or the Radiology Nurse if you have any signs of infection like fever, drainage, redness, or increased pain at the graft site. Call your doctor or dialysis center immediately if you do not feel a thrill on your graft/fistula, or if you have a change in color of this extremity. Call 911 and seek immediate medical attention if you start bleeding from your graft or fistula. Apply pressure to the graft, but only enough pressure to control the bleeding. [place] the time you start holding pressure and let medical personnel know. Follow-Up Instructions: Please see your ordering doctor as he/she has requested. You may not go to dialysis today, except with special written permission from you doctor. You may go to dialysis tomorrow, and resume your normal dialysis schedule. To Reach Us:   
 
 
Patient Signature: 
Date: 12/28/2017 Discharging Nurse: Henry Vasquez RN 
 
DISCHARGE SUMMARY from Nurse PATIENT INSTRUCTIONS: 
 
 
F-face looks uneven A-arms unable to move or move unevenly S-speech slurred or non-existent T-time-call 911 as soon as signs and symptoms begin-DO NOT go Back to bed or wait to see if you get better-TIME IS BRAIN. Warning Signs of HEART ATTACK Call 911 if you have these symptoms: 
? Chest discomfort. Most heart attacks involve discomfort in the center of the chest that lasts more than a few minutes, or that goes away and comes back. It can feel like uncomfortable pressure, squeezing, fullness, or pain. ? Discomfort in other areas of the upper body. Symptoms can include pain or discomfort in one or both arms, the back, neck, jaw, or stomach. ? Shortness of breath with or without chest discomfort. ? Other signs may include breaking out in a cold sweat, nausea, or lightheadedness. Don't wait more than five minutes to call [address]! Fast action can save your life. Calling 911 is almost always the fastest way to get lifesaving treatment. Emergency Medical Services staff can begin treatment when they arrive  up to an hour sooner than if someone gets to the hospital by car. Patient armband removed and shredded. The discharge information has been reviewed with the patient and caregiver. The patient and caregiver verbalized understanding. Discharge medications reviewed with the patient and caregiver and appropriate educational materials and side effects teaching were provided. ___________________________________________________________________________________________________________________________________ Chart Review Routing History No Routing History on File

## 2017-12-28 NOTE — DISCHARGE INSTRUCTIONS
[facility] [address]  Department of Interventional Radiology  [address] [place] [address] [address] [facility]  [phone] Office  [phone] Fax    THROMBECTOMY/FISTULAPLASTY DISCHARGE INSTRUCTIONS    General Information: This procedure has been done in order to improve blood flow through your dialysis access. The procedure is designed to remove clots and/or to enlarge the narrowed areas of your dialysis access. Home Care Instructions: You can resume your regular diet and medication regimen. Do not drink alcohol, drive, or make any important legal decisions in the next 24 hours. Watch the site carefully for signs of infection. You may have some tenderness at your graft site. You make take Tylenol (acetaminophen) for this discomfort. Do not carry anything heavier than a gallon of milk. Do not wear any tight clothing on this arm, including elastic cuffs and/or tight watches. Do not allow anyone to take a blood pressure or draw blood from this extremity. You should elevate this arm on pillows to help with any swelling. Do not sleep on this arm with the graft, or fold it under your head while you sleep. Feel your graft every day to see if you can feel a thrill (pulsation). Call If:     You should call your Physician and/or the Radiology Nurse if you have any signs of infection like fever, drainage, redness, or increased pain at the graft site. Call your doctor or dialysis center immediately if you do not feel a thrill on your graft/fistula, or if you have a change in color of this extremity. Call 911 and seek immediate medical attention if you start bleeding from your graft or fistula. Apply pressure to the graft, but only enough pressure to control the bleeding. Chago the time you start holding pressure and let medical personnel know. Follow-Up Instructions: Please see your ordering doctor as he/she has requested.  You may not go to dialysis today, except with special written permission from you doctor. You may go to dialysis tomorrow, and resume your normal dialysis schedule. To Reach Us:        Patient Signature:  Date: 12/28/2017  Discharging Nurse: Ayse Zamudio RN    DISCHARGE SUMMARY from Nurse    PATIENT INSTRUCTIONS:    After general anesthesia or intravenous sedation, for 24 hours or while taking prescription Narcotics:  · Limit your activities  · Do not drive and operate hazardous machinery  · Do not make important personal or business decisions  · Do  not drink alcoholic beverages  · If you have not urinated within 8 hours after discharge, please contact your surgeon on call. Report the following to your surgeon:  · Excessive pain, swelling, redness or odor of or around the surgical area  · Temperature over 100.5  · Nausea and vomiting lasting longer than 4 hours or if unable to take medications  · Any signs of decreased circulation or nerve impairment to extremity: change in color, persistent  numbness, tingling, coldness or increase pain  · Any questions    What to do at Home:  Recommended activity: Activity as tolerated and no driving for today,     If you experience any of the following symptoms as above, please follow up with DR Perales at [phone]. *  Please give a list of your current medications to your Primary Care Provider. *  Please update this list whenever your medications are discontinued, doses are      changed, or new medications (including over-the-counter products) are added. *  Please carry medication information at all times in case of emergency situations. These are general instructions for a healthy lifestyle:    No smoking/ No tobacco products/ Avoid exposure to second hand smoke  Surgeon General's Warning:  Quitting smoking now greatly reduces serious risk to your health.     Obesity, smoking, and sedentary lifestyle greatly increases your risk for illness    A healthy diet, regular physical exercise & weight monitoring are important for maintaining a healthy lifestyle    You may be retaining fluid if you have a history of heart failure or if you experience any of the following symptoms:  Weight gain of 3 pounds or more overnight or 5 pounds in a week, increased swelling in our hands or feet or shortness of breath while lying flat in bed. Please call your doctor as soon as you notice any of these symptoms; do not wait until your next office visit. Recognize signs and symptoms of STROKE:    F-face looks uneven    A-arms unable to move or move unevenly    S-speech slurred or non-existent    T-time-call 911 as soon as signs and symptoms begin-DO NOT go       Back to bed or wait to see if you get better-TIME IS BRAIN. Warning Signs of HEART ATTACK     Call 911 if you have these symptoms:   Chest discomfort. Most heart attacks involve discomfort in the center of the chest that lasts more than a few minutes, or that goes away and comes back. It can feel like uncomfortable pressure, squeezing, fullness, or pain.  Discomfort in other areas of the upper body. Symptoms can include pain or discomfort in one or both arms, the back, neck, jaw, or stomach.  Shortness of breath with or without chest discomfort.  Other signs may include breaking out in a cold sweat, nausea, or lightheadedness. Don't wait more than five minutes to call 911 - MINUTES MATTER! Fast action can save your life. Calling 911 is almost always the fastest way to get lifesaving treatment. Emergency Medical Services staff can begin treatment when they arrive -- up to an hour sooner than if someone gets to the hospital by car. Patient armband removed and shredded. The discharge information has been reviewed with the patient and caregiver. The patient and caregiver verbalized understanding.   Discharge medications reviewed with the patient and caregiver and appropriate educational materials and side effects teaching were provided.   ___________________________________________________________________________________________________________________________________

## 2017-12-28 NOTE — PERIOP NOTES
When Olya brought the patient back to [facility] for surgery, she mentioned to me that the patient was requesting a taxi voucher. I went in to speak with the patient regarding this and asked if anyone was with him because we couldn't put him imani taxi cab by himself. The patient immediately became agitated and started asking me why I was asking him so many questions, that is was none of my business and that we did this for him last month when he was here. I repeated that it was against the law for us to put someone who has received anesthesia on any type of public transportation without someone 18 years and older with them. The patient proceeded to tell me that \"the doctor is above you and I don't have to talk to you anymore so get the f*ck out of my room\". I told the patient that I would call Dr. Clive Wiseman because I will not be talked to like that. I called Dr. Clive Wiseman who came down to speak with the patient. I also reached out to Liu who went in and spoke with the patient as well. The patient said he had someone named Rashad Sanderson coming at 6 who will be accompanying him on the taxi. His nurse will attempt to call her to confirm.

## 2017-12-28 NOTE — OP NOTES
[facility] [place]  OPERATIVE REPORT    Name:Franca WHITE  MR#: [id number]  : 1965  ACCOUNT #: [de-identified]   DATE OF SERVICE: 2017    SURGEON: Afsaneh Salcedo MD    PREOPERATIVE DIAGNOSIS: Renal failure with clotted left arm arteriovenous (AV) graft. POSTOPERATIVE DIAGNOSIS: Renal failure with clotted left arm arteriovenous (AV) graft. PROCEDURES PERFORMED:  Left forearm loop arteriovenous (AV) graft thrombectomy, shuntogram with radiographic interpretation, reflux arteriogram with radiographic interpretation, placement of catheter in brachial artery, brachial artery angioplasty with associated angiography interpretation. COMPLICATIONS:  Zero. ESTIMATED BLOOD LOSS:  Minimal.    CONDITION:  The patient is stable. SPECIMENS REMOVED:  Acute thrombus. ANESTHESIA:  Moderate sedation with local.    DETAILS OF THE PROCEDURE: The patient is a 60-year-old male here with a left arm clotted loop graft. He is brought to the OR. He had preop antibiotics prior to coming in the room. His labs were reviewed. The correct patient was identified. The left arm was prepped and draped in usual standard fashion after he had moderate sedation followed by CDC-compliant guidelines for aseptic technique. I made an incision across the graft at the distal loop cut down upon the graft, it was well incorporated. No signs of infection. I made a graftotomy. There was fresh thrombus in the graft. I thrombectomized the venous outflow. All soft clot came out. Immediately there was backbleeding. I really did not feel much of any resistance on the pullback of the balloon. I thrombectomized the inflow, had an arterial flow, repaired the site with a 5-0 Prolene, gave 5000 of heparin, punctured the graft in a retrograde fashion and placed a 5-Amharic sheath, compressed the graft, did a reflux arteriogram.  The brachial artery was patent.   There was a 3 cm narrowing of about 80% including up to the anastomosis with the graft. The graft itself is free of thrombus. The entire graft had all thrombus removed, no residual, it was widely patent. The outflow was widely patent to the vein. Across this lesion with a V-18 wire, balloon angioplasty with a 4 x 40 balloon with a 2-1/2-minute hold time. The followup angiogram showed a patent brachial artery and patent anastomosis to the graft. Satisfied with this, I removed the sheath, repaired the site with a 5-0 Prolene. There is a palpable thrill in the graft now. I irrigated and closed with 3-0 Monocryl for subcutaneous, 4-0 Monocryl and Dermabond glue for skin. He was transferred to recovery in stable condition, has no pain in the hand. FINDINGS: Brachial artery patent with a focal stenosis relieved with balloon angioplasty. Runoff of the brachial artery was patent.       DO CHAR Cox / Nori Camp  D: 12/28/2017 14:11     T: 12/28/2017 15:40  JOB #: [id number]

## 2017-12-28 NOTE — PERIOP NOTES
Reviewed PTA medication list with patient/caregiver and patient/caregiver denies any additional medications. Patient admits to having a responsible adult care for them for at least 24 hours after surgery.  When speaking with pt regarding his meds he didn't seem to know what he was taking or not taking other than his Coreg

## 2017-12-28 NOTE — PERIOP NOTES
Spoke with pt's friend Justus Urbina who confirmed she will be picking him up and taking care of him for the next 24 hrs.

## 2017-12-29 NOTE — ANESTHESIA POSTPROCEDURE EVALUATION
Post-Anesthesia Evaluation and Assessment (note entered after discharge because anesthesia was never notified prior to discharge)  Cardiovascular Function/Vital Signs  Visit Vitals    /70    Pulse 78    Temp 36.9 °C (98.4 °F)    Resp 16    Ht 5' 5\" (1.651 m)    Wt 61.2 kg (134 lb 14.4 oz)    SpO2 100%    BMI 22.45 kg/m2       Patient is status post Procedure(s):  LEFT FOREARM LOOP GRAFT THROMBECTOMY W/C-ARM shuntogram and angioplasty of artery \"SPEC POP\"  . Nausea/Vomiting: Controlled. Postoperative hydration reviewed and adequate. Pain:  Pain Scale 1: Numeric (0 - 10) (12/28/17 1510)  Pain Intensity 1: 0 (12/28/17 1510)   Managed. Neurological Status:   Neuro (WDL): Within Defined Limits (12/28/17 1510)   At baseline. Mental Status and Level of Consciousness: Baseline and stable. Pulmonary Status:   O2 Device: Room air (12/28/17 1500)   Adequate oxygenation and airway patent. Complications related to anesthesia: None    Post-anesthesia assessment completed. No concerns. Patient has met all discharge requirements.     Signed By: Clover Miller MD

## 2018-01-03 ENCOUNTER — TELEPHONE (OUTPATIENT)
Dept: VASCULAR SURGERY | Age: 53
End: 2018-01-03

## 2018-01-03 RX ORDER — FLUMAZENIL 0.1 MG/ML
0.2 INJECTION INTRAVENOUS
Status: CANCELLED | OUTPATIENT
Start: 2018-01-09

## 2018-01-03 RX ORDER — HEPARIN SODIUM 1000 [USP'U]/ML
10000 INJECTION, SOLUTION INTRAVENOUS; SUBCUTANEOUS
Status: CANCELLED | OUTPATIENT
Start: 2018-01-09

## 2018-01-03 RX ORDER — NALOXONE HYDROCHLORIDE 0.4 MG/ML
0.1 INJECTION, SOLUTION INTRAMUSCULAR; INTRAVENOUS; SUBCUTANEOUS AS NEEDED
Status: CANCELLED | OUTPATIENT
Start: 2018-01-09

## 2018-01-03 RX ORDER — SODIUM CHLORIDE 9 MG/ML
25 INJECTION, SOLUTION INTRAVENOUS CONTINUOUS
Status: CANCELLED | OUTPATIENT
Start: 2018-01-09

## 2018-01-03 RX ORDER — FENTANYL CITRATE 50 UG/ML
25-200 INJECTION, SOLUTION INTRAMUSCULAR; INTRAVENOUS
Status: CANCELLED | OUTPATIENT
Start: 2018-01-09

## 2018-01-03 RX ORDER — MIDAZOLAM HYDROCHLORIDE 1 MG/ML
.5-4 INJECTION, SOLUTION INTRAMUSCULAR; INTRAVENOUS
Status: CANCELLED | OUTPATIENT
Start: 2018-01-09

## 2018-01-03 NOTE — TELEPHONE ENCOUNTER
----- Message from Razia Luke sent at 1/3/2018 11:20 AM EST -----  Scheryl Saliva from [facility] called in reference to Mr. Hewitt Schaumann his access is clotted.  Can you please give her a call at [phone]

## 2018-01-03 NOTE — TELEPHONE ENCOUNTER
Called dialysis pt's left arm graft has clotted, discussed with provider and patient needs thrombectomy in the cath lab. Scheduled the patient and called him , advised 01/09/18 at 0730 he will work on the ride. and is agreeable to proceed. He will continue to dialyze with cath .

## 2018-01-04 NOTE — PROGRESS NOTES
PT aware of NPO status. NPO after MN night prior to procedure. PT aware of potential for sedation administration and need for  at discharge. PT aware of arrival time pre procedure. Arrive at [facility] pt registration on 1/9/18 at 0630 for scheduled procedure to occur at 0730. Pt states no questions at this time.

## 2018-01-08 ENCOUNTER — TELEPHONE (OUTPATIENT)
Dept: VASCULAR SURGERY | Age: 53
End: 2018-01-08

## 2018-01-08 NOTE — TELEPHONE ENCOUNTER
Called patient and went to voicemail, called the dialysis center and spoke with Ashley Culp, charge nurse , advised the patient needs to come in at 1300, time had changed from 0730 to 1400, advised the patient to come in at 1300, NPO after midnight , and heart and bp med's with small sip of water . Per Ashley Culp she will confirm with patient and give him instructions for tomorrow.

## 2018-01-09 ENCOUNTER — HOSPITAL ENCOUNTER (OUTPATIENT)
Dept: INTERVENTIONAL RADIOLOGY/VASCULAR | Age: 53
Discharge: HOME | End: 2018-01-09
Attending: SURGERY | Admitting: SURGERY
Payer: MEDICAID

## 2018-01-09 ENCOUNTER — HOSPITAL ENCOUNTER (OUTPATIENT)
Dept: INTERVENTIONAL RADIOLOGY/VASCULAR | Age: 53
Discharge: HOME | End: 2018-01-09
Attending: SURGERY

## 2018-01-09 VITALS
HEIGHT: 65 IN | BODY MASS INDEX: 23.06 KG/M2 | DIASTOLIC BLOOD PRESSURE: 76 MMHG | TEMPERATURE: 98.1 F | WEIGHT: 138.4 LBS | OXYGEN SATURATION: 100 % | RESPIRATION RATE: 20 BRPM | SYSTOLIC BLOOD PRESSURE: 157 MMHG | HEART RATE: 62 BPM

## 2018-01-09 DIAGNOSIS — N18.6 ESRD (END STAGE RENAL DISEASE) (HCC): ICD-10-CM

## 2018-01-09 LAB — POTASSIUM SERPL-SCNC: 5 MMOL/L (ref 3.5–5.5)

## 2018-01-09 PROCEDURE — 74011636320 HC RX REV CODE- 636/320: Performed by: SURGERY

## 2018-01-09 PROCEDURE — 75820 VEIN X-RAY ARM/LEG: CPT

## 2018-01-09 PROCEDURE — 84132 ASSAY OF SERUM POTASSIUM: CPT | Performed by: SURGERY

## 2018-01-09 RX ORDER — NALOXONE HYDROCHLORIDE 0.4 MG/ML
0.2 INJECTION, SOLUTION INTRAMUSCULAR; INTRAVENOUS; SUBCUTANEOUS AS NEEDED
Status: DISCONTINUED | OUTPATIENT
Start: 2018-01-09 | End: 2018-01-09 | Stop reason: HOSPADM

## 2018-01-09 RX ORDER — MIDAZOLAM HYDROCHLORIDE 1 MG/ML
.5-4 INJECTION, SOLUTION INTRAMUSCULAR; INTRAVENOUS
Status: DISCONTINUED | OUTPATIENT
Start: 2018-01-09 | End: 2018-01-09 | Stop reason: HOSPADM

## 2018-01-09 RX ORDER — FENTANYL CITRATE 50 UG/ML
25-200 INJECTION, SOLUTION INTRAMUSCULAR; INTRAVENOUS
Status: DISCONTINUED | OUTPATIENT
Start: 2018-01-09 | End: 2018-01-09 | Stop reason: HOSPADM

## 2018-01-09 RX ORDER — SODIUM CHLORIDE 9 MG/ML
25 INJECTION, SOLUTION INTRAVENOUS CONTINUOUS
Status: DISCONTINUED | OUTPATIENT
Start: 2018-01-09 | End: 2018-01-09 | Stop reason: HOSPADM

## 2018-01-09 RX ORDER — FLUMAZENIL 0.1 MG/ML
0.2 INJECTION INTRAVENOUS
Status: DISCONTINUED | OUTPATIENT
Start: 2018-01-09 | End: 2018-01-09 | Stop reason: HOSPADM

## 2018-01-09 RX ADMIN — IOPAMIDOL 8 ML: 510 INJECTION, SOLUTION INTRAVASCULAR at 14:44

## 2018-01-09 NOTE — IP AVS SNAPSHOT
Aylin Gilbert 
 
 
 [address] [facility] [address] 
[phone] Patient: Shae Lay MRN: [id number] JSU:[id number] About your hospitalization You were admitted on:  January 9, 2018 You last received care in the:  [address] You were discharged on:  January 9, 2018 Why you were hospitalized Your primary diagnosis was:  Not on File Follow-up Information Follow up With Details Comments Contact Info None   None ([phone]) Patient stated that they have no PCP Karen Farr MD In 2 weeks follow up as instructed [address] [facility] [address] [facility] [address] 
[phone] Your Scheduled Appointments Tuesday January 23, 2018 11:30 AM EST HOSPITAL DISCHARGE with Karen Farr MD  
BS Vein/Vascular Spec [facility] ([facility])  
 [address] [facility] [address]  
[phone] Discharge Orders None A check ofelia indicates which time of day the medication should be taken. My Medications CONTINUE taking these medications Instructions Each Dose to Equal  
 Morning Noon Evening Bedtime  
 aspirin 81 mg chewable tablet Your last dose was: Your next dose is: Take 1 Tab by mouth daily. 81 mg  
    
   
   
   
  
 calcium carbonate 500 mg calcium (1,250 mg) tablet Commonly known as:  OS-NATALIE Your last dose was: Your next dose is: Take 1 Tab by mouth three (3) times daily (with meals). 1 Tab  
    
   
   
   
  
 carvedilol 25 mg tablet Commonly known as:  [place] Dine Your last dose was: Your next dose is: Take 1 Tab by mouth two (2) times daily (with meals). 25 mg CLONIDINE HCL PO Your last dose was: Your next dose is: Take 2.5 mg by mouth daily. Indications: HTN  
 2.5 mg  
    
   
   
   
  
 simvastatin 20 mg tablet Commonly known as:  ZOCOR Your last dose was: Your next dose is: Take 1 Tab by mouth nightly. 20 mg  
    
   
   
   
  
 vit B Cmplx 3-FA-Vit C-Biotin 1- mg-mg-mcg tablet Commonly known as:  NEPHRO FLETCHER RX Your last dose was: Your next dose is: Take 1 Tab by mouth daily. 1 Tab Discharge Instructions [address] [facility] THROMBECTOMY/FISTULAPLASTY DISCHARGE INSTRUCTIONS General Information: This procedure has been done in order to improve blood flow through your dialysis access. The procedure is designed to remove clots and/or to enlarge the narrowed areas of your dialysis access. Home Care Instructions: You can resume your regular diet and medication regimen. Do not drink alcohol, drive, or make any important legal decisions in the next 24 hours. Watch the site carefully for signs of infection. You may have some tenderness at your graft site. You make take Tylenol (acetaminophen) for this discomfort. Do not carry anything heavier than a gallon of milk. Do not wear any tight clothing on this arm, including elastic cuffs and/or tight watches. Do not allow anyone to take a blood pressure or draw blood from this extremity. You should elevate this arm on pillows to help with any swelling. Do not sleep on this arm with the graft, or fold it under your head while you sleep. Feel your graft every day to see if you can feel a thrill (pulsation). Call If: 
   You should call your Physician and/or the Radiology Nurse if you have any signs of infection like fever, drainage, redness, or increased pain at the graft site. Call your doctor or dialysis center immediately if you do not feel a thrill on your graft/fistula, or if you have a change in color of this extremity. Call 911 and seek immediate medical attention if you start bleeding from your graft or fistula.  Apply pressure to the graft, but only enough pressure to control the bleeding. [place] the time you start holding pressure and let medical personnel know. Follow-Up Instructions: Please see your ordering doctor as he/she has requested. You may not go to dialysis today, except with special written permission from you doctor. You may go to dialysis tomorrow, and resume your normal dialysis schedule. Sedation for a Medical Procedure: Care Instructions Your Care Instructions For a minor procedure or surgery, you will get a sedative to help you relax. This drug will make you sleepy. It is usually given in a vein (by IV). A shot may also be used to numb the area. If you had local anesthesia, you may feel some pain and discomfort as it wears off. If you have pain, don't be afraid to say so. Pain medicine works better if you take it before the pain gets bad. Common side effects from sedation include: · Feeling sleepy. (Your doctors and nurses will make sure you are not too sleepy to go home.) · Nausea and vomiting. This usually does not last long. · Feeling tired. Follow-up care is a key part of your treatment and safety. Be sure to make and go to all appointments, and call your doctor if you are having problems. It's also a good idea to know your test results and keep a list of the medicines you take. How can you care for yourself at home? Activity ? · Don't do anything for 24 hours that requires attention to detail. It takes time for the medicine effects to completely wear off.  
? · For your safety, you should not drive or operate any machinery that could be dangerous until the medicine wears off and you can think clearly and react easily. ? · Rest when you feel tired. Getting enough sleep will help you recover. Diet ? · You can eat your normal diet, unless your doctor gives you other instructions. If your stomach is upset, try clear liquids and bland, low-fat foods like plain toast or rice. ? · Drink plenty of fluids (unless your doctor tells you not to). ? · Don't drink alcohol for 24 hours. Medicines ? · Be safe with medicines. Read and follow all instructions on the label. ¨ If the doctor gave you a prescription medicine for pain, take it as prescribed. ¨ If you are not taking a prescription pain medicine, ask your doctor if you can take an over-the-counter medicine. ? · If you think your pain medicine is making you sick to your stomach: 
¨ Take your medicine after meals (unless your doctor has told you not to). ¨ Ask your doctor for a different pain medicine. When should you call for help? Call 911 anytime you think you may need emergency care. For example, call if: 
? · You have severe trouble breathing. ? · You passed out (lost consciousness). ?Call your doctor now or seek immediate medical care if: 
? · You have trouble breathing. ? · You have ongoing or worsening nausea or vomiting. ? · You have a fever. ? · You have a new or worse headache. ? · The medicine is not wearing off and you can't think clearly. ? Watch closely for changes in your health, and be sure to contact your doctor if: 
? · You do not get better as expected. Where can you learn more? Go to http://jacinto-mike.info/. Enter F310 in the search box to learn more about \"Sedation for a Medical Procedure: Care Instructions. \" Current as of: August 14, 2016 Content Version: 11.4 © 9544-8036 [employer]. Care instructions adapted under license by [employer] (which disclaims liability or warranty for this information). If you have questions about a medical condition or this instruction, always ask your healthcare professional. [place] [address] any warranty or liability for your use of this information. DISCHARGE SUMMARY from Nurse PATIENT INSTRUCTIONS: 
 
 
F-face looks uneven A-arms unable to move or move unevenly S-speech slurred or non-existent T-time-call 911 as soon as signs and symptoms begin-DO NOT go Back to bed or wait to see if you get better-TIME IS BRAIN. Warning Signs of HEART ATTACK Call 911 if you have these symptoms: 
? Chest discomfort. Most heart attacks involve discomfort in the center of the chest that lasts more than a few minutes, or that goes away and comes back. It can feel like uncomfortable pressure, squeezing, fullness, or pain. ? Discomfort in other areas of the upper body. Symptoms can include pain or discomfort in one or both arms, the back, neck, jaw, or stomach. ? Shortness of breath with or without chest discomfort. ? Other signs may include breaking out in a cold sweat, nausea, or lightheadedness. Don't wait more than five minutes to call [address]! Fast action can save your life. Calling 911 is almost always the fastest way to get lifesaving treatment. Emergency Medical Services staff can begin treatment when they arrive  up to an hour sooner than if someone gets to the hospital by car. The discharge information has been reviewed with the patient and spouse. The patient and spouse verbalized understanding. Discharge medications reviewed with the patient and spouse and appropriate educational materials and side effects teaching were provided. Patient armband removed and shredded 
___________________________________________________________________________________________________________________________________ Unresulted Labs-Please follow up with your PCP about these lab tests Order Current Status IR VENOGRAPHY EXT LT In process Providers Seen During Your Hospitalization Provider Specialty Primary office phone Wing Dev MD Vascular Surgery [phone] Your Primary Care Physician (PCP) Primary Care Physician Office Phone Office Fax NONE ** None ** ** None ** You are allergic to the following No active allergies Recent Documentation Height Weight BMI Smoking Status 1.651 m 62.8 kg 23.03 kg/m2 Former Smoker Emergency Contacts Name Discharge Info Relation Home Work Mobile [place]Lisa[place] DISCHARGE CAREGIVER [3] Friend [5] [phone] Patient Belongings The following personal items are in your possession at time of discharge: 
                             
 
  
  
 Please provide this summary of care documentation to your next provider. Signatures-by signing, you are acknowledging that this After Visit Summary has been reviewed with you and you have received a copy. Patient Signature:  ____________________________________________________________ Date:  ____________________________________________________________  
  
[facility] Provider Signature:  ____________________________________________________________ Date:  ____________________________________________________________

## 2018-01-09 NOTE — PROGRESS NOTES
Discharge instructions given to pt and he verbalized all understandings. Pt left via taxi voucher to home in stable condition.

## 2018-01-09 NOTE — H&P (VIEW-ONLY)
Surgery History and Physical    Subjective:      [facility] 47 y.o.  male who presents with clotted avg. Patient Active Problem List    Diagnosis Date Noted    CRF (chronic renal failure) 12/24/2017    AV fistula occlusion ([facility] 12/24/2017    ESRD on hemodialysis ([facility] 11/22/2017    Severe protein-calorie malnutrition ([facility] 08/08/2017    Pleural effusion, right 08/06/2017    Rash of genital area 08/05/2017    PAM (acute kidney injury) ([facility] 08/04/2017    Hypertensive urgency, malignant 08/04/2017    Uremia 08/04/2017    Severe anemia 08/04/2017    Nonadherence to medical treatment 08/04/2017    Tobacco abuse 11/22/2013    Routine general medical examination at a [facility] care facility 11/12/2013    Unspecified essential hypertension 11/12/2013     Past Medical History:   Diagnosis Date    Chronic kidney disease     ESRD- Dialysis- [facility] Hypertension 07/2017    Non compliance w medication regimen     noncompliant with HTN meds      Past Surgical History:   Procedure Laterality Date    HX OTHER SURGICAL      left arm dialysis shunt    HX UROLOGICAL  07/2017    cath on right chest for dialysis    HX VASCULAR ACCESS  11/2017    AV  graft left arm      Social History   Substance Use Topics    Smoking status: Former Smoker     Packs/day: 0.50     Years: 7.00     Types: Cigarettes     Quit date: 7/26/2017    Smokeless tobacco: Never Used    Alcohol use No      Comment: stopped 7-2017      Family History   Problem Relation Age of Onset    Cancer Mother       Prior to Admission medications    Medication Sig Start Date End Date Taking? Authorizing Provider   carvedilol (COREG) 25 mg tablet Take 1 Tab by mouth two (2) times daily (with meals). 11/27/17   Candida Brandon MD   amLODIPine (NORVASC) 5 mg tablet Take 1 Tab by mouth daily for 30 days. 11/27/17 12/27/17  Candida Brandon MD   CLONIDINE HCL PO Take 2.5 mg by mouth daily.  Indications: HTN Historical Provider   aspirin 81 mg chewable tablet Take 1 Tab by mouth daily. 17   Max Hayes MD   calcium carbonate (OS-NATALIE) 500 mg calcium (1,250 mg) tablet Take 1 Tab by mouth three (3) times daily (with meals). 17   Max Hayes MD   simvastatin (ZOCOR) 20 mg tablet Take 1 Tab by mouth nightly. 17   Max Hayes MD   vit B Cmplx 3-FA-Vit C-Biotin (NEPHRO FLETCHER RX) 1- mg-mg-mcg tablet Take 1 Tab by mouth daily. 17   Max Hayes MD     No Known Allergies      Review of Systems:    A comprehensive review of systems was negative except for that written in the History of Present Illness. Objective:     Patient Vitals for the past 8 hrs:   BP Temp Pulse Resp SpO2 Height Weight   17 0922 130/81 98.3 °F (36.8 °C) 69 16 100 % 5' 5\" (1.651 m) 134 lb 14.4 oz (61.2 kg)       Temp (24hrs), Av.3 °F (36.8 °C), Min:98.3 °F (36.8 °C), Max:98.3 °F (36.8 °C)      Physical Exam:  LUNG: clear to auscultation bilaterally, HEART: S1, S2 normal, ABDOMEN: soft, non-tender. Bowel sounds normal. No masses,  no organomegaly, EXTREMITIES:  extremities normal, atraumatic, no cyanosis or edema, no edema, clotted avg left arm    Labs: No results found for this or any previous visit (from the past 24 hour(s)). Data Review:    BMP:   Lab Results   Component Value Date/Time    Glucose 80 2017 07:50 AM    Sodium 144 2017 07:50 AM    Potassium 4.5 2017 07:50 AM    Chloride 101 2017 07:50 AM    CO2 32 2017 07:50 AM    BUN 57 2017 07:50 AM    Creatinine 14.29 2017 07:50 AM    Calcium 9.1 2017 07:50 AM       Assessment:     Active Problems:    * No active hospital problems. *      Plan: Thrombectomy avg.  Possible revision     Signed By: Marquis Victor MD     2017

## 2018-01-09 NOTE — DISCHARGE INSTRUCTIONS
[address] THROMBECTOMY/FISTULAPLASTY DISCHARGE INSTRUCTIONS    General Information: This procedure has been done in order to improve blood flow through your dialysis access. The procedure is designed to remove clots and/or to enlarge the narrowed areas of your dialysis access. Home Care Instructions: You can resume your regular diet and medication regimen. Do not drink alcohol, drive, or make any important legal decisions in the next 24 hours. Watch the site carefully for signs of infection. You may have some tenderness at your graft site. You make take Tylenol (acetaminophen) for this discomfort. Do not carry anything heavier than a gallon of milk. Do not wear any tight clothing on this arm, including elastic cuffs and/or tight watches. Do not allow anyone to take a blood pressure or draw blood from this extremity. You should elevate this arm on pillows to help with any swelling. Do not sleep on this arm with the graft, or fold it under your head while you sleep. Feel your graft every day to see if you can feel a thrill (pulsation). Call If:     You should call your Physician and/or the Radiology Nurse if you have any signs of infection like fever, drainage, redness, or increased pain at the graft site. Call your doctor or dialysis center immediately if you do not feel a thrill on your graft/fistula, or if you have a change in color of this extremity. Call 911 and seek immediate medical attention if you start bleeding from your graft or fistula. Apply pressure to the graft, but only enough pressure to control the bleeding. [place]ago the time you start holding pressure and let medical personnel know. Follow-Up Instructions: Please see your ordering doctor as he/she has requested. You may not go to dialysis today, except with special written permission from you doctor. You may go to dialysis tomorrow, and resume your normal dialysis schedule.          Sedation for a Medical Procedure: Care Instructions  Your Care Instructions    For a minor procedure or surgery, you will get a sedative to help you relax. This drug will make you sleepy. It is usually given in a vein (by IV). A shot may also be used to numb the area. If you had local anesthesia, you may feel some pain and discomfort as it wears off. If you have pain, don't be afraid to say so. Pain medicine works better if you take it before the pain gets bad. Common side effects from sedation include:  · Feeling sleepy. (Your doctors and nurses will make sure you are not too sleepy to go home.)  · Nausea and vomiting. This usually does not last long. · Feeling tired. Follow-up care is a key part of your treatment and safety. Be sure to make and go to all appointments, and call your doctor if you are having problems. It's also a good idea to know your test results and keep a list of the medicines you take. How can you care for yourself at home? Activity  ? · Don't do anything for 24 hours that requires attention to detail. It takes time for the medicine effects to completely wear off.   ? · For your safety, you should not drive or operate any machinery that could be dangerous until the medicine wears off and you can think clearly and react easily. ? · Rest when you feel tired. Getting enough sleep will help you recover. Diet  ? · You can eat your normal diet, unless your doctor gives you other instructions. If your stomach is upset, try clear liquids and bland, low-fat foods like plain toast or rice. ? · Drink plenty of fluids (unless your doctor tells you not to). ? · Don't drink alcohol for 24 hours. Medicines  ? · Be safe with medicines. Read and follow all instructions on the label. ¨ If the doctor gave you a prescription medicine for pain, take it as prescribed. ¨ If you are not taking a prescription pain medicine, ask your doctor if you can take an over-the-counter medicine.    ? · If you think your pain medicine is making you sick to your stomach:  ¨ Take your medicine after meals (unless your doctor has told you not to). ¨ Ask your doctor for a different pain medicine. When should you call for help? Call 911 anytime you think you may need emergency care. For example, call if:  ? · You have severe trouble breathing. ? · You passed out (lost consciousness). ?Call your doctor now or seek immediate medical care if:  ? · You have trouble breathing. ? · You have ongoing or worsening nausea or vomiting. ? · You have a fever. ? · You have a new or worse headache. ? · The medicine is not wearing off and you can't think clearly. ? Watch closely for changes in your health, and be sure to contact your doctor if:  ? · You do not get better as expected. Where can you learn more? Go to http://jacinto-mike.info/. Enter S451 in the search box to learn more about \"Sedation for a Medical Procedure: Care Instructions. \"  Current as of: August 14, 2016  Content Version: 11.4  © 8222-2790 [employer]. Care instructions adapted under license by [employer] (which disclaims liability or warranty for this information). If you have questions about a medical condition or this instruction, always ask your healthcare professional. [address] any warranty or liability for your use of this information. DISCHARGE SUMMARY from Nurse    PATIENT INSTRUCTIONS:    After general anesthesia or intravenous sedation, for 24 hours or while taking prescription Narcotics:  · Limit your activities  · Do not drive and operate hazardous machinery  · Do not make important personal or business decisions  · Do  not drink alcoholic beverages  · If you have not urinated within 8 hours after discharge, please contact your surgeon on call.     Report the following to your surgeon:  · Excessive pain, swelling, redness or odor of or around the surgical area  · Temperature over 100.5  · Nausea and vomiting lasting longer than 4 hours or if unable to take medications  · Any signs of decreased circulation or nerve impairment to extremity: change in color, persistent  numbness, tingling, coldness or increase pain  · Any questions    What to do at Home:  Recommended activity: Activity as tolerated and no driving for today,       *  Please give a list of your current medications to your Primary Care Provider. *  Please update this list whenever your medications are discontinued, doses are      changed, or new medications (including over-the-counter products) are added. *  Please carry medication information at all times in case of emergency situations. These are general instructions for a healthy lifestyle:    No smoking/ No tobacco products/ Avoid exposure to second hand smoke  Surgeon General's Warning:  Quitting smoking now greatly reduces serious risk to your health. Obesity, smoking, and sedentary lifestyle greatly increases your risk for illness    A healthy diet, regular physical exercise & weight monitoring are important for maintaining a healthy lifestyle    You may be retaining fluid if you have a history of heart failure or if you experience any of the following symptoms:  Weight gain of 3 pounds or more overnight or 5 pounds in a week, increased swelling in our hands or feet or shortness of breath while lying flat in bed. Please call your doctor as soon as you notice any of these symptoms; do not wait until your next office visit. Recognize signs and symptoms of STROKE:    F-face looks uneven    A-arms unable to move or move unevenly    S-speech slurred or non-existent    T-time-call 911 as soon as signs and symptoms begin-DO NOT go       Back to bed or wait to see if you get better-TIME IS BRAIN. Warning Signs of HEART ATTACK     Call 911 if you have these symptoms:   Chest discomfort.  Most heart attacks involve discomfort in the center of the chest that lasts more than a few minutes, or that goes away and comes back. It can feel like uncomfortable pressure, squeezing, fullness, or pain.  Discomfort in other areas of the upper body. Symptoms can include pain or discomfort in one or both arms, the back, neck, jaw, or stomach.  Shortness of breath with or without chest discomfort.  Other signs may include breaking out in a cold sweat, nausea, or lightheadedness. Don't wait more than five minutes to call 911 - MINUTES MATTER! Fast action can save your life. Calling 911 is almost always the fastest way to get lifesaving treatment. Emergency Medical Services staff can begin treatment when they arrive -- up to an hour sooner than if someone gets to the hospital by car. The discharge information has been reviewed with the patient and spouse. The patient and spouse verbalized understanding. Discharge medications reviewed with the patient and spouse and appropriate educational materials and side effects teaching were provided.     Patient armband removed and shredded  ___________________________________________________________________________________________________________________________________

## 2018-01-09 NOTE — INTERVAL H&P NOTE
H&P Update:  [facility] was seen and examined. History and physical has been reviewed.  Significant clinical changes have occurred as noted:  His graft has occluded again     Signed By: Troy Adams MD     January 9, 2018 2:26 PM

## 2018-01-10 DIAGNOSIS — N18.6 ESRD ON HEMODIALYSIS (HCC): Primary | ICD-10-CM

## 2018-01-10 DIAGNOSIS — Z99.2 ESRD ON HEMODIALYSIS (HCC): Primary | ICD-10-CM

## 2018-01-11 NOTE — OP NOTES
[facility]  [facility] RE[facility]    Name:Papa WHITE  MR#: [id number]  : 1965  ACCOUNT #: [de-identified]   DATE OF SERVICE: 2018    PREOPERATIVE DIAGNOSES:  End-stage renal disease with recurrent thrombosis of the left upper extremity arteriovenous graft. POSTOPERATIVE DIAGNOSES:  End-stage renal disease with recurrent thrombosis of the left upper extremity arteriovenous graft. PROCEDURE PERFORMED:  Left upper extremity venograms. SURGEON:  Gabrielle Dunlap MD     ANESTHESIA:  None. PACKS AND DRAINS:  None. IMPLANTS:  None. SPECIMENS REMOVED:  None. ESTIMATED BLOOD LOSS:  None. CONDITION:  To recovery, stable. FINDINGS:  Gross forearm cephalic and basilic veins. No usable cephalic in the upper arm, possible short segment basilic vein that would be usable, but appropriate brachial and subclavian vein for an AV graft.  '    INDICATION FOR PROCEDURE:  The patient is a 60-year-old gentleman with end-stage renal disease who presented to the office with recurrent thrombosis of his graft. The patient had had this graft thrombose several times in a short span. Given these findings, decision was made about the patient for new access. Informed consent was obtained for venogram.    PROCEDURE IN DETAIL:  On , the patient presented to the catheterization suite, identified by name and ID bracelet by myself and the entire team.  Once that was done, the patient was then placed on the catheter table in supine position and once this was done, timeout was performed. At this point through the patient's IV in his left hand, we injected contrast and found the above findings on fluoroscopy. Once this was done, we flushed the catheter and then redressed in a sterile fashion.       MD Rehana Mejia Arm / Susy Pitts  D: 2018 17:19     T: 2018 18:31  JOB #: [id number]

## 2018-01-16 ENCOUNTER — HOSPITAL ENCOUNTER (OUTPATIENT)
Dept: VASCULAR SURGERY | Age: 53
Discharge: HOME | End: 2018-01-16
Attending: SURGERY
Payer: MEDICAID

## 2018-01-16 DIAGNOSIS — Z99.2 ESRD ON HEMODIALYSIS (HCC): ICD-10-CM

## 2018-01-16 DIAGNOSIS — N18.6 ESRD ON HEMODIALYSIS (HCC): ICD-10-CM

## 2018-01-16 PROCEDURE — 93970 EXTREMITY STUDY: CPT

## 2018-01-16 NOTE — PROCEDURES
[facility]  *** FINAL REPORT ***    Name: Dony Lock  MRN: [id number]    Outpatient  : 15 Sep 1965  HIS Order #: [id number]  [address] [facility] Visit #: [id number]  Date: 2018    TYPE OF TEST: Peripheral Venous Testing    REASON FOR TEST  Chronic renal failure    Right Arm:-  Deep venous thrombosis:           Not examined  Superficial venous thrombosis:    Not examined    Left Arm:-  Deep venous thrombosis:           Not examined  Superficial venous thrombosis:    Not examined    Vein Mapping:    Diam.   Depth  (mm)    Right Cephalic Vein:    Axilla:    Upper Arm:       3.6     7.8    Lower Arm:       2.5     3.2    Antecubital:     4.2     3.4    Upper Forearm:   2.3     2.1    Forearm:         1.3     2.1    Wrist:    Right Basilic Vein:    Upper Arm:       8.2     4.3    Lower Arm:       4.3     3.8    Antecubital:     4.2     2.2    Upper Forearm:   1.9     2.0    Lower Forearm:    Wrist:    R.Median Cubital:  3.3     6.0    Right Brachial Vein:    Proximal:        3.3     9.3    Distal:          2.7     8.5    Right Subclavian Artery:  Right Axillary Artery  : Normal    Left Cephalic Vein:    Axilla:    Upper Arm:    Lower Arm:    Antecubital:    Upper Forearm:    Forearm:    Wrist:    Left Basilic Vein:    Upper Arm:       4.2    Lower Arm:       2.6    Antecubital:     2.0    Upper Forearm:   1.6    Lower Forearm:   1.3    Wrist:    L. Median Cubital:    Left Brachial Vein:    Proximal:        3.6    14.0    Distal:          3.7     9.3    Left Subclavian Artery:  Left Axillary Artery  : Normal      INTERPRETATION/FINDINGS  Duplex images were obtained using 2-D gray scale, color flow, and  spectral Doppler analysis. Bilateral veins mapped with the application of tourniquet. Right arm:  1. Vein diameters as noted above. 2. No evidence of deep venous thrombosis in the brachial vein. 3. No evidence of superficial venous thrombosis in the superficial  veins. 4. Branch noted in the basilic.   5. Brachial artery bifurcation noted distal to cubital fossa. 6. Distal brachial and radial artery with triphasic waveforms. 7. Basilic and cephalic joins distal to cubital fossa to form medial  cubital vein= 3.3mm    Left arm:  1. Vein diameters as noted above. 2. No evidence of deep venous thrombosis in the brachial vein. 3. Cephalic vein not mapped due to superficial venous thrombus. 4. Brachial artery bifurcation noted distal to cubital fossa. 5. Distal brachial and radial artery with triphasic waveforms  6. Median cubital vein not seen    ADDITIONAL COMMENTS    I have personally reviewed the data relevant to the interpretation of  this  study.     TECHNOLOGIST: Tameka Tan  Signed: 01/16/2018 03:59 PM    PHYSICIAN: Alan Ramos MD  Signed: 01/17/2018 03:52 PM

## 2018-01-19 ENCOUNTER — TELEPHONE (OUTPATIENT)
Dept: VASCULAR SURGERY | Age: 53
End: 2018-01-19

## 2018-01-19 ENCOUNTER — OFFICE VISIT (OUTPATIENT)
Dept: VASCULAR SURGERY | Age: 53
End: 2018-01-19

## 2018-01-19 ENCOUNTER — HOSPITAL ENCOUNTER (OUTPATIENT)
Dept: LAB | Age: 53
Discharge: HOME | End: 2018-01-19
Payer: MEDICAID

## 2018-01-19 VITALS
HEART RATE: 68 BPM | DIASTOLIC BLOOD PRESSURE: 64 MMHG | RESPIRATION RATE: 18 BRPM | SYSTOLIC BLOOD PRESSURE: 124 MMHG | HEIGHT: 65 IN | WEIGHT: 138 LBS | BODY MASS INDEX: 22.99 KG/M2

## 2018-01-19 DIAGNOSIS — Z99.2 ESRD ON HEMODIALYSIS (HCC): ICD-10-CM

## 2018-01-19 DIAGNOSIS — N18.6 ESRD ON HEMODIALYSIS (HCC): Primary | ICD-10-CM

## 2018-01-19 DIAGNOSIS — N18.6 ESRD ON HEMODIALYSIS (HCC): ICD-10-CM

## 2018-01-19 DIAGNOSIS — Z99.2 ESRD ON HEMODIALYSIS (HCC): Primary | ICD-10-CM

## 2018-01-19 LAB
ANION GAP SERPL CALC-SCNC: 16 MMOL/L (ref 3–18)
BASOPHILS # BLD: 0 K/UL (ref 0–0.06)
BASOPHILS NFR BLD: 0 % (ref 0–2)
BUN SERPL-MCNC: 28 MG/DL (ref 7–18)
BUN/CREAT SERPL: 3 (ref 12–20)
CALCIUM SERPL-MCNC: 9.5 MG/DL (ref 8.5–10.1)
CHLORIDE SERPL-SCNC: 97 MMOL/L (ref 100–108)
CO2 SERPL-SCNC: 24 MMOL/L (ref 21–32)
CREAT SERPL-MCNC: 8.03 MG/DL (ref 0.6–1.3)
DIFFERENTIAL METHOD BLD: ABNORMAL
EOSINOPHIL # BLD: 0.5 K/UL (ref 0–0.4)
EOSINOPHIL NFR BLD: 2 % (ref 0–5)
ERYTHROCYTE [DISTWIDTH] IN BLOOD BY AUTOMATED COUNT: 13.2 % (ref 11.6–14.5)
GLUCOSE SERPL-MCNC: 61 MG/DL (ref 74–99)
HCT VFR BLD AUTO: 40.2 % (ref 36–48)
HGB BLD-MCNC: 12.7 G/DL (ref 13–16)
INR PPP: 1 (ref 0.8–1.2)
LYMPHOCYTES # BLD: 1.6 K/UL (ref 0.9–3.6)
LYMPHOCYTES NFR BLD: 7 % (ref 21–52)
MCH RBC QN AUTO: 32.1 PG (ref 24–34)
MCHC RBC AUTO-ENTMCNC: 31.6 G/DL (ref 31–37)
MCV RBC AUTO: 101.5 FL (ref 74–97)
MONOCYTES # BLD: 2.3 K/UL (ref 0.05–1.2)
MONOCYTES NFR BLD: 10 % (ref 3–10)
NEUTS SEG # BLD: 18.7 K/UL (ref 1.8–8)
NEUTS SEG NFR BLD: 81 % (ref 40–73)
PLATELET # BLD AUTO: 250 K/UL (ref 135–420)
PMV BLD AUTO: 10.2 FL (ref 9.2–11.8)
POTASSIUM SERPL-SCNC: 4.5 MMOL/L (ref 3.5–5.5)
PROTHROMBIN TIME: 12.8 SEC (ref 11.5–15.2)
RBC # BLD AUTO: 3.96 M/UL (ref 4.7–5.5)
RBC MORPH BLD: ABNORMAL
RBC MORPH BLD: ABNORMAL
SODIUM SERPL-SCNC: 137 MMOL/L (ref 136–145)
WBC # BLD AUTO: 23.1 K/UL (ref 4.6–13.2)

## 2018-01-19 PROCEDURE — 80048 BASIC METABOLIC PNL TOTAL CA: CPT | Performed by: NURSE PRACTITIONER

## 2018-01-19 PROCEDURE — 85610 PROTHROMBIN TIME: CPT | Performed by: NURSE PRACTITIONER

## 2018-01-19 PROCEDURE — 85025 COMPLETE CBC W/AUTO DIFF WBC: CPT | Performed by: NURSE PRACTITIONER

## 2018-01-19 PROCEDURE — 36415 COLL VENOUS BLD VENIPUNCTURE: CPT | Performed by: NURSE PRACTITIONER

## 2018-01-19 NOTE — PROGRESS NOTES
Order for UA/blood culture  placed per Verbal order from Tim Pittman NP . Pt verbalized understanding .

## 2018-01-19 NOTE — PROGRESS NOTES
[address]    Chief Complaint   Patient presents with    End Stage Renal Disease       History and Physical    Mr. Ashlee Stallings is a 47year old male who returns to our office for continued management of his long term hemodialysis access. Mr. Ashlee Stallings has undergone multiple thrombectomies of his left forearm AV graft, which is currently clotted again. He is currently receiving dialysis through a right IJ TDC. He says the catheter is working well but the machine clots frequently during dialysis. He has no further complaints at this time. Past Medical History:   Diagnosis Date    Chronic kidney disease     ESRD- Dialysis- [facility] Hypertension 07/2017    Non compliance w medication regimen     noncompliant with HTN meds     Patient Active Problem List   Diagnosis Code    Routine general medical examination at a health care facility Z00.00    Unspecified essential hypertension I10    Tobacco abuse Z72.0    PAM (acute kidney injury) ([address] N17.9    Hypertensive urgency, malignant I16.0    Uremia N19    Severe anemia D64.9    Nonadherence to medical treatment Z91.19    Rash of genital area R21    Pleural effusion, right J90    Severe protein-calorie malnutrition ([address] E43    ESRD on hemodialysis ([address] N18.6, Z99.2    CRF (chronic renal failure) N18.9    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 60 Tab 0    CLONIDINE HCL PO Take 2.5 mg by mouth daily. Indications: HTN      aspirin 81 mg chewable tablet Take 1 Tab by mouth daily.  30 Tab 1    calcium carbonate (OS-NATALIE) 500 mg calcium (1,250 mg) tablet Take 1 Tab by mouth three (3) times daily (with meals). 90 Tab 0    simvastatin (ZOCOR) 20 mg tablet Take 1 Tab by mouth nightly. 30 Tab 1    vit B Cmplx 3-FA-Vit C-Biotin (NEPHRO FLETCHER RX) 1- mg-mg-mcg tablet Take 1 Tab by mouth daily. 30 Tab 1     No Known Allergies  Social History     Social History    Marital status: SINGLE     Spouse name: N/A    Number of children: N/A    Years of education: N/A     Occupational History    Not on file. Social History Main Topics    Smoking status: Former Smoker     Packs/day: 0.50     Years: 7.00     Types: Cigarettes     Quit date: 7/26/2017    Smokeless tobacco: Never Used    Alcohol use No      Comment: stopped 7-2017    Drug use: No      Comment: [id number]    Sexual activity: Yes     Partners: Female     Other Topics Concern    Not on file     Social History Narrative      Family History   Problem Relation Age of Onset    Cancer Mother        Review of Systems    Review of Systems   Constitutional: Negative for chills, fever, malaise/fatigue and weight loss. HENT: Negative for ear pain and hearing loss. Eyes: Negative for blurred vision, pain and discharge. Respiratory: Negative for cough, hemoptysis, sputum production and shortness of breath. Cardiovascular: Negative for chest pain, palpitations and orthopnea. Gastrointestinal: Negative for heartburn, nausea and vomiting. Genitourinary: Negative for dysuria and urgency. Musculoskeletal: Negative for myalgias. Skin: Negative for itching and rash. Neurological: Negative for dizziness, tingling, sensory change, weakness and headaches. Endo/Heme/Allergies: Does not bruise/bleed easily. Psychiatric/Behavioral: Negative for depression.      Physical Exam:    Visit Vitals    /64 (BP 1 Location: Right arm, BP Patient Position: Sitting)    Pulse 68    Resp 18    Ht 5' 5\" (1.651 m)    Wt 138 lb (62.6 kg)    BMI 22.96 kg/m2      Physical Examination: General appearance - alert, well appearing, and in no distress  Mental status - alert, oriented to person, place, and time, normal mood, behavior, speech, dress, motor activity, and thought processes  Eyes - left eye normal, right eye normal  Ears - right ear normal, left ear normal  Mouth - mucous membranes moist  Chest - clear to auscultation, no wheezes  Heart - normal rate and regular rhythm  Musculoskeletal - no muscular tenderness noted  Extremities - left radial pulse normal, left forearm AV graft- no palpable thrill. Skin - extremely dry, normal coloration, no rashes, no suspicious skin lesions noted, right chest TDC with dressing clean, dry, and intact     Impression and Plan:    ICD-10-CM ICD-9-CM    1. ESRD on hemodialysis (HCC) N18.6 585.6 PROTHROMBIN TIME + INR    Z99.2 V45.11 CBC WITH AUTOMATED DIFF      METABOLIC PANEL, BASIC      XR CHEST PA LAT      EKG, 12 LEAD, INITIAL     Orders Placed This Encounter    XR CHEST PA LAT    PROTHROMBIN TIME + INR    CBC WITH AUTOMATED DIFF    METABOLIC PANEL, BASIC    EKG, 12 LEAD, INITIAL     I had a conversation with Mr. Alison Keyes regarding his vein mapping and his options for long term hemodialysis access. We will proceed with a new upper arm AV graft as soon as feasible. We will contact the dialysis center and inquire about their complications in dialyzing him. I will follow up with Mr. Alison Keyes after his surgery. Follow-up Disposition:  Return in about 1 month (around 2/19/2018). The treatment plan was reviewed with the patient in detail. The patient voiced understanding of this plan and all questions and concerns were addressed. The patient agrees with this plan. We discussed the signs and symptoms that would require earlier attention or intervention. The patient was given educational material related to his/her visit and the patient has voiced understanding of the material.     I appreciate the opportunity to participate in the care of your patient.   I will be sure to keep you informed of any subsequent changes in the treatment plan. If you have any questions or concerns, please feel free to contact me. Antonio Katz NP    PLEASE NOTE:  This document has been produced using voice recognition software. Unrecognized errors in transcription may be present.

## 2018-01-19 NOTE — PROGRESS NOTES
Order for CBC, BMP, PT/INR , chest xray pa and lat and EKG 12 lead  placed per Verbal order from Roberta Saucedo NP. Pt verbalized understanding .

## 2018-01-19 NOTE — TELEPHONE ENCOUNTER
Contacted Mr. Morrison Cousins to inform him of his elevated WBC count. He denies recent illness, fever, or signs of infection. I have ordered a CXR, UA, and blood cultures and will have him establish care with a PCP to be worked up for this before his surgery. Mr. Morrison Cousins verbalized understanding of this plan.

## 2018-01-19 NOTE — TELEPHONE ENCOUNTER
Called and advised patient that we have set him up with TORSTEN Borrego at [facility] primary care on Tuesday 23rd , at 1230, patient verbalized understanding. Faxed over all records with clearance sheet. Fabienne Knowles

## 2018-01-22 ENCOUNTER — HOSPITAL ENCOUNTER (OUTPATIENT)
Dept: GENERAL RADIOLOGY | Age: 53
Discharge: HOME | End: 2018-01-22
Attending: SURGERY
Payer: MEDICAID

## 2018-01-22 DIAGNOSIS — N18.6 END STAGE RENAL DISEASE (HCC): ICD-10-CM

## 2018-01-22 PROCEDURE — 71046 X-RAY EXAM CHEST 2 VIEWS: CPT

## 2018-02-21 ENCOUNTER — ANESTHESIA EVENT (OUTPATIENT)
Dept: SURGERY | Age: 53
End: 2018-02-21
Payer: MEDICAID

## 2018-02-21 ENCOUNTER — TELEPHONE (OUTPATIENT)
Dept: VASCULAR SURGERY | Age: 53
End: 2018-02-21

## 2018-02-21 NOTE — TELEPHONE ENCOUNTER
Called the patient and advised to arrive no later than 0900, NPO after midnight , take heart and bp meds with small sip of water in the am . Pt verbalized understanding.

## 2018-02-22 ENCOUNTER — ANESTHESIA (OUTPATIENT)
Dept: SURGERY | Age: 53
End: 2018-02-22
Payer: MEDICAID

## 2018-02-22 ENCOUNTER — HOSPITAL ENCOUNTER (OUTPATIENT)
Age: 53
Setting detail: OUTPATIENT SURGERY
Discharge: HOME | End: 2018-02-22
Attending: SURGERY | Admitting: SURGERY
Payer: MEDICAID

## 2018-02-22 VITALS
HEART RATE: 68 BPM | BODY MASS INDEX: 22.91 KG/M2 | RESPIRATION RATE: 15 BRPM | HEIGHT: 65 IN | TEMPERATURE: 98.1 F | DIASTOLIC BLOOD PRESSURE: 78 MMHG | OXYGEN SATURATION: 99 % | WEIGHT: 137.5 LBS | SYSTOLIC BLOOD PRESSURE: 131 MMHG

## 2018-02-22 DIAGNOSIS — G89.18 POST-OPERATIVE PAIN: Primary | ICD-10-CM

## 2018-02-22 LAB
ABO + RH BLD: NORMAL
BLOOD GROUP ANTIBODIES SERPL: NORMAL
POTASSIUM SERPL-SCNC: 4.9 MMOL/L (ref 3.5–5.5)
SPECIMEN EXP DATE BLD: NORMAL

## 2018-02-22 PROCEDURE — 84132 ASSAY OF SERUM POTASSIUM: CPT | Performed by: SURGERY

## 2018-02-22 PROCEDURE — 74011250636 HC RX REV CODE- 250/636: Performed by: SURGERY

## 2018-02-22 PROCEDURE — 76210000021 HC REC RM PH II 0.5 TO 1 HR: Performed by: SURGERY

## 2018-02-22 PROCEDURE — 74011250637 HC RX REV CODE- 250/637: Performed by: ANESTHESIOLOGY

## 2018-02-22 PROCEDURE — 77030020782 HC GWN BAIR PAWS FLX 3M -B: Performed by: SURGERY

## 2018-02-22 PROCEDURE — 74011000272 HC RX REV CODE- 272: Performed by: SURGERY

## 2018-02-22 PROCEDURE — 77030002933 HC SUT MCRYL J&J -A: Performed by: SURGERY

## 2018-02-22 PROCEDURE — 76010000131 HC OR TIME 2 TO 2.5 HR: Performed by: SURGERY

## 2018-02-22 PROCEDURE — 74011250636 HC RX REV CODE- 250/636

## 2018-02-22 PROCEDURE — 74011000250 HC RX REV CODE- 250: Performed by: SURGERY

## 2018-02-22 PROCEDURE — 77030032490 HC SLV COMPR SCD KNE COVD -B: Performed by: SURGERY

## 2018-02-22 PROCEDURE — C1768 GRAFT, VASCULAR: HCPCS | Performed by: SURGERY

## 2018-02-22 PROCEDURE — 76060000035 HC ANESTHESIA 2 TO 2.5 HR: Performed by: SURGERY

## 2018-02-22 PROCEDURE — 77030010512 HC APPL CLP LIG J&J -C: Performed by: SURGERY

## 2018-02-22 PROCEDURE — 77030002996 HC SUT SLK J&J -A: Performed by: SURGERY

## 2018-02-22 PROCEDURE — 74011000258 HC RX REV CODE- 258

## 2018-02-22 PROCEDURE — 86850 RBC ANTIBODY SCREEN: CPT | Performed by: SURGERY

## 2018-02-22 PROCEDURE — 64415 NJX AA&/STRD BRCH PLXS IMG: CPT | Performed by: ANESTHESIOLOGY

## 2018-02-22 PROCEDURE — 36415 COLL VENOUS BLD VENIPUNCTURE: CPT | Performed by: SURGERY

## 2018-02-22 PROCEDURE — 77030011640 HC PAD GRND REM COVD -A: Performed by: SURGERY

## 2018-02-22 PROCEDURE — 77030031139 HC SUT VCRL2 J&J -A: Performed by: SURGERY

## 2018-02-22 PROCEDURE — 77030002924 HC SUT GORTX WLGO -B: Performed by: SURGERY

## 2018-02-22 PROCEDURE — 76942 ECHO GUIDE FOR BIOPSY: CPT | Performed by: SURGERY

## 2018-02-22 PROCEDURE — 77030016060 HC NDL NRV BLK TELE -A: Performed by: ANESTHESIOLOGY

## 2018-02-22 DEVICE — GRAFT VASC L45CM DIA4-7MM PTFE CBAS HEP SURF STD WALLED: Type: IMPLANTABLE DEVICE | Site: ARM | Status: FUNCTIONAL

## 2018-02-22 RX ORDER — CEFAZOLIN SODIUM 2 G/50ML
2 SOLUTION INTRAVENOUS ONCE
Status: DISCONTINUED | OUTPATIENT
Start: 2018-02-22 | End: 2018-02-22 | Stop reason: HOSPADM

## 2018-02-22 RX ORDER — SODIUM CHLORIDE 9 MG/ML
1000 INJECTION, SOLUTION INTRAVENOUS CONTINUOUS
Status: DISCONTINUED | OUTPATIENT
Start: 2018-02-22 | End: 2018-02-22 | Stop reason: HOSPADM

## 2018-02-22 RX ORDER — OXYCODONE AND ACETAMINOPHEN 5; 325 MG/1; MG/1
2 TABLET ORAL
Qty: 112 TAB | Refills: 0 | Status: SHIPPED | OUTPATIENT
Start: 2018-02-22 | End: 2018-02-28 | Stop reason: ALTCHOICE

## 2018-02-22 RX ORDER — HEPARIN SODIUM 1000 [USP'U]/ML
INJECTION, SOLUTION INTRAVENOUS; SUBCUTANEOUS AS NEEDED
Status: DISCONTINUED | OUTPATIENT
Start: 2018-02-22 | End: 2018-02-22 | Stop reason: HOSPADM

## 2018-02-22 RX ORDER — OXYCODONE AND ACETAMINOPHEN 5; 325 MG/1; MG/1
1 TABLET ORAL
Status: COMPLETED | OUTPATIENT
Start: 2018-02-22 | End: 2018-02-22

## 2018-02-22 RX ORDER — MIDAZOLAM HYDROCHLORIDE 1 MG/ML
INJECTION, SOLUTION INTRAMUSCULAR; INTRAVENOUS AS NEEDED
Status: DISCONTINUED | OUTPATIENT
Start: 2018-02-22 | End: 2018-02-22 | Stop reason: HOSPADM

## 2018-02-22 RX ADMIN — HEPARIN SODIUM 4000 UNITS: 1000 INJECTION, SOLUTION INTRAVENOUS; SUBCUTANEOUS at 13:07

## 2018-02-22 RX ADMIN — SODIUM CHLORIDE 1000 ML: 900 INJECTION, SOLUTION INTRAVENOUS at 10:51

## 2018-02-22 RX ADMIN — MIDAZOLAM HYDROCHLORIDE 5 MG: 1 INJECTION, SOLUTION INTRAMUSCULAR; INTRAVENOUS at 11:25

## 2018-02-22 RX ADMIN — OXYCODONE HYDROCHLORIDE AND ACETAMINOPHEN 1 TABLET: 5; 325 TABLET ORAL at 14:33

## 2018-02-22 NOTE — ANESTHESIA PREPROCEDURE EVALUATION
Anesthetic History   No history of anesthetic complications            Review of Systems / Medical History  Patient summary reviewed, nursing notes reviewed and pertinent labs reviewed    Pulmonary  Within defined limits                 Neuro/Psych   Within defined limits           Cardiovascular    Hypertension: poorly controlled              Exercise tolerance: >4 METS     GI/Hepatic/Renal  Within defined limits              Endo/Other  Within defined limits           Other Findings              Physical Exam    Airway  Mallampati: II  TM Distance: 4 - 6 cm  Neck ROM: normal range of motion   Mouth opening: Normal     Cardiovascular  Regular rate and rhythm,  S1 and S2 normal,  no murmur, click, rub, or gallop             Dental    Dentition: Poor dentition     Pulmonary  Breath sounds clear to auscultation               Abdominal  GI exam deferred       Other Findings            Anesthetic Plan    ASA: 4  Anesthesia type: regional - interscalene block          Induction: Intravenous  Anesthetic plan and risks discussed with: Patient

## 2018-02-22 NOTE — H&P
History and Physical    Mr. Qi Vieyra is a 47year old male who returns to our office for continued management of his long term hemodialysis access. Mr. Qi Vieyra has undergone multiple thrombectomies of his left forearm AV graft, which is currently clotted again. He is currently receiving dialysis through a right IJ TDC. He says the catheter is working well but the machine clots frequently during dialysis. He has no further complaints at this time.          Past Medical History:   Diagnosis Date    Chronic kidney disease       ESRD- D[facility]- [facility]    Hypertension 07/2017    Non compliance w medication regimen       noncompliant with HTN meds           Patient Active Problem List   Diagnosis Code    Routine general medical examination at a health care facility Z00.00    Unspecified essential hypertension I10    Tobacco abuse Z72.0    PAM (acute kidney injury) ([address] N17.9    Hypertensive urgency, malignant I16.0    Uremia N19    Severe anemia D64.9    Nonadherence to medical treatment Z91.19    Rash of genital area R21    Pleural effusion, right J90    Severe protein-calorie malnutrition ([address] E43    ESRD on hemodialysis ([address] N18.6, Z99.2    CRF (chronic renal failure) N18.9    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 60 Tab 0    CLONIDINE HCL PO Take 2.5 mg by mouth daily. Indications: HTN        aspirin 81 mg chewable tablet Take 1 Tab by mouth daily. 30 Tab 1    calcium carbonate (OS-NATALIE) 500 mg calcium (1,250 mg) tablet Take 1 Tab by mouth three (3) times daily (with meals).  90 Tab 0    simvastatin (ZOCOR) 20 mg tablet Take 1 Tab by mouth nightly. 30 Tab 1    vit B Cmplx 3-FA-Vit C-Biotin (NEPHRO FLETCHER RX) 1- mg-mg-mcg tablet Take 1 Tab by mouth daily. 30 Tab 1      No Known Allergies  Social History            Social History    Marital status: SINGLE       Spouse name: N/A    Number of children: N/A    Years of education: N/A          Occupational History    Not on file.             Social History Main Topics    Smoking status: Former Smoker       Packs/day: 0.50       Years: 7.00       Types: Cigarettes       Quit date: 7/26/2017    Smokeless tobacco: Never Used    Alcohol use No         Comment: stopped 7-2017    Drug use: No         Comment: [id number]    Sexual activity: Yes       Partners: Female           Other Topics Concern    Not on file      Social History Narrative            Family History   Problem Relation Age of Onset    Cancer Mother           Review of Systems    Review of Systems   Constitutional: Negative for chills, fever, malaise/fatigue and weight loss. HENT: Negative for ear pain and hearing loss. Eyes: Negative for blurred vision, pain and discharge. Respiratory: Negative for cough, hemoptysis, sputum production and shortness of breath. Cardiovascular: Negative for chest pain, palpitations and orthopnea. Gastrointestinal: Negative for heartburn, nausea and vomiting. Genitourinary: Negative for dysuria and urgency. Musculoskeletal: Negative for myalgias. Skin: Negative for itching and rash. Neurological: Negative for dizziness, tingling, sensory change, weakness and headaches. Endo/Heme/Allergies: Does not bruise/bleed easily.    Psychiatric/Behavioral: Negative for depression.      Physical Exam:         Visit Vitals    /64 (BP 1 Location: Right arm, BP Patient Position: Sitting)    Pulse 68    Resp 18    Ht 5' 5\" (1.651 m)    Wt 138 lb (62.6 kg)    BMI 22.96 kg/m2      Physical Examination: General appearance - alert, well appearing, and in no distress  Mental status - alert, oriented to person, place, and time, normal mood, behavior, speech, dress, motor activity, and thought processes  Eyes - left eye normal, right eye normal  Ears - right ear normal, left ear normal  Mouth - mucous membranes moist  Chest - clear to auscultation, no wheezes  Heart - normal rate and regular rhythm  Musculoskeletal - no muscular tenderness noted  Extremities - left radial pulse normal, left forearm AV graft- no palpable thrill. Skin - extremely dry, normal coloration, no rashes, no suspicious skin lesions noted, right chest TDC with dressing clean, dry, and intact      Impression and Plan:      ICD-10-CM ICD-9-CM     1. ESRD on hemodialysis (HCC) N18.6 585.6 PROTHROMBIN TIME + INR     Z99.2 V45.11 CBC WITH AUTOMATED DIFF         METABOLIC PANEL, BASIC         XR CHEST PA LAT         EKG, 12 LEAD, INITIAL          Orders Placed This Encounter    XR CHEST PA LAT    PROTHROMBIN TIME + INR    CBC WITH AUTOMATED DIFF    METABOLIC PANEL, BASIC    EKG, 12 LEAD, INITIAL      I had a conversation with . Red Terry regarding his vein mapping and his options for long term hemodialysis access. We will proceed with a new upper arm AV graft as soon as feasible. We will contact the dialysis center and inquire about their complications in dialyzing him.   I will follow up with . Red Terry after his surgery.

## 2018-02-22 NOTE — PERIOP NOTES
Patient admits to having a responsible adult care for them at home for 24 hours, but does not have a ride home. Stated he will need a cab.

## 2018-02-22 NOTE — DISCHARGE INSTRUCTIONS
Hemodialysis Access: What to Expect at [facility]  Hemodialysis is a way to remove wastes from the blood when your kidneys can no longer do the job. It is not a cure, but it can help you live longer and feel better. It is a lifesaving treatment when you have kidney failure. Hemodialysis is often called dialysis. Your doctor created a place (called an access) in your arm for your blood to flow in and out of your body during your dialysis sessions. Your arm will probably be bruised and swollen. It may hurt. The cut (incision) may bleed. The pain and bleeding will get better over several days. You will probably need only over-the-counter pain medicine. You can reduce swelling by propping your arm on 1 or 2 pillows and keeping your elbow straight. You will have stitches. These may dissolve on their own, or your doctor will tell you when to come in to have them removed. You should also be able to return to work in a few days. You may feel some coolness or numbness in your hand. These feelings usually go away in a few weeks. Your doctor may suggest squeezing a soft object. This will strengthen your access and may make hemodialysis faster and easier. You should always be able to feel blood rushing through the fistula or graft. It feels like a slight vibration when you put your fingers on the skin over the fistula or graft. This feeling is called a thrill or pulse. This care sheet gives you a general idea about how long it will take for you to recover. But each person recovers at a different pace. Follow the steps below to get better as quickly as possible. How can you care for yourself at home? Activity  ? · Rest when you feel tired. Getting enough sleep will help you recover. Do not lie on or sleep on the arm with the access. ? · Avoid activities such as washing windows or gardening that put stress on the arm with the access.    ? · You may use your arm, but do not lift anything that weighs more than about 15 pounds. This may include a child, heavy grocery bags, a heavy briefcase or backpack, cat litter or dog food bags, or a vacuum . ? · You can shower, but keep the access dry for the first 2 days. Cover the area with a plastic bag to keep it dry. ? · Do not soak or scrub the incision until it has healed. ? · Wear an arm guard to protect the area if you play sports or work with your arms. ? · You may drive when your doctor says it is okay. This is usually in 1 to 2 days. ? · Most people are able to return to work about 1 or 2 days after surgery. Diet  ? · Follow an eating plan that is good for your kidneys. A registered dietitian can help you make a meal plan that is right for you. You may need to limit protein, salt, fluids, and certain foods. Medicines  ? · Your doctor will tell you if and when you can restart your medicines. He or she will also give you instructions about taking any new medicines. ? · If you take blood thinners, such as warfarin (Coumadin), clopidogrel (Plavix), or aspirin, be sure to talk to your doctor. He or she will tell you if and when to start taking those medicines again. Make sure that you understand exactly what your doctor wants you to do. ? · Take pain medicines exactly as directed. ¨ If the doctor gave you a prescription medicine for pain, take it as prescribed. ¨ If you are not taking a prescription pain medicine, ask your doctor if you can take acetaminophen (Tylenol). Do not take ibuprofen (Advil, Motrin) or naproxen (Aleve), or similar medicines, unless your doctor tells you to. They may make chronic kidney disease worse. ¨ Do not take two or more pain medicines at the same time unless the doctor told you to. Many pain medicines have acetaminophen, which is Tylenol. Too much acetaminophen (Tylenol) can be harmful.    ? · If you think your pain medicine is making you sick to your stomach:  ¨ Take your medicine after meals (unless your doctor has told you not to). ¨ Ask your doctor for a different pain medicine. ? · If your doctor prescribed antibiotics, take them as directed. Do not stop taking them just because you feel better. You need to take the full course of antibiotics. Incision care  ? · Keep the area dry for 2 days. After 2 days, wash the area with soap and water every day, and always before dialysis. ? · Do not soak or scrub the incision until it has healed. ? · If you have a bandage, change it every day or as your doctor recommends. Your doctor will tell you when you can remove it. Exercise  ? · Squeeze a soft ball or other object as your doctor tells you. This will help blood flow through the access and help prevent blood clots. ? Elevation  ? · Prop up the sore arm on a pillow anytime you sit or lie down during the next 3 days. Try to keep it above the level of your heart. This will help reduce swelling. Other instructions  ? · Every day, check your access for a pulse or thrill in the fistula or graft area. A thrill is a vibration. To feel a pulse or thrill, place the first two fingers of your hand over the access. ? · Do not bump your arm. ? · Do not wear tight clothing, jewelry, or anything else that may squeeze the access. ? · Use your other arm to have blood drawn or blood pressure taken. ? · Do not put cream or lotion on or near the access. ? · Make sure all doctors you deal with know you have a vascular access. Follow-up care is a key part of your treatment and safety. Be sure to make and go to all appointments, and call your doctor if you are having problems. It's also a good idea to know your test results and keep a list of the medicines you take. When should you call for help? Call 911 anytime you think you may need emergency care. For example, call if:  ? · You passed out (lost consciousness). ? · You have chest pain, are short of breath, or cough up blood.    ?Call your doctor now or seek immediate medical care if:  ? · Your hand or arm is cold or dark-colored. ? · You have no pulse in your access. ? · You have nausea or you vomit. ? · You have pain that does not get better after you take pain medicine. ? · You have loose stitches, or your incision comes open. ? · You are bleeding from the incision. ? · You have signs of infection, such as:  ¨ Increased pain, swelling, warmth, or redness. ¨ Red streaks leading from the area. ¨ Pus draining from the area. ¨ A fever. ? · You have signs of a blood clot in your leg (called a deep vein thrombosis), such as:  ¨ Pain in your calf, back of the knee, thigh, or groin. ¨ Redness or swelling in your leg. ? Watch closely for changes in your health, and be sure to contact your doctor if you have any problems. Where can you learn more? Go to http://jacinto-mike.info/. Enter P616 in the search box to learn more about \"Hemodialysis Access: What to Expect at Home. \"  Current as of: May 12, 2017  Content Version: 11.4  © 4439-0514 [employer]. Care instructions adapted under license by [employer] (which disclaims liability or warranty for this information). If you have questions about a medical condition or this instruction, always ask your healthcare professional. [address] any warranty or liability for your use of this information. DISCHARGE SUMMARY from Nurse    PATIENT INSTRUCTIONS:    After general anesthesia or intravenous sedation, for 24 hours or while taking prescription Narcotics:  · Limit your activities  · Do not drive and operate hazardous machinery  · Do not make important personal or business decisions  · Do  not drink alcoholic beverages  · If you have not urinated within 8 hours after discharge, please contact your surgeon on call.     Report the following to your surgeon:  · Excessive pain, swelling, redness or odor of or around the surgical area  · Temperature over 100.5  · Nausea and vomiting lasting longer than 4 hours or if unable to take medications  · Any signs of decreased circulation or nerve impairment to extremity: change in color, persistent  numbness, tingling, coldness or increase pain  · Any questions    What to do at Home:  Recommended activity: Activity as tolerated and Ambulate in house. If you experience any of the following symptoms fever greater than 101.0, chills, nausea or vomiting, drainage from incision site, pain not relieved with medication, please follow up with Dr. Alban Hood. Regular diet as tolerated. Maintain good fluid intake at home. Wear sling at home until circulation returns to normal.  No heavy lifting or strenuous activity with left arm. If taking narcotic pain medication, use stool softener to prevent constipation. Follow up with Dr. Alban Hood in 2-weeks. *  Please give a list of your current medications to your Primary Care Provider. *  Please update this list whenever your medications are discontinued, doses are      changed, or new medications (including over-the-counter products) are added. *  Please carry medication information at all times in case of emergency situations. These are general instructions for a healthy lifestyle:    No smoking/ No tobacco products/ Avoid exposure to second hand smoke  Surgeon General's Warning:  Quitting smoking now greatly reduces serious risk to your health. Obesity, smoking, and sedentary lifestyle greatly increases your risk for illness    A healthy diet, regular physical exercise & weight monitoring are important for maintaining a healthy lifestyle    You may be retaining fluid if you have a history of heart failure or if you experience any of the following symptoms:  Weight gain of 3 pounds or more overnight or 5 pounds in a week, increased swelling in our hands or feet or shortness of breath while lying flat in bed.   Please call your doctor as soon as you notice any of these symptoms; do not wait until your next office visit. Recognize signs and symptoms of STROKE:    F-face looks uneven    A-arms unable to move or move unevenly    S-speech slurred or non-existent    T-time-call 911 as soon as signs and symptoms begin-DO NOT go       Back to bed or wait to see if you get better-TIME IS BRAIN. Warning Signs of HEART ATTACK     Call 911 if you have these symptoms:   Chest discomfort. Most heart attacks involve discomfort in the center of the chest that lasts more than a few minutes, or that goes away and comes back. It can feel like uncomfortable pressure, squeezing, fullness, or pain.  Discomfort in other areas of the upper body. Symptoms can include pain or discomfort in one or both arms, the back, neck, jaw, or stomach.  Shortness of breath with or without chest discomfort.  Other signs may include breaking out in a cold sweat, nausea, or lightheadedness. Don't wait more than five minutes to call 911 - MINUTES MATTER! Fast action can save your life. Calling 911 is almost always the fastest way to get lifesaving treatment. Emergency Medical Services staff can begin treatment when they arrive -- up to an hour sooner than if someone gets to the hospital by car. The discharge information has been reviewed with the patient and caregiver. The patient and caregiver verbalized understanding. Discharge medications reviewed with the patient and caregiver and appropriate educational materials and side effects teaching were provided. Patient armband removed and shredded.     ___________________________________________________________________________________________________________________________________

## 2018-02-22 NOTE — IP AVS SNAPSHOT
[facility] 
 
 
 [address] [place] [address] [address] [address] 
[phone] Patient: Izabela Cartwright MRN: [id number] AEW:9/56/1937 About your hospitalization You were admitted on:  February 22, 2018 You last received care in the:   PHASE 2 RECOVERY You were discharged on:  February 22, 2018 Why you were hospitalized Your primary diagnosis was:  Not on File Follow-up Information Follow up With Details Comments Contact Info Magda Lizama, [address] [facility] [address] [address] 
[phone] Laura Bowers MD Go in 2 week(s)  [address] [facility] [address] [facility] 
[phone] Your Scheduled Appointments Thursday March 08, 2018 10:00 AM EST HOSPITAL DISCHARGE with Darnell Curran NP  
BS Vein/Vascular Spec THE [facility] ([facility])  
 [address] [facility]  
[phone] Discharge Orders None A check ofelia indicates which time of day the medication should be taken. My Medications START taking these medications Instructions Each Dose to Equal  
 Morning Noon Evening Bedtime  
 oxyCODONE-acetaminophen 5-325 mg per tablet Commonly known as:  PERCOCET Your last dose was: Your next dose is: Take 2 Tabs by mouth every six (6) hours as needed for Pain. Max Daily Amount: 8 Tabs. 2 Tab CONTINUE taking these medications Instructions Each Dose to Equal  
 Morning Noon Evening Bedtime  
 aspirin 81 mg chewable tablet Your last dose was: Your next dose is: Take 1 Tab by mouth daily. 81 mg  
    
   
   
   
  
 carvedilol 25 mg tablet Commonly known as:  [place] Your last dose was: Your next dose is: Take 1 Tab by mouth two (2) times daily (with meals). 25 mg  CLONIDINE HCL PO  
   
 Your last dose was: Your next dose is: Take 2.5 mg by mouth nightly. Indications: HTN  
 2.5 mg  
    
   
   
   
  
 simvastatin 20 mg tablet Commonly known as:  ZOCOR Your last dose was: Your next dose is: Take 1 Tab by mouth nightly. 20 mg Where to Get Your Medications Information on where to get these meds will be given to you by the nurse or doctor. ! Ask your nurse or doctor about these medications  
  oxyCODONE-acetaminophen 5-325 mg per tablet Discharge Instructions Hemodialysis Access: What to Expect at [facility] Your Recovery Hemodialysis is a way to remove wastes from the blood when your kidneys can no longer do the job. It is not a cure, but it can help you live longer and feel better. It is a lifesaving treatment when you have kidney failure. Hemodialysis is often called dialysis. Your doctor created a place (called an access) in your arm for your blood to flow in and out of your body during your dialysis sessions. Your arm will probably be bruised and swollen. It may hurt. The cut (incision) may bleed. The pain and bleeding will get better over several days. You will probably need only over-the-counter pain medicine. You can reduce swelling by propping your arm on 1 or 2 pillows and keeping your elbow straight. You will have stitches. These may dissolve on their own, or your doctor will tell you when to come in to have them removed. You should also be able to return to work in a few days. You may feel some coolness or numbness in your hand. These feelings usually go away in a few weeks. Your doctor may suggest squeezing a soft object. This will strengthen your access and may make hemodialysis faster and easier. You should always be able to feel blood rushing through the fistula or graft.  It feels like a slight vibration when you put your fingers on the skin over the fistula or graft. This feeling is called a thrill or pulse. This care sheet gives you a general idea about how long it will take for you to recover. But each person recovers at a different pace. Follow the steps below to get better as quickly as possible. How can you care for yourself at home? Activity ? · Rest when you feel tired. Getting enough sleep will help you recover. Do not lie on or sleep on the arm with the access. ? · Avoid activities such as washing windows or gardening that put stress on the arm with the access. ? · You may use your arm, but do not lift anything that weighs more than about 15 pounds. This may include a child, heavy grocery bags, a heavy briefcase or backpack, cat litter or dog food bags, or a vacuum . ? · You can shower, but keep the access dry for the first 2 days. Cover the area with a plastic bag to keep it dry. ? · Do not soak or scrub the incision until it has healed. ? · Wear an arm guard to protect the area if you play sports or work with your arms. ? · You may drive when your doctor says it is okay. This is usually in 1 to 2 days. ? · Most people are able to return to work about 1 or 2 days after surgery. Diet ? · Follow an eating plan that is good for your kidneys. A registered dietitian can help you make a meal plan that is right for you. You may need to limit protein, salt, fluids, and certain foods. Medicines ? · Your doctor will tell you if and when you can restart your medicines. He or she will also give you instructions about taking any new medicines. ? · If you take blood thinners, such as warfarin (Coumadin), clopidogrel (Plavix), or aspirin, be sure to talk to your doctor. He or she will tell you if and when to start taking those medicines again. Make sure that you understand exactly what your doctor wants you to do. ? · Take pain medicines exactly as directed. ¨ If the doctor gave you a prescription medicine for pain, take it as prescribed. ¨ If you are not taking a prescription pain medicine, ask your doctor if you can take acetaminophen (Tylenol). Do not take ibuprofen (Advil, Motrin) or naproxen (Aleve), or similar medicines, unless your doctor tells you to. They may make chronic kidney disease worse. ¨ Do not take two or more pain medicines at the same time unless the doctor told you to. Many pain medicines have acetaminophen, which is Tylenol. Too much acetaminophen (Tylenol) can be harmful. ? · If you think your pain medicine is making you sick to your stomach: 
¨ Take your medicine after meals (unless your doctor has told you not to). ¨ Ask your doctor for a different pain medicine. ? · If your doctor prescribed antibiotics, take them as directed. Do not stop taking them just because you feel better. You need to take the full course of antibiotics. Incision care ? · Keep the area dry for 2 days. After 2 days, wash the area with soap and water every day, and always before dialysis. ? · Do not soak or scrub the incision until it has healed. ? · If you have a bandage, change it every day or as your doctor recommends. Your doctor will tell you when you can remove it. Exercise ? · Squeeze a soft ball or other object as your doctor tells you. This will help blood flow through the access and help prevent blood clots. ? Elevation ? · Prop up the sore arm on a pillow anytime you sit or lie down during the next 3 days. Try to keep it above the level of your heart. This will help reduce swelling. Other instructions ? · Every day, check your access for a pulse or thrill in the fistula or graft area. A thrill is a vibration. To feel a pulse or thrill, place the first two fingers of your hand over the access. ? · Do not bump your arm. ? · Do not wear tight clothing, jewelry, or anything else that may squeeze the access. ? · Use your other arm to have blood drawn or blood pressure taken. ? · Do not put cream or lotion on or near the access. ? · Make sure all doctors you deal with know you have a vascular access. Follow-up care is a key part of your treatment and safety. Be sure to make and go to all appointments, and call your doctor if you are having problems. It's also a good idea to know your test results and keep a list of the medicines you take. When should you call for help? Call 911 anytime you think you may need emergency care. For example, call if: 
? · You passed out (lost consciousness). ? · You have chest pain, are short of breath, or cough up blood. ?Call your doctor now or seek immediate medical care if: 
? · Your hand or arm is cold or dark-colored. ? · You have no pulse in your access. ? · You have nausea or you vomit. ? · You have pain that does not get better after you take pain medicine. ? · You have loose stitches, or your incision comes open. ? · You are bleeding from the incision. ? · You have signs of infection, such as: 
¨ Increased pain, swelling, warmth, or redness. ¨ Red streaks leading from the area. ¨ Pus draining from the area. ¨ A fever. ? · You have signs of a blood clot in your leg (called a deep vein thrombosis), such as: 
¨ Pain in your calf, back of the knee, thigh, or groin. ¨ Redness or swelling in your leg. ? Watch closely for changes in your health, and be sure to contact your doctor if you have any problems. Where can you learn more? Go to http://jacinto-mike.info/. Enter P616 in the search box to learn more about \"Hemodialysis Access: What to Expect at Home. \" Current as of: May 12, 2017 Content Version: 11.4 © 8256-2671 [employer]. Care instructions adapted under license by [employer] (which disclaims liability or warranty for this information).  If you have questions about a medical condition or this instruction, always ask your healthcare professional. [place] [address] any warranty or liability for your use of this information. DISCHARGE SUMMARY from Nurse PATIENT INSTRUCTIONS: 
 
After general anesthesia or intravenous sedation, for 24 hours or while taking prescription Narcotics: · Limit your activities · Do not drive and operate hazardous machinery · Do not make important personal or business decisions · Do  not drink alcoholic beverages · If you have not urinated within 8 hours after discharge, please contact your surgeon on call. Report the following to your surgeon: 
· Excessive pain, swelling, redness or odor of or around the surgical area · Temperature over 100.5 · Nausea and vomiting lasting longer than 4 hours or if unable to take medications · Any signs of decreased circulation or nerve impairment to extremity: change in color, persistent  numbness, tingling, coldness or increase pain · Any questions What to do at Home: 
Recommended activity: Activity as tolerated and Ambulate in house. If you experience any of the following symptoms fever greater than 101.0, chills, nausea or vomiting, drainage from incision site, pain not relieved with medication, please follow up with Dr. Anna Arana. Regular diet as tolerated. Maintain good fluid intake at home. Wear sling at home until circulation returns to normal. 
No heavy lifting or strenuous activity with left arm. If taking narcotic pain medication, use stool softener to prevent constipation. Follow up with Dr. Anna Arana in 2-weeks. *  Please give a list of your current medications to your Primary Care Provider. *  Please update this list whenever your medications are discontinued, doses are 
    changed, or new medications (including over-the-counter products) are added. *  Please carry medication information at all times in case of emergency situations. These are general instructions for a healthy lifestyle: No smoking/ No tobacco products/ Avoid exposure to second hand smoke Surgeon General's Warning:  Quitting smoking now greatly reduces serious risk to your health. Obesity, smoking, and sedentary lifestyle greatly increases your risk for illness A healthy diet, regular physical exercise & weight monitoring are important for maintaining a healthy lifestyle You may be retaining fluid if you have a history of heart failure or if you experience any of the following symptoms:  Weight gain of 3 pounds or more overnight or 5 pounds in a week, increased swelling in our hands or feet or shortness of breath while lying flat in bed. Please call your doctor as soon as you notice any of these symptoms; do not wait until your next office visit. Recognize signs and symptoms of STROKE: 
 
F-face looks uneven A-arms unable to move or move unevenly S-speech slurred or non-existent T-time-call 911 as soon as signs and symptoms begin-DO NOT go Back to bed or wait to see if you get better-TIME IS BRAIN. Warning Signs of HEART ATTACK Call 911 if you have these symptoms: 
? Chest discomfort. Most heart attacks involve discomfort in the center of the chest that lasts more than a few minutes, or that goes away and comes back. It can feel like uncomfortable pressure, squeezing, fullness, or pain. ? Discomfort in other areas of the upper body. Symptoms can include pain or discomfort in one or both arms, the back, neck, jaw, or stomach. ? Shortness of breath with or without chest discomfort. ? Other signs may include breaking out in a cold sweat, nausea, or lightheadedness. Don't wait more than five minutes to call [address]! Fast action can save your life. Calling 911 is almost always the fastest way to get lifesaving treatment.  Emergency Medical Services staff can begin treatment when they arrive  up to an hour sooner than if someone gets to the hospital by car. The discharge information has been reviewed with the patient and caregiver. The patient and caregiver verbalized understanding. Discharge medications reviewed with the patient and caregiver and appropriate educational materials and side effects teaching were provided. Patient armband removed and shredded. ___________________________________________________________________________________________________________________________________ Providers Seen During Your Hospitalization Provider Specialty Primary office phone Claudine Montalvo MD Vascular Surgery [phone] Your Primary Care Physician (PCP) Primary Care Physician Office Phone Office Fax Pj Treviño [phone] You are allergic to the following No active allergies Recent Documentation Height Weight BMI Smoking Status 1.651 m 62.4 kg 22.88 kg/m2 Former Smoker Emergency Contacts Name Discharge Info Relation Home Work Mobile Rahel Cruz DISCHARGE CAREGIVER [3] Friend [5] [phone] Patient Belongings The following personal items are in your possession at time of discharge: 
  Dental Appliances: None         Home Medications: None   Jewelry: None  Clothing:  (locker 5)    Other Valuables: None Please provide this summary of care documentation to your next provider. Signatures-by signing, you are acknowledging that this After Visit Summary has been reviewed with you and you have received a copy. Patient Signature:  ____________________________________________________________ Date:  ____________________________________________________________  
  
Lucina Keas Provider Signature:  ____________________________________________________________ Date:  ____________________________________________________________

## 2018-02-22 NOTE — BRIEF OP NOTE
BRIEF OPERATIVE NOTE    Date of Procedure: 2/22/2018   Preoperative Diagnosis: END STAGE RENAL DISEASE  Postoperative Diagnosis: END STAGE RENAL DISEASE    Procedure(s):  LEFT ARM GRAFT PLACEMENT **SPEC POP**  Surgeon(s) and Role:     * Hiram Turk MD - Primary         Assistant Staff: None      Surgical Staff:  Circ-1: Musa Eldridge RN  Circ-Relief: Madelaine Cannon RN; Alyssa Barrera  Scrub Tech-1: Kenji Figueroa  Scrub Tech-Relief: Amanda Greco  Surg Asst-1: Keren Beck  Event Time In   Incision Start 1223   Incision Close 1354     Anesthesia: Regional   Estimated Blood Loss: 50 mL  Specimens: * No specimens in log *   Findings: Palpable thrill at completion of the procedure   Complications: None  Implants:   Implant Name Type Inv.  Item Serial No.  Lot No. LRB No. Used Action   GRAFT EPTFE-HEPRN TAPR 4-7X10 -- PROPATEN - [id number]   GRAFT EPTFE-HEPRN TAPR 4-7X10 -- PROPATEN [id number]  [employer]   Left 1 Implanted

## 2018-02-22 NOTE — PERIOP NOTES
AVS medication list reviewed and no duplicates noted. Discharge information reviewed with patient and family; all questions answered.

## 2018-02-22 NOTE — ANESTHESIA PROCEDURE NOTES
Peripheral Block    Start time: 2/22/2018 11:25 AM  End time: 2/22/2018 11:35 AM  Performed by: Maira Kirk  Authorized by: Maira Kirk       Pre-procedure:    Indications: at surgeon's request, post-op pain management, procedure for pain and primary anesthetic    Preanesthetic Checklist: patient identified, risks and benefits discussed, site marked, timeout performed, anesthesia consent given and patient being monitored      Block Type:   Block Type:  Supraclavicular  Laterality:  Left  Monitoring:  Standard ASA monitoring, continuous pulse ox, oxygen, frequent vital sign checks, heart rate and responsive to questions  Injection Technique:  Single shot  Procedures: paresthesia technique    Patient Position: seated  Prep: chlorhexidine    Needle Type:  Stimuplex  Needle Gauge:  20 G  Needle Localization:  Infiltration  Motor Response: minimal motor response >0.4 mA    Medication Injected:  2.0%  mepivacaine  Volume (mL):  20    Assessment:  Number of attempts:  1  Injection Assessment:  Incremental injection every 5 mL, negative aspiration for CSF, no paresthesia, ultrasound image on chart, no intravascular symptoms, negative aspiration for blood and local visualized surrounding nerve on ultrasound  Patient tolerance:  Patient tolerated the procedure well with no immediate complications

## 2018-02-22 NOTE — OP NOTES
Rei 40  MR#: [id number]  : 1965  ACCOUNT #: [de-identified]   DATE OF SERVICE: 2018    PREOPERATIVE DIAGNOSIS: End-stage renal disease with thrombosed dialysis access. POSTOPERATIVE DIAGNOSIS: End-stage renal disease with thrombosed dialysis access. PROCEDURE:  Placement of left brachial artery to axillary vein upper arm straight graft. SURGEON: Morgan Botello MD    ASSISTANT: None    ANESTHESIA: Regional with local.     ESTIMATED BLOOD LOSS: 50mL    SPECIMEN REMOVED: None    PACKS AND DRAINS:  None. IMPLANTS:  40 cm tapered patent graft. COMPLICATIONS:  None. CONDITION:  To recovery, stable. FINDINGS: Palpable thrill at completion of procedure. INDICATIONS FOR PROCEDURE: The patient is a  61-year-old gentleman with end-stage renal disease, who had previously had a left forearm AV graft that was thrombosed. The patient underwent vein mapping and did not have a large vein for access, although it appeared that his axillary vein was okay. Given these findings, decision was made to proceed with a brachial artery to axillary vein AV graft on the left and informed consent was obtained. DESCRIPTION OF PROCEDURE: On 2018, the patient presented to the operating room, identified by name, by ID bracelet with our entire team. Once this was done the patient was placed on the catheterization table in supine position. After appropriate depth of anesthesia obtained, the patient was prepped and draped and timeout performed. At this point. , the patient received preoperative antibiotics. We made a longitudinal incision in the patient's distal upper arm to expose the brachial artery. Vessel loops were placed for proximal and distal control. We made an incision similarly in the upper arm to expose the axillary vein and vessel loops were placed for proximal and distal control.   We then tunneled a 4-7 mm graft with the 4 mm end toward the brachial artery and the 7 mm end toward the axillary vein and heparinized the patient appropriately. We then made a longitudinal arteriotomy and completed end-to-side anastomosis between the Propaten graft at the 4 mg end in the  CV6 suture onto the brachial artery. We completed the anastomosis and flushed the brachial artery through the graft prior to restoring flow to the hand. Once this was then completed, we then made a longitudinal venotomy, completed an end-to-side anastomosis in a similar fashion. We backbled the graft to make sure there was no clot or air and then we backbled the vein and instilled heparinized saline. We then completed our anastomosis. Upon completion, we had a palpable thrill throughout the graft and had hemostasis. We then closed the incision with interrupted Vicryl suture followed by running 4-0 Monocryl for skin. I was present and scrubbed throughout the entire procedure.       MD Flaca Sierra  D: 02/22/2018 14:02     T: 02/22/2018 14:43  JOB #: [id number]

## 2018-02-23 ENCOUNTER — APPOINTMENT (OUTPATIENT)
Dept: VASCULAR SURGERY | Age: 53
End: 2018-02-23
Attending: SURGERY

## 2018-02-23 ENCOUNTER — TELEPHONE (OUTPATIENT)
Dept: VASCULAR SURGERY | Age: 53
End: 2018-02-23

## 2018-02-23 ENCOUNTER — HOSPITAL ENCOUNTER (OUTPATIENT)
Dept: VASCULAR SURGERY | Age: 53
Discharge: HOME | End: 2018-02-23
Payer: MEDICAID

## 2018-02-23 ENCOUNTER — HOSPITAL ENCOUNTER (EMERGENCY)
Age: 53
Discharge: STILL ADMITTED OR ADMINISTRATIVE | End: 2018-02-23
Attending: INTERNAL MEDICINE
Payer: MEDICAID

## 2018-02-23 DIAGNOSIS — N18.6 ESRD ON HEMODIALYSIS (HCC): ICD-10-CM

## 2018-02-23 DIAGNOSIS — N18.6 ESRD ON HEMODIALYSIS (HCC): Primary | ICD-10-CM

## 2018-02-23 DIAGNOSIS — Z99.2 ESRD ON HEMODIALYSIS (HCC): ICD-10-CM

## 2018-02-23 DIAGNOSIS — Z99.2 ESRD ON HEMODIALYSIS (HCC): Primary | ICD-10-CM

## 2018-02-23 PROCEDURE — 93990 DOPPLER FLOW TESTING: CPT

## 2018-02-23 PROCEDURE — 75810000275 HC EMERGENCY DEPT VISIT NO LEVEL OF CARE

## 2018-02-23 NOTE — TELEPHONE ENCOUNTER
Wife/sig other called patient had walked home from dialysis center and began bleeding, shirt is covers and also the sling. She can't see where the bleeding is coming from advised to hold pressure at the incision site with hang towel and take patient to the ED. Asked if she thought it was from the catheter patient has , she checked that first and no bleeding noted. She will take the patient to the ED now. Notified Dr. Clementina Lorenzo of patient going to the ED.

## 2018-02-23 NOTE — ANESTHESIA POSTPROCEDURE EVALUATION
Post-Anesthesia Evaluation & Assessment    Visit Vitals    /78 (BP 1 Location: Right arm, BP Patient Position: At rest)    Pulse 68    Temp 36.7 °C (98.1 °F)    Resp 15    Ht 5' 5\" (1.651 m)    Wt 62.4 kg (137 lb 8 oz)    SpO2 99%    BMI 22.88 kg/m2       Nausea/Vomiting: no nausea    Post-operative hydration adequate. Pain score (VAS): 2    Mental status & Level of consciousness: alert and oriented x 3    Neurological status: moves all extremities, sensation grossly intact    Pulmonary status: airway patent, no supplemental oxygen required    Complications related to anesthesia: none    Patient has met all discharge requirements.     Additional comments:        Momo Mcwilliams MD

## 2018-02-23 NOTE — PROCEDURES
[facility]  *** FINAL REPORT ***    Name: Osiris Bledsoe  MRN: [id number]    Outpatient  : 15 Sep 1965  HIS Order #: [id number]  [id number] [facility] Visit #: [id number]  Date: 2018    TYPE OF TEST: Dialysis Access Duplex    REASON FOR TEST  Surveillance, Clinical change    Graft:-  Summary:   Left arm straight fistula from brachial to axillary  Op. Date:  2018  Surgeon: Alverto Massey    Results:-            Velocity  Ratio  Flow Volume Stenosis            --------  -----  ----------- --------  Inflow:  Proximal:  Mid-graft:  Distal:  Outflow:                       N/A    Mean Flow Volume:    INTERPRETATION/FINDINGS  Duplex images were obtained using 2-D gray scale, color flow, and  spectral Doppler analysis. 1. This exam was performed one day post-op of  surgical placement of  the AVG that is present to rule out presence of pseudoaneurysm. 2. A large hematoma was visualized in the upper arm near what is  likely the venous anastomosis end of the straight graft. 3. No pseudoaneurysm neck was visualized. 4. Flow was seeen in a vessel coursing along the plane of the  hematoma, which may just represent the venous outflow of the AVG given   that the flow seen in the vessel is low resistant and consistent  wiith flow typically seen in a hemodialysis access vessel. No  to-and-fro flow was noted. ADDITIONAL COMMENTS  1. This exam was technically difficult due to limited mobility of the  arm. 2. Preliminary findings reported to Dr. Anna Arana at 3:45 pm.    I have personally reviewed the data relevant to the interpretation of  this  study. TECHNOLOGIST: Ronny Melchor RDCS, RVT  Signed: 2018 04:55 PM    PHYSICIAN: Yonny Sherman.  Abhijeet Roach MD  Signed: 2018 09:19 AM

## 2018-02-28 DIAGNOSIS — G89.18 POST-OPERATIVE PAIN: ICD-10-CM

## 2018-02-28 DIAGNOSIS — N18.6 ESRD ON HEMODIALYSIS (HCC): Primary | ICD-10-CM

## 2018-02-28 DIAGNOSIS — Z99.2 ESRD ON HEMODIALYSIS (HCC): Primary | ICD-10-CM

## 2018-02-28 RX ORDER — OXYCODONE AND ACETAMINOPHEN 7.5; 325 MG/1; MG/1
2 TABLET ORAL
Qty: 56 TAB | Refills: 0 | Status: SHIPPED | OUTPATIENT
Start: 2018-02-28 | End: 2018-03-16

## 2018-03-02 NOTE — PROGRESS NOTES
Precall completed with pt's spouse:   aware of NPO status. NPO after MN night prior to procedure. aware of need to hold anticoagulants per protocol. Denies. aware of potential for sedation administration and need for  at discharge. aware of arrival time pre procedure. Arrive at [facility] radiology waiting room on 3/6/18 at 1100 for scheduled procedure to occur at 1300. states no questions at this time. Gave pt's spouse [facility] rad rn phone number [phone].

## 2018-03-05 ENCOUNTER — TELEPHONE (OUTPATIENT)
Dept: VASCULAR SURGERY | Age: 53
End: 2018-03-05

## 2018-03-05 NOTE — TELEPHONE ENCOUNTER
Called the patient and advised to arrive by 1100 , NPO after midnight and heart and bp meds with small sip of water . Per wife incision is still oozing but she is putting dry dressing over it. She verbalized understanding.

## 2018-03-06 ENCOUNTER — TELEPHONE (OUTPATIENT)
Dept: VASCULAR SURGERY | Age: 53
End: 2018-03-06

## 2018-03-06 ENCOUNTER — HOSPITAL ENCOUNTER (OUTPATIENT)
Dept: INTERVENTIONAL RADIOLOGY/VASCULAR | Age: 53
Discharge: HOME | End: 2018-03-06
Attending: SURGERY | Admitting: SURGERY
Payer: MEDICAID

## 2018-03-06 ENCOUNTER — HOME HEALTH ADMISSION (OUTPATIENT)
Dept: HOME HEALTH SERVICES | Facility: HOME HEALTH | Age: 53
End: 2018-03-06
Payer: MEDICAID

## 2018-03-06 VITALS
TEMPERATURE: 97.9 F | DIASTOLIC BLOOD PRESSURE: 72 MMHG | SYSTOLIC BLOOD PRESSURE: 122 MMHG | BODY MASS INDEX: 22.85 KG/M2 | WEIGHT: 142.19 LBS | OXYGEN SATURATION: 100 % | RESPIRATION RATE: 20 BRPM | HEART RATE: 55 BPM | HEIGHT: 66 IN

## 2018-03-06 DIAGNOSIS — Z99.2 ESRD ON HEMODIALYSIS (HCC): Primary | ICD-10-CM

## 2018-03-06 DIAGNOSIS — N18.6 ESRD (END STAGE RENAL DISEASE) (HCC): ICD-10-CM

## 2018-03-06 DIAGNOSIS — N18.6 ESRD ON HEMODIALYSIS (HCC): Primary | ICD-10-CM

## 2018-03-06 LAB
ALBUMIN SERPL-MCNC: 3.2 G/DL (ref 3.4–5)
ALBUMIN/GLOB SERPL: 0.7 {RATIO} (ref 0.8–1.7)
ALP SERPL-CCNC: 46 U/L (ref 45–117)
ALT SERPL-CCNC: 12 U/L (ref 16–61)
ANION GAP SERPL CALC-SCNC: 10 MMOL/L (ref 3–18)
APTT PPP: 32.8 SEC (ref 23–36.4)
AST SERPL-CCNC: 28 U/L (ref 15–37)
BASOPHILS # BLD: 0 K/UL (ref 0–0.06)
BASOPHILS NFR BLD: 0 % (ref 0–2)
BILIRUB SERPL-MCNC: 0.5 MG/DL (ref 0.2–1)
BUN SERPL-MCNC: 42 MG/DL (ref 7–18)
BUN/CREAT SERPL: 3 (ref 12–20)
CALCIUM SERPL-MCNC: 9.5 MG/DL (ref 8.5–10.1)
CHLORIDE SERPL-SCNC: 102 MMOL/L (ref 100–108)
CO2 SERPL-SCNC: 28 MMOL/L (ref 21–32)
CREAT SERPL-MCNC: 12.76 MG/DL (ref 0.6–1.3)
DIFFERENTIAL METHOD BLD: ABNORMAL
EOSINOPHIL # BLD: 0.2 K/UL (ref 0–0.4)
EOSINOPHIL NFR BLD: 2 % (ref 0–5)
ERYTHROCYTE [DISTWIDTH] IN BLOOD BY AUTOMATED COUNT: 12.7 % (ref 11.6–14.5)
GLOBULIN SER CALC-MCNC: 4.3 G/DL (ref 2–4)
GLUCOSE SERPL-MCNC: 78 MG/DL (ref 74–99)
HCT VFR BLD AUTO: 32.2 % (ref 36–48)
HGB BLD-MCNC: 9.9 G/DL (ref 13–16)
INR PPP: 1.1 (ref 0.8–1.2)
LYMPHOCYTES # BLD: 2.3 K/UL (ref 0.9–3.6)
LYMPHOCYTES NFR BLD: 25 % (ref 21–52)
MCH RBC QN AUTO: 31.4 PG (ref 24–34)
MCHC RBC AUTO-ENTMCNC: 30.7 G/DL (ref 31–37)
MCV RBC AUTO: 102.2 FL (ref 74–97)
MONOCYTES # BLD: 1 K/UL (ref 0.05–1.2)
MONOCYTES NFR BLD: 11 % (ref 3–10)
NEUTS SEG # BLD: 5.7 K/UL (ref 1.8–8)
NEUTS SEG NFR BLD: 62 % (ref 40–73)
PLATELET # BLD AUTO: 355 K/UL (ref 135–420)
PMV BLD AUTO: 10.9 FL (ref 9.2–11.8)
POTASSIUM SERPL-SCNC: 4.7 MMOL/L (ref 3.5–5.5)
PROT SERPL-MCNC: 7.5 G/DL (ref 6.4–8.2)
PROTHROMBIN TIME: 13.2 SEC (ref 11.5–15.2)
RBC # BLD AUTO: 3.15 M/UL (ref 4.7–5.5)
SODIUM SERPL-SCNC: 140 MMOL/L (ref 136–145)
WBC # BLD AUTO: 9.2 K/UL (ref 4.6–13.2)

## 2018-03-06 PROCEDURE — 85025 COMPLETE CBC W/AUTO DIFF WBC: CPT | Performed by: SURGERY

## 2018-03-06 PROCEDURE — 85730 THROMBOPLASTIN TIME PARTIAL: CPT | Performed by: SURGERY

## 2018-03-06 PROCEDURE — 74011250636 HC RX REV CODE- 250/636: Performed by: SURGERY

## 2018-03-06 PROCEDURE — 99152 MOD SED SAME PHYS/QHP 5/>YRS: CPT

## 2018-03-06 PROCEDURE — 85610 PROTHROMBIN TIME: CPT | Performed by: SURGERY

## 2018-03-06 PROCEDURE — 36901 INTRO CATH DIALYSIS CIRCUIT: CPT

## 2018-03-06 PROCEDURE — 74011636320 HC RX REV CODE- 636/320: Performed by: SURGERY

## 2018-03-06 PROCEDURE — 80053 COMPREHEN METABOLIC PANEL: CPT | Performed by: SURGERY

## 2018-03-06 PROCEDURE — 36415 COLL VENOUS BLD VENIPUNCTURE: CPT | Performed by: SURGERY

## 2018-03-06 RX ORDER — MIDAZOLAM HYDROCHLORIDE 1 MG/ML
.5-4 INJECTION, SOLUTION INTRAMUSCULAR; INTRAVENOUS
Status: DISCONTINUED | OUTPATIENT
Start: 2018-03-06 | End: 2018-03-06 | Stop reason: HOSPADM

## 2018-03-06 RX ORDER — LIDOCAINE HYDROCHLORIDE 10 MG/ML
1-10 INJECTION, SOLUTION EPIDURAL; INFILTRATION; INTRACAUDAL; PERINEURAL
Status: COMPLETED | OUTPATIENT
Start: 2018-03-06 | End: 2018-03-06

## 2018-03-06 RX ORDER — HEPARIN SODIUM 1000 [USP'U]/ML
10000 INJECTION, SOLUTION INTRAVENOUS; SUBCUTANEOUS
Status: DISCONTINUED | OUTPATIENT
Start: 2018-03-06 | End: 2018-03-06 | Stop reason: HOSPADM

## 2018-03-06 RX ORDER — SODIUM CHLORIDE 9 MG/ML
25 INJECTION, SOLUTION INTRAVENOUS CONTINUOUS
Status: DISCONTINUED | OUTPATIENT
Start: 2018-03-06 | End: 2018-03-06 | Stop reason: HOSPADM

## 2018-03-06 RX ORDER — NALOXONE HYDROCHLORIDE 0.4 MG/ML
0.1 INJECTION, SOLUTION INTRAMUSCULAR; INTRAVENOUS; SUBCUTANEOUS AS NEEDED
Status: DISCONTINUED | OUTPATIENT
Start: 2018-03-06 | End: 2018-03-06 | Stop reason: HOSPADM

## 2018-03-06 RX ORDER — HEPARIN SODIUM 200 [USP'U]/100ML
1000 INJECTION, SOLUTION INTRAVENOUS
Status: COMPLETED | OUTPATIENT
Start: 2018-03-06 | End: 2018-03-06

## 2018-03-06 RX ORDER — FENTANYL CITRATE 50 UG/ML
25-200 INJECTION, SOLUTION INTRAMUSCULAR; INTRAVENOUS
Status: DISCONTINUED | OUTPATIENT
Start: 2018-03-06 | End: 2018-03-06 | Stop reason: HOSPADM

## 2018-03-06 RX ORDER — HEPARIN SODIUM 200 [USP'U]/100ML
500 INJECTION, SOLUTION INTRAVENOUS
Status: DISCONTINUED | OUTPATIENT
Start: 2018-03-06 | End: 2018-03-06

## 2018-03-06 RX ORDER — FLUMAZENIL 0.1 MG/ML
0.2 INJECTION INTRAVENOUS
Status: DISCONTINUED | OUTPATIENT
Start: 2018-03-06 | End: 2018-03-06 | Stop reason: HOSPADM

## 2018-03-06 RX ADMIN — HEPARIN SODIUM 1000 UNITS: 200 INJECTION, SOLUTION INTRAVENOUS at 13:11

## 2018-03-06 RX ADMIN — FENTANYL CITRATE 25 MCG: 50 INJECTION, SOLUTION INTRAMUSCULAR; INTRAVENOUS at 13:13

## 2018-03-06 RX ADMIN — MIDAZOLAM HYDROCHLORIDE 0.5 MG: 1 INJECTION, SOLUTION INTRAMUSCULAR; INTRAVENOUS at 13:13

## 2018-03-06 RX ADMIN — FENTANYL CITRATE 25 MCG: 50 INJECTION, SOLUTION INTRAMUSCULAR; INTRAVENOUS at 13:05

## 2018-03-06 RX ADMIN — LIDOCAINE HYDROCHLORIDE 3 ML: 10 INJECTION, SOLUTION EPIDURAL; INFILTRATION; INTRACAUDAL; PERINEURAL at 13:10

## 2018-03-06 RX ADMIN — MIDAZOLAM HYDROCHLORIDE 1 MG: 1 INJECTION, SOLUTION INTRAMUSCULAR; INTRAVENOUS at 13:05

## 2018-03-06 RX ADMIN — IOPAMIDOL 5 ML: 510 INJECTION, SOLUTION INTRAVASCULAR at 13:10

## 2018-03-06 NOTE — PROGRESS NOTES
TRANSFER - OUT REPORT:    Verbal report given to [facility] RN on [employer]  being transferred to Heart Care(unit) for ordered procedure       Report consisted of patients Situation, Background, Assessment and   Recommendations(SBAR). Information from the following report(s) SBAR, Kardex and MAR was reviewed with the receiving nurse. Lines:   Peripheral IV 03/06/18 Right Forearm (Active)   Site Assessment Clean, dry, & intact 3/6/2018 11:16 AM   Phlebitis Assessment 0 3/6/2018 11:16 AM   Infiltration Assessment 0 3/6/2018 11:16 AM   Dressing Status Clean, dry, & intact 3/6/2018 11:16 AM        Opportunity for questions and clarification was provided.       Patient transported with:   Registered Nurse

## 2018-03-06 NOTE — H&P (VIEW-ONLY)
History and Physical    Mr. Alexandra Magana is a 47year old male who returns to our office for continued management of his long term hemodialysis access. Mr. Alexandra aMgana has undergone multiple thrombectomies of his left forearm AV graft, which is currently clotted again. He is currently receiving dialysis through a right IJ TDC. He says the catheter is working well but the machine clots frequently during dialysis. He has no further complaints at this time.          Past Medical History:   Diagnosis Date    Chronic kidney disease       ESRD- Dialysis- [facility]    Hypertension 07/2017    Non compliance w medication regimen       noncompliant with HTN meds           Patient Active Problem List   Diagnosis Code    Routine general medical examination at a health care facility Z00.00    Unspecified essential hypertension I10    Tobacco abuse Z72.0    PAM (acute kidney injury) ([address] N17.9    Hypertensive urgency, malignant I16.0    Uremia N19    Severe anemia D64.9    Nonadherence to medical treatment Z91.19    Rash of genital area R21    Pleural effusion, right J90    Severe protein-calorie malnutrition ([address] E43    ESRD on hemodialysis ([address] N18.6, Z99.2    CRF (chronic renal failure) N18.9    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 60 Tab 0    CLONIDINE HCL PO Take 2.5 mg by mouth daily. Indications: HTN        aspirin 81 mg chewable tablet Take 1 Tab by mouth daily. 30 Tab 1    calcium carbonate (OS-NATALIE) 500 mg calcium (1,250 mg) tablet Take 1 Tab by mouth three (3) times daily (with meals).  90 Tab 0    simvastatin (ZOCOR) 20 mg tablet Take 1 Tab by mouth nightly. 30 Tab 1    vit B Cmplx 3-FA-Vit C-Biotin (NEPHRO FLETCHER RX) 1- mg-mg-mcg tablet Take 1 Tab by mouth daily. 30 Tab 1      No Known Allergies  Social History            Social History    Marital status: SINGLE       Spouse name: N/A    Number of children: N/A    Years of education: N/A          Occupational History    Not on file.             Social History Main Topics    Smoking status: Former Smoker       Packs/day: 0.50       Years: 7.00       Types: Cigarettes       Quit date: 7/26/2017    Smokeless tobacco: Never Used    Alcohol use No         Comment: stopped 7-2017    Drug use: No         Comment: [id number]    Sexual activity: Yes       Partners: Female           Other Topics Concern    Not on file      Social History Narrative            Family History   Problem Relation Age of Onset    Cancer Mother           Review of Systems    Review of Systems   Constitutional: Negative for chills, fever, malaise/fatigue and weight loss. HENT: Negative for ear pain and hearing loss. Eyes: Negative for blurred vision, pain and discharge. Respiratory: Negative for cough, hemoptysis, sputum production and shortness of breath. Cardiovascular: Negative for chest pain, palpitations and orthopnea. Gastrointestinal: Negative for heartburn, nausea and vomiting. Genitourinary: Negative for dysuria and urgency. Musculoskeletal: Negative for myalgias. Skin: Negative for itching and rash. Neurological: Negative for dizziness, tingling, sensory change, weakness and headaches. Endo/Heme/Allergies: Does not bruise/bleed easily.    Psychiatric/Behavioral: Negative for depression.      Physical Exam:         Visit Vitals    /64 (BP 1 Location: Right arm, BP Patient Position: Sitting)    Pulse 68    Resp 18    Ht 5' 5\" (1.651 m)    Wt 138 lb (62.6 kg)    BMI 22.96 kg/m2      Physical Examination: General appearance - alert, well appearing, and in no distress  Mental status - alert, oriented to person, place, and time, normal mood, behavior, speech, dress, motor activity, and thought processes  Eyes - left eye normal, right eye normal  Ears - right ear normal, left ear normal  Mouth - mucous membranes moist  Chest - clear to auscultation, no wheezes  Heart - normal rate and regular rhythm  Musculoskeletal - no muscular tenderness noted  Extremities - left radial pulse normal, left forearm AV graft- no palpable thrill. Skin - extremely dry, normal coloration, no rashes, no suspicious skin lesions noted, right chest TDC with dressing clean, dry, and intact      Impression and Plan:      ICD-10-CM ICD-9-CM     1. ESRD on hemodialysis (HCC) N18.6 585.6 PROTHROMBIN TIME + INR     Z99.2 V45.11 CBC WITH AUTOMATED DIFF         METABOLIC PANEL, BASIC         XR CHEST PA LAT         EKG, 12 LEAD, INITIAL          Orders Placed This Encounter    XR CHEST PA LAT    PROTHROMBIN TIME + INR    CBC WITH AUTOMATED DIFF    METABOLIC PANEL, BASIC    EKG, 12 LEAD, INITIAL      I had a conversation with Mr. Prince Mccullough regarding his vein mapping and his options for long term hemodialysis access. We will proceed with a new upper arm AV graft as soon as feasible. We will contact the dialysis center and inquire about their complications in dialyzing him.   I will follow up with Mr. Prince Mccullough after his surgery.

## 2018-03-06 NOTE — INTERVAL H&P NOTE
H&P Update:  [facility] was seen and examined. History and physical has been reviewed. The patient has been examined.  There have been no significant clinical changes since the completion of the originally dated History and Physical.    Signed By: Hiram Turk MD     March 6, 2018 12:56 PM

## 2018-03-06 NOTE — DISCHARGE INSTRUCTIONS
Hemodialysis Access: What to Expect at 41 [facility]  Hemodialysis is a way to remove wastes from the blood when your kidneys can no longer do the job. It is not a cure, but it can help you live longer and feel better. It is a lifesaving treatment when you have kidney failure. Hemodialysis is often called dialysis. Your doctor created a place (called an access) in your arm for your blood to flow in and out of your body during your dialysis sessions. Your arm will probably be bruised and swollen. It may hurt. The cut (incision) may bleed. The pain and bleeding will get better over several days. You will probably need only over-the-counter pain medicine. You can reduce swelling by propping your arm on 1 or 2 pillows and keeping your elbow straight. You will have stitches. These may dissolve on their own, or your doctor will tell you when to come in to have them removed. You should also be able to return to work in a few days. You may feel some coolness or numbness in your hand. These feelings usually go away in a few weeks. Your doctor may suggest squeezing a soft object. This will strengthen your access and may make hemodialysis faster and easier. You should always be able to feel blood rushing through the fistula or graft. It feels like a slight vibration when you put your fingers on the skin over the fistula or graft. This feeling is called a thrill or pulse. This care sheet gives you a general idea about how long it will take for you to recover. But each person recovers at a different pace. Follow the steps below to get better as quickly as possible. How can you care for yourself at home? Activity  ? · Rest when you feel tired. Getting enough sleep will help you recover. Do not lie on or sleep on the arm with the access. ? · Avoid activities such as washing windows or gardening that put stress on the arm with the access.    ? · You may use your arm, but do not lift anything that weighs more than about 15 pounds. This may include a child, heavy grocery bags, a heavy briefcase or backpack, cat litter or dog food bags, or a vacuum . ? · You can shower, but keep the access dry for the first 2 days. Cover the area with a plastic bag to keep it dry. ? · Do not soak or scrub the incision until it has healed. ? · Wear an arm guard to protect the area if you play sports or work with your arms. ? · You may drive when your doctor says it is okay. This is usually in 1 to 2 days. ? · Most people are able to return to work about 1 or 2 days after surgery. Diet  ? · Follow an eating plan that is good for your kidneys. A registered dietitian can help you make a meal plan that is right for you. You may need to limit protein, salt, fluids, and certain foods. Medicines  ? · Your doctor will tell you if and when you can restart your medicines. He or she will also give you instructions about taking any new medicines. ? · If you take blood thinners, such as warfarin (Coumadin), clopidogrel (Plavix), or aspirin, be sure to talk to your doctor. He or she will tell you if and when to start taking those medicines again. Make sure that you understand exactly what your doctor wants you to do. ? · Take pain medicines exactly as directed. ¨ If the doctor gave you a prescription medicine for pain, take it as prescribed. ¨ If you are not taking a prescription pain medicine, ask your doctor if you can take acetaminophen (Tylenol). Do not take ibuprofen (Advil, Motrin) or naproxen (Aleve), or similar medicines, unless your doctor tells you to. They may make chronic kidney disease worse. ¨ Do not take two or more pain medicines at the same time unless the doctor told you to. Many pain medicines have acetaminophen, which is Tylenol. Too much acetaminophen (Tylenol) can be harmful.    ? · If you think your pain medicine is making you sick to your stomach:  ¨ Take your medicine after meals (unless your doctor has told you not to). ¨ Ask your doctor for a different pain medicine. ? · If your doctor prescribed antibiotics, take them as directed. Do not stop taking them just because you feel better. You need to take the full course of antibiotics. Incision care  ? · Keep the area dry for 2 days. After 2 days, wash the area with soap and water every day, and always before dialysis. ? · Do not soak or scrub the incision until it has healed. ? · If you have a bandage, change it every day or as your doctor recommends. Your doctor will tell you when you can remove it. Exercise  ? · Squeeze a soft ball or other object as your doctor tells you. This will help blood flow through the access and help prevent blood clots. ? Elevation  ? · Prop up the sore arm on a pillow anytime you sit or lie down during the next 3 days. Try to keep it above the level of your heart. This will help reduce swelling. Other instructions  ? · Every day, check your access for a pulse or thrill in the fistula or graft area. A thrill is a vibration. To feel a pulse or thrill, place the first two fingers of your hand over the access. ? · Do not bump your arm. ? · Do not wear tight clothing, jewelry, or anything else that may squeeze the access. ? · Use your other arm to have blood drawn or blood pressure taken. ? · Do not put cream or lotion on or near the access. ? · Make sure all doctors you deal with know you have a vascular access. Follow-up care is a key part of your treatment and safety. Be sure to make and go to all appointments, and call your doctor if you are having problems. It's also a good idea to know your test results and keep a list of the medicines you take. When should you call for help? Call 911 anytime you think you may need emergency care. For example, call if:  ? · You passed out (lost consciousness). ? · You have chest pain, are short of breath, or cough up blood.    ?Call your doctor now or seek immediate medical care if:  ? · Your hand or arm is cold or dark-colored. ? · You have no pulse in your access. ? · You have nausea or you vomit. ? · You have pain that does not get better after you take pain medicine. ? · You have loose stitches, or your incision comes open. ? · You are bleeding from the incision. ? · You have signs of infection, such as:  ¨ Increased pain, swelling, warmth, or redness. ¨ Red streaks leading from the area. ¨ Pus draining from the area. ¨ A fever. ? · You have signs of a blood clot in your leg (called a deep vein thrombosis), such as:  ¨ Pain in your calf, back of the knee, thigh, or groin. ¨ Redness or swelling in your leg. ? Watch closely for changes in your health, and be sure to contact your doctor if you have any problems. Where can you learn more? Go to http://jacinto-mike.info/. Enter P616 in the search box to learn more about \"Hemodialysis Access: What to Expect at Home. \"  Current as of: May 12, 2017  Content Version: 11.4  © 2685-5116 [facility]. Care instructions adapted under license by [employer] (which disclaims liability or warranty for this information). If you have questions about a medical condition or this instruction, always ask your healthcare professional. [address] any warranty or liability for your use of this information. DISCHARGE SUMMARY from Nurse    PATIENT INSTRUCTIONS:    After general anesthesia or intravenous sedation, for 24 hours or while taking prescription Narcotics:  · Limit your activities  · Do not drive and operate hazardous machinery  · Do not make important personal or business decisions  · Do  not drink alcoholic beverages  · If you have not urinated within 8 hours after discharge, please contact your surgeon on call.     Report the following to your surgeon:  · Excessive pain, swelling, redness or odor of or around the surgical area  · Temperature over 100.5  · Nausea and vomiting lasting longer than 4 hours or if unable to take medications  · Any signs of decreased circulation or nerve impairment to extremity: change in color, persistent  numbness, tingling, coldness or increase pain  · Any questions    What to do at Home:  Recommended activity: Activity as tolerated and no driving for today,     If you experience any of the following symptoms severe pain, discoloration of extremity, go to the er    *  Please give a list of your current medications to your Primary Care Provider. *  Please update this list whenever your medications are discontinued, doses are      changed, or new medications (including over-the-counter products) are added. *  Please carry medication information at all times in case of emergency situations. These are general instructions for a healthy lifestyle:    No smoking/ No tobacco products/ Avoid exposure to second hand smoke  Surgeon General's Warning:  Quitting smoking now greatly reduces serious risk to your health. Obesity, smoking, and sedentary lifestyle greatly increases your risk for illness    A healthy diet, regular physical exercise & weight monitoring are important for maintaining a healthy lifestyle    You may be retaining fluid if you have a history of heart failure or if you experience any of the following symptoms:  Weight gain of 3 pounds or more overnight or 5 pounds in a week, increased swelling in our hands or feet or shortness of breath while lying flat in bed. Please call your doctor as soon as you notice any of these symptoms; do not wait until your next office visit. Recognize signs and symptoms of STROKE:    F-face looks uneven    A-arms unable to move or move unevenly    S-speech slurred or non-existent    T-time-call 911 as soon as signs and symptoms begin-DO NOT go       Back to bed or wait to see if you get better-TIME IS BRAIN.     Warning Signs of HEART ATTACK     Call 911 if you have these symptoms:   Chest discomfort. Most heart attacks involve discomfort in the center of the chest that lasts more than a few minutes, or that goes away and comes back. It can feel like uncomfortable pressure, squeezing, fullness, or pain.  Discomfort in other areas of the upper body. Symptoms can include pain or discomfort in one or both arms, the back, neck, jaw, or stomach.  Shortness of breath with or without chest discomfort.  Other signs may include breaking out in a cold sweat, nausea, or lightheadedness. Don't wait more than five minutes to call 911 - MINUTES MATTER! Fast action can save your life. Calling 911 is almost always the fastest way to get lifesaving treatment. Emergency Medical Services staff can begin treatment when they arrive -- up to an hour sooner than if someone gets to the hospital by car. The discharge information has been reviewed with the patient. The patient verbalized understanding. Discharge medications reviewed with the patient and appropriate educational materials and side effects teaching were provided.   ___________________________________________________________________________________________________________________________________

## 2018-03-06 NOTE — PROGRESS NOTES
Awaiting ride,  Noted quarter size drainage on upper inner edge of dressing,  Dr Alexandra Naylor office notified, inst to replace outer layer

## 2018-03-06 NOTE — PROGRESS NOTES
Transport here, pt discharged via wheelchair to cab, pt denies any pain or distress at time of discharge, arm bands removed and shredded

## 2018-03-06 NOTE — PROGRESS NOTES
Old dressing removed, no active drainage noted, 4x4 and Kerlix applied to site and secured with tape. Pt tolerated procedure well, neurovascular assessment of left arm and hand WNl.   Pt given 4x4 and kerlix to reinforce dressing as needed

## 2018-03-06 NOTE — PROGRESS NOTES
Pt becoming impatient, does not want to wait for [facility], wants cab voucher from supervisor, explained will not get voucher if pt has alternate transportation

## 2018-03-06 NOTE — TELEPHONE ENCOUNTER
Received call from Dr. [place] Brittle that patient needs to be set up with [facility] for dressing changes every other day. Advised ok to pack with aquacel , cover with aBD , and kerlex . Pt to start in 03/08/18 , orders faxed to [facility], spoke with Jeni Wood. Referral placed as verbal order from Dr. [place] Brittle.

## 2018-03-06 NOTE — PROGRESS NOTES
On preop friend states taking pt home, pt and friend states not have no ride home. Wants cab voucher, pt has medicaid, [facility] transportation called.  Will make arrangements to take pt home

## 2018-03-08 ENCOUNTER — HOME CARE VISIT (OUTPATIENT)
Dept: SCHEDULING | Facility: HOME HEALTH | Age: 53
End: 2018-03-08
Payer: MEDICAID

## 2018-03-08 ENCOUNTER — TELEPHONE (OUTPATIENT)
Dept: VASCULAR SURGERY | Age: 53
End: 2018-03-08

## 2018-03-08 VITALS
HEART RATE: 75 BPM | DIASTOLIC BLOOD PRESSURE: 80 MMHG | SYSTOLIC BLOOD PRESSURE: 128 MMHG | TEMPERATURE: 99.1 F | OXYGEN SATURATION: 93 % | RESPIRATION RATE: 14 BRPM

## 2018-03-08 PROCEDURE — A4216 STERILE WATER/SALINE, 10 ML: HCPCS

## 2018-03-08 PROCEDURE — A4452 WATERPROOF TAPE: HCPCS

## 2018-03-08 PROCEDURE — A6197 ALGINATE DRSG >16 <=48 SQ IN: HCPCS

## 2018-03-08 PROCEDURE — 400013 HH SOC

## 2018-03-08 PROCEDURE — A9270 NON-COVERED ITEM OR SERVICE: HCPCS

## 2018-03-08 PROCEDURE — A4927 NON-STERILE GLOVES: HCPCS

## 2018-03-08 PROCEDURE — G0299 HHS/HOSPICE OF RN EA 15 MIN: HCPCS

## 2018-03-08 PROCEDURE — A6446 CONFORM BAND S W>=3" <5"/YD: HCPCS

## 2018-03-08 NOTE — TELEPHONE ENCOUNTER
Latanya Chime called me from patient's home and noticed a odor when she removed dressing , temp is 99. 1. Advised that he had a lot of old hematoma and probably what it is but would discuss with provider and call her back. Discussed with Dr. Adri Yang and he states he feels that it is hematoma as well. Called Latanya Hanna back and advised her to wash out wound and repack with aqua cell ag and cover with abd and kerlex. She verbalized understanding. Advised that they will go back out on Saturday , and that if they need to page MD Dr. Adri Yang is on call.

## 2018-03-08 NOTE — OP NOTES
[address]  MR#: [id number]  : 1965  ACCOUNT #: [de-identified]   DATE OF SERVICE: 2018    PREOPERATIVE DIAGNOSIS:  Hematoma, left arm brachial axillary graft with decreased thrill. POSTOPERATIVE DIAGNOSIS: Hematoma, left arm brachial axillary graft with  decreased thrill with thrombus within the graft and some small area of extravasation. PROCEDURE PERFORMED: Left lower extremity shuntogram.    SURGEON: Radha Boyd MD    ASSISTANT: None. ANESTHESIA:  Moderate sedation with locl    ESTIMATED BLOOD LOSS: Minimal    SPECIMEN REMOVED: None. COMPLICATIONS: None. IMPLANTS: None. CONDITION: Stable to recovery. PACKS AND DRAINS:  None. FINDINGS:  A thrombosed graft with a small amount of extravasation with forced injection of contrast.  No evidence of any active bleeding. INDICATIONS FOR PROCEDURE: The patient is a 60-year-old gentleman with end-stage renal disease who recently underwent a left upper extremity arteriovenous graft placement. The patient developed a postop hematoma that appeared to be compressing the graft. Decision was made to take the patient to the catheterization suite to try to perform a shuntogram to see if there was any occlusion of the graft as well as to see if there is any extravasation. Informed consent was obtained. PROCEDURE IN DETAIL: On 2018, the patient was taken to the catheterization suite, identified by name and ID bracelet by myself and the entire operating team. Once this was done, the patient was placed on the catheterization table in supine position. The left arm was extended. After an appropriate depth of anesthesia was obtained, the patient was prepped and draped and time-out was performed. At this point, percutaneous access of the left arteriovenous graft was obtained with a 6-Bengali sheath. We performed a shuntogram with the above findings.   Given the results, we decided not to proceed further and take the patient to open surgery at a later date to try to revise the graft. The sheath was removed. Manual pressure was held for hemostasis. I was present and scrubbed for the entire procedure.       MD Yara Campos  D: 03/08/2018 16:02     T: 03/08/2018 16:43  JOB #: [id number]

## 2018-03-09 ENCOUNTER — HOME CARE VISIT (OUTPATIENT)
Dept: HOME HEALTH SERVICES | Facility: HOME HEALTH | Age: 53
End: 2018-03-09
Payer: MEDICAID

## 2018-03-10 ENCOUNTER — HOME CARE VISIT (OUTPATIENT)
Dept: SCHEDULING | Facility: HOME HEALTH | Age: 53
End: 2018-03-10
Payer: MEDICAID

## 2018-03-10 PROCEDURE — G0299 HHS/HOSPICE OF RN EA 15 MIN: HCPCS

## 2018-03-12 ENCOUNTER — HOSPITAL ENCOUNTER (INPATIENT)
Age: 53
LOS: 4 days | Discharge: HOME HEALTH | DRG: 173 | End: 2018-03-16
Attending: INTERNAL MEDICINE | Admitting: SURGERY
Payer: MEDICAID

## 2018-03-12 ENCOUNTER — HOME CARE VISIT (OUTPATIENT)
Dept: HOME HEALTH SERVICES | Facility: HOME HEALTH | Age: 53
End: 2018-03-12
Payer: MEDICAID

## 2018-03-12 ENCOUNTER — TELEPHONE (OUTPATIENT)
Dept: VASCULAR SURGERY | Age: 53
End: 2018-03-12

## 2018-03-12 ENCOUNTER — HOME CARE VISIT (OUTPATIENT)
Dept: SCHEDULING | Facility: HOME HEALTH | Age: 53
End: 2018-03-12
Payer: MEDICAID

## 2018-03-12 VITALS
SYSTOLIC BLOOD PRESSURE: 111 MMHG | HEART RATE: 84 BPM | OXYGEN SATURATION: 99 % | RESPIRATION RATE: 14 BRPM | TEMPERATURE: 100.4 F | DIASTOLIC BLOOD PRESSURE: 71 MMHG

## 2018-03-12 VITALS
RESPIRATION RATE: 16 BRPM | TEMPERATURE: 97.3 F | DIASTOLIC BLOOD PRESSURE: 70 MMHG | SYSTOLIC BLOOD PRESSURE: 120 MMHG | OXYGEN SATURATION: 98 % | HEART RATE: 88 BPM

## 2018-03-12 DIAGNOSIS — G89.18 POST-OPERATIVE PAIN: ICD-10-CM

## 2018-03-12 DIAGNOSIS — L08.9 WOUND INFECTION: Primary | ICD-10-CM

## 2018-03-12 DIAGNOSIS — T14.8XXA WOUND INFECTION: Primary | ICD-10-CM

## 2018-03-12 LAB
ALBUMIN SERPL-MCNC: 3.7 G/DL (ref 3.4–5)
ALBUMIN/GLOB SERPL: 0.7 {RATIO} (ref 0.8–1.7)
ALP SERPL-CCNC: 63 U/L (ref 45–117)
ALT SERPL-CCNC: 15 U/L (ref 16–61)
ANION GAP SERPL CALC-SCNC: 11 MMOL/L (ref 3–18)
APTT PPP: 23.3 SEC (ref 23–36.4)
AST SERPL-CCNC: 16 U/L (ref 15–37)
BASOPHILS # BLD: 0.1 K/UL (ref 0–0.06)
BASOPHILS NFR BLD: 1 % (ref 0–2)
BILIRUB SERPL-MCNC: 0.5 MG/DL (ref 0.2–1)
BUN SERPL-MCNC: 33 MG/DL (ref 7–18)
BUN/CREAT SERPL: 4 (ref 12–20)
CALCIUM SERPL-MCNC: 9.6 MG/DL (ref 8.5–10.1)
CHLORIDE SERPL-SCNC: 96 MMOL/L (ref 100–108)
CO2 SERPL-SCNC: 28 MMOL/L (ref 21–32)
CREAT SERPL-MCNC: 9.38 MG/DL (ref 0.6–1.3)
DIFFERENTIAL METHOD BLD: ABNORMAL
EOSINOPHIL # BLD: 0.3 K/UL (ref 0–0.4)
EOSINOPHIL NFR BLD: 2 % (ref 0–5)
ERYTHROCYTE [DISTWIDTH] IN BLOOD BY AUTOMATED COUNT: 13.5 % (ref 11.6–14.5)
GLOBULIN SER CALC-MCNC: 5.1 G/DL (ref 2–4)
GLUCOSE SERPL-MCNC: 75 MG/DL (ref 74–99)
HCT VFR BLD AUTO: 35.2 % (ref 36–48)
HGB BLD-MCNC: 10.8 G/DL (ref 13–16)
INR PPP: 1.1 (ref 0.8–1.2)
LACTATE SERPL-SCNC: 0.5 MMOL/L (ref 0.4–2)
LYMPHOCYTES # BLD: 2 K/UL (ref 0.9–3.6)
LYMPHOCYTES NFR BLD: 15 % (ref 21–52)
MCH RBC QN AUTO: 30.9 PG (ref 24–34)
MCHC RBC AUTO-ENTMCNC: 30.7 G/DL (ref 31–37)
MCV RBC AUTO: 100.6 FL (ref 74–97)
MONOCYTES # BLD: 1.3 K/UL (ref 0.05–1.2)
MONOCYTES NFR BLD: 10 % (ref 3–10)
NEUTS SEG # BLD: 9.5 K/UL (ref 1.8–8)
NEUTS SEG NFR BLD: 72 % (ref 40–73)
PLATELET # BLD AUTO: 399 K/UL (ref 135–420)
PMV BLD AUTO: 10.3 FL (ref 9.2–11.8)
POTASSIUM SERPL-SCNC: 4.6 MMOL/L (ref 3.5–5.5)
PROT SERPL-MCNC: 8.8 G/DL (ref 6.4–8.2)
PROTHROMBIN TIME: 13.5 SEC (ref 11.5–15.2)
RBC # BLD AUTO: 3.5 M/UL (ref 4.7–5.5)
SODIUM SERPL-SCNC: 135 MMOL/L (ref 136–145)
WBC # BLD AUTO: 13.1 K/UL (ref 4.6–13.2)

## 2018-03-12 PROCEDURE — 85730 THROMBOPLASTIN TIME PARTIAL: CPT | Performed by: NURSE PRACTITIONER

## 2018-03-12 PROCEDURE — A6212 FOAM DRG <=16 SQ IN W/BORDER: HCPCS

## 2018-03-12 PROCEDURE — 83605 ASSAY OF LACTIC ACID: CPT | Performed by: NURSE PRACTITIONER

## 2018-03-12 PROCEDURE — 85610 PROTHROMBIN TIME: CPT | Performed by: NURSE PRACTITIONER

## 2018-03-12 PROCEDURE — A6253 ABSORPT DRG > 48 SQ IN W/O B: HCPCS

## 2018-03-12 PROCEDURE — 65270000029 HC RM PRIVATE

## 2018-03-12 PROCEDURE — 80053 COMPREHEN METABOLIC PANEL: CPT | Performed by: NURSE PRACTITIONER

## 2018-03-12 PROCEDURE — 99285 EMERGENCY DEPT VISIT HI MDM: CPT

## 2018-03-12 PROCEDURE — A6223 GAUZE >16<=48 NO W/SAL W/O B: HCPCS

## 2018-03-12 PROCEDURE — A4927 NON-STERILE GLOVES: HCPCS

## 2018-03-12 PROCEDURE — A6196 ALGINATE DRESSING <=16 SQ IN: HCPCS

## 2018-03-12 PROCEDURE — 74011250636 HC RX REV CODE- 250/636: Performed by: NURSE PRACTITIONER

## 2018-03-12 PROCEDURE — A9270 NON-COVERED ITEM OR SERVICE: HCPCS

## 2018-03-12 PROCEDURE — A6197 ALGINATE DRSG >16 <=48 SQ IN: HCPCS

## 2018-03-12 PROCEDURE — 85025 COMPLETE CBC W/AUTO DIFF WBC: CPT | Performed by: NURSE PRACTITIONER

## 2018-03-12 PROCEDURE — A6446 CONFORM BAND S W>=3" <5"/YD: HCPCS

## 2018-03-12 PROCEDURE — 87040 BLOOD CULTURE FOR BACTERIA: CPT | Performed by: NURSE PRACTITIONER

## 2018-03-12 PROCEDURE — A4216 STERILE WATER/SALINE, 10 ML: HCPCS

## 2018-03-12 PROCEDURE — G0495 RN CARE TRAIN/EDU IN HH: HCPCS

## 2018-03-12 RX ORDER — SODIUM CHLORIDE 0.9 % (FLUSH) 0.9 %
5-10 SYRINGE (ML) INJECTION AS NEEDED
Status: CANCELLED | OUTPATIENT
Start: 2018-03-12

## 2018-03-12 RX ORDER — SODIUM CHLORIDE 9 MG/ML
30 INJECTION, SOLUTION INTRAVENOUS CONTINUOUS
Status: DISCONTINUED | OUTPATIENT
Start: 2018-03-12 | End: 2018-03-16 | Stop reason: HOSPADM

## 2018-03-12 RX ADMIN — SODIUM CHLORIDE 1000 MG: 900 INJECTION, SOLUTION INTRAVENOUS at 21:45

## 2018-03-12 RX ADMIN — SODIUM CHLORIDE 1830 ML: 9 INJECTION, SOLUTION INTRAVENOUS at 18:07

## 2018-03-12 NOTE — ED PROVIDER NOTES
[address]  EMERGENCY DEPARTMENT HISTORY AND PHYSICAL EXAM    Date: 3/12/2018  Patient Name: Savanna Hoffman  YOB: 1965  Medical Record Number: [id number]     History of Presenting Illness     Chief Complaint   Patient presents with    Back Pain       History Provided By: Patient    Chief Complaint: Fever  Duration: 1 Days  Timing:  Acute  Severity: Tmax of 104 F, 99.6 F in ED  Modifying Factors: No relieving or worsening factors   Associated Symptoms: Malodorous wound to LUE and LUE pain    Additional History (Context):   5:47 PM  Savanna Hoffman is a 47 y.o. male with PMHX CKD, HTN, & dialysis, who presents to the emergency department C/O fever (tmax of 104 F, 99.6 F in ED) onset today. Reports recent surgery to LUE on 2/23/18 from which incisional wound has not healed. Associated symptoms include malodorous wound to LUE and LUE pain. Home health nurse noticed fever and wound and called Dr. Urvashi Garcia office who referred pt to ED. Pt currently receives MWF dialysis. Pt denies chills, rash, any other Sx or complaints. Shx: +former tobacco use, -EtOH use, -illicit drug use    PCP: Marion Sotelo MD  Specialist: Susanna Franz MD (Vascular Surgery), Dr. Jayla Estrella (Nephrology)    Current Facility-Administered Medications   Medication Dose Route Frequency Provider Last Rate Last Dose    0.9% sodium chloride infusion 1,830 mL  30 mL/kg IntraVENous CONTINUOUS Nakul Vincent NP        vancomycin (VANCOCIN) 1,000 mg in 0.9% sodium chloride (MBP/ADV) 250 mL adv  1,000 mg IntraVENous ONCE Nakul Vincent NP        Vancomycin- pharmacy to dose  1 Each Other Rx Dosing/Monitoring Nakul Vincent NP         Current Outpatient Prescriptions   Medication Sig Dispense Refill    ferric citrate (AURYXIA) 210 mg iron tablet Take 420 mg by mouth three (3) times daily (with meals).       oxyCODONE-acetaminophen (PERCOCET 7.5) 7.5-325 mg per tablet Take 2 Tabs by mouth every six (6) hours as needed for Pain. Max Daily Amount: 8 Tabs. 56 Tab 0    carvedilol (COREG) 25 mg tablet Take 1 Tab by mouth two (2) times daily (with meals). 60 Tab 0    CLONIDINE HCL PO Take 2.5 mg by mouth nightly. Indications: HTN      aspirin 81 mg chewable tablet Take 1 Tab by mouth daily. (Patient taking differently: Take 325 mg by mouth daily.) 30 Tab 1    simvastatin (ZOCOR) 20 mg tablet Take 1 Tab by mouth nightly. 30 Tab 1       Past History     Past Medical History:  Past Medical History:   Diagnosis Date    Chronic kidney disease     ESRD- Dialysis- [facility] Hypertension 07/2017    Ill-defined condition     dialysis monday wednesday friday    Non compliance w medication regimen     noncompliant with HTN meds       Past Surgical History:  Past Surgical History:   Procedure Laterality Date    HX OTHER SURGICAL      left arm dialysis shunt    HX VASCULAR ACCESS  11/2017    AV  graft left arm    HX VASCULAR ACCESS  07/2017    left chest cath for dialysis       Family History:  Family History   Problem Relation Age of Onset    Cancer Mother        Social History:  Social History   Substance Use Topics    Smoking status: Former Smoker     Packs/day: 0.50     Years: 7.00     Types: Cigarettes     Quit date: 7/26/2017    Smokeless tobacco: Never Used    Alcohol use No      Comment: stopped 7-2017       Allergies:  No Known Allergies      Review of Systems     Review of Systems   Constitutional: Positive for fever. Negative for chills. Musculoskeletal: Positive for myalgias (LUE). Skin: Positive for wound (malodorous surgical incision to LUE). Negative for rash. All other systems reviewed and are negative.       Physical Exam     Vitals:    03/12/18 1915 03/12/18 1935 03/12/18 2045 03/12/18 2052   BP: 110/75  117/69 117/69   Pulse: 81  83 78   Resp: 12  17 16   Temp:  98.9 °F (37.2 °C)  98.6 °F (37 °C)   SpO2: 98%  99% 98%   Weight:       Height: Physical Exam   Constitutional: He is oriented to person, place, and time. He appears well-developed and well-nourished. HENT:   Head: Normocephalic. Eyes: Conjunctivae are normal.   Neck: Normal range of motion. Cardiovascular: Regular rhythm. tachycardic   Pulmonary/Chest: Effort normal and breath sounds normal.   Abdominal: Soft. Bowel sounds are normal. There is no tenderness. Musculoskeletal: Normal range of motion. Neurological: He is alert and oriented to person, place, and time. Skin: Skin is warm and dry. Nursing note and vitals reviewed. Spoke to Dr. Samir Salter NP, they would like the patient worked up and then admitted to their service for surgical wash out tomorrow    Diagnostic Study Results     Labs -     Recent Results (from the past 12 hour(s))   METABOLIC PANEL, COMPREHENSIVE    Collection Time: 03/12/18  6:00 PM   Result Value Ref Range    Sodium 135 (L) 136 - 145 mmol/L    Potassium 4.6 3.5 - 5.5 mmol/L    Chloride 96 (L) 100 - 108 mmol/L    CO2 28 21 - 32 mmol/L    Anion gap 11 3.0 - 18 mmol/L    Glucose 75 74 - 99 mg/dL    BUN 33 (H) 7.0 - 18 MG/DL    Creatinine 9.38 (H) 0.6 - 1.3 MG/DL    BUN/Creatinine ratio 4 (L) 12 - 20      GFR est AA 7 (L) >60 ml/min/1.73m2    GFR est non-AA 6 (L) >60 ml/min/1.73m2    Calcium 9.6 8.5 - 10.1 MG/DL    Bilirubin, total 0.5 0.2 - 1.0 MG/DL    ALT (SGPT) 15 (L) 16 - 61 U/L    AST (SGOT) 16 15 - 37 U/L    Alk.  phosphatase 63 45 - 117 U/L    Protein, total 8.8 (H) 6.4 - 8.2 g/dL    Albumin 3.7 3.4 - 5.0 g/dL    Globulin 5.1 (H) 2.0 - 4.0 g/dL    A-G Ratio 0.7 (L) 0.8 - 1.7     PROTHROMBIN TIME + INR    Collection Time: 03/12/18  6:00 PM   Result Value Ref Range    Prothrombin time 13.5 11.5 - 15.2 sec    INR 1.1 0.8 - 1.2     PTT    Collection Time: 03/12/18  6:00 PM   Result Value Ref Range    aPTT 23.3 23.0 - 36.4 SEC   CBC WITH AUTOMATED DIFF    Collection Time: 03/12/18  6:30 PM   Result Value Ref Range    WBC 13.1 4.6 - 13.2 K/uL RBC 3.50 (L) 4.70 - 5.50 M/uL    HGB 10.8 (L) 13.0 - 16.0 g/dL    HCT 35.2 (L) 36.0 - 48.0 %    .6 (H) 74.0 - 97.0 FL    MCH 30.9 24.0 - 34.0 PG    MCHC 30.7 (L) 31.0 - 37.0 g/dL    RDW 13.5 11.6 - 14.5 %    PLATELET 161 034 - 639 K/uL    MPV 10.3 9.2 - 11.8 FL    NEUTROPHILS 72 40 - 73 %    LYMPHOCYTES 15 (L) 21 - 52 %    MONOCYTES 10 3 - 10 %    EOSINOPHILS 2 0 - 5 %    BASOPHILS 1 0 - 2 %    ABS. NEUTROPHILS 9.5 (H) 1.8 - 8.0 K/UL    ABS. LYMPHOCYTES 2.0 0.9 - 3.6 K/UL    ABS. MONOCYTES 1.3 (H) 0.05 - 1.2 K/UL    ABS. EOSINOPHILS 0.3 0.0 - 0.4 K/UL    ABS. BASOPHILS 0.1 (H) 0.0 - 0.06 K/UL    DF AUTOMATED     LACTIC ACID    Collection Time: 03/12/18  6:30 PM   Result Value Ref Range    Lactic acid 0.5 0.4 - 2.0 MMOL/L       Radiologic Studies -   No orders to display     Medications Given in the ED:  Medications   0.9% sodium chloride infusion 1,830 mL (not administered)   vancomycin (VANCOCIN) 1,000 mg in 0.9% sodium chloride (MBP/ADV) 250 mL adv (not administered)   Vancomycin- pharmacy to dose (not administered)        Medical Decision Making     I am the first provider for this patient. I reviewed the vital signs, available nursing notes, past medical history, past surgical history, family history and social history. Records Reviewed: Nursing Notes    Vital Signs-Reviewed the patient's vital signs.   Patient Vitals for the past 12 hrs:   Temp Pulse Resp BP SpO2   03/12/18 2052 98.6 °F (37 °C) 78 16 117/69 98 %   03/12/18 2045 - 83 17 117/69 99 %   03/12/18 1935 98.9 °F (37.2 °C) - - - -   03/12/18 1915 - 81 12 110/75 98 %   03/12/18 1900 - 78 17 121/64 100 %   03/12/18 1845 - 77 17 121/68 99 %   03/12/18 1836 - 77 23 - 97 %   03/12/18 1835 - 80 20 - -   03/12/18 1833 - 78 12 - -   03/12/18 1830 99.2 °F (37.3 °C) - - 119/65 -   03/12/18 1815 - - - 121/62 100 %   03/12/18 1502 99.6 °F (37.6 °C) (!) 101 16 125/59 100 %       Pulse Oximetry Analysis - Normal 100% on RA        Procedures:  Procedures ED Course:   3:00 PM NP for Nadia Garcia MD (Vascular Surgery) called to send this pt to ED. He is requesting line, labs, antibioticVancomycin, and admit to their service. Pt is to be NPO after midnight for surgical wound wash tomorrow. 5:47 PM Initial assessment performed. 8:03 PM Consult Note: Discussed patient's history, exam, and available diagnostics results over the telephone with DO Mary, who is aware pt is being admitted to Dr. Rob Lainez service. States pt admit pt to Surgical.       Diagnosis and Disposition       Core Measures:  For Hospitalized Patients:    1. Hospitalization Decision Time:  The decision to hospitalize the patient was made by Nadia Garcia MD (Vascular Surgery) at 8:03 PM on 3/12/2018    2. Aspirin: Aspirin was not given because the patient did not present with a stroke at the time of their Emergency Department evaluation    3. Plan: Admit to Surgical    8:04 PM Patient is being admitted to the hospital by Nadia Garcia MD (Vascular Surgery). The results of their tests and reasons for their admission have been discussed with them and/or available family. They convey agreement and understanding for the need to be admitted and for their admission diagnosis. Conditions on Admission:  Sepsis is not present at the time of admission. Deep Vein Thrombosis is not present at the time of admission. Thrombosis is not present at the time of admission. Urinary Tract Infection is not present at the time of admission. Pneumonia is not present at the time of admission. Wound infection is present at the time of admission. Pressure Ulcer is not present at the time of admission. Clinical Impression:    1. Wound infection        _______________________________    Attestations: This note is prepared by Deshawn Reed, acting as Scribe for [facility].     [facility] FNP-BC:  The scribe's documentation has been prepared under my direction and personally reviewed by me in its entirety.   I confirm that the note above accurately reflects all work, treatment, procedures, and medical decision making performed by me.  _______________________________

## 2018-03-12 NOTE — ED TRIAGE NOTES
C/o low back pain and fever today. Pt is on MWF dialysis and it was done today. Sent to ED by home health nurse for evaluation per pt  Sepsis Screening completed    (  )Patient meets SIRS criteria. ( x )Patient does not meet SIRS criteria.       SIRS Criteria is achieved when two or more of the following are present   Temperature < 96.8°F (36°C) or > 100.9°F (38.3°C)   Heart Rate > 90 beats per minute   Respiratory Rate > 20 breaths per minute   WBC count > 12,000 or <4,000 or > 10% bands

## 2018-03-12 NOTE — IP AVS SNAPSHOT
Joycelyn Farley 
 
 
 [address] [facility] [address] 
[phone] Patient: Lesvia Miranda MRN: [id number] EYU:[birth year] About your hospitalization You were admitted on:  March 12, 2018 You last received care in the:  [address] [place] [address] You were discharged on:  March 16, 2018 Why you were hospitalized Your primary diagnosis was: Wound Infection Your diagnoses also included:  Esrd On Hemodialysis (Hcc) Follow-up Information Follow up With Details Comments Contact Info [facility] [place]  Chosen to continue managing your healthcare needs [address] [place] [address] Taylor Messer, Fri 
[phone] [address] [facility] to continue managing your healthcare needs [facility] [phone] Meg Escalona MD On 3/20/2018 Follow up appointment scheduled for March 20, 2018 at 2:00 p.m. with Demar Edmond, [address] [address] 
[phone] Discharge Orders None A check ofelia indicates which time of day the medication should be taken. My Medications START taking these medications Instructions Each Dose to Equal  
 Morning Noon Evening Bedtime  
 cephALEXin 500 mg capsule Commonly known as:  Sydni Bowen Your last dose was: Your next dose is: Take 1 Cap by mouth three (3) times daily for 7 days. 500 mg  
    
   
   
   
  
 oxyCODONE-acetaminophen 5-325 mg per tablet Commonly known as:  PERCOCET Replaces:  oxyCODONE-acetaminophen 7.5-325 mg per tablet Your last dose was: Your next dose is: Take 1-2 Tabs by mouth every six (6) hours as needed for up to 14 days. Max Daily Amount: 8 Tabs. 1-2 Tab CHANGE how you take these medications Instructions Each Dose to Equal  
 Morning Noon Evening Bedtime  
 aspirin 81 mg chewable tablet What changed:  how much to take Your last dose was: Your next dose is: Take 1 Tab by mouth daily. 81 mg CONTINUE taking these medications Instructions Each Dose to Equal  
 Morning Noon Evening Bedtime AURYXIA 210 mg iron tablet Generic drug:  ferric citrate Your last dose was: Your next dose is: Take 420 mg by mouth three (3) times daily (with meals). 420 mg  
    
   
   
   
  
 carvedilol 25 mg tablet Commonly known as:  Chelo [place] Your last dose was: Your next dose is: Take 1 Tab by mouth two (2) times daily (with meals). 25 mg CLONIDINE HCL PO Your last dose was: Your next dose is: Take 0.2 mg by mouth nightly. Indications: HTN  
 0.2 mg  
    
   
   
   
  
 simvastatin 20 mg tablet Commonly known as:  ZOCOR Your last dose was: Your next dose is: Take 1 Tab by mouth nightly. 20 mg  
    
   
   
   
  
  
STOP taking these medications   
 oxyCODONE-acetaminophen 7.5-325 mg per tablet Commonly known as:  PERCOCET 7.5 Replaced by:  oxyCODONE-acetaminophen 5-325 mg per tablet Where to Get Your Medications These medications were sent to [address] [place] [address] - [address] [facility]  [address] [place] [address] Phone:  [phone]  
  cephALEXin 500 mg capsule Information on where to get these meds will be given to you by the nurse or doctor. ! Ask your nurse or doctor about these medications  
  oxyCODONE-acetaminophen 5-325 mg per tablet Discharge Instructions DISCHARGE SUMMARY from Nurse PATIENT INSTRUCTIONS: 
 
 
F-face looks uneven A-arms unable to move or move unevenly S-speech slurred or non-existent T-time-call 911 as soon as signs and symptoms begin-DO NOT go Back to bed or wait to see if you get better-TIME IS BRAIN. Warning Signs of HEART ATTACK Call 911 if you have these symptoms: 
? Chest discomfort. Most heart attacks involve discomfort in the center of the chest that lasts more than a few minutes, or that goes away and comes back. It can feel like uncomfortable pressure, squeezing, fullness, or pain. ? Discomfort in other areas of the upper body. Symptoms can include pain or discomfort in one or both arms, the back, neck, jaw, or stomach. ? Shortness of breath with or without chest discomfort. ? Other signs may include breaking out in a cold sweat, nausea, or lightheadedness. Don't wait more than five minutes to call [address]! Fast action can save your life. Calling 911 is almost always the fastest way to get lifesaving treatment. Emergency Medical Services staff can begin treatment when they arrive  up to an hour sooner than if someone gets to the hospital by car. The discharge information has been reviewed with the patient. The patient verbalized understanding. Discharge medications reviewed with the patient and appropriate educational materials and side effects teaching were provided. ___________________________________________________________________________________________________________________________________ Learning About Hemodialysis and Vascular Access Surgery What are hemodialysis and vascular access? Hemodialysis is a way to remove wastes from the blood when your kidneys can no longer do the job. It is a lifesaving treatment when you have kidney failure. Hemodialysis is often called dialysis.  
Before you can start dialysis, your doctor will need to create a place where the blood can flow in and out of your body during your dialysis sessions. This site is called the vascular access. Your doctor will prepare the vascular access weeks to months before dialysis starts. It is important to get your access as soon as your doctor says to. This allows your access to heal before you use it. For dialysis to work best, the access needs to have a good, steady blood flow. It also must be sturdy since it will be used often, usually 3 times every week. Learning about vascular access and dialysis can help you take an active role in your treatment. Dialysis does not cure kidney disease, but it can help you live longer and feel better. You will need to follow your diet and treatment schedule carefully. How is vascular access surgery done? The vascular access is where the needles are put that draw the blood from your body and send it through tubes to the dialysis machine. This access is also used to return the clean blood that is sent back into your body. There are two basic types of permanent vascular access: · AV fistula. To make a fistula, your doctor will connect an artery to a vein in your arm. After the fistula heals, the dialysis needles can be put into it. Fistulas tend to be stronger and less likely to get infected than grafts. But they need to be prepared at least several months ahead of time. They are the best type of vascular access, but they are harder to create. · AV graft. To make a graft, your doctor will put a tube under the skin in your arm. The tube, or graft, connects an artery and a vein. The dialysis needles can then be put into the graft for hemodialysis. A graft is a good choice if you have small veins or other problems. A graft can sometimes be used as soon as 1 to 3 weeks after placement. You will be asleep or get medicine to feel relaxed during the surgery. You will not feel pain.  Your doctor will make a cut (incision) on the arm you use the least. If you are right-handed, the fistula or graft will probably be put in your left arm. If you are left-handed, it will probably be put in your right arm. Your doctor will close the incision with stitches. The incision will leave a scar that fades with time. If you need to start hemodialysis right away, your doctor may place a tube in a vein in your neck, chest, or leg. This is called a central venous catheter. The catheter can be used while your permanent access heals. Catheters have more problems than an AV fistula or AV graft, so they are not the best choice for permanent access. If you get an AV fistula, you will probably go home the same day as the surgery. If you get an AV graft, you may spend 1 night at the hospital. You will probably need to take 1 or 2 days off from work. How is dialysis done? Hemodialysis uses a man-made membrane called a dialyzer to clean your blood. You are connected to the dialyzer by tubes attached to your blood vessels through your vascular access. · Dialysis is done mainly by trained health workers who can watch for any problems. · It allows you to be in contact with other people having dialysis. This can help provide emotional support. · You can schedule your treatments in the evenings so you can keep working. · You may be able to do home dialysis, which gives you more control over your schedule. · You will need dialysis on a regular basis, usually 3 times a week. You will have to arrange your schedule to have these treatments. · Dialysis can cause side effects. The most common side effects are low blood pressure and muscle cramps. These can often be treated easily. · Dialysis requires needle sticks, which bother some people. Others get used to it and even do the needle sticks themselves. What can you expect after you start dialysis? · Be sure to go to all of your dialysis sessions. Do not try to shorten or skip your sessions. You have a better chance of a longer and healthier life by getting your full treatment. · Your doctor or health care team will show you the steps you need to go through each day before, during, and after dialysis. Be sure to follow these steps. If you do not understand a step, talk to your team. 
· Your doctor and dietitian will help you design menus that follow your diet. Be sure to follow your diet guidelines. ¨ You will need to limit fluids and certain foods, such as salt (sodium), potassium, and phosphorus. ¨ You may need to follow a heart-healthy diet to keep the fat (cholesterol) in your blood under control. ¨ Ask your doctor how much protein you can have each day. Most people with chronic kidney disease need to limit protein. But you still need some protein to stay healthy. · Your doctor may recommend certain vitamins. But do not take any other medicine, including over-the-counter medicines, vitamins, and herbal products, without talking to your doctor first. 
· Do not smoke. Smoking raises your risk of many health problems, including more kidney damage. If you need help quitting, talk to your doctor about stop-smoking programs and medicines. These can increase your chances of quitting for good. · Do not take ibuprofen (Advil, Motrin) or naproxen (Aleve), or similar medicines, unless your doctor tells you to. They may make chronic kidney disease worse. You may be able to take acetaminophen (Tylenol). Follow-up care is a key part of your treatment and safety. Be sure to make and go to all appointments, and call your doctor if you are having problems. It's also a good idea to know your test results and keep a list of the medicines you take. Where can you learn more? Go to http://jacinto-mike.info/. Enter Y563 in the search box to learn more about \"Learning About Hemodialysis and Vascular Access Surgery. \" Current as of: May 12, 2017 Content Version: 11.4 © 5365-6527 Healthwise, Incorporated.  Care instructions adapted under license by [address] (which disclaims liability or warranty for this information). If you have questions about a medical condition or this instruction, always ask your healthcare professional. [address] any warranty or liability for your use of this information. Hemodialysis Access: What to Expect at [facility] Your Recovery Hemodialysis is a way to remove wastes from the blood when your kidneys can no longer do the job. It is not a cure, but it can help you live longer and feel better. It is a lifesaving treatment when you have kidney failure. Hemodialysis is often called dialysis. Your doctor created a place (called an access) in your arm for your blood to flow in and out of your body during your dialysis sessions. Your arm will probably be bruised and swollen. It may hurt. The cut (incision) may bleed. The pain and bleeding will get better over several days. You will probably need only over-the-counter pain medicine. You can reduce swelling by propping your arm on 1 or 2 pillows and keeping your elbow straight. You will have stitches. These may dissolve on their own, or your doctor will tell you when to come in to have them removed. You should also be able to return to work in a few days. You may feel some coolness or numbness in your hand. These feelings usually go away in a few weeks. Your doctor may suggest squeezing a soft object. This will strengthen your access and may make hemodialysis faster and easier. You should always be able to feel blood rushing through the fistula or graft. It feels like a slight vibration when you put your fingers on the skin over the fistula or graft. This feeling is called a thrill or pulse. This care sheet gives you a general idea about how long it will take for you to recover. But each person recovers at a different pace. Follow the steps below to get better as quickly as possible. How can you care for yourself at home? Activity ? · Rest when you feel tired. Getting enough sleep will help you recover. Do not lie on or sleep on the arm with the access. ? · Avoid activities such as washing windows or gardening that put stress on the arm with the access. ? · You may use your arm, but do not lift anything that weighs more than about 15 pounds. This may include a child, heavy grocery bags, a heavy briefcase or backpack, cat litter or dog food bags, or a vacuum . ? · You can shower, but keep the access dry for the first 2 days. Cover the area with a plastic bag to keep it dry. ? · Do not soak or scrub the incision until it has healed. ? · Wear an arm guard to protect the area if you play sports or work with your arms. ? · You may drive when your doctor says it is okay. This is usually in 1 to 2 days. ? · Most people are able to return to work about 1 or 2 days after surgery. Diet ? · Follow an eating plan that is good for your kidneys. A registered dietitian can help you make a meal plan that is right for you. You may need to limit protein, salt, fluids, and certain foods. Medicines ? · Your doctor will tell you if and when you can restart your medicines. He or she will also give you instructions about taking any new medicines. ? · If you take blood thinners, such as warfarin (Coumadin), clopidogrel (Plavix), or aspirin, be sure to talk to your doctor. He or she will tell you if and when to start taking those medicines again. Make sure that you understand exactly what your doctor wants you to do. ? · Take pain medicines exactly as directed. ¨ If the doctor gave you a prescription medicine for pain, take it as prescribed. ¨ If you are not taking a prescription pain medicine, ask your doctor if you can take acetaminophen (Tylenol). Do not take ibuprofen (Advil, Motrin) or naproxen (Aleve), or similar medicines, unless your doctor tells you to. They may make chronic kidney disease worse. ¨ Do not take two or more pain medicines at the same time unless the doctor told you to. Many pain medicines have acetaminophen, which is Tylenol. Too much acetaminophen (Tylenol) can be harmful. ? · If you think your pain medicine is making you sick to your stomach: 
¨ Take your medicine after meals (unless your doctor has told you not to). ¨ Ask your doctor for a different pain medicine. ? · If your doctor prescribed antibiotics, take them as directed. Do not stop taking them just because you feel better. You need to take the full course of antibiotics. Incision care ? · Keep the area dry for 2 days. After 2 days, wash the area with soap and water every day, and always before dialysis. ? · Do not soak or scrub the incision until it has healed. ? · If you have a bandage, change it every day or as your doctor recommends. Your doctor will tell you when you can remove it. Exercise ? · Squeeze a soft ball or other object as your doctor tells you. This will help blood flow through the access and help prevent blood clots. ? Elevation ? · Prop up the sore arm on a pillow anytime you sit or lie down during the next 3 days. Try to keep it above the level of your heart. This will help reduce swelling. Other instructions ? · Every day, check your access for a pulse or thrill in the fistula or graft area. A thrill is a vibration. To feel a pulse or thrill, place the first two fingers of your hand over the access. ? · Do not bump your arm. ? · Do not wear tight clothing, jewelry, or anything else that may squeeze the access. ? · Use your other arm to have blood drawn or blood pressure taken. ? · Do not put cream or lotion on or near the access. ? · Make sure all doctors you deal with know you have a vascular access. Follow-up care is a key part of your treatment and safety.  Be sure to make and go to all appointments, and call your doctor if you are having problems. It's also a good idea to know your test results and keep a list of the medicines you take. When should you call for help? Call 911 anytime you think you may need emergency care. For example, call if: 
? · You passed out (lost consciousness). ? · You have chest pain, are short of breath, or cough up blood. ?Call your doctor now or seek immediate medical care if: 
? · Your hand or arm is cold or dark-colored. ? · You have no pulse in your access. ? · You have nausea or you vomit. ? · You have pain that does not get better after you take pain medicine. ? · You have loose stitches, or your incision comes open. ? · You are bleeding from the incision. ? · You have signs of infection, such as: 
¨ Increased pain, swelling, warmth, or redness. ¨ Red streaks leading from the area. ¨ Pus draining from the area. ¨ A fever. ? · You have signs of a blood clot in your leg (called a deep vein thrombosis), such as: 
¨ Pain in your calf, back of the knee, thigh, or groin. ¨ Redness or swelling in your leg. ? Watch closely for changes in your health, and be sure to contact your doctor if you have any problems. Where can you learn more? Go to http://jacinto-mike.info/. Enter P616 in the search box to learn more about \"Hemodialysis Access: What to Expect at Home. \" Current as of: May 12, 2017 Content Version: 11.4 © 8168-3362 Healthwise, Incorporated. Care instructions adapted under license by [employer] (which disclaims liability or warranty for this information). If you have questions about a medical condition or this instruction, always ask your healthcare professional. [place] [address] any warranty or liability for your use of this information. [employer] Announcement We are excited to announce that we are making your provider's discharge notes available to you in [employer].   You will see these notes when they are completed and signed by the physician that discharged you from your recent hospital stay. If you have any questions or concerns about any information you see in MyChart, please call the Health Information Department where you were seen or reach out to your Primary Care Provider for more information about your plan of care. Unresulted Labs-Please follow up with your PCP about these lab tests Order Current Status CULTURE, ANAEROBIC Preliminary result CULTURE, BLOOD Preliminary result CULTURE, WOUND W GRAM STAIN Preliminary result Providers Seen During Your Hospitalization Provider Specialty Primary office phone Zaina Prasad MD Emergency Medicine [phone] Karen Farr MD Vascular Surgery [phone] Your Primary Care Physician (PCP) Primary Care Physician Office Phone Office Fax Wilian Acosta [phone] You are allergic to the following No active allergies Recent Documentation Height Weight BMI Smoking Status 1.651 m 63.5 kg 23.3 kg/m2 Former Smoker Emergency Contacts Name Discharge Info Relation Home Work Mobile Rahel Cruz DISCHARGE CAREGIVER [3] Friend [5] [phone] Patient Belongings The following personal items are in your possession at time of discharge: 
     Visual Aid: Glasses                  Other Valuables: At bedside, Cell Phone Please provide this summary of care documentation to your next provider. Signatures-by signing, you are acknowledging that this After Visit Summary has been reviewed with you and you have received a copy. Patient Signature:  ____________________________________________________________ Date:  ____________________________________________________________  
  
Alicja Boo Provider Signature:  ____________________________________________________________ Date:  ____________________________________________________________

## 2018-03-12 NOTE — IP AVS SNAPSHOT
[address] [place] [id number] 
[phone] Patient: Shae Lay MRN: [id number] DPR:2/35/1594 A check ofelia indicates which time of day the medication should be taken. My Medications START taking these medications Instructions Each Dose to Equal  
 Morning Noon Evening Bedtime  
 cephALEXin 500 mg capsule Commonly known as:  Florin Michelle Your last dose was: Your next dose is: Take 1 Cap by mouth three (3) times daily for 7 days. 500 mg  
    
   
   
   
  
 oxyCODONE-acetaminophen 5-325 mg per tablet Commonly known as:  PERCOCET Replaces:  oxyCODONE-acetaminophen 7.5-325 mg per tablet Your last dose was: Your next dose is: Take 1-2 Tabs by mouth every six (6) hours as needed for up to 14 days. Max Daily Amount: 8 Tabs. 1-2 Tab CHANGE how you take these medications Instructions Each Dose to Equal  
 Morning Noon Evening Bedtime  
 aspirin 81 mg chewable tablet What changed:  how much to take Your last dose was: Your next dose is: Take 1 Tab by mouth daily. 81 mg CONTINUE taking these medications Instructions Each Dose to Equal  
 Morning Noon Evening Bedtime AURYXIA 210 mg iron tablet Generic drug:  ferric citrate Your last dose was: Your next dose is: Take 420 mg by mouth three (3) times daily (with meals). 420 mg  
    
   
   
   
  
 carvedilol 25 mg tablet Commonly known as:  [place] Your last dose was: Your next dose is: Take 1 Tab by mouth two (2) times daily (with meals). 25 mg CLONIDINE HCL PO Your last dose was: Your next dose is: Take 0.2 mg by mouth nightly. Indications: HTN  
 0.2 mg  
    
   
   
   
  
 simvastatin 20 mg tablet Commonly known as:  ZOCOR Your last dose was: Your next dose is: Take 1 Tab by mouth nightly. 20 mg  
    
   
   
   
  
  
STOP taking these medications   
 oxyCODONE-acetaminophen 7.5-325 mg per tablet Commonly known as:  PERCOCET 7.5 Replaced by:  oxyCODONE-acetaminophen 5-325 mg per tablet Where to Get Your Medications These medications were sent to [address] [place] [address] - [address]  [address] Phone:  [phone]  
  cephALEXin 500 mg capsule Information on where to get these meds will be given to you by the nurse or doctor. ! Ask your nurse or doctor about these medications  
  oxyCODONE-acetaminophen 5-325 mg per tablet

## 2018-03-13 LAB
ABO + RH BLD: NORMAL
BLOOD GROUP ANTIBODIES SERPL: NORMAL
SPECIMEN EXP DATE BLD: NORMAL

## 2018-03-13 PROCEDURE — 86900 BLOOD TYPING SEROLOGIC ABO: CPT | Performed by: SURGERY

## 2018-03-13 PROCEDURE — 77030018836 HC SOL IRR NACL ICUM -A

## 2018-03-13 PROCEDURE — 65270000029 HC RM PRIVATE

## 2018-03-13 PROCEDURE — 74011250637 HC RX REV CODE- 250/637: Performed by: NURSE PRACTITIONER

## 2018-03-13 PROCEDURE — 36415 COLL VENOUS BLD VENIPUNCTURE: CPT | Performed by: SURGERY

## 2018-03-13 RX ORDER — GUAIFENESIN 100 MG/5ML
81 LIQUID (ML) ORAL DAILY
Status: DISCONTINUED | OUTPATIENT
Start: 2018-03-13 | End: 2018-03-16 | Stop reason: HOSPADM

## 2018-03-13 RX ORDER — CARVEDILOL 12.5 MG/1
25 TABLET ORAL 2 TIMES DAILY WITH MEALS
Status: DISCONTINUED | OUTPATIENT
Start: 2018-03-13 | End: 2018-03-16 | Stop reason: HOSPADM

## 2018-03-13 RX ORDER — SIMVASTATIN 20 MG/1
20 TABLET, FILM COATED ORAL
Status: DISCONTINUED | OUTPATIENT
Start: 2018-03-13 | End: 2018-03-16 | Stop reason: HOSPADM

## 2018-03-13 RX ORDER — OXYCODONE AND ACETAMINOPHEN 5; 325 MG/1; MG/1
1-2 TABLET ORAL
Status: DISCONTINUED | OUTPATIENT
Start: 2018-03-13 | End: 2018-03-16 | Stop reason: HOSPADM

## 2018-03-13 RX ADMIN — OXYCODONE HYDROCHLORIDE AND ACETAMINOPHEN 2 TABLET: 5; 325 TABLET ORAL at 23:14

## 2018-03-13 RX ADMIN — SIMVASTATIN 20 MG: 20 TABLET, FILM COATED ORAL at 23:14

## 2018-03-13 RX ADMIN — CARVEDILOL 25 MG: 12.5 TABLET, FILM COATED ORAL at 17:01

## 2018-03-13 RX ADMIN — OXYCODONE HYDROCHLORIDE AND ACETAMINOPHEN 2 TABLET: 5; 325 TABLET ORAL at 14:13

## 2018-03-13 NOTE — H&P
Surgery History and Physical    Subjective:      Talisha Loya is a 47 y.o.  male with a history of ESRD on dialysis who presents to the ED as recommended by his home health nurse for left upper arm wound infection. Mr. Mary Weems had a shuntogram on 3/6, which revealed thrombosed graft with a small amount of extravasation with forced injection of contrast.  No evidence of any active bleeding. On 3/6 his left upper arm wound was packed and a dry dressing was applied before discharge. Mr. Mary Weems states his wound has only been packed by home health one time, which was on 3/10, since he was discharged from the hospital on 3/6. He states there was a foul odor from the wound on 3/10, which was worse and accompanied by a temperature of 104 degress on 3/12. These findings prompted the home health nurse to call our office and we advised that he go to the ED at that time.     Patient Active Problem List    Diagnosis Date Noted    Wound infection 03/12/2018    Post-operative pain 02/28/2018    CRF (chronic renal failure) 12/24/2017    AV fistula occlusion ([address] 12/24/2017    ESRD on hemodialysis ([address] 11/22/2017    Severe protein-calorie malnutrition ([address] 08/08/2017    Pleural effusion, right 08/06/2017    Rash of genital area 08/05/2017    PAM (acute kidney injury) ([address] 08/04/2017    Hypertensive urgency, malignant 08/04/2017    Uremia 08/04/2017    Severe anemia 08/04/2017    Nonadherence to medical treatment 08/04/2017    Tobacco abuse 11/22/2013    Routine general medical examination at a health care facility 11/12/2013    Unspecified essential hypertension 11/12/2013     Past Medical History:   Diagnosis Date    Chronic kidney disease     ESRD- Dialysis- [facility] Hypertension 07/2017    Ill-defined condition     dialysis monday wednesday friday    Non compliance w medication regimen     noncompliant with HTN meds      Past Surgical History:   Procedure Laterality Date    HX OTHER SURGICAL      left arm dialysis shunt    HX VASCULAR ACCESS  2017    AV  graft left arm    HX VASCULAR ACCESS  2017    left chest cath for dialysis      Social History   Substance Use Topics    Smoking status: Former Smoker     Packs/day: 0.50     Years: 7.00     Types: Cigarettes     Quit date: 2017    Smokeless tobacco: Never Used    Alcohol use No      Comment: stopped       Family History   Problem Relation Age of Onset    Cancer Mother       Prior to Admission medications    Medication Sig Start Date End Date Taking? Authorizing Provider   ferric citrate (AURYXIA) 210 mg iron tablet Take 420 mg by mouth three (3) times daily (with meals). Yes Historical Provider   oxyCODONE-acetaminophen (PERCOCET 7.5) 7.5-325 mg per tablet Take 2 Tabs by mouth every six (6) hours as needed for Pain. Max Daily Amount: 8 Tabs. 18  Yes Airam Blair MD   carvedilol (COREG) 25 mg tablet Take 1 Tab by mouth two (2) times daily (with meals). 17  Yes Cristine Monreal MD   CLONIDINE HCL PO Take 2.5 mg by mouth nightly. Indications: HTN   Yes Historical Provider   aspirin 81 mg chewable tablet Take 1 Tab by mouth daily. Patient taking differently: Take 325 mg by mouth daily. 17  Yes Quinn Renae MD   simvastatin (ZOCOR) 20 mg tablet Take 1 Tab by mouth nightly.  17  Yes Quinn Renae MD     No Known Allergies      Review of Systems:    Constitutional: negative except for fevers and chills  Respiratory: negative  Cardiovascular: negative  Gastrointestinal: negative  Integument/Breast: negative except for left upper arm wounds with foul odor and drainage    Objective:     Patient Vitals for the past 8 hrs:   BP Temp Pulse Resp SpO2   18 0806 120/57 98.6 °F (37 °C) 71 16 100 %   18 0338 111/63 98.7 °F (37.1 °C) 78 16 98 %       Temp (24hrs), Av.1 °F (37.3 °C), Min:98.5 °F (36.9 °C), Max:100.4 °F (38 °C)      Physical Exam:  GENERAL: alert, cooperative, no distress, appears stated age, LUNG: clear to auscultation bilaterally, HEART: regular rate and rhythm, ABDOMEN: soft, non-tender. Bowel sounds normal. No masses,  no organomegaly, EXTREMITIES:  warm and well perfused, left radial pulse palpable, +sensory/+motor function SKIN: left upper arm with three surgical wounds with serosanguinous drainage and foul odor, edema, and warmth. Labs:   Recent Results (from the past 24 hour(s))   CULTURE, BLOOD    Collection Time: 03/12/18  5:53 PM   Result Value Ref Range    Special Requests: NO SPECIAL REQUESTS      Culture result: NO GROWTH AFTER 9 HOURS     METABOLIC PANEL, COMPREHENSIVE    Collection Time: 03/12/18  6:00 PM   Result Value Ref Range    Sodium 135 (L) 136 - 145 mmol/L    Potassium 4.6 3.5 - 5.5 mmol/L    Chloride 96 (L) 100 - 108 mmol/L    CO2 28 21 - 32 mmol/L    Anion gap 11 3.0 - 18 mmol/L    Glucose 75 74 - 99 mg/dL    BUN 33 (H) 7.0 - 18 MG/DL    Creatinine 9.38 (H) 0.6 - 1.3 MG/DL    BUN/Creatinine ratio 4 (L) 12 - 20      GFR est AA 7 (L) >60 ml/min/1.73m2    GFR est non-AA 6 (L) >60 ml/min/1.73m2    Calcium 9.6 8.5 - 10.1 MG/DL    Bilirubin, total 0.5 0.2 - 1.0 MG/DL    ALT (SGPT) 15 (L) 16 - 61 U/L    AST (SGOT) 16 15 - 37 U/L    Alk.  phosphatase 63 45 - 117 U/L    Protein, total 8.8 (H) 6.4 - 8.2 g/dL    Albumin 3.7 3.4 - 5.0 g/dL    Globulin 5.1 (H) 2.0 - 4.0 g/dL    A-G Ratio 0.7 (L) 0.8 - 1.7     PROTHROMBIN TIME + INR    Collection Time: 03/12/18  6:00 PM   Result Value Ref Range    Prothrombin time 13.5 11.5 - 15.2 sec    INR 1.1 0.8 - 1.2     PTT    Collection Time: 03/12/18  6:00 PM   Result Value Ref Range    aPTT 23.3 23.0 - 36.4 SEC   CBC WITH AUTOMATED DIFF    Collection Time: 03/12/18  6:30 PM   Result Value Ref Range    WBC 13.1 4.6 - 13.2 K/uL    RBC 3.50 (L) 4.70 - 5.50 M/uL    HGB 10.8 (L) 13.0 - 16.0 g/dL    HCT 35.2 (L) 36.0 - 48.0 %    .6 (H) 74.0 - 97.0 FL    MCH 30.9 24.0 - 34.0 PG    MCHC 30.7 (L) 31.0 - 37.0 g/dL    RDW 13.5 11.6 - 14.5 %    PLATELET 562 006 - 795 K/uL    MPV 10.3 9.2 - 11.8 FL    NEUTROPHILS 72 40 - 73 %    LYMPHOCYTES 15 (L) 21 - 52 %    MONOCYTES 10 3 - 10 %    EOSINOPHILS 2 0 - 5 %    BASOPHILS 1 0 - 2 %    ABS. NEUTROPHILS 9.5 (H) 1.8 - 8.0 K/UL    ABS. LYMPHOCYTES 2.0 0.9 - 3.6 K/UL    ABS. MONOCYTES 1.3 (H) 0.05 - 1.2 K/UL    ABS. EOSINOPHILS 0.3 0.0 - 0.4 K/UL    ABS. BASOPHILS 0.1 (H) 0.0 - 0.06 K/UL    DF AUTOMATED     LACTIC ACID    Collection Time: 03/12/18  6:30 PM   Result Value Ref Range    Lactic acid 0.5 0.4 - 2.0 MMOL/L         Assessment:     Principal Problem:    Wound infection (3/12/2018)    Active Problems:    ESRD on hemodialysis ([facility] (11/22/2017)        Plan:     Left upper arm surgical wound infection. Plan for IV antibiotics and surgical washout later today. Will consult nephrology for hemodialysis management.       Signed By: Augustin Sawyer NP     March 13, 2018

## 2018-03-13 NOTE — PROGRESS NOTES
Problem: Falls - Risk of  Goal: *Absence of Falls  Document Jesus Fall Risk and appropriate interventions in the flowsheet.    Outcome: Progressing Towards Goal  Fall Risk Interventions:            Medication Interventions: Teach patient to arise slowly

## 2018-03-13 NOTE — ROUTINE PROCESS
TRANSFER - OUT REPORT:    Verbal report given to [address] [place] [address] rn(name) on [employer]  being transferred to [address] [place] [address](unit) for routine progression of care       Report consisted of patients Situation, Background, Assessment and   Recommendations(SBAR). Information from the following report(s) SBAR, Kardex and ED Summary was reviewed with the receiving nurse. Lines:   Peripheral IV 03/12/18 Right Forearm (Active)   Site Assessment Clean, dry, & intact 3/12/2018  5:53 PM   Phlebitis Assessment 0 3/12/2018  5:53 PM   Infiltration Assessment 0 3/12/2018  5:53 PM   Dressing Status Clean, dry, & intact; Occlusive 3/12/2018  5:53 PM   Hub Color/Line Status Pink 3/12/2018  5:53 PM   Action Taken Blood drawn 3/12/2018  5:53 PM   Alcohol Cap Used Yes 3/12/2018  5:53 PM       Peripheral IV 03/12/18 Right Antecubital (Active)        Opportunity for questions and clarification was provided.       Patient transported with:   [employer]

## 2018-03-13 NOTE — ROUTINE PROCESS
Bedside, Verbal and Written shift change report given to Shani Mcarthur RN (oncoming nurse) by Brisa Joshi. Chelsy Lock (offgoing nurse). Report included the following information SBAR, Kardex, MAR and Recent Results.

## 2018-03-13 NOTE — PROGRESS NOTES
Pharmacy Dosing Services: Vancomycin    Consult for Vancomycin Dosing by Pharmacy by Tracey Cordial provided for this 47y.o. year old male , for indication of wound infection. Day of Therapy 1    Ht Readings from Last 1 Encounters:   03/12/18 165.1 cm (65\")        Wt Readings from Last 1 Encounters:   03/12/18 61 kg (134 lb 7.7 oz)        Other Current Antibiotics   Significant Cultures   Serum Creatinine   Creatinine Clearance   BUN   WBC   H/H   Platelets   Temp     Start Vancomycin therapy, with l dose of 1000(mg) at  2200/ 03/12/2018(time/date). Dose calculated to approximate a therapeutic trough of 15-20mcg/mL. Pharmacy to follow daily and will make changes to dose and/or frequency based on clinical status. Estimated Pharmacokinetic Parameters (based on population kinetics)  Vd: 43 L (0.7 L/kg)   Elvin: 0.009 hr-1 (T1/2 = 77 hrs)     Dosing Recommendations   Vancomycin dose: 1000 mg IV   (infused over 1 hr)   Estimated peak: 48.8 mcg/mL   Estimated trough: 25.7 mcg/mL   Estimated AUC:THANIA: 865 mcg*hr/mL (assumed THANIA 1 mcg/mL)     A/P:   1. Recommend vancomycin 1000 mg IV  (16 mg/kg)   2. Order random vancomycin level approximately . 4 hours after H-D   If result comes back < 20 re order Vancomycin  3. Please monitor renal function (urine output, BUN/SCr).  Dose adjustments may be necessary with a significant change in renal function and clinical outcomes    Pharmacist Kade Espinoza, PHARMD

## 2018-03-13 NOTE — ROUTINE PROCESS
21:00: Received verbal report via phone from Steven Yang RN at this time. Report included SBAR, KARDEX, MAR and ED course. 21:30: Received pt as a transfer from ED at this time. Pt arrived via W/C with transportation staff in attendance. Pt was able to transfer from W/C to bed unassisted and without apparent difficulty. Respirations even and unlabored, skin W&D. Pt was asked to provide some information R/T admission assessment and cooperated with this effort for several minutes; however he verbalized his desire to sleep as this had been 'A real long day, man. \" Vancomycin hung at this time. Pt provided with specimen cup in the event he was able to urinate (pt oliguric) Monitoring ongoing. 03:00: Paged attending physician R/T contradiction in orders placed and note written in ED (note indicated procedure would be tomorrow 3/13, order indicated 3/15.)  As pt is NPO status and requires further diagnostic tests to be run clarification required. Physician who responded replied that if procedure is not slated till 3/15 NPO status could be reversed as necessary in the morning. Diagnostic tests can be performed as routine priority. 06:15: Pt slept soundly remainder of shift after above note was appended. Denies C/O at this time.

## 2018-03-13 NOTE — PROGRESS NOTES
D/C Plan: Nehemias Augustin    Pt admitted for fever and back pain. Pt with a wound infection in the left upper arm and AV fistula occlusion. Pt with a history of ESRD and goes to dialysis MWF at [address] in [place] and non compliance with medications. Prior to admission pt was being seen by Nehemias Augustin. Pt indicated his friend, Nelly Krishnamurthy, will assist him upon discharge. Pt with plan IV ABX and surgical washout later today of left upper arm surgical wound. Please have pt ambulate or order therapy to assist with identifying an appropirate plan of care. Readmission Risk Assessment:     Moderate Risk and MSSP/Good Help ACO patients    RRAT Score:  13-20    Initial Assessment: Nehemias Augustin     Emergency Contact:  See face sheet    Pertinent Medical Hx:     See clinical     PCP/Specialists: Jerome Controls:   Hemodialysis    DME:      none    Moderate Risk Care Transition Plan:  1. Evaluate for [facility] or [facility], SNF, acute rehab, community care coordination of resources. 2. Involve patient/caregiver in assessment, planning, education and implement of intervention. 3. CM daily patient care huddles/interdisciplinary rounds. 4. PCP/Specialist appointment within 5  7 days made prior to discharge. 5. Facilitate transportation and logistics for follow-up appointments. 6. Medication reconciliation [id number] [facility]  7. Formal handoff between hospital provider and post-acute provider to transition patient  Handoff to [address] [place] [address] Nurse Navigator or PCP practice. Care Management Interventions  PCP Verified by CM: Yes  Mode of Transport at Discharge:  Other (see comment) (Friend)  Transition of Care Consult (CM Consult): Discharge Planning, [address]: Yes  Health Maintenance Reviewed: Yes  Current Support Network: [facility] Lives [facility] to assist)  Confirm Follow Up Transport: Self  Plan discussed with Pt/Family/Caregiver: Yes  Discharge Location  Discharge Placement: Home with home health (Current with Michi Liriano)

## 2018-03-13 NOTE — PROGRESS NOTES
INITIAL NUTRITION ASSESSMENT     RECOMMENDATIONS/PLAN:   Adv diet per MD  Avoid prolonged NPO diet order as it does not meet estimated needs  Add daily Phos labs  Add Nephro Francisco Rx  Monitor labs/lytes, diet adv, skin integrity, wt, fluid status, BM    REASON FOR ASSESSMENT:     []  RN Referral:    [x] MST score >/=2  Malnutrition Screening Tool (MST):  Recently Lost Weight Without Trying: No  If Yes, How Much Weight Loss: Unsure  Eating Poorly Due to Decreased Appetite:  (NPO after midnight)  MST Score: 2    n  NUTRITION ASSESSMENT:   Client History: 47 yrs old Male admitted with fever and malodorous wound to LUE and LUE pain. PMHx: ESRD-HD, HTN, non-compliant w/ HTN meds  Cultural/Orthodoxy Food Preferences: None Identified    FOOD/NUTRITION HISTORY  Diet History: pt reports appetite being ok. He also, reports being hungry now. Food Allergies:  [x] NKFA      Pertinent PTA Medications: iron, zocor     NUTRITION INTAKE   Diet Order:  NPO     Average PO Intake:       No data found. Pertinent Medications:  [x] Reviewed; Electrolyte Replacement Protocol: []K  []Mg  []PO4    Insulin:  [] SSI  [] Pre-meal   []  Basal   [] Drip  [] None  Pt expected to meet estimated nutrient needs through next review:          []  Yes     [x] No;NPO does not meet estimated needs  ANTHROPOMETRICS  Height: 5' 5\" (165.1 cm)       Weight: 63.2 kg (139 lb 4.8 oz)    BMI: 23.2 kg/m^2  -  normal weight (18.5%-24.9% BMI)        Weight change: no wt loss noted in chart; pt reported no recent wt loss                                    Comparison to Reference Standards:  IBW: 136 lbs      %IBW: 102%      AdjBW: n/a    NUTRITION-FOCUSED PHYSICAL ASSESSMENT  Skin: malodorous wound to LUE . GI: +BM 3/13/18    BIOCHEMICAL DATA & MEDICAL TESTS  Pertinent Labs:  [x] Reviewed;  Na-135    NUTRITION PRESCRIPTION  Calories: 2205 kcal/day based on 35kcal/kg  Protein: 76 g/day based on 1.2 g/kg  CHO: 276 g/day based on 50% of total energy  Fluid: 2205 ml/day based on 1 kcal/ml      NUTRITION DIAGNOSES:   1. At risk for inadequate oral intake related to diet order as evidence by Day 1 NPO    NUTRITION INTERVENTIONS:   INTERVENTIONS:        GOALS:  1. Nephro Francisco Rx 1. Adv diet per MD by next review 3 days   2. Other:Adv diet per MD    3      LEARNING NEEDS (Diet, Supplementation, Food/Nutrient-Drug Interaction):   [x] None Identified  [] Inpatient education provided/documented    [] Identified and patient:  [] Declined     [] Was not appropriate/indicated  NUTRITION MONITORING /EVALUATION:   Monitor wt  Monitor renal labs, electrolytes, fluid status    [] Participated in Interdisciplinary Rounds  [x] Interdisciplinary Care Plan Reviewed/Documented  DISCHARGE NUTRITION RECOMMENDATIONS ADDRESSED:      [x] To be determined closer to discharge    NUTRITION RISK:     [x]  At risk                     []  Not currently at risk     Will follow-up per policy.   Luis Alberto Walden, [address] [address]

## 2018-03-14 ENCOUNTER — ANESTHESIA (OUTPATIENT)
Dept: SURGERY | Age: 53
DRG: 173 | End: 2018-03-14
Payer: MEDICAID

## 2018-03-14 ENCOUNTER — ANESTHESIA EVENT (OUTPATIENT)
Dept: SURGERY | Age: 53
DRG: 173 | End: 2018-03-14
Payer: MEDICAID

## 2018-03-14 LAB
ANION GAP SERPL CALC-SCNC: 10 MMOL/L (ref 3–18)
BASOPHILS # BLD: 0 K/UL (ref 0–0.06)
BASOPHILS NFR BLD: 0 % (ref 0–2)
BUN SERPL-MCNC: 63 MG/DL (ref 7–18)
BUN/CREAT SERPL: 4 (ref 12–20)
CALCIUM SERPL-MCNC: 8.9 MG/DL (ref 8.5–10.1)
CHLORIDE SERPL-SCNC: 99 MMOL/L (ref 100–108)
CO2 SERPL-SCNC: 29 MMOL/L (ref 21–32)
CREAT SERPL-MCNC: 15.44 MG/DL (ref 0.6–1.3)
DIFFERENTIAL METHOD BLD: ABNORMAL
EOSINOPHIL # BLD: 0.3 K/UL (ref 0–0.4)
EOSINOPHIL NFR BLD: 3 % (ref 0–5)
ERYTHROCYTE [DISTWIDTH] IN BLOOD BY AUTOMATED COUNT: 13.2 % (ref 11.6–14.5)
GLUCOSE BLD STRIP.AUTO-MCNC: 93 MG/DL (ref 70–110)
GLUCOSE SERPL-MCNC: 86 MG/DL (ref 74–99)
HCT VFR BLD AUTO: 32.8 % (ref 36–48)
HGB BLD-MCNC: 10.1 G/DL (ref 13–16)
INR PPP: 1.1 (ref 0.8–1.2)
LYMPHOCYTES # BLD: 2.8 K/UL (ref 0.9–3.6)
LYMPHOCYTES NFR BLD: 25 % (ref 21–52)
MCH RBC QN AUTO: 30.1 PG (ref 24–34)
MCHC RBC AUTO-ENTMCNC: 30.8 G/DL (ref 31–37)
MCV RBC AUTO: 97.6 FL (ref 74–97)
MONOCYTES # BLD: 1.1 K/UL (ref 0.05–1.2)
MONOCYTES NFR BLD: 9 % (ref 3–10)
NEUTS SEG # BLD: 7.2 K/UL (ref 1.8–8)
NEUTS SEG NFR BLD: 63 % (ref 40–73)
PHOSPHATE SERPL-MCNC: 5.9 MG/DL (ref 2.5–4.9)
PLATELET # BLD AUTO: 354 K/UL (ref 135–420)
PMV BLD AUTO: 10 FL (ref 9.2–11.8)
POTASSIUM SERPL-SCNC: 4.7 MMOL/L (ref 3.5–5.5)
PROTHROMBIN TIME: 13.6 SEC (ref 11.5–15.2)
RBC # BLD AUTO: 3.36 M/UL (ref 4.7–5.5)
SODIUM SERPL-SCNC: 138 MMOL/L (ref 136–145)
WBC # BLD AUTO: 11.4 K/UL (ref 4.6–13.2)

## 2018-03-14 PROCEDURE — 77030002524 HC INSTR CLMP FGRTY EDWD -B: Performed by: SURGERY

## 2018-03-14 PROCEDURE — 77030002933 HC SUT MCRYL J&J -A: Performed by: SURGERY

## 2018-03-14 PROCEDURE — 77030031139 HC SUT VCRL2 J&J -A: Performed by: SURGERY

## 2018-03-14 PROCEDURE — 77030020989 HC NDL NRV BLK ARRO -B: Performed by: ANESTHESIOLOGY

## 2018-03-14 PROCEDURE — 77030002996 HC SUT SLK J&J -A: Performed by: SURGERY

## 2018-03-14 PROCEDURE — 74011250636 HC RX REV CODE- 250/636: Performed by: ANESTHESIOLOGY

## 2018-03-14 PROCEDURE — 87340 HEPATITIS B SURFACE AG IA: CPT | Performed by: INTERNAL MEDICINE

## 2018-03-14 PROCEDURE — 85025 COMPLETE CBC W/AUTO DIFF WBC: CPT | Performed by: NURSE PRACTITIONER

## 2018-03-14 PROCEDURE — 76210000006 HC OR PH I REC 0.5 TO 1 HR: Performed by: SURGERY

## 2018-03-14 PROCEDURE — 05BY0ZZ EXCISION OF UPPER VEIN, OPEN APPROACH: ICD-10-PCS | Performed by: SURGERY

## 2018-03-14 PROCEDURE — 77030011640 HC PAD GRND REM COVD -A: Performed by: SURGERY

## 2018-03-14 PROCEDURE — 87186 SC STD MICRODIL/AGAR DIL: CPT | Performed by: SURGERY

## 2018-03-14 PROCEDURE — 03BY0ZZ EXCISION OF UPPER ARTERY, OPEN APPROACH: ICD-10-PCS | Performed by: SURGERY

## 2018-03-14 PROCEDURE — 74011250636 HC RX REV CODE- 250/636: Performed by: NURSE PRACTITIONER

## 2018-03-14 PROCEDURE — 64415 NJX AA&/STRD BRCH PLXS IMG: CPT | Performed by: ANESTHESIOLOGY

## 2018-03-14 PROCEDURE — 74011250636 HC RX REV CODE- 250/636: Performed by: INTERNAL MEDICINE

## 2018-03-14 PROCEDURE — 87070 CULTURE OTHR SPECIMN AEROBIC: CPT | Performed by: SURGERY

## 2018-03-14 PROCEDURE — 74011000250 HC RX REV CODE- 250: Performed by: SURGERY

## 2018-03-14 PROCEDURE — 76010000149 HC OR TIME 1 TO 1.5 HR: Performed by: SURGERY

## 2018-03-14 PROCEDURE — 74011250636 HC RX REV CODE- 250/636

## 2018-03-14 PROCEDURE — 82962 GLUCOSE BLOOD TEST: CPT

## 2018-03-14 PROCEDURE — 76942 ECHO GUIDE FOR BIOPSY: CPT | Performed by: SURGERY

## 2018-03-14 PROCEDURE — 76060000033 HC ANESTHESIA 1 TO 1.5 HR: Performed by: SURGERY

## 2018-03-14 PROCEDURE — 85610 PROTHROMBIN TIME: CPT | Performed by: NURSE PRACTITIONER

## 2018-03-14 PROCEDURE — 3E0T3BZ INTRODUCTION OF ANESTHETIC AGENT INTO PERIPHERAL NERVES AND PLEXI, PERCUTANEOUS APPROACH: ICD-10-PCS | Performed by: ANESTHESIOLOGY

## 2018-03-14 PROCEDURE — 86706 HEP B SURFACE ANTIBODY: CPT | Performed by: INTERNAL MEDICINE

## 2018-03-14 PROCEDURE — 87077 CULTURE AEROBIC IDENTIFY: CPT | Performed by: SURGERY

## 2018-03-14 PROCEDURE — 74011250637 HC RX REV CODE- 250/637: Performed by: NURSE PRACTITIONER

## 2018-03-14 PROCEDURE — 74011250636 HC RX REV CODE- 250/636: Performed by: SURGERY

## 2018-03-14 PROCEDURE — 36415 COLL VENOUS BLD VENIPUNCTURE: CPT | Performed by: SURGERY

## 2018-03-14 PROCEDURE — 77030032490 HC SLV COMPR SCD KNE COVD -B: Performed by: SURGERY

## 2018-03-14 PROCEDURE — 90935 HEMODIALYSIS ONE EVALUATION: CPT

## 2018-03-14 PROCEDURE — 77030012508 HC MSK AIRWY LMA AMBU -A: Performed by: ANESTHESIOLOGY

## 2018-03-14 PROCEDURE — 87075 CULTR BACTERIA EXCEPT BLOOD: CPT | Performed by: NURSE PRACTITIONER

## 2018-03-14 PROCEDURE — 80048 BASIC METABOLIC PNL TOTAL CA: CPT | Performed by: NURSE PRACTITIONER

## 2018-03-14 PROCEDURE — 84100 ASSAY OF PHOSPHORUS: CPT | Performed by: SURGERY

## 2018-03-14 PROCEDURE — 77030010512 HC APPL CLP LIG J&J -C: Performed by: SURGERY

## 2018-03-14 PROCEDURE — 74011000250 HC RX REV CODE- 250

## 2018-03-14 PROCEDURE — 65270000029 HC RM PRIVATE

## 2018-03-14 RX ORDER — ONDANSETRON 2 MG/ML
4 INJECTION INTRAMUSCULAR; INTRAVENOUS ONCE
Status: DISCONTINUED | OUTPATIENT
Start: 2018-03-14 | End: 2018-03-14

## 2018-03-14 RX ORDER — SODIUM CHLORIDE 0.9 % (FLUSH) 0.9 %
5-10 SYRINGE (ML) INJECTION AS NEEDED
Status: DISCONTINUED | OUTPATIENT
Start: 2018-03-14 | End: 2018-03-14

## 2018-03-14 RX ORDER — SODIUM CHLORIDE, SODIUM LACTATE, POTASSIUM CHLORIDE, CALCIUM CHLORIDE 600; 310; 30; 20 MG/100ML; MG/100ML; MG/100ML; MG/100ML
100 INJECTION, SOLUTION INTRAVENOUS CONTINUOUS
Status: DISCONTINUED | OUTPATIENT
Start: 2018-03-14 | End: 2018-03-14

## 2018-03-14 RX ORDER — MAGNESIUM SULFATE 100 %
4 CRYSTALS MISCELLANEOUS AS NEEDED
Status: DISCONTINUED | OUTPATIENT
Start: 2018-03-14 | End: 2018-03-14

## 2018-03-14 RX ORDER — LIDOCAINE HYDROCHLORIDE 20 MG/ML
INJECTION, SOLUTION EPIDURAL; INFILTRATION; INTRACAUDAL; PERINEURAL AS NEEDED
Status: DISCONTINUED | OUTPATIENT
Start: 2018-03-14 | End: 2018-03-14 | Stop reason: HOSPADM

## 2018-03-14 RX ORDER — MIDAZOLAM HYDROCHLORIDE 1 MG/ML
INJECTION, SOLUTION INTRAMUSCULAR; INTRAVENOUS AS NEEDED
Status: DISCONTINUED | OUTPATIENT
Start: 2018-03-14 | End: 2018-03-14 | Stop reason: HOSPADM

## 2018-03-14 RX ORDER — PROPOFOL 10 MG/ML
INJECTION, EMULSION INTRAVENOUS
Status: DISCONTINUED | OUTPATIENT
Start: 2018-03-14 | End: 2018-03-14 | Stop reason: HOSPADM

## 2018-03-14 RX ORDER — MORPHINE SULFATE 2 MG/ML
2 INJECTION, SOLUTION INTRAMUSCULAR; INTRAVENOUS
Status: DISCONTINUED | OUTPATIENT
Start: 2018-03-14 | End: 2018-03-16 | Stop reason: HOSPADM

## 2018-03-14 RX ORDER — HYDROMORPHONE HYDROCHLORIDE 2 MG/ML
0.5 INJECTION, SOLUTION INTRAMUSCULAR; INTRAVENOUS; SUBCUTANEOUS
Status: DISCONTINUED | OUTPATIENT
Start: 2018-03-14 | End: 2018-03-14 | Stop reason: HOSPADM

## 2018-03-14 RX ORDER — CEFAZOLIN SODIUM 2 G/50ML
2 SOLUTION INTRAVENOUS ONCE
Status: COMPLETED | OUTPATIENT
Start: 2018-03-14 | End: 2018-03-14

## 2018-03-14 RX ORDER — VANCOMYCIN/0.9 % SOD CHLORIDE 750 MG/250
750 PLASTIC BAG, INJECTION (ML) INTRAVENOUS
Status: DISCONTINUED | OUTPATIENT
Start: 2018-03-14 | End: 2018-03-16 | Stop reason: HOSPADM

## 2018-03-14 RX ORDER — INSULIN LISPRO 100 [IU]/ML
INJECTION, SOLUTION INTRAVENOUS; SUBCUTANEOUS ONCE
Status: DISCONTINUED | OUTPATIENT
Start: 2018-03-14 | End: 2018-03-14

## 2018-03-14 RX ORDER — NALOXONE HYDROCHLORIDE 0.4 MG/ML
0.1 INJECTION, SOLUTION INTRAMUSCULAR; INTRAVENOUS; SUBCUTANEOUS AS NEEDED
Status: DISCONTINUED | OUTPATIENT
Start: 2018-03-14 | End: 2018-03-14

## 2018-03-14 RX ORDER — ONDANSETRON 2 MG/ML
INJECTION INTRAMUSCULAR; INTRAVENOUS AS NEEDED
Status: DISCONTINUED | OUTPATIENT
Start: 2018-03-14 | End: 2018-03-14 | Stop reason: HOSPADM

## 2018-03-14 RX ORDER — ROPIVACAINE HYDROCHLORIDE 5 MG/ML
INJECTION, SOLUTION EPIDURAL; INFILTRATION; PERINEURAL AS NEEDED
Status: DISCONTINUED | OUTPATIENT
Start: 2018-03-14 | End: 2018-03-14 | Stop reason: HOSPADM

## 2018-03-14 RX ORDER — PHENYLEPHRINE HCL IN 0.9% NACL 1 MG/10 ML
SYRINGE (ML) INTRAVENOUS AS NEEDED
Status: DISCONTINUED | OUTPATIENT
Start: 2018-03-14 | End: 2018-03-14 | Stop reason: HOSPADM

## 2018-03-14 RX ORDER — SODIUM CHLORIDE 9 MG/ML
25 INJECTION, SOLUTION INTRAVENOUS
Status: DISCONTINUED | OUTPATIENT
Start: 2018-03-14 | End: 2018-03-16 | Stop reason: HOSPADM

## 2018-03-14 RX ORDER — PROPOFOL 10 MG/ML
INJECTION, EMULSION INTRAVENOUS AS NEEDED
Status: DISCONTINUED | OUTPATIENT
Start: 2018-03-14 | End: 2018-03-14 | Stop reason: HOSPADM

## 2018-03-14 RX ORDER — SODIUM CHLORIDE 9 MG/ML
1000 INJECTION, SOLUTION INTRAVENOUS CONTINUOUS
Status: DISCONTINUED | OUTPATIENT
Start: 2018-03-14 | End: 2018-03-16

## 2018-03-14 RX ORDER — DEXTROSE 50 % IN WATER (D50W) INTRAVENOUS SYRINGE
25-50 AS NEEDED
Status: DISCONTINUED | OUTPATIENT
Start: 2018-03-14 | End: 2018-03-14

## 2018-03-14 RX ORDER — HYDROMORPHONE HYDROCHLORIDE 2 MG/ML
0.5 INJECTION, SOLUTION INTRAMUSCULAR; INTRAVENOUS; SUBCUTANEOUS
Status: DISCONTINUED | OUTPATIENT
Start: 2018-03-14 | End: 2018-03-14 | Stop reason: SDUPTHER

## 2018-03-14 RX ADMIN — MIDAZOLAM HYDROCHLORIDE 2 MG: 1 INJECTION, SOLUTION INTRAMUSCULAR; INTRAVENOUS at 13:42

## 2018-03-14 RX ADMIN — SODIUM CHLORIDE 1000 ML: 900 INJECTION, SOLUTION INTRAVENOUS at 16:22

## 2018-03-14 RX ADMIN — Medication 100 MCG: at 14:33

## 2018-03-14 RX ADMIN — CEFAZOLIN SODIUM 2 G: 2 SOLUTION INTRAVENOUS at 13:57

## 2018-03-14 RX ADMIN — SIMVASTATIN 20 MG: 20 TABLET, FILM COATED ORAL at 22:52

## 2018-03-14 RX ADMIN — MORPHINE SULFATE 2 MG: 2 INJECTION, SOLUTION INTRAMUSCULAR; INTRAVENOUS at 08:56

## 2018-03-14 RX ADMIN — LIDOCAINE HYDROCHLORIDE 20 MG: 20 INJECTION, SOLUTION EPIDURAL; INFILTRATION; INTRACAUDAL; PERINEURAL at 14:09

## 2018-03-14 RX ADMIN — Medication 100 MCG: at 14:46

## 2018-03-14 RX ADMIN — PROPOFOL 50 MG: 10 INJECTION, EMULSION INTRAVENOUS at 14:21

## 2018-03-14 RX ADMIN — CARVEDILOL 25 MG: 12.5 TABLET, FILM COATED ORAL at 08:56

## 2018-03-14 RX ADMIN — PROPOFOL 50 MCG/KG/MIN: 10 INJECTION, EMULSION INTRAVENOUS at 14:09

## 2018-03-14 RX ADMIN — MORPHINE SULFATE 2 MG: 2 INJECTION, SOLUTION INTRAMUSCULAR; INTRAVENOUS at 20:57

## 2018-03-14 RX ADMIN — PROPOFOL 150 MG: 10 INJECTION, EMULSION INTRAVENOUS at 14:19

## 2018-03-14 RX ADMIN — CARVEDILOL 25 MG: 12.5 TABLET, FILM COATED ORAL at 21:05

## 2018-03-14 RX ADMIN — ONDANSETRON 4 MG: 2 INJECTION INTRAMUSCULAR; INTRAVENOUS at 14:28

## 2018-03-14 RX ADMIN — ROPIVACAINE HYDROCHLORIDE 30 ML: 5 INJECTION, SOLUTION EPIDURAL; INFILTRATION; PERINEURAL at 13:46

## 2018-03-14 RX ADMIN — Medication 750 MG: at 21:00

## 2018-03-14 RX ADMIN — Medication 100 MCG: at 14:44

## 2018-03-14 RX ADMIN — SODIUM CHLORIDE: 900 INJECTION, SOLUTION INTRAVENOUS at 13:52

## 2018-03-14 NOTE — ROUTINE PROCESS
Bedside and Verbal shift change report given to LEONILA Gale, [facility] (oncoming nurse) by Karen Marin RN  (offgoing nurse). Report included the following information SBAR, Kardex, Intake/Output and MAR.

## 2018-03-14 NOTE — ANESTHESIA PROCEDURE NOTES
Peripheral Block    Start time: 3/14/2018 1:42 PM  End time: 3/14/2018 1:48 PM  Performed by: Aston Rios  Authorized by: Aston Rios       Pre-procedure: Indications: at surgeon's request and post-op pain management    Preanesthetic Checklist: patient identified, risks and benefits discussed, site marked, timeout performed, anesthesia consent given and patient being monitored      Block Type:   Block Type: Interscalene  Laterality:  Left  Monitoring:  Standard ASA monitoring, continuous pulse ox, frequent vital sign checks, heart rate, responsive to questions and oxygen  Injection Technique:  Single shot  Procedures: ultrasound guided    Patient Position: seated  Prep: alcohol    Location:  Interscalene  Needle Gauge:  22 G  Needle Localization:  Anatomical landmarks and ultrasound guidance  Medication Injected:  0.5%  ropivacaine  Volume (mL):  30  Add'l Medication Injected:  2.0%  mepivacaine    Assessment:  Number of attempts:  1  Injection Assessment:  Incremental injection every 5 mL, local visualized surrounding nerve on ultrasound, negative aspiration for CSF, negative aspiration for blood, no paresthesia, no intravascular symptoms and ultrasound image on chart  Patient tolerance:  Patient tolerated the procedure well with no immediate complications  80N SB.

## 2018-03-14 NOTE — PROGRESS NOTES
Problem: Falls - Risk of  Goal: *Absence of Falls  Document Jesus Fall Risk and appropriate interventions in the flowsheet.    Outcome: Progressing Towards Goal  Fall Risk Interventions:            Medication Interventions: Teach patient to arise slowly, Patient to call before getting OOB

## 2018-03-14 NOTE — PROGRESS NOTES
0484 Dr. Jen Day calls the floor to indicate that pt should have dialysis after his surgery in order to keep the pt available for the procedure. Asked him if pt can have percocet, and he indicates that pt can take meds with small sips of water. SHIFT SUMMARY: No acute events. Pt able to get some rest. CHG wipes done at midnight; current plan is for surgery at 1200. Dressing on right chest access was changed from gauze and CHG disk to transparent dressing and CHG disk. Dressing on left arm was changed after CHG wipes with wet to dry gauze and bulky gauze dressing.

## 2018-03-14 NOTE — ROUTINE PROCESS
TRANSFER - IN REPORT:    Verbal report received from Jeffery Oswald RN(name) on [employer]  being received from [employer]) for ordered procedure      Report consisted of patients Situation, Background, Assessment and   Recommendations(SBAR). Information from the following report(s) Kardex, Procedure Summary, Intake/Output and MAR was reviewed with the receiving nurse. Opportunity for questions and clarification was provided. Assessment completed upon patients arrival to unit and care assumed.

## 2018-03-14 NOTE — PERIOP NOTES
Verbal hand off at the bedside with Luciano Eduardo RN provided opportunity for questions. Dual skin assessment performed skin is intact. The family member was not present according to the patient.

## 2018-03-14 NOTE — PROGRESS NOTES
Nephrology Progress note    Subjective:     Soraya Hughes is a 47 y.o. male with PMH HTN, ESRD on HD MWF at  admitted for L arm infection. Pt had fistulogram 3/6, wound packed but he developed fever, foul smelling odor from wound. Seen by surgery, due for surgical washout today and has been on IV abx overnight.  He denies SOB, CP    Admit Date: 3/12/2018      Allergy:  No Known Allergies     Objective:     Visit Vitals    /81 (BP 1 Location: Right arm, BP Patient Position: At rest)    Pulse 70    Temp 98.5 °F (36.9 °C)    Resp 16    Ht 5' 5\" (1.651 m)    Wt 63.2 kg (139 lb 4.8 oz)    SpO2 100%    BMI 23.18 kg/m2       No intake or output data in the 24 hours ending 03/14/18 0911    Physical Exam:       General: No acute distress   HENT: Atraumatic and normocephalic   Eyes: Normal conjunctiva   Neck: Supple    Cardiovascular: Normal S1 & S2, no m/r/g   Pulmonary/Chest Wall: Clear to auscultation bilaterally   Abdominal: Soft and non-tender   Musculoskeletal: no edema   Neurological: No focal deficits       Data Review:      Lab Results   Component Value Date/Time    WBC 11.4 03/14/2018 08:17 AM    RBC 3.36 (L) 03/14/2018 08:17 AM    HCT 32.8 (L) 03/14/2018 08:17 AM    MCV 97.6 (H) 03/14/2018 08:17 AM    MCH 30.1 03/14/2018 08:17 AM    MCHC 30.8 (L) 03/14/2018 08:17 AM    RDW 13.2 03/14/2018 08:17 AM      Lab Results   Component Value Date    IRON 57 08/04/2017   No components found for: FERRITIN  No components found for: PTHINT      Impression:     ESRD  LUE infection- blood cx neg so far  HTN  Anemia  SHPT    Plan:     HD today  Surgical washout today, continue abx    MD Jesse Donato  [phone]

## 2018-03-14 NOTE — ROUTINE PROCESS
TRANSFER - OUT REPORT:    Verbal report given to Milla Angeles RN(name) on [employer]  being transferred to [facility](unit) for ordered procedure       Report consisted of patients Situation, Background, Assessment and   Recommendations(SBAR). Information from the following report(s) SBAR, Intake/Output and MAR was reviewed with the receiving nurse. Lines:   Peripheral IV 03/12/18 Right Forearm (Active)   Site Assessment Clean, dry, & intact 3/14/2018  9:08 AM   Phlebitis Assessment 0 3/14/2018  9:08 AM   Infiltration Assessment 0 3/14/2018  9:08 AM   Dressing Status Clean, dry, & intact 3/14/2018  9:08 AM   Dressing Type Transparent;Tape 3/14/2018  9:08 AM   Hub Color/Line Status Pink;Patent 3/14/2018  9:08 AM   Action Taken Blood drawn 3/12/2018  5:53 PM   Alcohol Cap Used Yes 3/14/2018  9:08 AM       Peripheral IV 03/12/18 Right Antecubital (Active)   Site Assessment Clean, dry, & intact 3/14/2018  9:08 AM   Phlebitis Assessment 0 3/14/2018  9:08 AM   Infiltration Assessment 0 3/14/2018  9:08 AM   Dressing Status Clean, dry, & intact 3/14/2018  9:08 AM   Dressing Type Transparent;Tape 3/14/2018  9:08 AM   Hub Color/Line Status Pink;Patent 3/14/2018  9:08 AM   Alcohol Cap Used Yes 3/14/2018  9:08 AM        Opportunity for questions and clarification was provided.       Patient transported with:   [employer]

## 2018-03-14 NOTE — ANESTHESIA POSTPROCEDURE EVALUATION
Post-Anesthesia Evaluation and Assessment    Cardiovascular Function/Vital Signs  Visit Vitals    /80    Pulse 76    Temp 37 °C (98.6 °F)    Resp 13    Ht 5' 5\" (1.651 m)    Wt 63.2 kg (139 lb 4.8 oz)    SpO2 95%    BMI 23.18 kg/m2       Patient is status post Procedure(s):  LEFT UPPER ARM IRRIGATION AND DEBRIDEMENT  AV GRAFT EXCISION. Nausea/Vomiting: Controlled. Postoperative hydration reviewed and adequate. Pain:  Pain Scale 1: Numeric (0 - 10) (03/14/18 1530)  Pain Intensity 1: 0 (03/14/18 1530)   Managed. Neurological Status:   Neuro (WDL): Exceptions to WDL (03/14/18 1513)   At baseline. Mental Status and Level of Consciousness: Arousable. Pulmonary Status:   O2 Device: Room air (03/14/18 1520)   Adequate oxygenation and airway patent. Complications related to anesthesia: None    Post-anesthesia assessment completed. No concerns. Patient has met all discharge requirements.     Signed By: Keisha Lucas CRNA    March 14, 2018

## 2018-03-14 NOTE — INTERVAL H&P NOTE
H&P Update:  Thomas Pelletier PHYSICIAN'S [facility] was seen and examined. History and physical has been reviewed. The patient has been examined.  There have been no significant clinical changes since the completion of the originally dated History and Physical.    Signed By: Lavon Mcneal MD     March 14, 2018 1:36 PM

## 2018-03-14 NOTE — ROUTINE PROCESS
Bedside and Verbal shift change report given to Carolynn Goode RN (oncoming nurse) by Queen Primrose   (offgoing nurse). Report included the following information SBAR and Intake/Output.

## 2018-03-14 NOTE — BRIEF OP NOTE
BRIEF OPERATIVE NOTE    Date of Procedure: 3/14/2018   Preoperative Diagnosis: LEFT ARM WOUND INFECTION  Postoperative Diagnosis: LEFT ARM WOUND INFECTION    Procedure(s):  LEFT UPPER ARM IRRIGATION AND DEBRIDEMENT POSSIBLE AV GRAFT EXCISION- PT IN ROOM #307  Surgeon(s) and Role:     * Brittany Roper MD - Primary         Assistant Staff: None      Surgical Staff:  Circ-1: Antolin Joseph RN  Circ-2: Keila Johnson RN  Scrub Tech-1: Gaetana   Surg Asst-1: formerly [facility]  Event Time In   Incision Start 1415   Incision Close 1459     Anesthesia: General   Estimated Blood Loss: 20 mL  Specimens:   ID Type Source Tests Collected by Time Destination   1 : left arm wound cultures Wound Arm, Left CULTURE, ANAEROBIC, CULTURE, WOUND W GRAM STAIN, GRAM STAIN, CULTURE, FUNGUS, CULTURE & SMEAR, AFB Brittany Roper MD 3/14/2018 1427 Microbiology   2 : LEFT ARM DIALYSIS CATHETER Wound Left Arm CULTURE, WOUND W GRAM STAIN, CULTURE, FUNGUS, CULTURE &amp;amp; SMEAR, AFB, CULTURE, ANAEROBIC, GRAM STAIN Brittany Roper MD 3/14/2018 1436 Microbiology      Findings: Purulent fluid from graft and surrounding tissue.   Hemostasis at end of procedure   Complications: None  Implants: * No implants in log *

## 2018-03-14 NOTE — PERIOP NOTES
TRANSFER - OUT REPORT:    Verbal report given to [facility] RN(name) on [employer]  being transferred to [address] [place] [address](unit) for routine post - op       Report consisted of patients Situation, Background, Assessment and   Recommendations(SBAR). Information from the following report(s) OR Summary, Intake/Output and MAR was reviewed with the receiving nurse. Lines:   Peripheral IV 03/12/18 Right Forearm (Active)   Site Assessment Clean, dry, & intact 3/14/2018  3:26 PM   Phlebitis Assessment 0 3/14/2018  3:26 PM   Infiltration Assessment 0 3/14/2018  3:26 PM   Dressing Status Clean, dry, & intact 3/14/2018  3:26 PM   Dressing Type Transparent;Tape 3/14/2018  3:26 PM   Hub Color/Line Status Pink;Patent 3/14/2018  9:08 AM   Action Taken Blood drawn 3/12/2018  5:53 PM   Alcohol Cap Used Yes 3/14/2018  9:08 AM       Peripheral IV 03/12/18 Right Antecubital (Active)   Site Assessment Clean, dry, & intact 3/14/2018  9:08 AM   Phlebitis Assessment 0 3/14/2018  9:08 AM   Infiltration Assessment 0 3/14/2018  9:08 AM   Dressing Status Clean, dry, & intact 3/14/2018  9:08 AM   Dressing Type Transparent;Tape 3/14/2018  9:08 AM   Hub Color/Line Status Pink;Patent 3/14/2018  9:08 AM   Alcohol Cap Used Yes 3/14/2018  9:08 AM        Opportunity for questions and clarification was provided.       Patient transported with:   Registered Nurse  Tech

## 2018-03-14 NOTE — PROGRESS NOTES
SHIFT SUMMARY:  Pt has been stable day shift. Drsg change completed on ELLA as ordered. Pt aware of intended procedure and NPO status at [facility]. Pt states pain @ 4/10 left arm and left ear. Pt was given PRN percocet x1 for pain. Bedside shift change report given to MARIA DE JESUS Figueroa RN. Report included the following information SBAR, Kardex, Intake/Output, MAR and Recent Results.

## 2018-03-14 NOTE — PROGRESS NOTES
Vancomycin - Pharmacy to Dose ( wound infection )  Last dose of Vancomycin:  1 gm IV at 21:45 3/12  Will continue Vancomycin 750 mg IV M-W-F after Hemodialysis. Pharmacy will continue to monitor and adjust.  De Jewell

## 2018-03-14 NOTE — ANESTHESIA PREPROCEDURE EVALUATION
Anesthetic History   No history of anesthetic complications            Review of Systems / Medical History  Patient summary reviewed, nursing notes reviewed and pertinent labs reviewed    Pulmonary  Within defined limits                 Neuro/Psych   Within defined limits           Cardiovascular    Hypertension: well controlled              Exercise tolerance: >4 METS     GI/Hepatic/Renal         Renal disease: dialysis and ESRD       Endo/Other  Within defined limits           Other Findings              Physical Exam    Airway  Mallampati: II  TM Distance: 4 - 6 cm  Neck ROM: normal range of motion   Mouth opening: Normal     Cardiovascular  Regular rate and rhythm,  S1 and S2 normal,  no murmur, click, rub, or gallop  Rhythm: regular  Rate: normal         Dental         Pulmonary  Breath sounds clear to auscultation               Abdominal  GI exam deferred       Other Findings            Anesthetic Plan    ASA: 3  Anesthesia type: regional and MAC - interscalene block

## 2018-03-14 NOTE — PROGRESS NOTES
1610 Received pt to unit. Provided menu and ginger ale, no needs at this time. 1700 pt receiving dialysis.

## 2018-03-15 LAB
GLUCOSE BLD STRIP.AUTO-MCNC: 33 MG/DL (ref 70–110)
HBV SURFACE AB SER QL IA: POSITIVE
HBV SURFACE AB SERPL IA-ACNC: 11.17 MIU/ML
HBV SURFACE AG SER QL: <0.1 INDEX
HBV SURFACE AG SER QL: NEGATIVE
HEP BS AB COMMENT,HBSAC: NORMAL
PHOSPHATE SERPL-MCNC: 5.9 MG/DL (ref 2.5–4.9)

## 2018-03-15 PROCEDURE — 74011250636 HC RX REV CODE- 250/636: Performed by: SURGERY

## 2018-03-15 PROCEDURE — 65270000029 HC RM PRIVATE

## 2018-03-15 PROCEDURE — 93971 EXTREMITY STUDY: CPT

## 2018-03-15 PROCEDURE — 74011250637 HC RX REV CODE- 250/637: Performed by: SURGERY

## 2018-03-15 PROCEDURE — 74011250637 HC RX REV CODE- 250/637: Performed by: NURSE PRACTITIONER

## 2018-03-15 PROCEDURE — 36415 COLL VENOUS BLD VENIPUNCTURE: CPT | Performed by: SURGERY

## 2018-03-15 PROCEDURE — 84100 ASSAY OF PHOSPHORUS: CPT | Performed by: SURGERY

## 2018-03-15 RX ADMIN — MORPHINE SULFATE 2 MG: 2 INJECTION, SOLUTION INTRAMUSCULAR; INTRAVENOUS at 08:19

## 2018-03-15 RX ADMIN — CARVEDILOL 25 MG: 12.5 TABLET, FILM COATED ORAL at 08:19

## 2018-03-15 RX ADMIN — MORPHINE SULFATE 2 MG: 2 INJECTION, SOLUTION INTRAMUSCULAR; INTRAVENOUS at 04:12

## 2018-03-15 RX ADMIN — Medication 1 TABLET: at 08:19

## 2018-03-15 RX ADMIN — ASPIRIN 81 MG 81 MG: 81 TABLET ORAL at 08:19

## 2018-03-15 RX ADMIN — MORPHINE SULFATE 2 MG: 2 INJECTION, SOLUTION INTRAMUSCULAR; INTRAVENOUS at 13:49

## 2018-03-15 NOTE — PROGRESS NOTES
Chart reviewed. Pt still requiring medical management. Please advise if patient will require IV abx at discharge. If so, patient will need hard script(s). CM will follow for discharge planning needs.

## 2018-03-15 NOTE — PROGRESS NOTES
Patient seen and examined. Reports intermittent pain to his left upper arm, which is controlled with medication. Left upper arm dressing changed, wounds packed, wrapped with kerlex and ACE wrap. Proximal and distal wounds with serosanguinous drainage. Palpable left radial pulse. Plan for discharge with home health tomorrow after dialysis.     Visit Vitals    /72 (BP 1 Location: Left arm, BP Patient Position: At rest)    Pulse 66    Temp 98.9 °F (37.2 °C)    Resp 16    Ht 5' 5\" (1.651 m)    Wt 139 lb 4.8 oz (63.2 kg)    SpO2 100%    BMI 23.18 kg/m2

## 2018-03-15 NOTE — PROGRESS NOTES
1210 Notified from vascular that pt has superficial clot to right ac, Notified Te Carter NP. Verbal order to remove IV and start a new one away from IV. Pt complained of pain to left arm throughout shift, medicated via MAR.       Patient Vitals for the past 12 hrs:   Temp Pulse Resp BP SpO2   03/15/18 1924 99.3 °F (37.4 °C) 76 16 127/77 98 %   03/15/18 1507 99.1 °F (37.3 °C) 67 16 117/51 100 %   03/15/18 1205 98.9 °F (37.2 °C) 66 16 128/72 100 %   03/15/18 0825 99.5 °F (37.5 °C) 76 16 116/65 100 %

## 2018-03-15 NOTE — OP NOTES
[facility]  OPERATIVE REPORT    Name:Franca WHITE  MR#: [id number]  : 1965  ACCOUNT #: [de-identified]   DATE OF SERVICE: 2018    PREOPERATIVE DIAGNOSIS:  Left lower extremity wound with possible graft infection. POSTOPERATIVE DIAGNOSIS:  Left lower extremity wound graft infection. PROCEDURE PERFORMED:  Excision of left arm AV graft and Pulsavac lavage. SURGEON:  Isela Garay MD    ASSISTANT:  None     TYPE OF ANESTHESIA:  General.    ESTIMATED BLOOD LOSS:  20 mL     PACKS AND DRAINS:  Kerlix packing. SPECIMENS REMOVED:  Left arm wound culture and left arm graft. CONDITION TO RECOVERY:  Stable. IMPLANTS: None     COMPLICATIONS:  None     FINDINGS:  Purulent drainage within the graft itself, as well as in the tunnel and in the upper arm incision. Satisfactory appearance after washout and graft excision. INDICATIONS FOR PROCEDURE:  The patient is a 60-year-old gentleman who presented to [facility] after being noted to be febrile with a low-grade temperature and have a malodorous smell to his wound identified by the [facility]. On exam, it was noted there was some purulent drainage from the patient's upper arm wound and given these findings, decision made to take the patient to the operating room for a washout and possible graft excision. Informed consent was obtained. PROCEDURE IN DETAIL:  On 2018, the patient was taken to the operating room, identified by name and ID bracelet by myself and the entire surgical team.  Once this was done, the patient was placed on the operative table in the supine position. After appropriate depth of anesthesia obtained, the patient prepped and draped and timeout performed and an LMA was placed. Once this was completed, we then turned our attention to the patient's arterial end of the wound and opened up his previous incision.   We evacuated some hematoma and noted that there was a little bit of purulent fluid more distally in the tunnel that made us concerned that the graft was in fact infected. Given these findings, decision was made to ligate the graft and remove it and so we put two 0 silk ties as far towards the anastomosis as possible to leave as little residual graft material as possible. We then transected the graft at this point and removed it through our venous tunnel and once this was completed, we irrigated out the inferior wound and turned our attention to the superior wound. At this point, we noted some continued purulent fluid in his upper wound, so we transected the graft and left a small rim on the venous outflow and used a 5-0 Prolene to oversew this stump. We then sent the graft to pathology and used a Pulsavac lavage to irrigate out all the wounds and then packed the upper and lower incision with Kerlix and packed the tunnel with 1/2 inch iodoform packing. We then dressed the wound with 4 x 4, ABD, Kerlix and Coban and the patient was then awoke without any complications. I was present and scrubbed for the entire procedure.       Laverne Siegel MD       [facility] / [facility]  D: 03/15/2018 07:14     T: 03/15/2018 09:27  JOB #: [id number]

## 2018-03-15 NOTE — ROUTINE PROCESS
Bedside, Verbal and Written shift change report given to [facility], RN (oncoming nurse) by Ciarra Lakhani. Mindy Sierra (offgoing nurse). Report included the following information SBAR, Kardex, MAR and Recent Results.

## 2018-03-15 NOTE — PROCEDURES
[facility]  *** FINAL REPORT ***    Name: Terri Bolton  MRN: [id number]    Inpatient  : 15 Sep 1965  HIS Order #: [id number]  [address] [facility] Visit #: [id number]  Date: 15 Mar 2018    TYPE OF TEST: Peripheral Venous Testing    REASON FOR TEST  Chronic renal failure    Right Arm:-  Deep venous thrombosis:           No  Superficial venous thrombosis:    Yes    Vein Mapping:    Diam.   Depth  (mm)    Right Cephalic Vein:    Axilla:    Upper Arm:       4.2     5.7    Lower Arm:       4.2     1.5    Antecubital:     5.1     3.6    Upper Forearm:   4.1     2.1    Forearm:         3.4     2.0    Wrist:           3.0     2.4    Right Basilic Vein:    Upper Arm:       6.7     9.4    Lower Arm:       8.7     5.3    Antecubital:     4.5     1.5    Upper Forearm:   4.1     3.0    Lower Forearm:   2.5     1.4    Wrist:           1.9     0.9    R. Median Cubital:  5.1     6.9    Right Brachial Vein:    Proximal:        5.0    15.1    Distal:          3.3     7.8    Right Subclavian Artery:  Right Axillary Artery  : Normal      INTERPRETATION/FINDINGS  Duplex images were obtained using 2-D gray scale, color flow, and  spectral Doppler analysis. Right arm mapped with the application of tourniquet :  1. Vein diameters as noted above. 2. No evidence of deep venous thrombosis in the internal  jugular,subclavian, axillary, or brachial vein. 3. Superficial vein(s) visualized include the basilic (upper arm),  basilic (forearm), cephalic (upper arm) and cephalic (forearm) veins. 4. Superficial venous thrombosis identified in the branch of basilic  vein at the cubital fossa in the place of insertion of IV line. 5. Branch noted in the basilic. 6. Brachial artery bifurcation noted distal to cubital fossa. 7. Distal brachial and radial artery with triphasic waveforms.     ADDITIONAL COMMENTS  Verbal report given to Nurse Matheus Boss I have personally reviewed the data relevant to the interpretation of  this  study. TECHNOLOGIST: Dakotah Garland  Signed: 03/15/2018 12:49 PM    PHYSICIAN: Fareed Arellano.  Carmen Mcdaniel MD  Signed: 03/15/2018 02:38 PM

## 2018-03-15 NOTE — ROUTINE PROCESS
Bedside and Verbal shift change report given to Liborio Minor RN (oncoming nurse) by Travis Miller   (offgoing nurse). Report included the following information SBAR, Intake/Output and MAR.

## 2018-03-15 NOTE — PROGRESS NOTES
Nephrology Progress note    Subjective:     Savanna Hoffman is a 47 y.o. male with PMH HTN, ESRD on HD MWF at  admitted for L arm infection. Pt had fistulogram 3/6, wound packed but he developed fever, foul smelling odor from wound. He is s/p LAVF excision and lavage. Low grade temp today.  He denies SOB, CP    Admit Date: 3/12/2018      Allergy:  No Known Allergies     Objective:     Visit Vitals    /72 (BP 1 Location: Left arm, BP Patient Position: At rest)    Pulse 66    Temp 98.9 °F (37.2 °C)    Resp 16    Ht 5' 5\" (1.651 m)    Wt 63.2 kg (139 lb 4.8 oz)    SpO2 100%    BMI 23.18 kg/m2         Intake/Output Summary (Last 24 hours) at 03/15/18 1344  Last data filed at 03/14/18 1501   Gross per 24 hour   Intake              250 ml   Output               20 ml   Net              230 ml       Physical Exam:       General: No acute distress   HENT: Atraumatic and normocephalic   Eyes: Normal conjunctiva   Neck: Supple    Cardiovascular: Normal S1 & S2, no m/r/g   Pulmonary/Chest Wall: Clear to auscultation bilaterally   Abdominal: Soft and non-tender   Musculoskeletal: no edema   Neurological: No focal deficits       Data Review:      Lab Results   Component Value Date/Time    WBC 11.4 03/14/2018 08:17 AM    RBC 3.36 (L) 03/14/2018 08:17 AM    HCT 32.8 (L) 03/14/2018 08:17 AM    MCV 97.6 (H) 03/14/2018 08:17 AM    MCH 30.1 03/14/2018 08:17 AM    MCHC 30.8 (L) 03/14/2018 08:17 AM    RDW 13.2 03/14/2018 08:17 AM      Lab Results   Component Value Date    IRON 57 08/04/2017   No components found for: FERRITIN  No components found for: PTHINT      Impression:     ESRD  LUE infection s/p excision and lavage- blood cx neg so far  HTN  Anemia  SHPT    Plan:     HD tomorrow  continue abx    MD Jesse Ignacio  [phone]

## 2018-03-15 NOTE — ROUTINE PROCESS
19:30: Received verbal/bedside change of shift report from [facility], RN. Report included SBAR, KARDEX, MAR and recent results. 19:45: Pt received undergoing dialysis at this time. Dialysis nurse stated that procedure would require approximately another hour to complete. Medications/IV ABX which are overdue will be given immediately upon completion of dialysis. Pt currently resting quietly and in NAD.    06:00: Pt slept soundly overnight with two brief periods of wakefulness noted. These periods corresponded with requests for PRN MS04 at 20:57 and 04:12. Dressing to left arm remains CDI.  Denies C/O.

## 2018-03-16 VITALS
WEIGHT: 140 LBS | SYSTOLIC BLOOD PRESSURE: 115 MMHG | OXYGEN SATURATION: 100 % | DIASTOLIC BLOOD PRESSURE: 63 MMHG | HEART RATE: 72 BPM | RESPIRATION RATE: 18 BRPM | BODY MASS INDEX: 23.32 KG/M2 | TEMPERATURE: 98.3 F | HEIGHT: 65 IN

## 2018-03-16 LAB — PHOSPHATE SERPL-MCNC: 8.5 MG/DL (ref 2.5–4.9)

## 2018-03-16 PROCEDURE — 84100 ASSAY OF PHOSPHORUS: CPT | Performed by: SURGERY

## 2018-03-16 PROCEDURE — 74011250637 HC RX REV CODE- 250/637: Performed by: NURSE PRACTITIONER

## 2018-03-16 PROCEDURE — 36415 COLL VENOUS BLD VENIPUNCTURE: CPT | Performed by: SURGERY

## 2018-03-16 PROCEDURE — 74011250637 HC RX REV CODE- 250/637: Performed by: SURGERY

## 2018-03-16 RX ORDER — OXYCODONE AND ACETAMINOPHEN 5; 325 MG/1; MG/1
1-2 TABLET ORAL
Qty: 112 TAB | Refills: 0 | Status: SHIPPED | OUTPATIENT
Start: 2018-03-16 | End: 2018-03-16

## 2018-03-16 RX ORDER — CEPHALEXIN 500 MG/1
500 CAPSULE ORAL 3 TIMES DAILY
Qty: 21 CAP | Refills: 0 | Status: SHIPPED | OUTPATIENT
Start: 2018-03-16 | End: 2018-03-23

## 2018-03-16 RX ORDER — OXYCODONE AND ACETAMINOPHEN 5; 325 MG/1; MG/1
1-2 TABLET ORAL
Qty: 112 TAB | Refills: 0 | Status: SHIPPED | OUTPATIENT
Start: 2018-03-16 | End: 2018-03-30

## 2018-03-16 RX ADMIN — SIMVASTATIN 20 MG: 20 TABLET, FILM COATED ORAL at 00:03

## 2018-03-16 RX ADMIN — CARVEDILOL 25 MG: 12.5 TABLET, FILM COATED ORAL at 08:32

## 2018-03-16 RX ADMIN — ASPIRIN 81 MG 81 MG: 81 TABLET ORAL at 08:32

## 2018-03-16 RX ADMIN — OXYCODONE HYDROCHLORIDE AND ACETAMINOPHEN 2 TABLET: 5; 325 TABLET ORAL at 00:04

## 2018-03-16 RX ADMIN — OXYCODONE HYDROCHLORIDE AND ACETAMINOPHEN 2 TABLET: 5; 325 TABLET ORAL at 08:32

## 2018-03-16 RX ADMIN — Medication 1 TABLET: at 08:32

## 2018-03-16 RX ADMIN — OXYCODONE HYDROCHLORIDE AND ACETAMINOPHEN 2 TABLET: 5; 325 TABLET ORAL at 05:17

## 2018-03-16 NOTE — PROGRESS NOTES
D/C Plan: [facility] 3/16/18    Pt is to be discharged today with [address] [facility] after dialysis. Pt will need to have medicaid transport arranged ([phone] vs [phone]) for transportation home. No other plan of care needs have been identified. Care Management Interventions  PCP Verified by CM: Yes  Mode of Transport at Discharge:  Other (see comment) (medicaid taxi)  Transition of Care Consult (CM Consult): Discharge Planning, Charlene  [address] [address]: Yes  Health Maintenance Reviewed: Yes  Current Support Network: [facility] [place] [facility] to assist)  Confirm Follow Up Transport: Cab (medicaid taxi)  Plan discussed with Pt/Family/Caregiver: Yes  Discharge Location  Discharge Placement: Home with home health (Current with [facility])

## 2018-03-16 NOTE — ROUTINE PROCESS
Dual AVS reviewed with Patti Shah RN. All medications reviewed individually with patient. Opportunities for questions and concerns provided. Patient discharged via (mode of transport ie. Car, ambulance or air transport) car  Patient's arm band appropriately discarded.

## 2018-03-16 NOTE — PROGRESS NOTES
Patient seen and examined. Reports pain is improved from yesterday and he is ready to go home after dialysis. Left upper arm dressing changed. Drainage decreased from yesterday. Wound beds pink, no signs of infection. Wounds packed wet to dry and new Kerlex and ACE wrap applied. Plan for discharge after dialysis today. Home health will see him starting tomorrow.     Visit Vitals    /63    Pulse 72    Temp 98.3 °F (36.8 °C)    Resp 18    Ht 5' 5\" (1.651 m)    Wt 140 lb (63.5 kg)    SpO2 100%    BMI 23.3 kg/m2

## 2018-03-16 NOTE — PROGRESS NOTES
Nephrology Progress note    Subjective:     Toan Mitchell is a 47 y.o. male with PMH HTN, ESRD on HD MWF at  admitted for L arm infection. Pt had fistulogram 3/6, wound packed but he developed fever, foul smelling odor from wound. He is s/p LAVF excision and lavage. Now afebrile- wants to go home.  He denies SOB, CP    Admit Date: 3/12/2018      Allergy:  No Known Allergies     Objective:     Visit Vitals    /63    Pulse 72    Temp 98.3 °F (36.8 °C)    Resp 18    Ht 5' 5\" (1.651 m)    Wt 63.5 kg (140 lb)    SpO2 100%    BMI 23.3 kg/m2       No intake or output data in the 24 hours ending 03/16/18 2058    Physical Exam:       General: No acute distress   HENT: Atraumatic and normocephalic   Eyes: Normal conjunctiva   Neck: Supple    Cardiovascular: Normal S1 & S2, no m/r/g   Pulmonary/Chest Wall: Clear to auscultation bilaterally   Abdominal: Soft and non-tender   Musculoskeletal: no edema   Neurological: No focal deficits       Data Review:      Lab Results   Component Value Date/Time    WBC 11.4 03/14/2018 08:17 AM    RBC 3.36 (L) 03/14/2018 08:17 AM    HCT 32.8 (L) 03/14/2018 08:17 AM    MCV 97.6 (H) 03/14/2018 08:17 AM    MCH 30.1 03/14/2018 08:17 AM    MCHC 30.8 (L) 03/14/2018 08:17 AM    RDW 13.2 03/14/2018 08:17 AM      Lab Results   Component Value Date    IRON 57 08/04/2017   No components found for: FERRITIN  No components found for: PTHINT      Impression:     ESRD  LUE infection s/p excision and lavage- blood cx neg so far  HTN  Anemia  SHPT    Plan:     HD today thru cath  continue abx    MD Jesse Lama  [phone]

## 2018-03-16 NOTE — PROGRESS NOTES
0795-received report from Mariam Bernstein RN included SBAR MAR and Kardex. 0930-notified Dr Meg Hillman of elevated Phos level.

## 2018-03-16 NOTE — ROUTINE PROCESS
Bedside and Verbal shift change report given to Nati Palma RN (oncoming nurse) by Willi Carson RN (offgoing nurse). Report included the following information SBAR, Kardex, ED Summary, Procedure Summary, Intake/Output, MAR, Recent Results and Med Rec Status.

## 2018-03-16 NOTE — DISCHARGE INSTRUCTIONS
DISCHARGE SUMMARY from Nurse    PATIENT INSTRUCTIONS:    After general anesthesia or intravenous sedation, for 24 hours or while taking prescription Narcotics:  · Limit your activities  · Do not drive and operate hazardous machinery  · Do not make important personal or business decisions  · Do  not drink alcoholic beverages  · If you have not urinated within 8 hours after discharge, please contact your surgeon on call. Report the following to your surgeon:  · Excessive pain, swelling, redness or odor of or around the surgical area  · Temperature over 100.5  · Nausea and vomiting lasting longer than 4 hours or if unable to take medications  · Any signs of decreased circulation or nerve impairment to extremity: change in color, persistent  numbness, tingling, coldness or increase pain  · Any questions    What to do at Home:  Recommended activity: Activity as tolerated. *  Please give a list of your current medications to your Primary Care Provider. *  Please update this list whenever your medications are discontinued, doses are      changed, or new medications (including over-the-counter products) are added. *  Please carry medication information at all times in case of emergency situations. These are general instructions for a healthy lifestyle:    No smoking/ No tobacco products/ Avoid exposure to second hand smoke  Surgeon General's Warning:  Quitting smoking now greatly reduces serious risk to your health. Obesity, smoking, and sedentary lifestyle greatly increases your risk for illness    A healthy diet, regular physical exercise & weight monitoring are important for maintaining a healthy lifestyle    You may be retaining fluid if you have a history of heart failure or if you experience any of the following symptoms:  Weight gain of 3 pounds or more overnight or 5 pounds in a week, increased swelling in our hands or feet or shortness of breath while lying flat in bed.   Please call your doctor as soon as you notice any of these symptoms; do not wait until your next office visit. Recognize signs and symptoms of STROKE:    F-face looks uneven    A-arms unable to move or move unevenly    S-speech slurred or non-existent    T-time-call 911 as soon as signs and symptoms begin-DO NOT go       Back to bed or wait to see if you get better-TIME IS BRAIN. Warning Signs of HEART ATTACK     Call 911 if you have these symptoms:   Chest discomfort. Most heart attacks involve discomfort in the center of the chest that lasts more than a few minutes, or that goes away and comes back. It can feel like uncomfortable pressure, squeezing, fullness, or pain.  Discomfort in other areas of the upper body. Symptoms can include pain or discomfort in one or both arms, the back, neck, jaw, or stomach.  Shortness of breath with or without chest discomfort.  Other signs may include breaking out in a cold sweat, nausea, or lightheadedness. Don't wait more than five minutes to call 911 - MINUTES MATTER! Fast action can save your life. Calling 911 is almost always the fastest way to get lifesaving treatment. Emergency Medical Services staff can begin treatment when they arrive -- up to an hour sooner than if someone gets to the hospital by car. The discharge information has been reviewed with the patient. The patient verbalized understanding. Discharge medications reviewed with the patient and appropriate educational materials and side effects teaching were provided. ___________________________________________________________________________________________________________________________________       Learning About Hemodialysis and Vascular Access Surgery  What are hemodialysis and vascular access? Hemodialysis is a way to remove wastes from the blood when your kidneys can no longer do the job. It is a lifesaving treatment when you have kidney failure. Hemodialysis is often called dialysis.   Before you can start dialysis, your doctor will need to create a place where the blood can flow in and out of your body during your dialysis sessions. This site is called the vascular access. Your doctor will prepare the vascular access weeks to months before dialysis starts. It is important to get your access as soon as your doctor says to. This allows your access to heal before you use it. For dialysis to work best, the access needs to have a good, steady blood flow. It also must be sturdy since it will be used often, usually 3 times every week. Learning about vascular access and dialysis can help you take an active role in your treatment. Dialysis does not cure kidney disease, but it can help you live longer and feel better. You will need to follow your diet and treatment schedule carefully. How is vascular access surgery done? The vascular access is where the needles are put that draw the blood from your body and send it through tubes to the dialysis machine. This access is also used to return the clean blood that is sent back into your body. There are two basic types of permanent vascular access:  · AV fistula. To make a fistula, your doctor will connect an artery to a vein in your arm. After the fistula heals, the dialysis needles can be put into it. Fistulas tend to be stronger and less likely to get infected than grafts. But they need to be prepared at least several months ahead of time. They are the best type of vascular access, but they are harder to create. · AV graft. To make a graft, your doctor will put a tube under the skin in your arm. The tube, or graft, connects an artery and a vein. The dialysis needles can then be put into the graft for hemodialysis. A graft is a good choice if you have small veins or other problems. A graft can sometimes be used as soon as 1 to 3 weeks after placement. You will be asleep or get medicine to feel relaxed during the surgery. You will not feel pain.  Your doctor will make a cut (incision) on the arm you use the least. If you are right-handed, the fistula or graft will probably be put in your left arm. If you are left-handed, it will probably be put in your right arm. Your doctor will close the incision with stitches. The incision will leave a scar that fades with time. If you need to start hemodialysis right away, your doctor may place a tube in a vein in your neck, chest, or leg. This is called a central venous catheter. The catheter can be used while your permanent access heals. Catheters have more problems than an AV fistula or AV graft, so they are not the best choice for permanent access. If you get an AV fistula, you will probably go home the same day as the surgery. If you get an AV graft, you may spend 1 night at the hospital. You will probably need to take 1 or 2 days off from work. How is dialysis done? Hemodialysis uses a man-made membrane called a dialyzer to clean your blood. You are connected to the dialyzer by tubes attached to your blood vessels through your vascular access. · Dialysis is done mainly by trained health workers who can watch for any problems. · It allows you to be in contact with other people having dialysis. This can help provide emotional support. · You can schedule your treatments in the evenings so you can keep working. · You may be able to do home dialysis, which gives you more control over your schedule. · You will need dialysis on a regular basis, usually 3 times a week. You will have to arrange your schedule to have these treatments. · Dialysis can cause side effects. The most common side effects are low blood pressure and muscle cramps. These can often be treated easily. · Dialysis requires needle sticks, which bother some people. Others get used to it and even do the needle sticks themselves. What can you expect after you start dialysis? · Be sure to go to all of your dialysis sessions. Do not try to shorten or skip your sessions. You have a better chance of a longer and healthier life by getting your full treatment. · Your doctor or health care team will show you the steps you need to go through each day before, during, and after dialysis. Be sure to follow these steps. If you do not understand a step, talk to your team.  · Your doctor and dietitian will help you design menus that follow your diet. Be sure to follow your diet guidelines. ¨ You will need to limit fluids and certain foods, such as salt (sodium), potassium, and phosphorus. ¨ You may need to follow a heart-healthy diet to keep the fat (cholesterol) in your blood under control. ¨ Ask your doctor how much protein you can have each day. Most people with chronic kidney disease need to limit protein. But you still need some protein to stay healthy. · Your doctor may recommend certain vitamins. But do not take any other medicine, including over-the-counter medicines, vitamins, and herbal products, without talking to your doctor first.  · Do not smoke. Smoking raises your risk of many health problems, including more kidney damage. If you need help quitting, talk to your doctor about stop-smoking programs and medicines. These can increase your chances of quitting for good. · Do not take ibuprofen (Advil, Motrin) or naproxen (Aleve), or similar medicines, unless your doctor tells you to. They may make chronic kidney disease worse. You may be able to take acetaminophen (Tylenol). Follow-up care is a key part of your treatment and safety. Be sure to make and go to all appointments, and call your doctor if you are having problems. It's also a good idea to know your test results and keep a list of the medicines you take. Where can you learn more? Go to http://jacinto-mike.info/. Enter A867 in the search box to learn more about \"Learning About Hemodialysis and Vascular Access Surgery. \"  Current as of:  May 12, 2017  Content Version: 11.4  © 3017-5866 Healthwise, [employer]. Care instructions adapted under license by [employer] (which disclaims liability or warranty for this information). If you have questions about a medical condition or this instruction, always ask your healthcare professional. [address] any warranty or liability for your use of this information. Hemodialysis Access: What to Expect at [facility]  Hemodialysis is a way to remove wastes from the blood when your kidneys can no longer do the job. It is not a cure, but it can help you live longer and feel better. It is a lifesaving treatment when you have kidney failure. Hemodialysis is often called dialysis. Your doctor created a place (called an access) in your arm for your blood to flow in and out of your body during your dialysis sessions. Your arm will probably be bruised and swollen. It may hurt. The cut (incision) may bleed. The pain and bleeding will get better over several days. You will probably need only over-the-counter pain medicine. You can reduce swelling by propping your arm on 1 or 2 pillows and keeping your elbow straight. You will have stitches. These may dissolve on their own, or your doctor will tell you when to come in to have them removed. You should also be able to return to work in a few days. You may feel some coolness or numbness in your hand. These feelings usually go away in a few weeks. Your doctor may suggest squeezing a soft object. This will strengthen your access and may make hemodialysis faster and easier. You should always be able to feel blood rushing through the fistula or graft. It feels like a slight vibration when you put your fingers on the skin over the fistula or graft. This feeling is called a thrill or pulse. This care sheet gives you a general idea about how long it will take for you to recover. But each person recovers at a different pace. Follow the steps below to get better as quickly as possible.   How can you care for yourself at home? Activity  ? · Rest when you feel tired. Getting enough sleep will help you recover. Do not lie on or sleep on the arm with the access. ? · Avoid activities such as washing windows or gardening that put stress on the arm with the access. ? · You may use your arm, but do not lift anything that weighs more than about 15 pounds. This may include a child, heavy grocery bags, a heavy briefcase or backpack, cat litter or dog food bags, or a vacuum . ? · You can shower, but keep the access dry for the first 2 days. Cover the area with a plastic bag to keep it dry. ? · Do not soak or scrub the incision until it has healed. ? · Wear an arm guard to protect the area if you play sports or work with your arms. ? · You may drive when your doctor says it is okay. This is usually in 1 to 2 days. ? · Most people are able to return to work about 1 or 2 days after surgery. Diet  ? · Follow an eating plan that is good for your kidneys. A registered dietitian can help you make a meal plan that is right for you. You may need to limit protein, salt, fluids, and certain foods. Medicines  ? · Your doctor will tell you if and when you can restart your medicines. He or she will also give you instructions about taking any new medicines. ? · If you take blood thinners, such as warfarin (Coumadin), clopidogrel (Plavix), or aspirin, be sure to talk to your doctor. He or she will tell you if and when to start taking those medicines again. Make sure that you understand exactly what your doctor wants you to do. ? · Take pain medicines exactly as directed. ¨ If the doctor gave you a prescription medicine for pain, take it as prescribed. ¨ If you are not taking a prescription pain medicine, ask your doctor if you can take acetaminophen (Tylenol). Do not take ibuprofen (Advil, Motrin) or naproxen (Aleve), or similar medicines, unless your doctor tells you to.  They may make chronic kidney disease worse. ¨ Do not take two or more pain medicines at the same time unless the doctor told you to. Many pain medicines have acetaminophen, which is Tylenol. Too much acetaminophen (Tylenol) can be harmful. ? · If you think your pain medicine is making you sick to your stomach:  ¨ Take your medicine after meals (unless your doctor has told you not to). ¨ Ask your doctor for a different pain medicine. ? · If your doctor prescribed antibiotics, take them as directed. Do not stop taking them just because you feel better. You need to take the full course of antibiotics. Incision care  ? · Keep the area dry for 2 days. After 2 days, wash the area with soap and water every day, and always before dialysis. ? · Do not soak or scrub the incision until it has healed. ? · If you have a bandage, change it every day or as your doctor recommends. Your doctor will tell you when you can remove it. Exercise  ? · Squeeze a soft ball or other object as your doctor tells you. This will help blood flow through the access and help prevent blood clots. ? Elevation  ? · Prop up the sore arm on a pillow anytime you sit or lie down during the next 3 days. Try to keep it above the level of your heart. This will help reduce swelling. Other instructions  ? · Every day, check your access for a pulse or thrill in the fistula or graft area. A thrill is a vibration. To feel a pulse or thrill, place the first two fingers of your hand over the access. ? · Do not bump your arm. ? · Do not wear tight clothing, jewelry, or anything else that may squeeze the access. ? · Use your other arm to have blood drawn or blood pressure taken. ? · Do not put cream or lotion on or near the access. ? · Make sure all doctors you deal with know you have a vascular access. Follow-up care is a key part of your treatment and safety. Be sure to make and go to all appointments, and call your doctor if you are having problems.  It's also a good idea to know your test results and keep a list of the medicines you take. When should you call for help? Call 911 anytime you think you may need emergency care. For example, call if:  ? · You passed out (lost consciousness). ? · You have chest pain, are short of breath, or cough up blood. ?Call your doctor now or seek immediate medical care if:  ? · Your hand or arm is cold or dark-colored. ? · You have no pulse in your access. ? · You have nausea or you vomit. ? · You have pain that does not get better after you take pain medicine. ? · You have loose stitches, or your incision comes open. ? · You are bleeding from the incision. ? · You have signs of infection, such as:  ¨ Increased pain, swelling, warmth, or redness. ¨ Red streaks leading from the area. ¨ Pus draining from the area. ¨ A fever. ? · You have signs of a blood clot in your leg (called a deep vein thrombosis), such as:  ¨ Pain in your calf, back of the knee, thigh, or groin. ¨ Redness or swelling in your leg. ? Watch closely for changes in your health, and be sure to contact your doctor if you have any problems. Where can you learn more? Go to http://jacinto-mike.info/. Enter P616 in the search box to learn more about \"Hemodialysis Access: What to Expect at Home. \"  Current as of: May 12, 2017  Content Version: 11.4  © 5238-1679 Healthwise, [employer]. Care instructions adapted under license by [employer] (which disclaims liability or warranty for this information). If you have questions about a medical condition or this instruction, always ask your healthcare professional. [place] [address] any warranty or liability for your use of this information.

## 2018-03-16 NOTE — PROGRESS NOTES
NUTRITION FOLLOW-UP    RECOMMENDATIONS/PLAN:   - Add Nepro TID  - Recc Phos Binder-Phos 8.5  Monitor labs/lytes, PO intakes, skin integrity, wt, fluid status, BM  NUTRITION ASSESSMENT:   Client Update: 47 yrs old Male with fever and malodorous wound to LUE and LUE pain. Neurology following pt reports fever and foul smelling wound. s/p LAVF excision and lavage      FOOD/NUTRITION INTAKE   Diet Order:  Renal 888   Supplements: n/a   Food Allergies: NKFA  Average PO Intake:      No data found. Pertinent Medications:  [x] Reviewed; nephro tish rx  Electrolyte Replacement Protocol: []K []Mg []PO4  Insulin:  []SSI  []Pre-meal   []Basal    []Drip  []None  Cultural/Jewish Food Preferences: None Identified    BIOCHEMICAL DATA & MEDICAL TESTS  Pertinent Labs:  [x] Reviewed; Phos-8.5   ANTHROPOMETRICS  Height: 5' 5\" (165.1 cm)       Weight: 63.5 kg (140 lb)         BMI: 23.3 kg/m^2 normal weight (18.5%-24.9% BMI)   Adm Weight: 139 lbs                Weight change: +1lbs  Adjusted Body Weight: n/a     NUTRITION-FOCUSED PHYSICAL ASSESSMENT  Skin: No PU      GI: +BM 3/14/18    NUTRITION PRESCRIPTION  Calories: 2205 kcal/day based on 35kcal/kg  Protein: 76 g/day based on 1.2 g/kg  CHO: 276 g/day based on 50% of total energy  Fluid: 2205 ml/day based on 1 kcal/ml              l      NUTRITION DIAGNOSES:   1. At risk for inadequate oral intake related to hospitalization as evidence by Day 4 in hospital     NUTRITION INTERVENTIONS:   INTERVENTIONS:        GOALS:  1. Commercial Beverage-Nepro TID 1.  Encourage PO intake >50% at all meals by next review 4 days             LEARNING NEEDS (Diet, Supplementation, Food/Nutrient-Drug Interaction):   [x] None Identified   [] Education provided/documented      Identified and patient: [] Declined   [] Was not appropriate/indicated        NUTRITION MONITORING /EVALUATION:   Follow PO intake  Monitor wt  Monitor renal labs, electrolytes, fluid status     Previous Recommendations Implemented: yes        Previous Goals Met:  yes -      [] Participated in Interdisciplinary Rounds    [x] Interdisciplinary Care Plan Reviewed  DISCHARGE NUTRITION RECOMMENDATIONS ADDRESSED:     [x] Yes- recommended Renal diet     NUTRITION RISK:           [x] At risk                        [] Not currently at risk        Will follow-up per policy.   Ena Borden 1

## 2018-03-16 NOTE — DISCHARGE SUMMARY
Physician Discharge Summary     Patient ID:  David Davies  [id number]  63 y.o.  1965    Admit date: 3/12/2018    Discharge date: 3/16/2018      Admitting Physician: Claudine Montalvo MD     Discharge Physician: Claudine Montalvo MD     Admission Diagnoses: Wound infection  LEFT ARM WOUND INFECTION  LEFT ARM WOUND INFECTION    Discharge Diagnoses: Left arm wound infection     Procedures for this admission: Procedure(s):  LEFT UPPER ARM IRRIGATION AND DEBRIDEMENT  AV GRAFT EXCISION    Discharged Condition: stable    Hospital Course: Admit on 3/12 for foul odor from left upper arm wound and temperature of 104 reported by home health nurse. Wound irrigation and debridement and AV graft excision on 3/14 in OR. Consults: Nephrology    Treatments: antibiotics: vancomycin, analgesia: percocet and morphine, dialysis: Hemodialysis     Discharge Exam: LUNG: clear to auscultation bilaterally  HEART: regular rate and rhythm  ABDOMEN:  Soft, non-tender, active BS  EXTREMITIES: warm and well-perfused, left radial pulse palpable, edema to left upper arm  SKIN: dry, no rashes  Wound:  left upper arm surgical wounds with minimal drainage, edema surrounding wound openings, no sign of infection    Disposition: home with home health for daily dressing changes    Patient Instructions:   Current Discharge Medication List      START taking these medications    Details   cephALEXin (KEFLEX) 500 mg capsule Take 1 Cap by mouth three (3) times daily for 7 days. Qty: 21 Cap, Refills: 0      oxyCODONE-acetaminophen (PERCOCET) 5-325 mg per tablet Take 1-2 Tabs by mouth every six (6) hours as needed for up to 14 days. Max Daily Amount: 8 Tabs. Qty: 112 Tab, Refills: 0    Associated Diagnoses: Post-operative pain         CONTINUE these medications which have NOT CHANGED    Details   ferric citrate (AURYXIA) 210 mg iron tablet Take 420 mg by mouth three (3) times daily (with meals).       carvedilol (COREG) 25 mg tablet Take 1 Tab by mouth two (2) times daily (with meals). Qty: 60 Tab, Refills: 0      CLONIDINE HCL PO Take 0.2 mg by mouth nightly. Indications: HTN      aspirin 81 mg chewable tablet Take 1 Tab by mouth daily. Qty: 30 Tab, Refills: 1      simvastatin (ZOCOR) 20 mg tablet Take 1 Tab by mouth nightly. Qty: 30 Tab, Refills: 1         STOP taking these medications       oxyCODONE-acetaminophen (PERCOCET 7.5) 7.5-325 mg per tablet Comments:   Reason for Stopping:               Reference discharge instructions as provided by nursing for diet, labs, medications, activity, wound care and any outpatient referrals. Follow-up with Dr. Fidencio Peoples in one week.      Signed:  Augustin Sawyer NP  3/16/2018  9:43 AM

## 2018-03-17 ENCOUNTER — HOME CARE VISIT (OUTPATIENT)
Dept: SCHEDULING | Facility: HOME HEALTH | Age: 53
End: 2018-03-17
Payer: MEDICAID

## 2018-03-17 VITALS
RESPIRATION RATE: 16 BRPM | OXYGEN SATURATION: 98 % | SYSTOLIC BLOOD PRESSURE: 110 MMHG | DIASTOLIC BLOOD PRESSURE: 70 MMHG | HEART RATE: 92 BPM | TEMPERATURE: 99.1 F

## 2018-03-17 LAB
BACTERIA SPEC CULT: ABNORMAL
BACTERIA SPEC CULT: ABNORMAL
GRAM STN SPEC: ABNORMAL
GRAM STN SPEC: ABNORMAL
SERVICE CMNT-IMP: ABNORMAL

## 2018-03-17 PROCEDURE — G0299 HHS/HOSPICE OF RN EA 15 MIN: HCPCS

## 2018-03-18 ENCOUNTER — HOME CARE VISIT (OUTPATIENT)
Dept: SCHEDULING | Facility: HOME HEALTH | Age: 53
End: 2018-03-18
Payer: MEDICAID

## 2018-03-18 LAB
BACTERIA SPEC CULT: NORMAL
SERVICE CMNT-IMP: NORMAL

## 2018-03-18 PROCEDURE — G0299 HHS/HOSPICE OF RN EA 15 MIN: HCPCS

## 2018-03-19 ENCOUNTER — HOME CARE VISIT (OUTPATIENT)
Dept: SCHEDULING | Facility: HOME HEALTH | Age: 53
End: 2018-03-19
Payer: MEDICAID

## 2018-03-19 VITALS
DIASTOLIC BLOOD PRESSURE: 70 MMHG | RESPIRATION RATE: 16 BRPM | SYSTOLIC BLOOD PRESSURE: 122 MMHG | OXYGEN SATURATION: 99 % | TEMPERATURE: 99 F

## 2018-03-19 PROCEDURE — A6449 LT COMPRES BAND >=3" <5"/YD: HCPCS

## 2018-03-19 PROCEDURE — A4450 NON-WATERPROOF TAPE: HCPCS

## 2018-03-19 PROCEDURE — A9270 NON-COVERED ITEM OR SERVICE: HCPCS

## 2018-03-19 PROCEDURE — G0299 HHS/HOSPICE OF RN EA 15 MIN: HCPCS

## 2018-03-19 PROCEDURE — A6216 NON-STERILE GAUZE<=16 SQ IN: HCPCS

## 2018-03-19 PROCEDURE — A6266 IMPREG GAUZE NO H20/SAL/YARD: HCPCS

## 2018-03-19 PROCEDURE — A6446 CONFORM BAND S W>=3" <5"/YD: HCPCS

## 2018-03-20 ENCOUNTER — HOME CARE VISIT (OUTPATIENT)
Dept: SCHEDULING | Facility: HOME HEALTH | Age: 53
End: 2018-03-20
Payer: MEDICAID

## 2018-03-20 VITALS
DIASTOLIC BLOOD PRESSURE: 62 MMHG | SYSTOLIC BLOOD PRESSURE: 118 MMHG | HEART RATE: 106 BPM | RESPIRATION RATE: 16 BRPM | TEMPERATURE: 99.2 F | OXYGEN SATURATION: 98 %

## 2018-03-20 LAB
BACTERIA SPEC CULT: NORMAL
SERVICE CMNT-IMP: NORMAL

## 2018-03-20 PROCEDURE — G0299 HHS/HOSPICE OF RN EA 15 MIN: HCPCS

## 2018-03-21 ENCOUNTER — HOME CARE VISIT (OUTPATIENT)
Dept: SCHEDULING | Facility: HOME HEALTH | Age: 53
End: 2018-03-21
Payer: MEDICAID

## 2018-03-21 PROCEDURE — G0299 HHS/HOSPICE OF RN EA 15 MIN: HCPCS

## 2018-03-22 ENCOUNTER — HOME CARE VISIT (OUTPATIENT)
Dept: SCHEDULING | Facility: HOME HEALTH | Age: 53
End: 2018-03-22
Payer: MEDICAID

## 2018-03-22 VITALS
RESPIRATION RATE: 16 BRPM | TEMPERATURE: 97.5 F | SYSTOLIC BLOOD PRESSURE: 148 MMHG | OXYGEN SATURATION: 96 % | HEART RATE: 87 BPM | DIASTOLIC BLOOD PRESSURE: 88 MMHG

## 2018-03-22 VITALS
OXYGEN SATURATION: 100 % | RESPIRATION RATE: 16 BRPM | TEMPERATURE: 99.8 F | SYSTOLIC BLOOD PRESSURE: 110 MMHG | DIASTOLIC BLOOD PRESSURE: 70 MMHG | HEART RATE: 102 BPM

## 2018-03-22 PROCEDURE — G0299 HHS/HOSPICE OF RN EA 15 MIN: HCPCS

## 2018-03-23 ENCOUNTER — HOME CARE VISIT (OUTPATIENT)
Dept: HOME HEALTH SERVICES | Facility: HOME HEALTH | Age: 53
End: 2018-03-23
Payer: MEDICAID

## 2018-03-23 ENCOUNTER — OFFICE VISIT (OUTPATIENT)
Dept: VASCULAR SURGERY | Age: 53
End: 2018-03-23

## 2018-03-23 VITALS
HEART RATE: 82 BPM | DIASTOLIC BLOOD PRESSURE: 78 MMHG | RESPIRATION RATE: 14 BRPM | SYSTOLIC BLOOD PRESSURE: 120 MMHG | OXYGEN SATURATION: 99 % | TEMPERATURE: 97.9 F

## 2018-03-23 VITALS
HEART RATE: 70 BPM | SYSTOLIC BLOOD PRESSURE: 100 MMHG | HEIGHT: 65 IN | RESPIRATION RATE: 18 BRPM | WEIGHT: 140 LBS | BODY MASS INDEX: 23.32 KG/M2 | DIASTOLIC BLOOD PRESSURE: 60 MMHG

## 2018-03-23 DIAGNOSIS — T82.7XXA INFECTION OF AV GRAFT FOR DIALYSIS (HCC): ICD-10-CM

## 2018-03-23 DIAGNOSIS — N18.6 ESRD ON HEMODIALYSIS (HCC): Primary | ICD-10-CM

## 2018-03-23 DIAGNOSIS — Z99.2 ESRD ON HEMODIALYSIS (HCC): Primary | ICD-10-CM

## 2018-03-23 NOTE — PROGRESS NOTES
[address]    Chief Complaint   Patient presents with    End Stage Renal Disease    Wound Check       History and Physical    Mr. Edson Russell is a 47year old male who returns to our office for continued management of his dialysis access. He was recently hospitalized for left upper arm AV graft infection. He underwent excision of graft and left arm I&D on 3/14. He is currently receiving dialysis through a right IJ TDC without difficulty. Mr. Edson Russell denies fevers and chills and states his wound is being packed by home health daily. He reports left upper arm pain and left shoulder pain, which is worse at night. He is still taking his antibiotics and pain medication at night. Past Medical History:   Diagnosis Date    Chronic kidney disease     ESRD- Dialysis- [facility] Hypertension 07/2017    Ill-defined condition     dialysis monday wednesday friday    Non compliance w medication regimen     noncompliant with HTN meds     Patient Active Problem List   Diagnosis Code    Routine general medical examination at a health care facility Z00.00    Unspecified essential hypertension I10    Tobacco abuse Z72.0    PAM (acute kidney injury) ([address] N17.9    Hypertensive urgency, malignant I16.0    Uremia N19    Severe anemia D64.9    Nonadherence to medical treatment Z91.19    Rash of genital area R21    Pleural effusion, right J90    Severe protein-calorie malnutrition ([address] E43    ESRD on hemodialysis ([address] N18.6, Z99.2    CRF (chronic renal failure) N18.9    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 8XXA, L08.9     Past Surgical History:   Procedure Laterality Date    HX OTHER SURGICAL      left arm dialysis shunt    HX VASCULAR ACCESS  11/2017    AV  graft left arm    HX VASCULAR ACCESS  07/2017    left chest cath for dialysis     Current Outpatient Prescriptions   Medication Sig Dispense Refill    cephALEXin (KEFLEX) 500 mg capsule Take 1 Cap by mouth three (3) times daily for 7 days. 21 Cap 0    oxyCODONE-acetaminophen (PERCOCET) 5-325 mg per tablet Take 1-2 Tabs by mouth every six (6) hours as needed for up to 14 days. Max Daily Amount: 8 Tabs. 112 Tab 0    ferric citrate (AURYXIA) 210 mg iron tablet Take 420 mg by mouth three (3) times daily (with meals).  carvedilol (COREG) 25 mg tablet Take 1 Tab by mouth two (2) times daily (with meals). 60 Tab 0    CLONIDINE HCL PO Take 0.2 mg by mouth nightly. Indications: HTN      aspirin 81 mg chewable tablet Take 1 Tab by mouth daily. (Patient taking differently: Take 325 mg by mouth daily.) 30 Tab 1    simvastatin (ZOCOR) 20 mg tablet Take 1 Tab by mouth nightly. 30 Tab 1     No Known Allergies  Social History     Social History    Marital status: SINGLE     Spouse name: N/A    Number of children: N/A    Years of education: N/A     Occupational History    Not on file. Social History Main Topics    Smoking status: Former Smoker     Packs/day: 0.50     Years: 7.00     Types: Cigarettes     Quit date: 7/26/2017    Smokeless tobacco: Never Used    Alcohol use No      Comment: stopped 7-2017    Drug use: No      Comment: [id number]    Sexual activity: Yes     Partners: Female     Other Topics Concern    Not on file     Social History Narrative      Family History   Problem Relation Age of Onset    Cancer Mother        Review of Systems    Review of Systems   Constitutional: Negative for chills, fever, malaise/fatigue and weight loss. HENT: Negative for ear pain and hearing loss. Eyes: Negative for blurred vision, pain and discharge. Respiratory: Negative for cough, hemoptysis, sputum production and shortness of breath. Cardiovascular: Negative for chest pain, palpitations and orthopnea. Gastrointestinal: Negative for heartburn, nausea and vomiting. Genitourinary: Negative for dysuria and urgency. Musculoskeletal: Positive for joint pain.    Skin: Negative for itching and rash. Neurological: Negative for dizziness, tingling, weakness and headaches. Endo/Heme/Allergies: Does not bruise/bleed easily. Psychiatric/Behavioral: Negative for depression. Physical Exam:    Visit Vitals    /60 (BP 1 Location: Right arm, BP Patient Position: Sitting)    Pulse 70    Resp 18    Ht 5' 5\" (1.651 m)    Wt 140 lb (63.5 kg)    BMI 23.3 kg/m2      Physical Examination: General appearance - alert, well appearing, and in no distress  Mental status - alert, oriented to person, place, and time, normal mood, behavior, speech, dress, motor activity, and thought processes  Eyes - left eye normal, right eye normal  Ears - right ear normal, left ear normal  Mouth - mucous membranes moist  Chest - clear to auscultation, no wheezes  Heart - normal rate and regular rhythm, right IJ TDC  Abdomen - soft, nontender, nondistended  Musculoskeletal - no obvious deformity  Extremities - left radial pulse palpable, edema to left upper arm  Skin - dry, left upper arm wounds: proximal wound with 1cm tunneling towards axilla, medial wound 0.5 cm depth, distal wound 1 cm depth. No signs of infection, no drainage. Impression and Plan:    ICD-10-CM ICD-9-CM    1. ESRD on hemodialysis ([facility]) N18.6 585.6     Z99.2 V45.11    2. Infection of AV graft for dialysis ([facility] T82. 7XXA 996.62      No orders of the defined types were placed in this encounter. Mr. Max Caldwell' left arm wounds are improving. [facility] I explained to him that it is important that he complete all of his antibiotics. After the left arm heals we will plan for new access creation. I will see Mr. Max Caldwell again in one week. Follow-up Disposition:  Return in about 1 week (around 3/30/2018). The treatment plan was reviewed with the patient in detail. The patient voiced understanding of this plan and all questions and concerns were addressed.   The patient agrees with this plan. We discussed the signs and symptoms that would require earlier attention or intervention. The patient was given educational material related to his/her visit and the patient has voiced understanding of the material.     I appreciate the opportunity to participate in the care of your patient. I will be sure to keep you informed of any subsequent changes in the treatment plan. If you have any questions or concerns, please feel free to contact me. Augustin Sawyer NP    PLEASE NOTE:  This document has been produced using voice recognition software. Unrecognized errors in transcription may be present.

## 2018-03-26 ENCOUNTER — HOME CARE VISIT (OUTPATIENT)
Dept: SCHEDULING | Facility: HOME HEALTH | Age: 53
End: 2018-03-26
Payer: MEDICAID

## 2018-03-26 VITALS
HEART RATE: 83 BPM | TEMPERATURE: 98.2 F | DIASTOLIC BLOOD PRESSURE: 60 MMHG | OXYGEN SATURATION: 100 % | SYSTOLIC BLOOD PRESSURE: 92 MMHG | RESPIRATION RATE: 14 BRPM

## 2018-03-26 PROCEDURE — G0299 HHS/HOSPICE OF RN EA 15 MIN: HCPCS

## 2018-03-28 ENCOUNTER — HOME CARE VISIT (OUTPATIENT)
Dept: HOME HEALTH SERVICES | Facility: HOME HEALTH | Age: 53
End: 2018-03-28
Payer: MEDICAID

## 2018-03-28 ENCOUNTER — HOME CARE VISIT (OUTPATIENT)
Dept: SCHEDULING | Facility: HOME HEALTH | Age: 53
End: 2018-03-28
Payer: MEDICAID

## 2018-03-28 PROCEDURE — G0299 HHS/HOSPICE OF RN EA 15 MIN: HCPCS

## 2018-03-29 VITALS
RESPIRATION RATE: 14 BRPM | HEART RATE: 78 BPM | SYSTOLIC BLOOD PRESSURE: 90 MMHG | TEMPERATURE: 99.6 F | OXYGEN SATURATION: 98 % | DIASTOLIC BLOOD PRESSURE: 60 MMHG

## 2018-03-30 ENCOUNTER — OFFICE VISIT (OUTPATIENT)
Dept: VASCULAR SURGERY | Age: 53
End: 2018-03-30

## 2018-03-30 ENCOUNTER — HOME CARE VISIT (OUTPATIENT)
Dept: HOME HEALTH SERVICES | Facility: HOME HEALTH | Age: 53
End: 2018-03-30
Payer: MEDICAID

## 2018-03-30 VITALS
HEIGHT: 65 IN | WEIGHT: 140 LBS | DIASTOLIC BLOOD PRESSURE: 60 MMHG | RESPIRATION RATE: 18 BRPM | BODY MASS INDEX: 23.32 KG/M2 | HEART RATE: 72 BPM | SYSTOLIC BLOOD PRESSURE: 118 MMHG

## 2018-03-30 DIAGNOSIS — Z99.2 ESRD ON HEMODIALYSIS (HCC): Primary | ICD-10-CM

## 2018-03-30 DIAGNOSIS — N18.6 ESRD ON HEMODIALYSIS (HCC): Primary | ICD-10-CM

## 2018-03-30 DIAGNOSIS — T82.7XXA INFECTION OF AV GRAFT FOR DIALYSIS (HCC): ICD-10-CM

## 2018-03-30 NOTE — PROGRESS NOTES
[address]    Chief Complaint   Patient presents with    End Stage Renal Disease       History and Physical    Mr. Erica Garcia is a 47year old male who returns to our office for continued management of his dialysis access. He is currently receiving dialysis through a right IJ TDC without difficulty. Mr. Erica Garcia denies fevers and chills and states he still has one wound that is being packed by home health. He reports continued left upper arm, shoulder, and neck stabbing/shooting pain, which is worse at night. He says the Percocet was helping with this but he does not have any more. Mr. Erica Garcia has no other complaints today. Past Medical History:   Diagnosis Date    Chronic kidney disease     ESRD- Dialysis- [facility] Hypertension 07/2017    Ill-defined condition     dialysis monday wednesday friday    Non compliance w medication regimen     noncompliant with HTN meds     Patient Active Problem List   Diagnosis Code    Routine general medical examination at a health care facility Z00.00    Unspecified essential hypertension I10    Tobacco abuse Z72.0    PAM (acute kidney injury) ([address] N17.9    Hypertensive urgency, malignant I16.0    Uremia N19    Severe anemia D64.9    Nonadherence to medical treatment Z91.19    Rash of genital area R21    Pleural effusion, right J90    Severe protein-calorie malnutrition ([address] E43    ESRD on hemodialysis ([address] N18.6, Z99.2    CRF (chronic renal failure) N18.9    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 8XXA, L08.9     Past Surgical History:   Procedure Laterality Date    HX OTHER SURGICAL      left arm dialysis shunt    HX VASCULAR ACCESS  11/2017    AV  graft left arm    HX VASCULAR ACCESS  07/2017    left chest cath for dialysis     Current Outpatient Prescriptions   Medication Sig Dispense Refill    oxyCODONE-acetaminophen (PERCOCET) 5-325 mg per tablet Take 1-2 Tabs by mouth every six (6) hours as needed for up to 14 days. Max Daily Amount: 8 Tabs. 112 Tab 0    ferric citrate (AURYXIA) 210 mg iron tablet Take 420 mg by mouth three (3) times daily (with meals).  carvedilol (COREG) 25 mg tablet Take 1 Tab by mouth two (2) times daily (with meals). 60 Tab 0    CLONIDINE HCL PO Take 0.2 mg by mouth nightly. Indications: HTN      aspirin 81 mg chewable tablet Take 1 Tab by mouth daily. (Patient taking differently: Take 325 mg by mouth daily.) 30 Tab 1    simvastatin (ZOCOR) 20 mg tablet Take 1 Tab by mouth nightly. 30 Tab 1     No Known Allergies  Social History     Social History    Marital status: SINGLE     Spouse name: N/A    Number of children: N/A    Years of education: N/A     Occupational History    Not on file. Social History Main Topics    Smoking status: Former Smoker     Packs/day: 0.50     Years: 7.00     Types: Cigarettes     Quit date: 7/26/2017    Smokeless tobacco: Never Used    Alcohol use No      Comment: stopped 7-2017    Drug use: No      Comment: [id number]    Sexual activity: Yes     Partners: Female     Other Topics Concern    Not on file     Social History Narrative      Family History   Problem Relation Age of Onset    Cancer Mother        Review of Systems    Review of Systems   Constitutional: Negative for chills, fever, malaise/fatigue and weight loss. HENT: Negative for ear pain and hearing loss. Eyes: Negative for blurred vision, pain and discharge. Respiratory: Negative for cough, hemoptysis, sputum production and shortness of breath. Cardiovascular: Negative for chest pain, palpitations and orthopnea. Gastrointestinal: Negative for abdominal pain, heartburn, nausea and vomiting. Genitourinary: Negative for dysuria and urgency. Musculoskeletal:        +left arm/neck pain   Skin: Negative for itching and rash. Neurological: Negative for dizziness, tingling, weakness and headaches.    Endo/Heme/Allergies: Does not bruise/bleed easily. Psychiatric/Behavioral: Negative for depression. Physical Exam:    Visit Vitals    /60 (BP 1 Location: Right arm, BP Patient Position: Sitting)    Pulse 72    Resp 18    Ht 5' 5\" (1.651 m)    Wt 140 lb (63.5 kg)    BMI 23.3 kg/m2      Physical Examination: General appearance - alert, well appearing, and in no distress  Mental status - alert, oriented to person, place, and time, normal mood, behavior, speech, dress, motor activity, and thought processes  Eyes - left eye normal, right eye normal  Ears - right ear normal, left ear normal  Mouth - mucous membranes moist  Chest - clear to auscultation, no wheezes  Heart - normal rate and regular rhythm, right IJ TDC  Abdomen - soft, nontender, nondistended  Musculoskeletal - no obvious deformity  Extremities - radial pulses palpable, hands cool  Skin - dry, left upper arm proximal and medial wounds well-healed and closed, distal wound remains open with small amount of yellow/pink drainage, approximately 0.5 cm deep, edema around wound improving      Impression and Plan:    ICD-10-CM ICD-9-CM    1. ESRD on hemodialysis ([facility]) N18.6 585.6     Z99.2 V45.11    2. Infection of AV graft for dialysis ([facility] T82. 7XXA 996.62      No orders of the defined types were placed in this encounter. Mr. Artemio Maurer' wounds have closed nicely. The remaining open wound continues to heal.  We will continue with packing until it is closed. I explained to Mr. Mckee that I will not order any more Percocet, however, I offered gabapentin as this sounds like nerve pain, he declined. I suggested he use a heating pad to his arm/shoulder/neck area. I also recommended he apply warm compresses to the swelling in his left upper arm. We will see Mr. Artemio Maurer again in two weeks to discuss plans for new access creation. Follow-up Disposition:  Return in about 2 weeks (around 4/13/2018). The treatment plan was reviewed with the patient in detail.   The patient voiced understanding of this plan and all questions and concerns were addressed. The patient agrees with this plan. We discussed the signs and symptoms that would require earlier attention or intervention. The patient was given educational material related to his/her visit and the patient has voiced understanding of the material.     I appreciate the opportunity to participate in the care of your patient. I will be sure to keep you informed of any subsequent changes in the treatment plan. If you have any questions or concerns, please feel free to contact me. Pop Marroquin NP    PLEASE NOTE:  This document has been produced using voice recognition software. Unrecognized errors in transcription may be present.

## 2018-03-30 NOTE — MR AVS SNAPSHOT
[address] [facility] 
 
 
 One [facility] [address] [facility] 
[phone] Patient: Jatinder Corbin MRN: [id number] KTP:1/70/6015 Visit Information Date & Time Provider Department Dept. Phone Encounter #  
 3/30/2018 10:30 AM Ofelia Sandoval NP [facility] [address] [id number] Your Appointments 4/11/2018 11:00 AM  
Follow Up with Mima Alexandra MD  
BS [facility] Appt Note: follow up wound /discuss surgery [facility] [address] [facility]  
  
   
 One [facility] [address] Upcoming Health Maintenance Date Due Pneumococcal 19-64 Highest Risk (1 of 3 - PCV13) 9/15/1985 DTaP/Tdap/Td series (1 - Tdap) 9/15/1987 FOBT Q 1 YEAR AGE 50-75 9/15/2016 Influenza Age 5 to Adult 8/1/2017 Allergies as of 3/30/2018  Review Complete On: 3/30/2018 By: Alfredo White RN No Known Allergies Current Immunizations  Reviewed on 3/16/2018 No immunizations on file. Not reviewed this visit Vitals BP Pulse Resp Height(growth percentile) Weight(growth percentile) BMI  
 118/60 (BP 1 Location: Right arm, BP Patient Position: Sitting) 72 18 5' 5\" (1.651 m) 140 lb (63.5 kg) 23.3 kg/m2 Smoking Status Former Smoker BMI and BSA Data Body Mass Index Body Surface Area  
 23.3 kg/m 2 1.71 m 2 Preferred Pharmacy Pharmacy Name Phone [id number] [address] [facility] [phone] Your Updated Medication List  
  
   
This list is accurate as of 3/30/18 11:27 AM.  Always use your most recent med list.  
  
  
  
  
 aspirin 81 mg chewable tablet Take 1 Tab by mouth daily. AURYXIA 210 mg iron tablet Generic drug:  ferric citrate Take 420 mg by mouth three (3) times daily (with meals). carvedilol 25 mg tablet Commonly known as:  Karla Yony Take 1 Tab by mouth two (2) times daily (with meals). CLONIDINE HCL PO Take 0.2 mg by mouth nightly. Indications: HTN  
  
 oxyCODONE-acetaminophen 5-325 mg per tablet Commonly known as:  PERCOCET Take 1-2 Tabs by mouth every six (6) hours as needed for up to 14 days. Max Daily Amount: 8 Tabs. simvastatin 20 mg tablet Commonly known as:  ZOCOR Take 1 Tab by mouth nightly. To-Do List   
 04/02/2018 To Be Determined Appointment with Diaz Art RN at [facility]  
  
 04/04/2018 To Be Determined Appointment with Diaz Art RN at [address] [place] [address] REG MED [facility]  
  
 04/06/2018 To Be Determined Appointment with Diaz Art RN at [facility]  
  
 04/09/2018 To Be Determined Appointment with Diaz Art RN at [facility]  
  
 04/11/2018 To Be Determined Appointment with Diaz Art RN at [facility]  
  
 04/13/2018 To Be Determined Appointment with Diaz Art RN at [address] [facility] Please provide this summary of care documentation to your next provider. Your primary care clinician is listed as Ayush Powell. If you have any questions after today's visit, please call [phone].

## 2018-04-02 ENCOUNTER — HOME CARE VISIT (OUTPATIENT)
Dept: SCHEDULING | Facility: HOME HEALTH | Age: 53
End: 2018-04-02
Payer: MEDICAID

## 2018-04-02 VITALS
SYSTOLIC BLOOD PRESSURE: 122 MMHG | HEART RATE: 105 BPM | OXYGEN SATURATION: 100 % | DIASTOLIC BLOOD PRESSURE: 78 MMHG | RESPIRATION RATE: 16 BRPM

## 2018-04-02 PROCEDURE — G0299 HHS/HOSPICE OF RN EA 15 MIN: HCPCS

## 2018-04-04 ENCOUNTER — HOME CARE VISIT (OUTPATIENT)
Dept: SCHEDULING | Facility: HOME HEALTH | Age: 53
End: 2018-04-04
Payer: MEDICAID

## 2018-04-04 PROCEDURE — G0299 HHS/HOSPICE OF RN EA 15 MIN: HCPCS

## 2018-04-05 VITALS
RESPIRATION RATE: 16 BRPM | OXYGEN SATURATION: 98 % | DIASTOLIC BLOOD PRESSURE: 66 MMHG | TEMPERATURE: 98.6 F | SYSTOLIC BLOOD PRESSURE: 98 MMHG | HEART RATE: 113 BPM

## 2018-04-06 ENCOUNTER — HOME CARE VISIT (OUTPATIENT)
Dept: SCHEDULING | Facility: HOME HEALTH | Age: 53
End: 2018-04-06
Payer: MEDICAID

## 2018-04-06 PROCEDURE — G0299 HHS/HOSPICE OF RN EA 15 MIN: HCPCS

## 2018-04-09 ENCOUNTER — HOME CARE VISIT (OUTPATIENT)
Dept: SCHEDULING | Facility: HOME HEALTH | Age: 53
End: 2018-04-09
Payer: MEDICAID

## 2018-04-09 VITALS
DIASTOLIC BLOOD PRESSURE: 72 MMHG | RESPIRATION RATE: 16 BRPM | OXYGEN SATURATION: 98 % | TEMPERATURE: 97.6 F | HEART RATE: 78 BPM | SYSTOLIC BLOOD PRESSURE: 138 MMHG

## 2018-04-09 PROCEDURE — G0299 HHS/HOSPICE OF RN EA 15 MIN: HCPCS

## 2018-04-10 VITALS
RESPIRATION RATE: 14 BRPM | HEART RATE: 102 BPM | OXYGEN SATURATION: 97 % | TEMPERATURE: 99.6 F | DIASTOLIC BLOOD PRESSURE: 60 MMHG | SYSTOLIC BLOOD PRESSURE: 90 MMHG

## 2018-04-11 ENCOUNTER — HOME CARE VISIT (OUTPATIENT)
Dept: HOME HEALTH SERVICES | Facility: HOME HEALTH | Age: 53
End: 2018-04-11
Payer: MEDICAID

## 2018-04-11 ENCOUNTER — OFFICE VISIT (OUTPATIENT)
Dept: VASCULAR SURGERY | Age: 53
End: 2018-04-11

## 2018-04-11 VITALS
HEIGHT: 65 IN | DIASTOLIC BLOOD PRESSURE: 60 MMHG | WEIGHT: 140 LBS | SYSTOLIC BLOOD PRESSURE: 118 MMHG | HEART RATE: 70 BPM | BODY MASS INDEX: 23.32 KG/M2 | RESPIRATION RATE: 18 BRPM

## 2018-04-11 DIAGNOSIS — Z99.2 ESRD ON HEMODIALYSIS (HCC): Primary | ICD-10-CM

## 2018-04-11 DIAGNOSIS — N18.6 ESRD ON HEMODIALYSIS (HCC): Primary | ICD-10-CM

## 2018-04-11 NOTE — MR AVS SNAPSHOT
Herseffie Payton 
 
 
 One [facility] [address] 
[phone] Patient: Maria A Nick MRN: [id number] KWL:6/98/[birth year] Visit Information Date & Time Provider Department Dept. Phone Encounter #  
 4/11/2018 11:00 AM Sweta Barlow MD [facility] [address] [id number] Your Appointments 4/11/2018 11:00 AM  
Follow Up with Sweta Barlow MD  
BS Vein/Vascular Spec THE [facility] ([address]) Appt Note: follow up wound /discuss surgery; Patient confirmed appt [address] [place] [address]  
  
   
 [facility] Upcoming Health Maintenance Date Due Pneumococcal 19-64 Highest Risk (1 of 3 - PCV13) 9/15/1985 DTaP/Tdap/Td series (1 - Tdap) 9/15/1987 FOBT Q 1 YEAR AGE 50-75 9/15/2016 Influenza Age 5 to Adult 8/1/2017 Allergies as of 4/11/2018  Review Complete On: 4/11/2018 By: Fei Dumont RN No Known Allergies Current Immunizations  Reviewed on 3/16/2018 No immunizations on file. Not reviewed this visit Vitals BP Pulse Resp Height(growth percentile) Weight(growth percentile) BMI  
 118/60 (BP 1 Location: Right arm, BP Patient Position: Sitting) 70 18 5' 5\" (1.651 m) 140 lb (63.5 kg) 23.3 kg/m2 Smoking Status Former Smoker BMI and BSA Data Body Mass Index Body Surface Area  
 23.3 kg/m 2 1.71 m 2 Preferred Pharmacy Pharmacy Name Phone [id number] [address] [facility] [phone] Your Updated Medication List  
  
   
This list is accurate as of 4/11/18 10:54 AM.  Always use your most recent med list.  
  
  
  
  
 aspirin 81 mg chewable tablet Take 1 Tab by mouth daily. AURYXIA 210 mg iron tablet Generic drug:  ferric citrate Take 420 mg by mouth three (3) times daily (with meals). carvedilol 25 mg tablet Commonly known as:  [place] Gopal Take 1 Tab by mouth two (2) times daily (with meals). CLONIDINE HCL PO Take 0.2 mg by mouth nightly. Indications: HTN  
  
 oxyCODONE-acetaminophen 5-325 mg per tablet Commonly known as:  PERCOCET Take 2 Tabs by mouth every six (6) hours as needed for Pain.  
  
 simvastatin 20 mg tablet Commonly known as:  ZOCOR Take 1 Tab by mouth nightly. To-Do List   
 04/13/2018 To Be Determined Appointment with Swapnil Almazan RN at [address] [place] [address] Please provide this summary of care documentation to your next provider. Your primary care clinician is listed as Ajay Mcdonald. If you have any questions after today's visit, please call [phone].

## 2018-04-13 ENCOUNTER — HOME CARE VISIT (OUTPATIENT)
Dept: SCHEDULING | Facility: HOME HEALTH | Age: 53
End: 2018-04-13
Payer: MEDICAID

## 2018-04-13 VITALS — TEMPERATURE: 98.6 F | DIASTOLIC BLOOD PRESSURE: 82 MMHG | RESPIRATION RATE: 16 BRPM | SYSTOLIC BLOOD PRESSURE: 140 MMHG

## 2018-04-13 PROCEDURE — G0299 HHS/HOSPICE OF RN EA 15 MIN: HCPCS

## 2018-04-13 NOTE — PROGRESS NOTES
[address]    Chief Complaint   Patient presents with    End Stage Renal Disease       History and Physical    Mr. Lacho Schwartz returns to our office today for continued follow up of his dialysis access and left arm wound after removing an infected AV graft. He has no new complaints today and is doing well. Past Medical History:   Diagnosis Date    Chronic kidney disease     ESRD- Dialysis- [facility] Hypertension 07/2017    Ill-defined condition     dialysis monday wednesday friday    Non compliance w medication regimen     noncompliant with HTN meds     Patient Active Problem List   Diagnosis Code    Routine general medical examination at a health care facility Z00.00    Unspecified essential hypertension I10    Tobacco abuse Z72.0    PAM (acute kidney injury) ([address] N17.9    Hypertensive urgency, malignant I16.0    Uremia N19    Severe anemia D64.9    Nonadherence to medical treatment Z91.19    Rash of genital area R21    Pleural effusion, right J90    Severe protein-calorie malnutrition ([address] E43    ESRD on hemodialysis ([facility] N18.6, Z99.2    CRF (chronic renal failure) N18.9    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 8XXA, L08.9     Past Surgical History:   Procedure Laterality Date    HX OTHER SURGICAL      left arm dialysis shunt    HX VASCULAR ACCESS  11/2017    AV  graft left arm    HX VASCULAR ACCESS  07/2017    left chest cath for dialysis     Current Outpatient Prescriptions   Medication Sig Dispense Refill    oxyCODONE-acetaminophen (PERCOCET) 5-325 mg per tablet Take 2 Tabs by mouth every six (6) hours as needed for Pain.  ferric citrate (AURYXIA) 210 mg iron tablet Take 420 mg by mouth three (3) times daily (with meals).  carvedilol (COREG) 25 mg tablet Take 1 Tab by mouth two (2) times daily (with meals). 60 Tab 0    CLONIDINE HCL PO Take 0.2 mg by mouth nightly.  Indications: HTN      aspirin 81 mg chewable tablet Take 1 Tab by mouth daily. (Patient taking differently: Take 325 mg by mouth daily.) 30 Tab 1    simvastatin (ZOCOR) 20 mg tablet Take 1 Tab by mouth nightly. 30 Tab 1     No Known Allergies  Social History     Social History    Marital status: SINGLE     Spouse name: N/A    Number of children: N/A    Years of education: N/A     Occupational History    Not on file. Social History Main Topics    Smoking status: Former Smoker     Packs/day: 0.50     Years: 7.00     Types: Cigarettes     Quit date: 7/26/2017    Smokeless tobacco: Never Used    Alcohol use No      Comment: stopped 7-2017    Drug use: No      Comment: [id number]    Sexual activity: Yes     Partners: Female     Other Topics Concern    Not on file     Social History Narrative      Family History   Problem Relation Age of Onset    Cancer Mother        Review of Systems    Review of Systems   Constitutional: Negative for chills, diaphoresis, fever, malaise/fatigue and weight loss. HENT: Negative for hearing loss and sore throat. Eyes: Negative for blurred vision, photophobia and redness. Respiratory: Negative for cough, hemoptysis, shortness of breath and wheezing. Cardiovascular: Negative for chest pain, palpitations and orthopnea. Gastrointestinal: Negative for abdominal pain, blood in stool, constipation, diarrhea, heartburn, nausea and vomiting. Genitourinary: Negative for dysuria, frequency, hematuria and urgency. Musculoskeletal: Negative for back pain and myalgias. Skin: Negative for itching and rash. Neurological: Negative for dizziness, speech change, focal weakness, weakness and headaches. Endo/Heme/Allergies: Does not bruise/bleed easily. Psychiatric/Behavioral: Negative for depression and suicidal ideas.             Physical Exam:    Visit Vitals    /60 (BP 1 Location: Right arm, BP Patient Position: Sitting)    Pulse 70    Resp 18    Ht 5' 5\" (1.651 m)    Wt 140 lb (63.5 kg)    BMI 23.3 kg/m2      Physical Examination: General appearance - alert, well appearing, and in no distress  Mental status - alert, oriented to person, place, and time  Eyes - sclera anicteric, left eye normal, right eye normal  Ears - right ear normal, left ear normal  Nose - normal and patent, no erythema, discharge or polyps  Mouth - mucous membranes moist, pharynx normal without lesions  Neck - supple, no significant adenopathy  Lymphatics - no palpable lymphadenopathy  Chest - clear to auscultation, no wheezes, rales or rhonchi, symmetric air entry  Heart - normal rate and regular rhythm  Abdomen - soft, nontender, nondistended, no masses or organomegaly  Extremities - left upper arm wound closed with hypertrophic tissue. Silver nitrate applied. Distal incision well healed. No edema or signs of active infection. Impression and Plan:    ICD-10-CM ICD-9-CM    1. ESRD on hemodialysis ([facility] N18.6 585.6     Z99.2 V45.11      I told Mr. Pond Limb that he is healing well and I think we can start plan on another access site. His veins are extremely small which makes access for him challenging. As such I will do a venogram on him first to get a better idea of what his access options are. After this we will schedule his surgery. The treatment plan was reviewed with the patient in detail. The patient voiced understanding of this plan and all questions and concerns were addressed. The patient agrees with this plan. We discussed the signs and symptoms that would require earlier attention or intervention. The patient was given educational material related to his/her visit and the patient has voiced understanding of the material.     I appreciate the opportunity to participate in the care of your patient. I will be sure to keep you informed of any subsequent changes in the treatment plan. If you have any questions or concerns, please feel free to contact me.   Eric Rosa MD    PLEASE NOTE:  This document has been produced using voice recognition software. Unrecognized errors in transcription may be present.

## 2018-04-18 ENCOUNTER — HOME CARE VISIT (OUTPATIENT)
Dept: SCHEDULING | Facility: HOME HEALTH | Age: 53
End: 2018-04-18
Payer: MEDICAID

## 2018-04-18 VITALS
HEART RATE: 102 BPM | SYSTOLIC BLOOD PRESSURE: 116 MMHG | DIASTOLIC BLOOD PRESSURE: 64 MMHG | RESPIRATION RATE: 14 BRPM | OXYGEN SATURATION: 99 % | TEMPERATURE: 99.7 F

## 2018-04-18 PROCEDURE — G0299 HHS/HOSPICE OF RN EA 15 MIN: HCPCS

## 2018-04-20 ENCOUNTER — HOME CARE VISIT (OUTPATIENT)
Dept: HOME HEALTH SERVICES | Facility: HOME HEALTH | Age: 53
End: 2018-04-20
Payer: MEDICAID

## 2018-04-20 VITALS
HEART RATE: 98 BPM | TEMPERATURE: 98.9 F | DIASTOLIC BLOOD PRESSURE: 72 MMHG | SYSTOLIC BLOOD PRESSURE: 112 MMHG | RESPIRATION RATE: 14 BRPM | OXYGEN SATURATION: 98 %

## 2018-04-20 PROCEDURE — G0299 HHS/HOSPICE OF RN EA 15 MIN: HCPCS

## 2018-04-24 ENCOUNTER — TELEPHONE (OUTPATIENT)
Dept: VASCULAR SURGERY | Age: 53
End: 2018-04-24

## 2018-04-25 ENCOUNTER — HOSPITAL ENCOUNTER (OUTPATIENT)
Dept: INTERVENTIONAL RADIOLOGY/VASCULAR | Age: 53
Discharge: HOME | End: 2018-04-25
Attending: SURGERY
Payer: MEDICAID

## 2018-04-25 DIAGNOSIS — N18.6 ESRD (END STAGE RENAL DISEASE) (HCC): ICD-10-CM

## 2018-04-25 PROCEDURE — 75822 VEIN X-RAY ARMS/LEGS: CPT

## 2018-04-25 PROCEDURE — 74011636320 HC RX REV CODE- 636/320: Performed by: SURGERY

## 2018-04-25 PROCEDURE — 36005 INJECTION EXT VENOGRAPHY: CPT

## 2018-04-25 RX ADMIN — IOPAMIDOL 9 ML: 612 INJECTION, SOLUTION INTRAVENOUS at 11:58

## 2018-04-25 NOTE — INTERVAL H&P NOTE
H&P Update:  [facility] was seen and examined. History and physical has been reviewed. The patient has been examined.  There have been no significant clinical changes since the completion of the originally dated History and Physical.    Signed By: Caitlin Tena MD     April 25, 2018 11:44 AM

## 2018-04-25 NOTE — H&P (VIEW-ONLY)
[address]    Chief Complaint   Patient presents with    End Stage Renal Disease       History and Physical    Mr. Antonio Haywood returns to our office today for continued follow up of his dialysis access and left arm wound after removing an infected AV graft. He has no new complaints today and is doing well. Past Medical History:   Diagnosis Date    Chronic kidney disease     ESRD- Dialysis- [facility] Hypertension 07/2017    Ill-defined condition     dialysis monday wednesday friday    Non compliance w medication regimen     noncompliant with HTN meds     Patient Active Problem List   Diagnosis Code    Routine general medical examination at a health care facility Z00.00    Unspecified essential hypertension I10    Tobacco abuse Z72.0    PAM (acute kidney injury) ([address] N17.9    Hypertensive urgency, malignant I16.0    Uremia N19    Severe anemia D64.9    Nonadherence to medical treatment Z91.19    Rash of genital area R21    Pleural effusion, right J90    Severe protein-calorie malnutrition ([address] E43    ESRD on hemodialysis ([address] N18.6, Z99.2    CRF (chronic renal failure) N18.9    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 8XXA, L08.9     Past Surgical History:   Procedure Laterality Date    HX OTHER SURGICAL      left arm dialysis shunt    HX VASCULAR ACCESS  11/2017    AV  graft left arm    HX VASCULAR ACCESS  07/2017    left chest cath for dialysis     Current Outpatient Prescriptions   Medication Sig Dispense Refill    oxyCODONE-acetaminophen (PERCOCET) 5-325 mg per tablet Take 2 Tabs by mouth every six (6) hours as needed for Pain.  ferric citrate (AURYXIA) 210 mg iron tablet Take 420 mg by mouth three (3) times daily (with meals).  carvedilol (COREG) 25 mg tablet Take 1 Tab by mouth two (2) times daily (with meals). 60 Tab 0    CLONIDINE HCL PO Take 0.2 mg by mouth nightly.  Indications: HTN      aspirin 81 mg chewable tablet Take 1 Tab by mouth daily. (Patient taking differently: Take 325 mg by mouth daily.) 30 Tab 1    simvastatin (ZOCOR) 20 mg tablet Take 1 Tab by mouth nightly. 30 Tab 1     No Known Allergies  Social History     Social History    Marital status: SINGLE     Spouse name: N/A    Number of children: N/A    Years of education: N/A     Occupational History    Not on file. Social History Main Topics    Smoking status: Former Smoker     Packs/day: 0.50     Years: 7.00     Types: Cigarettes     Quit date: 7/26/2017    Smokeless tobacco: Never Used    Alcohol use No      Comment: stopped 7-2017    Drug use: No      Comment: [id number]    Sexual activity: Yes     Partners: Female     Other Topics Concern    Not on file     Social History Narrative      Family History   Problem Relation Age of Onset    Cancer Mother        Review of Systems    Review of Systems   Constitutional: Negative for chills, diaphoresis, fever, malaise/fatigue and weight loss. HENT: Negative for hearing loss and sore throat. Eyes: Negative for blurred vision, photophobia and redness. Respiratory: Negative for cough, hemoptysis, shortness of breath and wheezing. Cardiovascular: Negative for chest pain, palpitations and orthopnea. Gastrointestinal: Negative for abdominal pain, blood in stool, constipation, diarrhea, heartburn, nausea and vomiting. Genitourinary: Negative for dysuria, frequency, hematuria and urgency. Musculoskeletal: Negative for back pain and myalgias. Skin: Negative for itching and rash. Neurological: Negative for dizziness, speech change, focal weakness, weakness and headaches. Endo/Heme/Allergies: Does not bruise/bleed easily. Psychiatric/Behavioral: Negative for depression and suicidal ideas.             Physical Exam:    Visit Vitals    /60 (BP 1 Location: Right arm, BP Patient Position: Sitting)    Pulse 70    Resp 18    Ht 5' 5\" (1.651 m)    Wt 140 lb (63.5 kg)    BMI 23.3 kg/m2      Physical Examination: General appearance - alert, well appearing, and in no distress  Mental status - alert, oriented to person, place, and time  Eyes - sclera anicteric, left eye normal, right eye normal  Ears - right ear normal, left ear normal  Nose - normal and patent, no erythema, discharge or polyps  Mouth - mucous membranes moist, pharynx normal without lesions  Neck - supple, no significant adenopathy  Lymphatics - no palpable lymphadenopathy  Chest - clear to auscultation, no wheezes, rales or rhonchi, symmetric air entry  Heart - normal rate and regular rhythm  Abdomen - soft, nontender, nondistended, no masses or organomegaly  Extremities - left upper arm wound closed with hypertrophic tissue. Silver nitrate applied. Distal incision well healed. No edema or signs of active infection. Impression and Plan:    ICD-10-CM ICD-9-CM    1. ESRD [facility] hemod[facility] ([facility] N18.6 585.6     Z99.2 V45.11      I told Mr. Tim Nguyen that he is healing well and I think we can start plan on another access site. His veins are extremely small which makes access for him challenging. As such I will do a venogram on him first to get a better idea of what his access options are. After this we will schedule his surgery. The treatment plan was reviewed with the patient in detail. The patient voiced understanding of this plan and all questions and concerns were addressed. The patient agrees with this plan. We discussed the signs and symptoms that would require earlier attention or intervention. The patient was given educational material related to his/her visit and the patient has voiced understanding of the material.     I appreciate the opportunity to participate in the care of your patient. I will be sure to keep you informed of any subsequent changes in the treatment plan. If you have any questions or concerns, please feel free to contact me.   Dulce Sharma MD    PLEASE NOTE:  This document has been produced using voice recognition software. Unrecognized errors in transcription may be present.

## 2018-04-27 NOTE — OP NOTES
[facility]  OPERATIVE REPORT    Name:Faby WHITE  MR#: [id number]  : 1965  ACCOUNT #: [de-identified]   DATE OF SERVICE: 2018    PREOPERATIVE DIAGNOSIS:  End-stage renal disease with thrombosed left upper extremity arteriovenous graft. POSTOPERATIVE DIAGNOSES:  End-stage renal disease with thrombosed left upper extremity arteriovenous graft. PROCEDURE PERFORMED:  Bilateral upper extremity venogram.      SURGEON:    Jones Lainez MD    ASSISTANT:  None     ANESTHESIA:  None. PACKS AND DRAINS:  None. IMPLANTS:  None. SPECIMENS REMOVED:  None. COMPLICATIONS:  None. ESTIMATED BLOOD LOSS:  Minimal     CONDITION:  Transferred to recovery, stable. FINDINGS:  Right upper extremity with sclerotic and diminutive cephalic vein up to the elbow. Beyond the elbow, only a single vein runoff, which appeared to be the brachial vein. In the left upper extremity, scattered sclerotic and small veins in the forearm with a patent basilic, axillary, brachial vein runoff into the upper extremity. INDICATION FOR PROCEDURE:  The patient is a 71-year-old gentleman with end-stage renal disease who has undergone 2 left upper extremity arteriovenous grafts. The first one thrombosed, despite several thrombectomy attempts. A second one became infected. Given these findings, the patient now needs a new access and will be dialyzed through a right IJ tunneled dialysis catheter. Decision was made to take the patient for a venogram and informed consent was obtained. PROCEDURE IN DETAIL:  On 2018, the patient sent to the catheterization suite and identified by CLAUDIA minor, by myself, and the team.  The patient was then placed on the operating catheter table and had his right arm extended. We then injected contrast through an IV in his left hand to perform a venogram with the above findings. We then repeated this on the left.   Upon completion, we flushed the IVs clear then remove the IV with manual pressure at the end of procedure. I was present and scrubbed for the entire procedure.       MD Hellen Baron / Christianne Dumas  D: 04/26/2018 17:08     T: 04/27/2018 10:11  JOB #: [id number]

## 2018-05-09 ENCOUNTER — HOSPITAL ENCOUNTER (OUTPATIENT)
Dept: PREADMISSION TESTING | Age: 53
Discharge: HOME | End: 2018-05-09
Payer: MEDICAID

## 2018-05-09 ENCOUNTER — HOSPITAL ENCOUNTER (OUTPATIENT)
Dept: GENERAL RADIOLOGY | Age: 53
Discharge: HOME | End: 2018-05-09
Attending: SURGERY
Payer: MEDICAID

## 2018-05-09 LAB
ABO + RH BLD: NORMAL
ALBUMIN SERPL-MCNC: 3.8 G/DL (ref 3.4–5)
ALBUMIN/GLOB SERPL: 0.8 {RATIO} (ref 0.8–1.7)
ALP SERPL-CCNC: 51 U/L (ref 45–117)
ALT SERPL-CCNC: 21 U/L (ref 16–61)
ANION GAP SERPL CALC-SCNC: 9 MMOL/L (ref 3–18)
APTT PPP: 29.2 SEC (ref 23–36.4)
AST SERPL-CCNC: 19 U/L (ref 15–37)
BASOPHILS # BLD: 0 K/UL (ref 0–0.06)
BASOPHILS NFR BLD: 0 % (ref 0–2)
BILIRUB SERPL-MCNC: 0.9 MG/DL (ref 0.2–1)
BLOOD GROUP ANTIBODIES SERPL: NORMAL
BUN SERPL-MCNC: 22 MG/DL (ref 7–18)
BUN/CREAT SERPL: 3 (ref 12–20)
CALCIUM SERPL-MCNC: 9.9 MG/DL (ref 8.5–10.1)
CHLORIDE SERPL-SCNC: 97 MMOL/L (ref 100–108)
CO2 SERPL-SCNC: 33 MMOL/L (ref 21–32)
CREAT SERPL-MCNC: 7.21 MG/DL (ref 0.6–1.3)
DIFFERENTIAL METHOD BLD: ABNORMAL
EOSINOPHIL # BLD: 0.2 K/UL (ref 0–0.4)
EOSINOPHIL NFR BLD: 1 % (ref 0–5)
ERYTHROCYTE [DISTWIDTH] IN BLOOD BY AUTOMATED COUNT: 14.4 % (ref 11.6–14.5)
GLOBULIN SER CALC-MCNC: 4.7 G/DL (ref 2–4)
GLUCOSE SERPL-MCNC: 88 MG/DL (ref 74–99)
HCT VFR BLD AUTO: 44.6 % (ref 36–48)
HGB BLD-MCNC: 14.2 G/DL (ref 13–16)
INR PPP: 0.9 (ref 0.8–1.2)
LYMPHOCYTES # BLD: 2.1 K/UL (ref 0.9–3.6)
LYMPHOCYTES NFR BLD: 16 % (ref 21–52)
MCH RBC QN AUTO: 30.8 PG (ref 24–34)
MCHC RBC AUTO-ENTMCNC: 31.8 G/DL (ref 31–37)
MCV RBC AUTO: 96.7 FL (ref 74–97)
MONOCYTES # BLD: 1.1 K/UL (ref 0.05–1.2)
MONOCYTES NFR BLD: 9 % (ref 3–10)
NEUTS SEG # BLD: 9.7 K/UL (ref 1.8–8)
NEUTS SEG NFR BLD: 74 % (ref 40–73)
PLATELET # BLD AUTO: 260 K/UL (ref 135–420)
PMV BLD AUTO: 11.2 FL (ref 9.2–11.8)
POTASSIUM SERPL-SCNC: 3.5 MMOL/L (ref 3.5–5.5)
PROT SERPL-MCNC: 8.5 G/DL (ref 6.4–8.2)
PROTHROMBIN TIME: 11.8 SEC (ref 11.5–15.2)
RBC # BLD AUTO: 4.61 M/UL (ref 4.7–5.5)
SODIUM SERPL-SCNC: 139 MMOL/L (ref 136–145)
SPECIMEN EXP DATE BLD: NORMAL
WBC # BLD AUTO: 13.1 K/UL (ref 4.6–13.2)

## 2018-05-09 PROCEDURE — 85025 COMPLETE CBC W/AUTO DIFF WBC: CPT | Performed by: SURGERY

## 2018-05-09 PROCEDURE — 93005 ELECTROCARDIOGRAM TRACING: CPT

## 2018-05-09 PROCEDURE — 86900 BLOOD TYPING SEROLOGIC ABO: CPT | Performed by: SURGERY

## 2018-05-09 PROCEDURE — 85610 PROTHROMBIN TIME: CPT | Performed by: SURGERY

## 2018-05-09 PROCEDURE — 85730 THROMBOPLASTIN TIME PARTIAL: CPT | Performed by: SURGERY

## 2018-05-09 PROCEDURE — 36415 COLL VENOUS BLD VENIPUNCTURE: CPT | Performed by: SURGERY

## 2018-05-09 PROCEDURE — 80053 COMPREHEN METABOLIC PANEL: CPT | Performed by: SURGERY

## 2018-05-09 PROCEDURE — 71045 X-RAY EXAM CHEST 1 VIEW: CPT

## 2018-05-10 LAB
ATRIAL RATE: 60 BPM
CALCULATED P AXIS, ECG09: 6 DEGREES
CALCULATED R AXIS, ECG10: 66 DEGREES
CALCULATED T AXIS, ECG11: 41 DEGREES
DIAGNOSIS, 93000: NORMAL
P-R INTERVAL, ECG05: 144 MS
Q-T INTERVAL, ECG07: 442 MS
QRS DURATION, ECG06: 100 MS
QTC CALCULATION (BEZET), ECG08: 442 MS
VENTRICULAR RATE, ECG03: 60 BPM

## 2018-05-14 ENCOUNTER — TELEPHONE (OUTPATIENT)
Dept: VASCULAR SURGERY | Age: 53
End: 2018-05-14

## 2018-05-14 NOTE — TELEPHONE ENCOUNTER
Called the patient and advised to arrive no later than 1000, for surgery , NPO after midnight , with heart and bp meds with small sip of water. Pt verbalized understanding. Advised he must have ride to go home and states that he has made arrangements.

## 2018-05-15 ENCOUNTER — ANESTHESIA EVENT (OUTPATIENT)
Dept: SURGERY | Age: 53
End: 2018-05-15
Payer: MEDICAID

## 2018-05-15 ENCOUNTER — HOSPITAL ENCOUNTER (OUTPATIENT)
Age: 53
Setting detail: OUTPATIENT SURGERY
Discharge: HOME | End: 2018-05-15
Attending: SURGERY | Admitting: SURGERY
Payer: MEDICAID

## 2018-05-15 ENCOUNTER — ANESTHESIA (OUTPATIENT)
Dept: SURGERY | Age: 53
End: 2018-05-15
Payer: MEDICAID

## 2018-05-15 VITALS
BODY MASS INDEX: 23.33 KG/M2 | WEIGHT: 145.19 LBS | TEMPERATURE: 97.7 F | HEIGHT: 66 IN | SYSTOLIC BLOOD PRESSURE: 120 MMHG | HEART RATE: 62 BPM | DIASTOLIC BLOOD PRESSURE: 78 MMHG | OXYGEN SATURATION: 100 % | RESPIRATION RATE: 15 BRPM

## 2018-05-15 DIAGNOSIS — G89.18 POST-OPERATIVE PAIN: Primary | ICD-10-CM

## 2018-05-15 LAB — POTASSIUM SERPL-SCNC: 4.3 MMOL/L (ref 3.5–5.5)

## 2018-05-15 PROCEDURE — 77030011640 HC PAD GRND REM COVD -A: Performed by: SURGERY

## 2018-05-15 PROCEDURE — 76210000021 HC REC RM PH II 0.5 TO 1 HR: Performed by: SURGERY

## 2018-05-15 PROCEDURE — 74011250636 HC RX REV CODE- 250/636

## 2018-05-15 PROCEDURE — L3650 SO 8 ABD RESTRAINT PRE OTS: HCPCS | Performed by: SURGERY

## 2018-05-15 PROCEDURE — 76942 ECHO GUIDE FOR BIOPSY: CPT | Performed by: SURGERY

## 2018-05-15 PROCEDURE — 76060000035 HC ANESTHESIA 2 TO 2.5 HR: Performed by: SURGERY

## 2018-05-15 PROCEDURE — 74011250637 HC RX REV CODE- 250/637: Performed by: ANESTHESIOLOGY

## 2018-05-15 PROCEDURE — 74011000250 HC RX REV CODE- 250

## 2018-05-15 PROCEDURE — 74011250636 HC RX REV CODE- 250/636: Performed by: SURGERY

## 2018-05-15 PROCEDURE — 77030012508 HC MSK AIRWY LMA AMBU -A: Performed by: NURSE ANESTHETIST, CERTIFIED REGISTERED

## 2018-05-15 PROCEDURE — 77030002933 HC SUT MCRYL J&J -A: Performed by: SURGERY

## 2018-05-15 PROCEDURE — 77030002987 HC SUT PROL J&J -B: Performed by: SURGERY

## 2018-05-15 PROCEDURE — 64415 NJX AA&/STRD BRCH PLXS IMG: CPT | Performed by: ANESTHESIOLOGY

## 2018-05-15 PROCEDURE — 77030032490 HC SLV COMPR SCD KNE COVD -B: Performed by: SURGERY

## 2018-05-15 PROCEDURE — 74011000272 HC RX REV CODE- 272: Performed by: SURGERY

## 2018-05-15 PROCEDURE — 36415 COLL VENOUS BLD VENIPUNCTURE: CPT | Performed by: SURGERY

## 2018-05-15 PROCEDURE — 74011250636 HC RX REV CODE- 250/636: Performed by: ANESTHESIOLOGY

## 2018-05-15 PROCEDURE — 84132 ASSAY OF SERUM POTASSIUM: CPT | Performed by: SURGERY

## 2018-05-15 PROCEDURE — 76210000016 HC OR PH I REC 1 TO 1.5 HR: Performed by: SURGERY

## 2018-05-15 PROCEDURE — 77030016060 HC NDL NRV BLK TELE -A: Performed by: ANESTHESIOLOGY

## 2018-05-15 PROCEDURE — 76010000131 HC OR TIME 2 TO 2.5 HR: Performed by: SURGERY

## 2018-05-15 PROCEDURE — 77030020782 HC GWN BAIR PAWS FLX 3M -B: Performed by: SURGERY

## 2018-05-15 PROCEDURE — 77030010507 HC ADH SKN DERMBND J&J -B: Performed by: SURGERY

## 2018-05-15 RX ORDER — OXYCODONE AND ACETAMINOPHEN 5; 325 MG/1; MG/1
1 TABLET ORAL ONCE
Status: COMPLETED | OUTPATIENT
Start: 2018-05-15 | End: 2018-05-15

## 2018-05-15 RX ORDER — ALBUTEROL SULFATE 0.83 MG/ML
2.5 SOLUTION RESPIRATORY (INHALATION) AS NEEDED
Status: DISCONTINUED | OUTPATIENT
Start: 2018-05-15 | End: 2018-05-15 | Stop reason: HOSPADM

## 2018-05-15 RX ORDER — OXYCODONE AND ACETAMINOPHEN 5; 325 MG/1; MG/1
2 TABLET ORAL
Qty: 112 TAB | Refills: 0 | Status: SHIPPED | OUTPATIENT
Start: 2018-05-15 | End: 2018-05-21 | Stop reason: SDUPTHER

## 2018-05-15 RX ORDER — HEPARIN SODIUM 1000 [USP'U]/ML
INJECTION, SOLUTION INTRAVENOUS; SUBCUTANEOUS AS NEEDED
Status: DISCONTINUED | OUTPATIENT
Start: 2018-05-15 | End: 2018-05-15 | Stop reason: HOSPADM

## 2018-05-15 RX ORDER — PROPOFOL 10 MG/ML
INJECTION, EMULSION INTRAVENOUS AS NEEDED
Status: DISCONTINUED | OUTPATIENT
Start: 2018-05-15 | End: 2018-05-15 | Stop reason: HOSPADM

## 2018-05-15 RX ORDER — LIDOCAINE HYDROCHLORIDE 20 MG/ML
INJECTION, SOLUTION EPIDURAL; INFILTRATION; INTRACAUDAL; PERINEURAL AS NEEDED
Status: DISCONTINUED | OUTPATIENT
Start: 2018-05-15 | End: 2018-05-15 | Stop reason: HOSPADM

## 2018-05-15 RX ORDER — DEXTROSE 50 % IN WATER (D50W) INTRAVENOUS SYRINGE
25-50 AS NEEDED
Status: DISCONTINUED | OUTPATIENT
Start: 2018-05-15 | End: 2018-05-15 | Stop reason: HOSPADM

## 2018-05-15 RX ORDER — SODIUM CHLORIDE, SODIUM LACTATE, POTASSIUM CHLORIDE, CALCIUM CHLORIDE 600; 310; 30; 20 MG/100ML; MG/100ML; MG/100ML; MG/100ML
150 INJECTION, SOLUTION INTRAVENOUS CONTINUOUS
Status: DISCONTINUED | OUTPATIENT
Start: 2018-05-15 | End: 2018-05-15 | Stop reason: HOSPADM

## 2018-05-15 RX ORDER — DIPHENHYDRAMINE HYDROCHLORIDE 50 MG/ML
12.5 INJECTION, SOLUTION INTRAMUSCULAR; INTRAVENOUS
Status: DISCONTINUED | OUTPATIENT
Start: 2018-05-15 | End: 2018-05-15 | Stop reason: HOSPADM

## 2018-05-15 RX ORDER — ONDANSETRON 2 MG/ML
INJECTION INTRAMUSCULAR; INTRAVENOUS AS NEEDED
Status: DISCONTINUED | OUTPATIENT
Start: 2018-05-15 | End: 2018-05-15 | Stop reason: HOSPADM

## 2018-05-15 RX ORDER — FENTANYL CITRATE 50 UG/ML
25 INJECTION, SOLUTION INTRAMUSCULAR; INTRAVENOUS AS NEEDED
Status: DISCONTINUED | OUTPATIENT
Start: 2018-05-15 | End: 2018-05-15 | Stop reason: HOSPADM

## 2018-05-15 RX ORDER — OXYCODONE HYDROCHLORIDE 5 MG/1
5 TABLET ORAL ONCE
Status: DISCONTINUED | OUTPATIENT
Start: 2018-05-15 | End: 2018-05-15 | Stop reason: HOSPADM

## 2018-05-15 RX ORDER — NALOXONE HYDROCHLORIDE 0.4 MG/ML
0.1 INJECTION, SOLUTION INTRAMUSCULAR; INTRAVENOUS; SUBCUTANEOUS AS NEEDED
Status: DISCONTINUED | OUTPATIENT
Start: 2018-05-15 | End: 2018-05-15 | Stop reason: HOSPADM

## 2018-05-15 RX ORDER — SODIUM CHLORIDE 9 MG/ML
500 INJECTION, SOLUTION INTRAVENOUS CONTINUOUS
Status: DISCONTINUED | OUTPATIENT
Start: 2018-05-15 | End: 2018-05-15 | Stop reason: HOSPADM

## 2018-05-15 RX ORDER — MIDAZOLAM HYDROCHLORIDE 1 MG/ML
INJECTION, SOLUTION INTRAMUSCULAR; INTRAVENOUS AS NEEDED
Status: DISCONTINUED | OUTPATIENT
Start: 2018-05-15 | End: 2018-05-15 | Stop reason: HOSPADM

## 2018-05-15 RX ORDER — MAGNESIUM SULFATE 100 %
4 CRYSTALS MISCELLANEOUS AS NEEDED
Status: DISCONTINUED | OUTPATIENT
Start: 2018-05-15 | End: 2018-05-15 | Stop reason: HOSPADM

## 2018-05-15 RX ORDER — SODIUM CHLORIDE 0.9 % (FLUSH) 0.9 %
5-10 SYRINGE (ML) INJECTION AS NEEDED
Status: DISCONTINUED | OUTPATIENT
Start: 2018-05-15 | End: 2018-05-15 | Stop reason: HOSPADM

## 2018-05-15 RX ORDER — ONDANSETRON 2 MG/ML
4 INJECTION INTRAMUSCULAR; INTRAVENOUS ONCE
Status: DISCONTINUED | OUTPATIENT
Start: 2018-05-15 | End: 2018-05-15 | Stop reason: HOSPADM

## 2018-05-15 RX ORDER — CEFAZOLIN SODIUM/WATER 2 G/20 ML
2 SYRINGE (ML) INTRAVENOUS ONCE
Status: COMPLETED | OUTPATIENT
Start: 2018-05-15 | End: 2018-05-15

## 2018-05-15 RX ORDER — EPHEDRINE SULFATE 50 MG/ML
INJECTION, SOLUTION INTRAVENOUS AS NEEDED
Status: DISCONTINUED | OUTPATIENT
Start: 2018-05-15 | End: 2018-05-15 | Stop reason: HOSPADM

## 2018-05-15 RX ORDER — HYDROCODONE BITARTRATE AND ACETAMINOPHEN 5; 325 MG/1; MG/1
1 TABLET ORAL AS NEEDED
Status: DISCONTINUED | OUTPATIENT
Start: 2018-05-15 | End: 2018-05-15 | Stop reason: HOSPADM

## 2018-05-15 RX ORDER — METOCLOPRAMIDE HYDROCHLORIDE 5 MG/ML
INJECTION INTRAMUSCULAR; INTRAVENOUS AS NEEDED
Status: DISCONTINUED | OUTPATIENT
Start: 2018-05-15 | End: 2018-05-15 | Stop reason: HOSPADM

## 2018-05-15 RX ADMIN — MIDAZOLAM HYDROCHLORIDE 3 MG: 1 INJECTION, SOLUTION INTRAMUSCULAR; INTRAVENOUS at 12:42

## 2018-05-15 RX ADMIN — OXYCODONE HYDROCHLORIDE AND ACETAMINOPHEN 1 TABLET: 5; 325 TABLET ORAL at 15:22

## 2018-05-15 RX ADMIN — METOCLOPRAMIDE HYDROCHLORIDE 10 MG: 5 INJECTION INTRAMUSCULAR; INTRAVENOUS at 12:52

## 2018-05-15 RX ADMIN — HEPARIN SODIUM 3000 UNITS: 1000 INJECTION, SOLUTION INTRAVENOUS; SUBCUTANEOUS at 13:37

## 2018-05-15 RX ADMIN — EPHEDRINE SULFATE 5 MG: 50 INJECTION, SOLUTION INTRAVENOUS at 13:42

## 2018-05-15 RX ADMIN — ONDANSETRON 4 MG: 2 INJECTION INTRAMUSCULAR; INTRAVENOUS at 12:52

## 2018-05-15 RX ADMIN — PROPOFOL 100 MG: 10 INJECTION, EMULSION INTRAVENOUS at 12:42

## 2018-05-15 RX ADMIN — PROPOFOL 50 MG: 10 INJECTION, EMULSION INTRAVENOUS at 12:40

## 2018-05-15 RX ADMIN — PROPOFOL 50 MG: 10 INJECTION, EMULSION INTRAVENOUS at 12:35

## 2018-05-15 RX ADMIN — LIDOCAINE HYDROCHLORIDE 80 MG: 20 INJECTION, SOLUTION EPIDURAL; INFILTRATION; INTRACAUDAL; PERINEURAL at 12:35

## 2018-05-15 RX ADMIN — MIDAZOLAM HYDROCHLORIDE 2 MG: 1 INJECTION, SOLUTION INTRAMUSCULAR; INTRAVENOUS at 12:27

## 2018-05-15 RX ADMIN — MIDAZOLAM HYDROCHLORIDE 2 MG: 1 INJECTION, SOLUTION INTRAMUSCULAR; INTRAVENOUS at 12:02

## 2018-05-15 RX ADMIN — FENTANYL CITRATE 25 MCG: 50 INJECTION, SOLUTION INTRAMUSCULAR; INTRAVENOUS at 15:06

## 2018-05-15 RX ADMIN — FENTANYL CITRATE 25 MCG: 50 INJECTION, SOLUTION INTRAMUSCULAR; INTRAVENOUS at 15:43

## 2018-05-15 RX ADMIN — EPHEDRINE SULFATE 5 MG: 50 INJECTION, SOLUTION INTRAVENOUS at 13:34

## 2018-05-15 RX ADMIN — SODIUM CHLORIDE 500 ML: 900 INJECTION, SOLUTION INTRAVENOUS at 11:19

## 2018-05-15 RX ADMIN — Medication 2 G: at 12:27

## 2018-05-15 NOTE — PERIOP NOTES
Reviewed PTA medication list with patient/caregiver and patient/caregiver denies any additional medications. Patient admits to having a responsible adult care for them for at least 24 hours after surgery.     Dual skin assessment completed by Dede Logan RN and ANUPAM Pope RN

## 2018-05-15 NOTE — PERIOP NOTES
AVS medication list reviewed and no duplciates noted. Discharge information reviewed with patient and spouse; verbalized understanding of discharge instructions and follow up.

## 2018-05-15 NOTE — DISCHARGE INSTRUCTIONS
DISCHARGE SUMMARY from Nurse    PATIENT INSTRUCTIONS:    After general anesthesia or intravenous sedation, for 24 hours or while taking prescription Narcotics:  · Limit your activities  · Do not drive and operate hazardous machinery  · Do not make important personal or business decisions  · Do  not drink alcoholic beverages  · If you have not urinated within 8 hours after discharge, please contact your surgeon on call. Report the following to your surgeon:  · Excessive pain, swelling, redness or odor of or around the surgical area  · Temperature over 100.5  · Nausea and vomiting lasting longer than 4 hours or if unable to take medications  · Any signs of decreased circulation or nerve impairment to extremity: change in color, persistent  numbness, tingling, coldness or increase pain  · Any questions    What to do at Home:  Recommended activity: Activity as tolerated and no driving for today and Ambulate in house. If you experience any of the following symptoms fever greater than 101.0, chills, nausea or vomiting, pain not relieved with medication, drainage from incision sites, please follow up with Dr. Francisco Javier Warren. Regular diet as tolerated. Maintain fluid intake at home. Wear sling until circulation and sensation returns to arm. No heavy lifting or strenuous activity. Follow up with Dr. Francisco Javier Warren in 2-weeks. *  Please give a list of your current medications to your Primary Care Provider. *  Please update this list whenever your medications are discontinued, doses are      changed, or new medications (including over-the-counter products) are added. *  Please carry medication information at all times in case of emergency situations. These are general instructions for a healthy lifestyle:    No smoking/ No tobacco products/ Avoid exposure to second hand smoke  Surgeon General's Warning:  Quitting smoking now greatly reduces serious risk to your health.     Obesity, smoking, and sedentary lifestyle greatly increases your risk for illness    A healthy diet, regular physical exercise & weight monitoring are important for maintaining a healthy lifestyle    You may be retaining fluid if you have a history of heart failure or if you experience any of the following symptoms:  Weight gain of 3 pounds or more overnight or 5 pounds in a week, increased swelling in our hands or feet or shortness of breath while lying flat in bed. Please call your doctor as soon as you notice any of these symptoms; do not wait until your next office visit. Recognize signs and symptoms of STROKE:    F-face looks uneven    A-arms unable to move or move unevenly    S-speech slurred or non-existent    T-time-call 911 as soon as signs and symptoms begin-DO NOT go       Back to bed or wait to see if you get better-TIME IS BRAIN. Warning Signs of HEART ATTACK     Call 911 if you have these symptoms:   Chest discomfort. Most heart attacks involve discomfort in the center of the chest that lasts more than a few minutes, or that goes away and comes back. It can feel like uncomfortable pressure, squeezing, fullness, or pain.  Discomfort in other areas of the upper body. Symptoms can include pain or discomfort in one or both arms, the back, neck, jaw, or stomach.  Shortness of breath with or without chest discomfort.  Other signs may include breaking out in a cold sweat, nausea, or lightheadedness. Don't wait more than five minutes to call 911 - MINUTES MATTER! Fast action can save your life. Calling 911 is almost always the fastest way to get lifesaving treatment. Emergency Medical Services staff can begin treatment when they arrive -- up to an hour sooner than if someone gets to the hospital by car. The discharge information has been reviewed with the patient and caregiver. The patient and caregiver verbalized understanding.   Discharge medications reviewed with the patient and caregiver and appropriate educational materials and side effects teaching were provided. Patient armband removed and shredded.     ___________________________________________________________________________________________________________________________________

## 2018-05-15 NOTE — ANESTHESIA PREPROCEDURE EVALUATION
Anesthetic History   No history of anesthetic complications            Review of Systems / Medical History  Patient summary reviewed, nursing notes reviewed and pertinent labs reviewed    Pulmonary              Pertinent negatives: No COPD and asthma     Neuro/Psych   Within defined limits           Cardiovascular    Hypertension: poorly controlled            Pertinent negatives: No past MI and CAD       GI/Hepatic/Renal     GERD: well controlled    Renal disease: ESRD       Endo/Other  Within defined limits           Other Findings                 Anesthetic Plan    ASA: 3  Anesthesia type: general - backup and regional - interscalene block  Risk and benefits fully explained to the patient including bleeding, headache, nerve damage, infection, nausea, back pain, and hemodynamic changes. Patient understands and agrees to the procedure.     Post-op pain plan if not by surgeon: peripheral nerve block single    Induction: Intravenous  Anesthetic plan and risks discussed with: Patient

## 2018-05-15 NOTE — ANESTHESIA PROCEDURE NOTES
Peripheral Block    Start time: 5/15/2018 12:02 PM  End time: 5/15/2018 12:12 PM  Performed by: Jeanette Marino  Authorized by: Clifton Rincon: Indications: at surgeon's request, post-op pain management, procedure for pain and primary anesthetic    Preanesthetic Checklist: patient identified, risks and benefits discussed, site marked, timeout performed, anesthesia consent given and patient being monitored      Block Type:   Block Type:   Interscalene  Laterality:  Left  Monitoring:  Standard ASA monitoring, continuous pulse ox, frequent vital sign checks, heart rate, oxygen and responsive to questions  Injection Technique:  Single shot  Procedures: ultrasound guided    Patient Position: seated  Prep: chlorhexidine    Location:  Interscalene  Needle Type:  Stimuplex  Needle Gauge:  20 G  Needle Localization:  Ultrasound guidance and nerve stimulator  Motor Response: minimal motor response >0.4 mA    Medication Injected:  2.0%  mepivacaine  Volume (mL):  20    Assessment:  Number of attempts:  1  Injection Assessment:  Incremental injection every 5 mL, negative aspiration for CSF, no paresthesia, ultrasound image on chart, no intravascular symptoms, negative aspiration for blood and local visualized surrounding nerve on ultrasound  Patient tolerance:  Patient tolerated the procedure well with no immediate complications

## 2018-05-15 NOTE — IP AVS SNAPSHOT
[address] [facility] 
 
 
 [address] [address] 
[phone] Patient: Melisa Wilson MRN: [id number] [facility]:[id number] About your hospitalization You were admitted on:  May 15, 2018 You last received care in the:  THE [facility] PHASE 2 RECOVERY You were discharged on:  May 15, 2018 Why you were hospitalized Your primary diagnosis was:  Not on File Follow-up Information Follow up With Details Comments Contact Info Brandy Bird, [address] [facility] [address] [address] 
[phone] Cande Molina MD Go in 2 week(s)  [address] [facility] [address] [facility] 150 
[phone] Your Scheduled Appointments Thursday May 17, 2018 12:30 PM EDT  
US ABDOMEN COMPLETE with THE [facility] US RM 1  
[facility] [address] [address]  
[phone] OUTSIDE FILMS  - Any outside films related to the study being scheduled should be brought with you on the day of the exam.  If this cannot be done there may be a delay in the reading of the study. NPO -Patient must be NPO (no food or drink) 8 hours prior to the exam.  MEDICATIONS  - Patient must bring a complete list of all medications currently taking to include prescriptions, over-the-counter meds, herbals, vitamins & any dietary supplements CHECK IN INSTRUCTIONS  Check in to Admissions at the [facility] entrance 15 minutes prior to your appointment. For appointments after 6:00pm and Saturday appointments - check in to the Emergency Room 15 minutes prior to your appointment. [address], [address] Wednesday May 30, 2018 11:00 AM EDT HOSPITAL DISCHARGE with Zaki Smith NP  
BS Vein/Vascular Spec THE [facility] ([facility])  
 [address] [facility] 150  
[phone] Discharge Orders None A check ofelia indicates which time of day the medication should be taken. My Medications START taking these medications Instructions Each Dose to Equal  
 Morning Noon Evening Bedtime  
 oxyCODONE-acetaminophen 5-325 mg per tablet Commonly known as:  PERCOCET Your last dose was: Your next dose is: Take 2 Tabs by mouth every six (6) hours as needed for Pain. Max Daily Amount: 8 Tabs. 2 Tab CHANGE how you take these medications Instructions Each Dose to Equal  
 Morning Noon Evening Bedtime  
 aspirin 81 mg chewable tablet What changed:  how much to take Your last dose was: Your next dose is: Take 1 Tab by mouth daily. 81 mg CONTINUE taking these medications Instructions Each Dose to Equal  
 Morning Noon Evening Bedtime AURYXIA 210 mg iron tablet Generic drug:  ferric citrate Your last dose was: Your next dose is: Take 420 mg by mouth three (3) times daily (with meals). 420 mg  
    
   
   
   
  
 carvedilol 25 mg tablet Commonly known as:  Wendall Lundborg Your last dose was: Your next dose is: Take 1 Tab by mouth two (2) times daily (with meals). 25 mg  
    
   
   
   
  
 cloNIDine HCl 0.2 mg tablet Commonly known as:  CATAPRES Your last dose was: Your next dose is: Take 0.2 mg by mouth nightly. 0.2 mg  
    
   
   
   
  
 multivitamin tablet Commonly known as:  ONE A DAY Your last dose was: Your next dose is: Take 1 Tab by mouth daily. 1 Tab  
    
   
   
   
  
 simvastatin 20 mg tablet Commonly known as:  ZOCOR Your last dose was: Your next dose is: Take 1 Tab by mouth nightly. 20 mg Where to Get Your Medications Information on where to get these meds will be given to you by the nurse or doctor. ! Ask your nurse or doctor about these medications  
  oxyCODONE-acetaminophen 5-325 mg per tablet Opioid Education Prescription Opioids: What You Need to Know: 
 
 
After general anesthesia or intravenous sedation, for 24 hours or while taking prescription Narcotics: · Limit your activities · Do not drive and operate hazardous machinery · Do not make important personal or business decisions · Do  not drink alcoholic beverages · If you have not urinated within 8 hours after discharge, please contact your surgeon on call. Report the following to your surgeon: 
· Excessive pain, swelling, redness or odor of or around the surgical area · Temperature over 100.5 · Nausea and vomiting lasting longer than 4 hours or if unable to take medications · Any signs of decreased circulation or nerve impairment to extremity: change in color, persistent  numbness, tingling, coldness or increase pain · Any questions What to do at Home: 
Recommended activity: Activity as tolerated and no driving for today and Ambulate in house. If you experience any of the following symptoms fever greater than 101.0, chills, nausea or vomiting, pain not relieved with medication, drainage from incision sites, please follow up with Dr. Guy Matta. Regular diet as tolerated. Maintain fluid intake at home. Wear sling until circulation and sensation returns to arm. No heavy lifting or strenuous activity. Follow up with Dr. Guy Matta in 2-weeks. *  Please give a list of your current medications to your Primary Care Provider. *  Please update this list whenever your medications are discontinued, doses are 
    changed, or new medications (including over-the-counter products) are added. *  Please carry medication information at all times in case of emergency situations. These are general instructions for a healthy lifestyle: No smoking/ No tobacco products/ Avoid exposure to second hand smoke Surgeon General's Warning:  Quitting smoking now greatly reduces serious risk to your health. Obesity, smoking, and sedentary lifestyle greatly increases your risk for illness A healthy diet, regular physical exercise & weight monitoring are important for maintaining a healthy lifestyle You may be retaining fluid if you have a history of heart failure or if you experience any of the following symptoms:  Weight gain of 3 pounds or more overnight or 5 pounds in a week, increased swelling in our hands or feet or shortness of breath while lying flat in bed. Please call your doctor as soon as you notice any of these symptoms; do not wait until your next office visit. Recognize signs and symptoms of STROKE: 
 
F-face looks uneven A-arms unable to move or move unevenly S-speech slurred or non-existent T-time-call 911 as soon as signs and symptoms begin-DO NOT go Back to bed or wait to see if you get better-TIME IS BRAIN. Warning Signs of HEART ATTACK Call 911 if you have these symptoms: 
? Chest discomfort. Most heart attacks involve discomfort in the center of the chest that lasts more than a few minutes, or that goes away and comes back. It can feel like uncomfortable pressure, squeezing, fullness, or pain. ? Discomfort in other areas of the upper body. Symptoms can include pain or discomfort in one or both arms, the back, neck, jaw, or stomach. ? Shortness of breath with or without chest discomfort. ? Other signs may include breaking out in a cold sweat, nausea, or lightheadedness. Don't wait more than five minutes to call [address]! Fast action can save your life. Calling 911 is almost always the fastest way to get lifesaving treatment. Emergency Medical Services staff can begin treatment when they arrive  up to an hour sooner than if someone gets to the hospital by car. The discharge information has been reviewed with the patient and caregiver. The patient and caregiver verbalized understanding. Discharge medications reviewed with the patient and caregiver and appropriate educational materials and side effects teaching were provided. Patient armband removed and shredded. ___________________________________________________________________________________________________________________________________ Introducing Bryson Valladares As a [employer] patient, I wanted to make you aware of our electronic visit tool called Bryson Jacquelyn. [email or web address] allows you to connect within minutes with a medical provider 24 hours a day, seven days a week via a mobile device or tablet or logging into a secure website from your computer. You can access Bryson Valladares from anywhere in the [place]. A virtual visit might be right for you when you have a simple condition and feel like you just dont want to get out of bed, or cant get away from work for an appointment, when your [facility] is not available (evenings, weekends or holidays), or when youre out of town and need minor care. Electronic visits cost only $49 and if the [employer] provider determines a prescription is needed to treat your condition, one can be electronically transmitted to a nearby pharmacy*. Please take a moment to enroll today if you have not already done so. The enrollment process is free and takes just a few minutes.   To enroll, please download the [employer] riky to your tablet or phone, or visit [email or web address] to enroll on your computer. And, as an [address] [place] [address] patient with a [employer] account, the results of your visits will be scanned into your electronic medical record and your primary care provider will be able to view the scanned results. We urge you to continue to see your regular [facility] provider for your ongoing medical care. And while your primary care provider may not be the one available when you seek a Bryson Murdockjayeshfin virtual visit, the peace of mind you get from getting a real diagnosis real time can be priceless. For more information on [employer], view our Frequently Asked Questions (FAQs) at [email or web address]. Sincerely, 
 
Cristhian Trinidad MD 
Chief Medical Officer [employer] *:  certain medications cannot be prescribed via [employer] Providers Seen During Your Hospitalization Provider Specialty Primary office phone Ara Libman, MD Vascular Surgery [phone] Your Primary Care Physician (PCP) Primary Care Physician Office Phone Office Fax Orestes Briscoe [phone] You are allergic to the following No active allergies Recent Documentation Height Weight BMI Smoking Status 1.664 m 65.9 kg 23.79 kg/m2 Former Smoker Emergency Contacts Name Discharge Info Relation Home Work Mobile Rahel Cruz DISCHARGE CAREGIVER [3] Friend [5] [phone] Patient Belongings The following personal items are in your possession at time of discharge: 
  Dental Appliances: None         Home Medications: None   Jewelry: None  Clothing: Pants, Shirt, Footwear, Socks, Hat (with Julissa Ponce)    Other Valuables: None Please provide this summary of care documentation to your next provider.  
  
  
 
  
Signatures-by signing, you are acknowledging that this After Visit Summary has been reviewed with you and you have received a copy. Patient Signature:  ____________________________________________________________ Date:  ____________________________________________________________  
  
Ina Landsman Provider Signature:  ____________________________________________________________ Date:  ____________________________________________________________

## 2018-05-15 NOTE — H&P
Mr. Liam Guevara returns to our office today for continued follow up of his dialysis access and left arm wound after removing an infected AV graft. He has no new complaints today and is doing well.            Past Medical History:   Diagnosis Date    Chronic kidney disease       ESRD- Dialysis- [facility] Hypertension 07/2017    Ill-defined condition       dialysis monday wednesday friday    Non compliance w medication regimen       noncompliant with HTN meds           Patient Active Problem List   Diagnosis Code    Routine general medical examination at a health care facility Z00.00    Unspecified essential hypertension I10    Tobacco abuse Z72.0    PAM (acute kidney injury) ([address] N17.9    Hypertensive urgency, malignant I16.0    Uremia N19    Severe anemia D64.9    Nonadherence to medical treatment Z91.19    Rash of genital area R21    Pleural effusion, right J90    Severe protein-calorie malnutrition ([address] E43    ESRD on hemodialysis ([facility] N18.6, Z99.2    CRF (chronic renal failure) N18.9    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 8XXA, L08.9            Past Surgical History:   Procedure Laterality Date    HX OTHER SURGICAL         left arm dialysis shunt    HX VASCULAR ACCESS   11/2017     AV  graft left arm    HX VASCULAR ACCESS   07/2017     left chest cath for dialysis             Current Outpatient Prescriptions   Medication Sig Dispense Refill    oxyCODONE-acetaminophen (PERCOCET) 5-325 mg per tablet Take 2 Tabs by mouth every six (6) hours as needed for Pain.        ferric citrate (AURYXIA) 210 mg iron tablet Take 420 mg by mouth three (3) times daily (with meals).        carvedilol (COREG) 25 mg tablet Take 1 Tab by mouth two (2) times daily (with meals). 60 Tab 0    CLONIDINE HCL PO Take 0.2 mg by mouth nightly. Indications: HTN        aspirin 81 mg chewable tablet Take 1 Tab by mouth daily.  (Patient taking differently: Take 325 mg by mouth daily.) 30 Tab 1    simvastatin (ZOCOR) 20 mg tablet Take 1 Tab by mouth nightly. 30 Tab 1      No Known Allergies  Social History            Social History    Marital status: SINGLE       Spouse name: N/A    Number of children: N/A    Years of education: N/A          Occupational History    Not on file.             Social History Main Topics    Smoking status: Former Smoker       Packs/day: 0.50       Years: 7.00       Types: Cigarettes       Quit date: 7/26/2017    Smokeless tobacco: Never Used    Alcohol use No         Comment: stopped 7-2017    Drug use: No         Comment: [id number]    Sexual activity: Yes       Partners: Female           Other Topics Concern    Not on file      Social History Narrative            Family History   Problem Relation Age of Onset    Cancer Mother           Review of Systems    Review of Systems   Constitutional: Negative for chills, diaphoresis, fever, malaise/fatigue and weight loss. HENT: Negative for hearing loss and sore throat. Eyes: Negative for blurred vision, photophobia and redness. Respiratory: Negative for cough, hemoptysis, shortness of breath and wheezing. Cardiovascular: Negative for chest pain, palpitations and orthopnea. Gastrointestinal: Negative for abdominal pain, blood in stool, constipation, diarrhea, heartburn, nausea and vomiting. Genitourinary: Negative for dysuria, frequency, hematuria and urgency. Musculoskeletal: Negative for back pain and myalgias. Skin: Negative for itching and rash. Neurological: Negative for dizziness, speech change, focal weakness, weakness and headaches. Endo/Heme/Allergies: Does not bruise/bleed easily.    Psychiatric/Behavioral: Negative for depression and suicidal ideas.                Physical Exam:         Visit Vitals    /60 (BP 1 Location: Right arm, BP Patient Position: Sitting)    Pulse 70    Resp 18    Ht 5' 5\" (1.651 m)    Wt 140 lb (63.5 kg)    BMI 23.3 kg/m2      Physical Examination: General appearance - alert, well appearing, and in no distress  Mental status - alert, oriented to person, place, and time  Eyes - sclera anicteric, left eye normal, right eye normal  Ears - right ear normal, left ear normal  Nose - normal and patent, no erythema, discharge or polyps  Mouth - mucous membranes moist, pharynx normal without lesions  Neck - supple, no significant adenopathy  Lymphatics - no palpable lymphadenopathy  Chest - clear to auscultation, no wheezes, rales or rhonchi, symmetric air entry  Heart - normal rate and regular rhythm  Abdomen - soft, nontender, nondistended, no masses or organomegaly  Extremities - left upper arm wound closed with hypertrophic tissue. Silver nitrate applied. Distal incision well healed. No edema or signs of active infection.        Impression and Plan:      ICD-10-CM ICD-9-CM     1. ESR[facility] hemod[facility] ([facility] N18.6 585. 6       Z99.2 V45.11        Plan for a left arm BVT today

## 2018-05-15 NOTE — BRIEF OP NOTE
BRIEF OPERATIVE NOTE    Date of Procedure: 5/15/2018   Preoperative Diagnosis: END STAGE RENAL DISEASE  Postoperative Diagnosis: END STAGE RENAL DISEASE    Procedure(s):  LEFT ARM BASILIC VEIN TRANSPOSITION first stage   Surgeon(s) and Role:     * Ericka Hitchcock MD - Primary         Surgical Assistant: Esa Campos    Surgical Staff:  Circ-1: Adelina Munguia RN  Surg Asst-1: Kellen Hodges.  Brown  Event Time In   Incision Start 1238   Incision Close      Anesthesia: General   Estimated Blood Loss: 75mL  Specimens: * No specimens in log *   Findings: Small sclerotic veins throughout, faint thrill after BVT 1st stage   Complications: None  Implants: * No implants in log *

## 2018-05-16 NOTE — OP NOTES
[facility]  OPERATIVE REPORT    Name:HINDS, Earline Harada  MR#: [id number]  : 1965  ACCOUNT #: [de-identified]   DATE OF SERVICE: 05/15/2018    PREOPERATIVE DIAGNOSIS:  End-stage renal disease. POSTOPERATIVE DIAGNOSIS:  End-stage renal disease. PROCEDURE PERFORMED:  First stage basilic vein transposition. SURGEON:  Maribeth Fothergill, MD     ASSISTANT:  Maggi Marin    ANESTHESIA:  Monitored anesthesia with local.    ESTIMATED BLOOD LOSS:  75mL    PACKS AND DRAINS:  None. IMPLANTS:  None. SPECIMENS REMOVED:  None. COMPLICATIONS:  None. CONDITION:  Returned to Recovery, stable. FINDINGS:  Diminutive arterial inflow and venous outflow with the basilic vein appearing approximately 2 mm in maximum diameter. Palpable thrill within the fistula after anastomosis. INDICATIONS FOR PROCEDURE:  The patient is a 27-year-old gentleman with end-stage renal disease in need of long-term dialysis access. The patient had previously undergone a left forearm loop AV graft that had occluded and left upper arm straight graft that had become infected, and so given these findings, the decision was made to place a new access. A venogram was obtained that showed the patient only had an axillary vein outflow on the right and appeared to have a reasonable sized basilic vein for basilic vein transposition. Informed consent was obtained. PROCEDURE IN DETAIL:  On 05/15/2018, the patient was taken to the operating room, identified by name and ID bracelet by myself and the entire team.  Once this was done, the patient was placed on the operating table in supine position and after appropriate depth of anesthesia was obtained, the patient was prepped and draped and time-out performed. The patient received a preoperative nerve block; however, the patient subsequently had LMA placed and general anesthesia was obtained. Once the patient was prepped and draped, a timeout was performed.   The patient received a preoperative dose of antibiotics. I made a longitudinal incision overlying the basilic vein and then using blunt and sharp dissection, dissected out the basilic vein from just beyond the elbow all the way up until the confluence of the deep venous system. Once this was completed, we noted that the vein was extremely small and appeared sclerotic in some areas and the brachial vein was small as well. Given these findings, the decision was made to just do the first stage of basilic vein transposition to see if the vein would mature and increase in size enough to be used for a long-term fistula. At this point, we then used blunt and sharp dissection to dissect out the brachial artery, but it appeared to be the radial artery due to a high bifurcation and vessel loops were placed proximal and distal control. We then heparinized the patient appropriately and occluded the radial artery. We then made a longitudinal arteriotomy and then performed an end-to-side anastomosis between the radial artery and the basilic vein. Upon completion, we had a faint palpable thrill throughout the graft and had a palpable radial pulse. Happy with the results, we then used Evicel along our incision line and then reapproximated the incision with interrupted Vicryl followed by running 4-0 Monocryl for the skin. I was present and scrubbed for the entire procedure.       Leonor Mcburney, MD [place] [facility]ath / [facility]  D: 05/16/2018 10:23     T: 05/16/2018 11:07  JOB #: [id number]

## 2018-05-21 ENCOUNTER — TELEPHONE (OUTPATIENT)
Dept: VASCULAR SURGERY | Age: 53
End: 2018-05-21

## 2018-05-21 DIAGNOSIS — G89.18 POST-OPERATIVE PAIN: ICD-10-CM

## 2018-05-21 RX ORDER — OXYCODONE AND ACETAMINOPHEN 5; 325 MG/1; MG/1
2 TABLET ORAL
Qty: 56 TAB | Refills: 0 | Status: SHIPPED | OUTPATIENT
Start: 2018-05-21 | End: 2018-06-01 | Stop reason: ALTCHOICE

## 2018-05-21 NOTE — TELEPHONE ENCOUNTER
Called and spoke with significant other , advised that I would discuss script with NP and call her back. Discussed with Tobin Meyer NP and she will write Mr. Crawford Hones another script for pain meds for patient . Called the patient and advised that there is script here for him to .

## 2018-05-21 NOTE — TELEPHONE ENCOUNTER
Patient called and said that he needs to speak with you regarding his pain medications. Suppose he needs more, the pharmacy only gives him a certain amount at a time. Please give him a call back in regards to this at [phone].

## 2018-05-21 NOTE — TELEPHONE ENCOUNTER
Refill due to [facility] DR LUIS DANIELLE only filling 7 days worth from previous prescription.

## 2018-05-22 ENCOUNTER — HOSPITAL ENCOUNTER (OUTPATIENT)
Dept: ULTRASOUND IMAGING | Age: 53
Discharge: HOME | End: 2018-05-22
Attending: INTERNAL MEDICINE
Payer: MEDICAID

## 2018-05-22 DIAGNOSIS — R18.8 ASCITES: ICD-10-CM

## 2018-05-22 PROCEDURE — 76700 US EXAM ABDOM COMPLETE: CPT

## 2018-06-01 ENCOUNTER — OFFICE VISIT (OUTPATIENT)
Dept: VASCULAR SURGERY | Age: 53
End: 2018-06-01

## 2018-06-01 VITALS
DIASTOLIC BLOOD PRESSURE: 80 MMHG | HEART RATE: 68 BPM | RESPIRATION RATE: 18 BRPM | BODY MASS INDEX: 23.3 KG/M2 | WEIGHT: 145 LBS | SYSTOLIC BLOOD PRESSURE: 110 MMHG | HEIGHT: 66 IN

## 2018-06-01 DIAGNOSIS — G62.9 NEUROPATHY: Primary | ICD-10-CM

## 2018-06-01 DIAGNOSIS — N18.6 ESRD ON HEMODIALYSIS (HCC): ICD-10-CM

## 2018-06-01 DIAGNOSIS — Z99.2 ESRD ON HEMODIALYSIS (HCC): ICD-10-CM

## 2018-06-01 DIAGNOSIS — G89.18 POST-OPERATIVE PAIN: ICD-10-CM

## 2018-06-01 RX ORDER — OXYCODONE AND ACETAMINOPHEN 10; 325 MG/1; MG/1
2 TABLET ORAL
Qty: 56 TAB | Refills: 0 | Status: SHIPPED | OUTPATIENT
Start: 2018-06-01

## 2018-06-03 NOTE — PROGRESS NOTES
[address]    Chief Complaint   Patient presents with    End Stage Renal Disease       History and Physical    Mr. Ty Greco returns to our office for follow up of his left arm fistula. He states his arm and shoulder are still fairly sore but it is improving. He has been using warm compresses and Motrin. He denies any fevers or chills and is currently being dialyzed via a tunneled dialysis catheter. Past Medical History:   Diagnosis Date    Chronic kidney disease     ESRD- Dialysis- [facility] GERD (gastroesophageal reflux disease)     Hypertension 07/2017    Ill-defined condition     dialysis monday wednesday friday    Non compliance w medication regimen     noncompliant with HTN meds     Patient Active Problem List   Diagnosis Code    Routine general medical examination at a health care facility Z00.00    Unspecified essential hypertension I10    Tobacco abuse Z72.0    PAM (acute kidney injury) ([facility] [address] N17.9    Hypertensive urgency, malignant I16.0    Uremia N19    Severe anemia D64.9    Nonadherence to medical treatment Z91.19    Rash of genital area R21    Pleural effusion, right J90    Severe protein-calorie malnutrition ([facility] [address] E43    ESRD on hemodialysis ([facility] [address] N18.6, Z99.2    CRF (chronic renal failure) N18.9    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 8XXA, L08.9     Past Surgical History:   Procedure Laterality Date    HX OTHER SURGICAL      left arm dialysis shunt    HX VASCULAR ACCESS  11/2017    AV  graft left arm    HX VASCULAR ACCESS  07/2017    left chest cath for dialysis     Current Outpatient Prescriptions   Medication Sig Dispense Refill    oxyCODONE-acetaminophen (PERCOCET 10)  mg per tablet Take 2 Tabs by mouth every six (6) hours as needed for Pain. Max Daily Amount: 8 Tabs. 56 Tab 0    cloNIDine HCl (CATAPRES) 0.2 mg tablet Take 0.2 mg by mouth nightly.       multivitamin (ONE A DAY) tablet Take 1 Tab by mouth daily.  ferric citrate (AURYXIA) 210 mg iron tablet Take 420 mg by mouth three (3) times daily (with meals).  carvedilol (COREG) 25 mg tablet Take 1 Tab by mouth two (2) times daily (with meals). 60 Tab 0    aspirin 81 mg chewable tablet Take 1 Tab by mouth daily. (Patient taking differently: Take 325 mg by mouth daily.) 30 Tab 1    simvastatin (ZOCOR) 20 mg tablet Take 1 Tab by mouth nightly. 30 Tab 1     No Known Allergies  Social History     Social History    Marital status: SINGLE     Spouse name: N/A    Number of children: N/A    Years of education: N/A     Occupational History    Not on file. Social History Main Topics    Smoking status: Former Smoker     Packs/day: 0.50     Years: 7.00     Types: Cigarettes     Quit date: 7/26/2017    Smokeless tobacco: Never Used    Alcohol use No      Comment: stopped 7-2017    Drug use: No      Comment: [id number]    Sexual activity: Yes     Partners: Female     Other Topics Concern    Not on file     Social History Narrative      Family History   Problem Relation Age of Onset    Cancer Mother        Review of Systems    Review of Systems   Constitutional: Negative for chills, diaphoresis, fever, malaise/fatigue and weight loss. HENT: Negative for hearing loss and sore throat. Eyes: Negative for blurred vision, photophobia and redness. Respiratory: Negative for cough, hemoptysis, shortness of breath and wheezing. Cardiovascular: Negative for chest pain, palpitations and orthopnea. Gastrointestinal: Negative for abdominal pain, blood in stool, constipation, diarrhea, heartburn, nausea and vomiting. Genitourinary: Negative for dysuria, frequency, hematuria and urgency. Musculoskeletal: Positive for joint pain. Negative for back pain and myalgias. Skin: Negative for itching and rash. Neurological: Negative for dizziness, speech change, focal weakness, weakness and headaches.    Endo/Heme/Allergies: Does not bruise/bleed easily. Psychiatric/Behavioral: Negative for depression and suicidal ideas. Physical Exam:    Visit Vitals    /80 (BP 1 Location: Right arm, BP Patient Position: Sitting)    Pulse 68    Resp 18    Ht 5' 5.5\" (1.664 m)    Wt 145 lb (65.8 kg)    BMI 23.76 kg/m2      Physical Examination: General appearance - alert, well appearing, and in no distress  Mental status - alert, oriented to person, place, and time  Eyes - sclera anicteric, left eye normal, right eye normal  Ears - right ear normal, left ear normal  Nose - normal and patent, no erythema, discharge or polyps  Mouth - mucous membranes moist, pharynx normal without lesions  Neck - supple, no significant adenopathy  Lymphatics - no palpable lymphadenopathy  Chest - clear to auscultation, no wheezes, rales or rhonchi, symmetric air entry  Heart - normal rate and regular rhythm  Abdomen - soft, nontender, nondistended, no masses or organomegaly  Extremities - Left arm incision c/d/i.  + thrill in fistula. +edema overlying medial arm. No warmth or signs of infection. Impression and Plan:    ICD-10-CM ICD-9-CM    1. Neuropathy G62.9 355.9 oxyCODONE-acetaminophen (PERCOCET 10)  mg per tablet   2. Post-operative pain G89.18 338.18    3. ESRD on hemodialysis ([facility] [address] N18.6 585.6 DUPLEX UPPER EXT VENOUS LEFT    Z99.2 V45.11      Orders Placed This Encounter    DUPLEX UPPER EXT VENOUS LEFT    oxyCODONE-acetaminophen (PERCOCET 10)  mg per tablet     I told Mr. Perla Jung that he fistula continues to be patent and by palpation has enlarged appropriately. We will obtain a duplex of this fistula and if it appears to be of appropriate size we will schedule him for a 2nd stage basilic vein transposition. Follow-up Disposition:  Return for post procedure. The treatment plan was reviewed with the patient in detail. The patient voiced understanding of this plan and all questions and concerns were addressed. The patient agrees with this plan. We discussed the signs and symptoms that would require earlier attention or intervention. The patient was given educational material related to his/her visit and the patient has voiced understanding of the material.     I appreciate the opportunity to participate in the care of your patient. I will be sure to keep you informed of any subsequent changes in the treatment plan. If you have any questions or concerns, please feel free to contact me. Rehan Chung MD    PLEASE NOTE:  This document has been produced using voice recognition software. Unrecognized errors in transcription may be present.

## 2018-06-12 ENCOUNTER — HOSPITAL ENCOUNTER (OUTPATIENT)
Dept: VASCULAR SURGERY | Age: 53
Discharge: HOME | End: 2018-06-12
Attending: SURGERY
Payer: MEDICAID

## 2018-06-12 DIAGNOSIS — N18.6 ESRD ON HEMODIALYSIS (HCC): ICD-10-CM

## 2018-06-12 DIAGNOSIS — Z99.2 ESRD ON HEMODIALYSIS (HCC): ICD-10-CM

## 2018-06-12 PROCEDURE — 93990 DOPPLER FLOW TESTING: CPT

## 2018-06-12 NOTE — PROCEDURES
[facility]  *** FINAL REPORT ***    Name: Andrew Burger  MRN: [id number]    Outpatient  : 15 Sep 1965  HIS Order #: [id number]  [address] [facility] Visit #: [id number]  Date: 2018    TYPE OF TEST: Dialysis Access Duplex    REASON FOR TEST  Surveillance    Graft:-  Summary:   Left arm fistula with vein from brachial to basilic (upper  arm)  Op. Date:  05/15/2018  Surgeon: Trae Ovalle    Results:-            Velocity  Ratio  Flow Volume Stenosis            --------  -----  ----------- --------  Inflow:    244.0  Proximal:  313.0      1.3      2068. Normal  Mid-graft: 165.0      0.5      1494. Normal  Distal:     44.0      0.3      161.0   Normal  Outflow:                       N/A    Mean Flow Volume:              1241. INTERPRETATION/FINDINGS  Duplex images were obtained using 2-D gray scale, color flow, and  spectral Doppler analysis. Duplex exam of the dialysis access reveals :  1. Inflow brachial artery = 244cm/s  2. At the anastomosis= 641cm/s  3. Near anastomosis the vein diameter is 8.4mm and the flow volume is  2068ml/min  4. Mid arm the vein diameter is 7.7mm and the flow volume is  1494ml/min  5. Vein diameter is only 4.2mm at the proximal upper arm with flow  volume= 161ml/min    ADDITIONAL COMMENTS  Technical work sheet is also scanned in the EMR    I have personally reviewed the data relevant to the interpretation of  this  study. TECHNOLOGIST: Irina Rivera  Signed: 2018 04:30 PM    PHYSICIAN: Sylvia Forbes.  Velvet Ruiz MD  Signed: 2018 08:17 AM

## 2018-06-14 ENCOUNTER — OFFICE VISIT (OUTPATIENT)
Dept: VASCULAR SURGERY | Age: 53
End: 2018-06-14

## 2018-06-14 ENCOUNTER — HOSPITAL ENCOUNTER (OUTPATIENT)
Dept: LAB | Age: 53
Discharge: HOME | End: 2018-06-14

## 2018-06-14 VITALS
DIASTOLIC BLOOD PRESSURE: 64 MMHG | HEART RATE: 70 BPM | HEIGHT: 66 IN | BODY MASS INDEX: 23.3 KG/M2 | RESPIRATION RATE: 18 BRPM | SYSTOLIC BLOOD PRESSURE: 116 MMHG | WEIGHT: 145 LBS

## 2018-06-14 DIAGNOSIS — N18.6 ESRD ON HEMODIALYSIS (HCC): Primary | ICD-10-CM

## 2018-06-14 DIAGNOSIS — Z99.2 ESRD ON HEMODIALYSIS (HCC): Primary | ICD-10-CM

## 2018-06-14 PROCEDURE — 36415 COLL VENOUS BLD VENIPUNCTURE: CPT | Performed by: NURSE PRACTITIONER

## 2018-06-14 PROCEDURE — 99001 SPECIMEN HANDLING PT-LAB: CPT | Performed by: NURSE PRACTITIONER

## 2018-06-14 NOTE — PROGRESS NOTES
[address]    Chief Complaint   Patient presents with    End Stage Renal Disease       History and Physical    Mr. Angel Martinez is a 47year old male who returns to our office for continued management of his long-term dialysis access. He is currently receiving dialysis via a right IJ TDC without difficulty. He reports continued discomfort in the left upper arm. Mr. Angel Martinez has no new complaints today. Past Medical History:   Diagnosis Date    Chronic kidney disease     ESRD- Dialysis- Davjakob Soler Breed GERD (gastroesophageal reflux disease)     Hypertension 07/2017    Ill-defined condition     dialysis monday wednesday friday    Non compliance w medication regimen     noncompliant with HTN meds     Patient Active Problem List   Diagnosis Code    Routine general medical examination at a health care facility Z00.00    Unspecified essential hypertension I10    Tobacco abuse Z72.0    PAM (acute kidney injury) ([facility] [address] N17.9    Hypertensive urgency, malignant I16.0    Uremia N19    Severe anemia D64.9    Nonadherence to medical treatment Z91.19    Rash of genital area R21    Pleural effusion, right J90    Severe protein-calorie malnutrition ([facility] [address] E43    ESRD on hemodialysis ([facility] [address] N18.6, Z99.2    CRF (chronic renal failure) N18.9    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 8XXA, L08.9     Past Surgical History:   Procedure Laterality Date    HX OTHER SURGICAL      left arm dialysis shunt    HX VASCULAR ACCESS  11/2017    AV  graft left arm    HX VASCULAR ACCESS  07/2017    left chest cath for dialysis     Current Outpatient Prescriptions   Medication Sig Dispense Refill    oxyCODONE-acetaminophen (PERCOCET 10)  mg per tablet Take 2 Tabs by mouth every six (6) hours as needed for Pain. Max Daily Amount: 8 Tabs. 56 Tab 0    cloNIDine HCl (CATAPRES) 0.2 mg tablet Take 0.2 mg by mouth nightly.       multivitamin (ONE A DAY) tablet Take 1 Tab by mouth daily.  ferric citrate (AURYXIA) 210 mg iron tablet Take 420 mg by mouth three (3) times daily (with meals).  carvedilol (COREG) 25 mg tablet Take 1 Tab by mouth two (2) times daily (with meals). 60 Tab 0    aspirin 81 mg chewable tablet Take 1 Tab by mouth daily. (Patient taking differently: Take 325 mg by mouth daily.) 30 Tab 1    simvastatin (ZOCOR) 20 mg tablet Take 1 Tab by mouth nightly. 30 Tab 1     No Known Allergies  Social History     Social History    Marital status: SINGLE     Spouse name: N/A    Number of children: N/A    Years of education: N/A     Occupational History    Not on file. Social History Main Topics    Smoking status: Former Smoker     Packs/day: 0.50     Years: 7.00     Types: Cigarettes     Quit date: 7/26/2017    Smokeless tobacco: Never Used    Alcohol use No      Comment: stopped 7-2017    Drug use: No      Comment: [id number]    Sexual activity: Yes     Partners: Female     Other Topics Concern    Not on file     Social History Narrative      Family History   Problem Relation Age of Onset    Cancer Mother        Review of Systems    Review of Systems   Constitutional: Negative for chills, fever, malaise/fatigue and weight loss. HENT: Negative for ear pain and hearing loss. Eyes: Negative for blurred vision, pain and discharge. Respiratory: Negative for cough, hemoptysis, sputum production and shortness of breath. Cardiovascular: Negative for chest pain, palpitations, orthopnea and leg swelling. Gastrointestinal: Negative for heartburn, nausea and vomiting. Genitourinary: Negative for dysuria and urgency. Musculoskeletal: Positive for myalgias. +left upper arm pain   Skin: Negative for itching and rash. Neurological: Negative for dizziness, tingling, weakness and headaches. Endo/Heme/Allergies: Does not bruise/bleed easily. Psychiatric/Behavioral: Negative for depression.        Physical Exam:    Visit Vitals    /64 (BP 1 Location: Left arm, BP Patient Position: Sitting)    Pulse 70    Resp 18    Ht 5' 5.5\" (1.664 m)    Wt 145 lb (65.8 kg)    BMI 23.76 kg/m2      Physical Examination: General appearance - alert, well appearing, and in no distress  Mental status - alert, oriented to person, place, and time, normal mood, behavior, speech, dress, motor activity, and thought processes  Eyes - left eye normal, right eye normal  Ears - right ear normal, left ear normal  Mouth - mucous membranes moist  Chest - clear to auscultation, no wheezes  Heart - normal rate and regular rhythm, right IJ TDC  Abdomen - soft, nontender, nondistended  Musculoskeletal - no obvious deformity  Extremities - warm and well-perfused, radial pulses palpable, left upper arm fistula with +bruit/+thrill  Skin - normal coloration and turgor, no rashes, no suspicious skin lesions noted, left upper arm surgical incision well healed, dissolvable sutures remain      Impression and Plan:    ICD-10-CM ICD-9-CM    1. ESRD on hemodialysis (HCC) N18.6 585.6 CBC W/O DIFF    S67.8 I93.49 METABOLIC PANEL, BASIC      PROTHROMBIN TIME + INR     Orders Placed This Encounter    CBC W/O DIFF    METABOLIC PANEL, BASIC    PROTHROMBIN TIME + INR     I had a discussion with Mr. Shruthi Pratt regarding his recent ultrasound. The fistula is now ready for the 2nd stage surgery. We will arrange for this as soon as feasible. I explained to Mr. Shruthi Pratt that I will not give him anymore Percocet. He should use his remaining Percocet only for when the pain is severe. He can use tylenol for mild pain. He verbalized understanding. We will see him for follow up after surgery. Follow-up Disposition:  Return in about 1 month (around 7/14/2018). The treatment plan was reviewed with the patient in detail. The patient voiced understanding of this plan and all questions and concerns were addressed. The patient agrees with this plan.   We discussed the signs and symptoms that would require earlier attention or intervention. The patient was given educational material related to his/her visit and the patient has voiced understanding of the material.     I appreciate the opportunity to participate in the care of your patient. I will be sure to keep you informed of any subsequent changes in the treatment plan. If you have any questions or concerns, please feel free to contact me. Inna Shine NP    PLEASE NOTE:  This document has been produced using voice recognition software. Unrecognized errors in transcription may be present.

## 2018-07-12 ENCOUNTER — TELEPHONE (OUTPATIENT)
Dept: VASCULAR SURGERY | Age: 53
End: 2018-07-12

## 2018-07-12 NOTE — TELEPHONE ENCOUNTER
Nelia from dialysis called back and advised that patient has decided to go to [facility] for follow up and next srugery, they will send over the referral and advised would send notes and studies for appt. Notes and study faxed to [facility] vascular.

## 2018-07-14 DIAGNOSIS — Z99.2 ESRD ON HEMODIALYSIS (HCC): ICD-10-CM

## 2018-07-14 DIAGNOSIS — N18.6 ESRD ON HEMODIALYSIS (HCC): ICD-10-CM

## 2019-05-24 ENCOUNTER — APPOINTMENT (OUTPATIENT)
Dept: GENERAL RADIOLOGY | Age: 54
End: 2019-05-24
Attending: EMERGENCY MEDICINE
Payer: COMMERCIAL

## 2019-05-24 ENCOUNTER — HOSPITAL ENCOUNTER (EMERGENCY)
Age: 54
Discharge: HOME | End: 2019-05-24
Attending: EMERGENCY MEDICINE
Payer: COMMERCIAL

## 2019-05-24 ENCOUNTER — APPOINTMENT (OUTPATIENT)
Dept: CT IMAGING | Age: 54
End: 2019-05-24
Attending: EMERGENCY MEDICINE
Payer: COMMERCIAL

## 2019-05-24 VITALS
RESPIRATION RATE: 20 BRPM | HEART RATE: 97 BPM | SYSTOLIC BLOOD PRESSURE: 198 MMHG | TEMPERATURE: 99.3 F | HEIGHT: 65 IN | OXYGEN SATURATION: 100 % | BODY MASS INDEX: 21.66 KG/M2 | DIASTOLIC BLOOD PRESSURE: 91 MMHG | WEIGHT: 130 LBS

## 2019-05-24 DIAGNOSIS — R05.9 COUGH: Primary | ICD-10-CM

## 2019-05-24 DIAGNOSIS — N18.6 ESRD (END STAGE RENAL DISEASE) ON DIALYSIS (HCC): ICD-10-CM

## 2019-05-24 DIAGNOSIS — I10 HYPERTENSION, UNSPECIFIED TYPE: ICD-10-CM

## 2019-05-24 DIAGNOSIS — Z99.2 ESRD (END STAGE RENAL DISEASE) ON DIALYSIS (HCC): ICD-10-CM

## 2019-05-24 LAB
ALBUMIN SERPL-MCNC: 3.1 G/DL (ref 3.4–5)
ALBUMIN/GLOB SERPL: 0.8 {RATIO} (ref 0.8–1.7)
ALP SERPL-CCNC: 85 U/L (ref 45–117)
ALT SERPL-CCNC: 24 U/L (ref 16–61)
ANION GAP SERPL CALC-SCNC: 7 MMOL/L (ref 3–18)
AST SERPL-CCNC: 17 U/L (ref 15–37)
ATRIAL RATE: 83 BPM
BASOPHILS # BLD: 0 K/UL (ref 0–0.1)
BASOPHILS NFR BLD: 0 % (ref 0–2)
BILIRUB SERPL-MCNC: 1 MG/DL (ref 0.2–1)
BNP SERPL-MCNC: ABNORMAL PG/ML (ref 0–900)
BUN SERPL-MCNC: 16 MG/DL (ref 7–18)
BUN/CREAT SERPL: 3 (ref 12–20)
CALCIUM SERPL-MCNC: 8.9 MG/DL (ref 8.5–10.1)
CALCULATED P AXIS, ECG09: 50 DEGREES
CALCULATED R AXIS, ECG10: -10 DEGREES
CALCULATED T AXIS, ECG11: 42 DEGREES
CHLORIDE SERPL-SCNC: 98 MMOL/L (ref 100–108)
CK MB CFR SERPL CALC: NORMAL % (ref 0–4)
CK MB SERPL-MCNC: <1 NG/ML (ref 5–25)
CK SERPL-CCNC: 109 U/L (ref 39–308)
CO2 SERPL-SCNC: 34 MMOL/L (ref 21–32)
CREAT SERPL-MCNC: 5.53 MG/DL (ref 0.6–1.3)
DIAGNOSIS, 93000: NORMAL
DIFFERENTIAL METHOD BLD: ABNORMAL
EOSINOPHIL # BLD: 0.3 K/UL (ref 0–0.4)
EOSINOPHIL NFR BLD: 2 % (ref 0–5)
ERYTHROCYTE [DISTWIDTH] IN BLOOD BY AUTOMATED COUNT: 16.7 % (ref 11.6–14.5)
GLOBULIN SER CALC-MCNC: 3.7 G/DL (ref 2–4)
GLUCOSE SERPL-MCNC: 80 MG/DL (ref 74–99)
HCT VFR BLD AUTO: 27.7 % (ref 36–48)
HGB BLD-MCNC: 8.8 G/DL (ref 13–16)
LACTATE BLD-SCNC: 0.58 MMOL/L (ref 0.4–2)
LYMPHOCYTES # BLD: 2 K/UL (ref 0.9–3.6)
LYMPHOCYTES NFR BLD: 16 % (ref 21–52)
MCH RBC QN AUTO: 29.3 PG (ref 24–34)
MCHC RBC AUTO-ENTMCNC: 31.8 G/DL (ref 31–37)
MCV RBC AUTO: 92.3 FL (ref 74–97)
MONOCYTES # BLD: 0.9 K/UL (ref 0.05–1.2)
MONOCYTES NFR BLD: 7 % (ref 3–10)
NEUTS SEG # BLD: 9.3 K/UL (ref 1.8–8)
NEUTS SEG NFR BLD: 75 % (ref 40–73)
P-R INTERVAL, ECG05: 150 MS
PLATELET # BLD AUTO: 242 K/UL (ref 135–420)
PMV BLD AUTO: 9.9 FL (ref 9.2–11.8)
POTASSIUM SERPL-SCNC: 3.4 MMOL/L (ref 3.5–5.5)
PROT SERPL-MCNC: 6.8 G/DL (ref 6.4–8.2)
Q-T INTERVAL, ECG07: 428 MS
QRS DURATION, ECG06: 90 MS
QTC CALCULATION (BEZET), ECG08: 502 MS
RBC # BLD AUTO: 3 M/UL (ref 4.7–5.5)
SODIUM SERPL-SCNC: 139 MMOL/L (ref 136–145)
TROPONIN I SERPL-MCNC: 0.04 NG/ML (ref 0–0.04)
VENTRICULAR RATE, ECG03: 83 BPM
WBC # BLD AUTO: 12.5 K/UL (ref 4.6–13.2)

## 2019-05-24 PROCEDURE — 82550 ASSAY OF CK (CPK): CPT

## 2019-05-24 PROCEDURE — 87077 CULTURE AEROBIC IDENTIFY: CPT

## 2019-05-24 PROCEDURE — 93005 ELECTROCARDIOGRAM TRACING: CPT

## 2019-05-24 PROCEDURE — 99285 EMERGENCY DEPT VISIT HI MDM: CPT

## 2019-05-24 PROCEDURE — 71275 CT ANGIOGRAPHY CHEST: CPT

## 2019-05-24 PROCEDURE — 83880 ASSAY OF NATRIURETIC PEPTIDE: CPT

## 2019-05-24 PROCEDURE — 74011636320 HC RX REV CODE- 636/320: Performed by: EMERGENCY MEDICINE

## 2019-05-24 PROCEDURE — 71045 X-RAY EXAM CHEST 1 VIEW: CPT

## 2019-05-24 PROCEDURE — 87040 BLOOD CULTURE FOR BACTERIA: CPT

## 2019-05-24 PROCEDURE — 87186 SC STD MICRODIL/AGAR DIL: CPT

## 2019-05-24 PROCEDURE — 80053 COMPREHEN METABOLIC PANEL: CPT

## 2019-05-24 PROCEDURE — 85025 COMPLETE CBC W/AUTO DIFF WBC: CPT

## 2019-05-24 PROCEDURE — 74011250637 HC RX REV CODE- 250/637: Performed by: EMERGENCY MEDICINE

## 2019-05-24 PROCEDURE — 83605 ASSAY OF LACTIC ACID: CPT

## 2019-05-24 RX ORDER — HYDRALAZINE HYDROCHLORIDE 25 MG/1
25 TABLET, FILM COATED ORAL
Status: COMPLETED | OUTPATIENT
Start: 2019-05-24 | End: 2019-05-24

## 2019-05-24 RX ORDER — AMLODIPINE BESYLATE 5 MG/1
5 TABLET ORAL 2 TIMES DAILY
COMMUNITY

## 2019-05-24 RX ORDER — AMLODIPINE BESYLATE 5 MG/1
5 TABLET ORAL
Status: COMPLETED | OUTPATIENT
Start: 2019-05-24 | End: 2019-05-24

## 2019-05-24 RX ORDER — AMLODIPINE BESYLATE 5 MG/1
5 TABLET ORAL 2 TIMES DAILY
Status: DISCONTINUED | OUTPATIENT
Start: 2019-05-24 | End: 2019-05-24

## 2019-05-24 RX ORDER — HYDRALAZINE HYDROCHLORIDE 25 MG/1
25 TABLET, FILM COATED ORAL 3 TIMES DAILY
Status: DISCONTINUED | OUTPATIENT
Start: 2019-05-24 | End: 2019-05-24

## 2019-05-24 RX ORDER — CLONIDINE 0.2 MG/24H
1 PATCH, EXTENDED RELEASE TRANSDERMAL
COMMUNITY

## 2019-05-24 RX ORDER — HYDRALAZINE HYDROCHLORIDE 25 MG/1
25 TABLET, FILM COATED ORAL 3 TIMES DAILY
COMMUNITY

## 2019-05-24 RX ORDER — CLONIDINE 0.2 MG/24H
1 PATCH, EXTENDED RELEASE TRANSDERMAL
Status: DISCONTINUED | OUTPATIENT
Start: 2019-05-24 | End: 2019-05-24

## 2019-05-24 RX ADMIN — HYDRALAZINE HYDROCHLORIDE 25 MG: 25 TABLET, FILM COATED ORAL at 19:02

## 2019-05-24 RX ADMIN — IOPAMIDOL 100 ML: 755 INJECTION, SOLUTION INTRAVENOUS at 17:33

## 2019-05-24 RX ADMIN — HYDRALAZINE HYDROCHLORIDE 25 MG: 25 TABLET, FILM COATED ORAL at 13:54

## 2019-05-24 RX ADMIN — AMLODIPINE BESYLATE 5 MG: 5 TABLET ORAL at 19:02

## 2019-05-24 RX ADMIN — AMLODIPINE BESYLATE 5 MG: 5 TABLET ORAL at 13:54

## 2019-05-24 NOTE — DISCHARGE INSTRUCTIONS
High Blood Pressure: Care Instructions  Overview    It's normal for blood pressure to go up and down throughout the day. But if it stays up, you have high blood pressure. Another name for high blood pressure is hypertension. Despite what a lot of people think, high blood pressure usually doesn't cause headaches or make you feel dizzy or lightheaded. It usually has no symptoms. But it does increase your risk of stroke, heart attack, and other problems. You and your doctor will talk about your risks of these problems based on your blood pressure. Your doctor will give you a goal for your blood pressure. Your goal will be based on your health and your age. Lifestyle changes, such as eating healthy and being active, are always important to help lower blood pressure. You might also take medicine to reach your blood pressure goal.  Follow-up care is a key part of your treatment and safety. Be sure to make and go to all appointments, and call your doctor if you are having problems. It's also a good idea to know your test results and keep a list of the medicines you take. How can you care for yourself at home? Medical treatment  · If you stop taking your medicine, your blood pressure will go back up. You may take one or more types of medicine to lower your blood pressure. Be safe with medicines. Take your medicine exactly as prescribed. Call your doctor if you think you are having a problem with your medicine. · Talk to your doctor before you start taking aspirin every day. Aspirin can help certain people lower their risk of a heart attack or stroke. But taking aspirin isn't right for everyone, because it can cause serious bleeding. · See your doctor regularly. You may need to see the doctor more often at first or until your blood pressure comes down. · If you are taking blood pressure medicine, talk to your doctor before you take decongestants or anti-inflammatory medicine, such as ibuprofen.  Some of these medicines can raise blood pressure. · Learn how to check your blood pressure at home. Lifestyle changes  · Stay at a healthy weight. This is especially important if you put on weight around the waist. Losing even 10 pounds can help you lower your blood pressure. · If your doctor recommends it, get more exercise. Walking is a good choice. Bit by bit, increase the amount you walk every day. Try for at least 30 minutes on most days of the week. You also may want to swim, bike, or do other activities. · Avoid or limit alcohol. Talk to your doctor about whether you can drink any alcohol. · Try to limit how much sodium you eat to less than 2,300 milligrams (mg) a day. Your doctor may ask you to try to eat less than 1,500 mg a day. · Eat plenty of fruits (such as bananas and oranges), vegetables, legumes, whole grains, and low-fat dairy products. · Lower the amount of saturated fat in your diet. Saturated fat is found in animal products such as milk, cheese, and meat. Limiting these foods may help you lose weight and also lower your risk for heart disease. · Do not smoke. Smoking increases your risk for heart attack and stroke. If you need help quitting, talk to your doctor about stop-smoking programs and medicines. These can increase your chances of quitting for good. When should you call for help? Call 911 anytime you think you may need emergency care. This may mean having symptoms that suggest that your blood pressure is causing a serious heart or blood vessel problem. Your blood pressure may be over 180/120.   For example, call 911 if:    · You have symptoms of a heart attack. These may include:  ? Chest pain or pressure, or a strange feeling in the chest.  ? Sweating. ? Shortness of breath. ? Nausea or vomiting. ? Pain, pressure, or a strange feeling in the back, neck, jaw, or upper belly or in one or both shoulders or arms. ? Lightheadedness or sudden weakness.   ? A fast or irregular heartbeat.     · You have symptoms of a stroke. These may include:  ? Sudden numbness, tingling, weakness, or loss of movement in your face, arm, or leg, especially on only one side of your body. ? Sudden vision changes. ? Sudden trouble speaking. ? Sudden confusion or trouble understanding simple statements. ? Sudden problems with walking or balance. ? A sudden, severe headache that is different from past headaches.     · You have severe back or belly pain.    Do not wait until your blood pressure comes down on its own. Get help right away.   Call your doctor now or seek immediate care if:    · Your blood pressure is much higher than normal (such as 180/120 or higher), but you don't have symptoms.     · You think high blood pressure is causing symptoms, such as:  ? Severe headache.  ? Blurry vision.    Watch closely for changes in your health, and be sure to contact your doctor if:    · Your blood pressure measures higher than your doctor recommends at least 2 times. That means the top number is higher or the bottom number is higher, or both.     · You think you may be having side effects from your blood pressure medicine. Where can you learn more? Go to http://jacinto-mike.info/. Enter J334 in the search box to learn more about \"High Blood Pressure: Care Instructions. \"  Current as of: July 22, 2018  Content Version: 11.9  © 0396-0508 [employer]. Care instructions adapted under license by [employer] (which disclaims liability or warranty for this information). If you have questions about a medical condition or this instruction, always ask your healthcare professional. [place] [address] any warranty or liability for your use of this information. Patient Education        Cough: Care Instructions  Your Care Instructions    A cough is your body's response to something that bothers your throat or airways. Many things can cause a cough.  You might cough because of a cold or the flu, bronchitis, or asthma. Smoking, postnasal drip, allergies, and stomach acid that backs up into your throat also can cause coughs. A cough is a symptom, not a disease. Most coughs stop when the cause, such as a cold, goes away. You can take a few steps at home to cough less and feel better. Follow-up care is a key part of your treatment and safety. Be sure to make and go to all appointments, and call your doctor if you are having problems. It's also a good idea to know your test results and keep a list of the medicines you take. How can you care for yourself at home? · Drink lots of water and other fluids. This helps thin the mucus and soothes a dry or sore throat. Honey or lemon juice in hot water or tea may ease a dry cough. · Take cough medicine as directed by your doctor. · Prop up your head on pillows to help you breathe and ease a dry cough. · Try cough drops to soothe a dry or sore throat. Cough drops don't stop a cough. Medicine-flavored cough drops are no better than candy-flavored drops or hard candy. · Do not smoke. Avoid secondhand smoke. If you need help quitting, talk to your doctor about stop-smoking programs and medicines. These can increase your chances of quitting for good. When should you call for help? Call 911 anytime you think you may need emergency care. For example, call if:    · You have severe trouble breathing.    Call your doctor now or seek immediate medical care if:    · You cough up blood.     · You have new or worse trouble breathing.     · You have a new or higher fever.     · You have a new rash.    Watch closely for changes in your health, and be sure to contact your doctor if:    · You cough more deeply or more often, especially if you notice more mucus or a change in the color of your mucus.     · You have new symptoms, such as a sore throat, an earache, or sinus pain.     · You do not get better as expected. Where can you learn more?   Go to http://jacinto-mike.info/. Enter D279 in the search box to learn more about \"Cough: Care Instructions. \"  Current as of: September 5, 2018  Content Version: 11.9  © 7525-1066 [employer]. Care instructions adapted under license by [employer] (which disclaims liability or warranty for this information). If you have questions about a medical condition or this instruction, always ask your healthcare professional. [place] [address] any warranty or liability for your use of this information.

## 2019-05-24 NOTE — ED TRIAGE NOTES
Patient arrives d/t PCP reports elelvated WBC.  Patient with c/c cough x1 month, noted blood tinged sputum

## 2019-05-24 NOTE — PROGRESS NOTES
Per Dr. Frederick Aguilar @ [id number]. She has spoken with the patient's nephrologist and the PT is okay to have CT contrast today, 05/24/2019, and be scheduled for dialysis on Monday, 05/27/2019.

## 2019-05-24 NOTE — ED PROVIDER NOTES
EMERGENCY DEPARTMENT HISTORY AND PHYSICAL EXAM    Date: 5/24/2019  Patient Name: Vinny Oro    History of Presenting Illness     Chief Complaint   Patient presents with    Abnormal Lab Results    Cough         History Provided By: Patient    12:15 PM  Vinny Oro is a 47 y.o. male with PMHX of ESRD on HD last dialysis this morning who presents to the emergency department C/O 3 weeks of cough with occasional blood-tinged sputum. Patient states that he was called by his doctor and told to come to the emergency department because his white count was rising. He does note that he had some chills yesterday but denies fever. He denies any chest pain, shortness of breath, lower extremity edema, abdominal pain, or any other complaints. Patient does not smoke. Rhys Lemus PCP: Marko Snyder MD    Current Outpatient Medications   Medication Sig Dispense Refill    amLODIPine (NORVASC) 5 mg tablet Take 5 mg by mouth two (2) times a day.  hydrALAZINE (APRESOLINE) 25 mg tablet Take 25 mg by mouth three (3) times daily.  cloNIDine (CATAPRES) 0.2 mg/24 hr ptwk 1 Patch by TransDERmal route every seven (7) days.  oxyCODONE-acetaminophen (PERCOCET 10)  mg per tablet Take 2 Tabs by mouth every six (6) hours as needed for Pain. Max Daily Amount: 8 Tabs. 56 Tab 0    multivitamin (ONE A DAY) tablet Take 1 Tab by mouth daily.  ferric citrate (AURYXIA) 210 mg iron tablet Take 420 mg by mouth three (3) times daily (with meals).  aspirin 81 mg chewable tablet Take 1 Tab by mouth daily. (Patient taking differently: Take 325 mg by mouth daily.) 30 Tab 1    simvastatin (ZOCOR) 20 mg tablet Take 1 Tab by mouth nightly.  30 Tab 1       Past History     Past Medical History:  Past Medical History:   Diagnosis Date    Chronic kidney disease     ESRD- Dialysis- [facility]    GERD (gastroesophageal reflux disease)     Hypertension 07/2017    Ill-defined condition     dialysis monday wednesday friday    Non compliance w medication regimen     noncompliant with HTN meds       Past Surgical History:  Past Surgical History:   Procedure Laterality Date    HX OTHER SURGICAL      left arm dialysis shunt    HX VASCULAR ACCESS  2017    AV  graft left arm    HX VASCULAR ACCESS  2017    left chest cath for dialysis    HX VASCULAR ACCESS Left     arm basilic vein transposition stage 1       Family History:  Family History   Problem Relation Age of Onset    Cancer Mother        Social History:  Social History     Tobacco Use    Smoking status: Former Smoker     Packs/day: 0.50     Years: 7.00     Pack years: 3.50     Types: Cigarettes     Last attempt to quit: 2017     Years since quittin.8    Smokeless tobacco: Never Used   Substance Use Topics    Alcohol use: No     Comment: stopped     Drug use: No     Types: Marijuana     Comment: [id number]       Allergies:  No Known Allergies      Review of Systems   Review of Systems   Constitutional: Positive for chills. Negative for fever. Respiratory: Positive for cough. Negative for shortness of breath. Cardiovascular: Negative for chest pain. Gastrointestinal: Negative for abdominal pain. All other systems reviewed and are negative.         Physical Exam     Vitals:    19 1600 19 1630 19 1700 19 1902   BP: (!) 198/95 (!) 190/94 198/86 (!) 198/91   Pulse: 87 87 90 97   Resp: 16  20    Temp:       SpO2: 100% 98% 100%    Weight:       Height:         Physical Exam    Nursing notes and vital signs reviewed    Constitutional: Likely ill appearing, no acute distress  Head: Normocephalic, Atraumatic  Eyes: EOMI  Neck: Supple  Cardiovascular: Regular rate and rhythm, no murmurs, rubs, or gallops  Chest: Normal work of breathing and chest excursion bilaterally  Lungs: Clear to ausculation bilaterally  Back: No evidence of trauma or deformity  Extremities: No evidence of trauma or deformity, no LE edema  Skin: Warm and dry, normal cap refill  Neuro: Alert and appropriate  Psychiatric: Normal mood and affect      Diagnostic Study Results     Labs -     Recent Results (from the past 12 hour(s))   CBC WITH AUTOMATED DIFF    Collection Time: 05/24/19 12:20 PM   Result Value Ref Range    WBC 12.5 4.6 - 13.2 K/uL    RBC 3.00 (L) 4.70 - 5.50 M/uL    HGB 8.8 (L) 13.0 - 16.0 g/dL    HCT 27.7 (L) 36.0 - 48.0 %    MCV 92.3 74.0 - 97.0 FL    MCH 29.3 24.0 - 34.0 PG    MCHC 31.8 31.0 - 37.0 g/dL    RDW 16.7 (H) 11.6 - 14.5 %    PLATELET 914 791 - 450 K/uL    MPV 9.9 9.2 - 11.8 FL    NEUTROPHILS 75 (H) 40 - 73 %    LYMPHOCYTES 16 (L) 21 - 52 %    MONOCYTES 7 3 - 10 %    EOSINOPHILS 2 0 - 5 %    BASOPHILS 0 0 - 2 %    ABS. NEUTROPHILS 9.3 (H) 1.8 - 8.0 K/UL    ABS. LYMPHOCYTES 2.0 0.9 - 3.6 K/UL    ABS. MONOCYTES 0.9 0.05 - 1.2 K/UL    ABS. EOSINOPHILS 0.3 0.0 - 0.4 K/UL    ABS. BASOPHILS 0.0 0.0 - 0.1 K/UL    DF AUTOMATED     METABOLIC PANEL, COMPREHENSIVE    Collection Time: 05/24/19 12:20 PM   Result Value Ref Range    Sodium 139 136 - 145 mmol/L    Potassium 3.4 (L) 3.5 - 5.5 mmol/L    Chloride 98 (L) 100 - 108 mmol/L    CO2 34 (H) 21 - 32 mmol/L    Anion gap 7 3.0 - 18 mmol/L    Glucose 80 74 - 99 mg/dL    BUN 16 7.0 - 18 MG/DL    Creatinine 5.53 (H) 0.6 - 1.3 MG/DL    BUN/Creatinine ratio 3 (L) 12 - 20      GFR est AA 13 (L) >60 ml/min/1.73m2    GFR est non-AA 11 (L) >60 ml/min/1.73m2    Calcium 8.9 8.5 - 10.1 MG/DL    Bilirubin, total 1.0 0.2 - 1.0 MG/DL    ALT (SGPT) 24 16 - 61 U/L    AST (SGOT) 17 15 - 37 U/L    Alk.  phosphatase 85 45 - 117 U/L    Protein, total 6.8 6.4 - 8.2 g/dL    Albumin 3.1 (L) 3.4 - 5.0 g/dL    Globulin 3.7 2.0 - 4.0 g/dL    A-G Ratio 0.8 0.8 - 1.7     NT-PRO BNP    Collection Time: 05/24/19 12:20 PM   Result Value Ref Range    NT pro-BNP 64,704 (H) 0 - 900 PG/ML   CARDIAC PANEL,(CK, CKMB & TROPONIN)    Collection Time: 05/24/19 12:20 PM   Result Value Ref Range     39 - 308 U/L    CK - MB <1.0 <3.6 ng/ml    CK-MB Index  0.0 - 4.0 %     CALCULATION NOT PERFORMED WHEN RESULT IS BELOW LINEAR LIMIT    Troponin-I, QT 0.04 0.0 - 0.045 NG/ML   POC LACTIC ACID    Collection Time: 05/24/19 12:28 PM   Result Value Ref Range    Lactic Acid (POC) 0.58 0.40 - 2.00 mmol/L   EKG, 12 LEAD, INITIAL    Collection Time: 05/24/19  2:23 PM   Result Value Ref Range    Ventricular Rate 83 BPM    Atrial Rate 83 BPM    P-R Interval 150 ms    QRS Duration 90 ms    Q-T Interval 428 ms    QTC Calculation (Bezet) 502 ms    Calculated P Axis 50 degrees    Calculated R Axis -10 degrees    Calculated T Axis 42 degrees    Diagnosis       Normal sinus rhythm  Possible Left atrial enlargement  Left ventricular hypertrophy  Prolonged QT  Abnormal ECG  When compared with ECG of 09-MAY-2018 11:18,  Questionable change in QRS axis  QT has lengthened  Confirmed by Jennifer Rahman MD, -- ([id number]) on 5/24/2019 5:08:32 PM         Radiologic Studies -   CTA CHEST W OR W WO CONT   Final Result      There is no evidence of a pulmonary embolism or aortic dissection. Groundglass and airspace opacities seen bilaterally especially in the lower   lobes suggesting moderate pulmonary edema however superimposed pneumonia not   excluded. Small new pulmonary nodules. Follow-up as per Fleischner Society protocol.      ========      Fleischner Society Pulmonary Nodule Guidelines (revised 2017): Multiple solid nodules less than 6 mm:   -Low risk for lung cancer: No followup.   -High risk for lung cancer: Optional chest CT in 12 months. Minimal bilateral pleural effusion. There are mildly enlarged mediastinal lymph nodes and hilar lymph node. XR CHEST PORT   Final Result      Findings suggesting fluid overload/mild pulmonary venous congestion and edema.         CT Results  (Last 48 hours)               05/24/19 1832  CTA CHEST W OR W WO CONT Final result    Impression:      There is no evidence of a pulmonary embolism or aortic dissection. Groundglass and airspace opacities seen bilaterally especially in the lower   lobes suggesting moderate pulmonary edema however superimposed pneumonia not   excluded. Small new pulmonary nodules. Follow-up as per Fleischner Society protocol.       ========       Fleischner Society Pulmonary Nodule Guidelines (revised 2017): Multiple solid nodules less than 6 mm:   -Low risk for lung cancer: No followup.   -High risk for lung cancer: Optional chest CT in 12 months. Minimal bilateral pleural effusion. There are mildly enlarged mediastinal lymph nodes and hilar lymph node. Narrative:  EXAM: CTA chest       INDICATION: Chest pain       COMPARISON: November 27, 2017       TECHNIQUE: Axial CT imaging from the thoracic inlet through the diaphragm with   intravenous contrast. Coronal and sagittal MIP reformats were generated. One or   more dose reduction techniques were used on this CT: automated exposure control,   adjustment of the mAs and/or kVp according to patient size, and iterative   reconstruction techniques. The specific techniques used on this CT exam have   been documented in the patient's electronic medical record. Digital Imaging and   Communications in Medicine (DICOM) format image data are available to   nonaffiliated external healthcare facilities or entities on a secure, media   free, reciprocally searchable basis with patient authorization for at least a   12-month period after this study. _______________       FINDINGS:       EXAM QUALITY: Overall exam quality is satisfactory. Pulmonary arterial   enhancement is adequate with adequate breath hold and no significant artifact. PULMONARY ARTERIES: No evidence of pulmonary embolism. LYMPH Nodes: There is a 1 cm AP window lymph node. There is a 13 mm subcarinal   lymph node. A 1 cm right infrahilar lymph node.  There is a borderline size left   hilar lymph node measuring 9 mm.       PLEURA: Minimal bilateral effusions are present left greater than right. HEART: Cardiac size is enlarged. There is no pericardial effusion. VASCULATURE/MEDIASTINUM: There is no aortic dissection. LUNGS: There is a 3 mm nodule left upper lobe image 40. There is a 5 mm nodule   in the left upper lobe in image 68. There is a 3 mm nodule right upper lobe   image 81. There are groundglass densities throughout both lungs with patchy airspace   opacities in both lower lobes which may represent pulmonary edema and/or   pneumonia. AIRWAY: Normal.       UPPER ABDOMEN: There is atrophy of the kidneys with perinephric stranding. Kidneys demonstrate no hydronephrosis. There is a probable cyst the lower pole   the left kidney. Otherwise the visualized solid organs are unremarkable. OTHER: No acute or aggressive osseous abnormalities identified. There is a right-sided dialysis catheter with tip at the junction the right   atrium and superior vena cava.       _______________               CXR Results  (Last 48 hours)               05/24/19 1225  XR CHEST PORT Final result    Impression:      Findings suggesting fluid overload/mild pulmonary venous congestion and edema. Narrative:  Chest AP portable       INDICATION: Cough for 3 weeks. COMPARISON: Radiographs 05/09/2018. FINDINGS: Stable right IJ pheresis catheter. There is interval mild prominence   of the cardiac silhouette and central pulmonary vessels. Mild reticular   interstitial thickening and hazy opacities noted bilaterally. No focal airspace   disease. Costophrenic angles are sharp. No pneumothorax.                  Medications given in the ED-  Medications   amLODIPine (NORVASC) tablet 5 mg (5 mg Oral Given 5/24/19 1354)   hydrALAZINE (APRESOLINE) tablet 25 mg (25 mg Oral Given 5/24/19 1354)   iopamidol (ISOVUE-370) 76 % injection 100 mL (100 mL IntraVENous Given 5/24/19 9095)   hydrALAZINE (APRESOLINE) tablet 25 mg (25 mg Oral Given 5/24/19 1902)   amLODIPine (NORVASC) tablet 5 mg (5 mg Oral Given 5/24/19 1902)         Medical Decision Making   I am the first provider for this patient. I reviewed the vital signs, available nursing notes, past medical history, past surgical history, family history and social history. Vital Signs-Reviewed the patient's vital signs. Pulse Oximetry Analysis - 97% on room air     Cardiac Monitor:  Rate: 82 bpm  Rhythm: normal sinus    EKG interpretation: (Preliminary)  EKG read by Dr. Gibran Umaña at 2:25 PM  Normal sinus rhythm at a rate of 83 bpm, CT interval 150 ms, QRS duration of 90 ms    Records Reviewed: Nursing Notes, Old Medical Records and Previous electrocardiograms    Provider Notes (Medical Decision Making): Linette Basurto is a 47 y.o. male sent from internal medicine office for chest x-ray as patient has been coughing for 3 weeks. Patient is hemodynamically stable without acute abnormalities on labs or imaging. Patient has not had any visible hemoptysis during his extended stay in the emergency department. Plan for discharge with early PCP follow-up and return precautions. Patient understands and agrees with this plan. Procedures:  Procedures    ED Course:   CONSULT NOTE:   2:14 PM  Dr. Gibran Umaña spoke with Eriberto Catalan NP  Specialty: Patient's internal medicine clinician  Discussed pt's hx, disposition, and available diagnostic and imaging results over the telephone. Reviewed care plans. Agrees with discharge and will continue to follow patient in the office. 2:54 PM  When I informed patient of plan for discharge she was very upset and states that we have not explain why he has a cough. I have not heard the patient cough during his ED stay here nor during the times I been in the room. Because the patient's concern will offer a CT with IV contrast and attempt to arrange for dialysis tomorrow.     CONSULT NOTE:   3:37 PM  Dr. Tari Rivera Bryson spoke with Dr. Shruthi Vaughn   Specialty: Nephrology  Discussed pt's hx, disposition, and available diagnostic and imaging results over the telephone. Reviewed care plans. States patient can receive CTA of the chest and get dialysis as scheduled on Monday. Diagnosis and Disposition       DISCHARGE NOTE:    Charli Mckee's  results have been reviewed with him. He has been counseled regarding his diagnosis, treatment, and plan. He verbally conveys understanding and agreement of the signs, symptoms, diagnosis, treatment and prognosis and additionally agrees to follow up as discussed. He also agrees with the care-plan and conveys that all of his questions have been answered. I have also provided discharge instructions for him that include: educational information regarding their diagnosis and treatment, and list of reasons why they would want to return to the ED prior to their follow-up appointment, should his condition change. He has been provided with education for proper emergency department utilization. CLINICAL IMPRESSION:    1. Cough    2. ESRD (end stage renal disease) on dialysis ([facility] [address]    3. Hypertension, unspecified type        PLAN:  1. D/C Home  2. Current Discharge Medication List        3. Follow-up Information     Follow up With Specialties Details Why Contact Info    Yaw Sanchez MD Family Practice Schedule an appointment as soon as possible for a visit  [facility] [address] [facility]      [facility] EMERGENCY DEPT Emergency Medicine  If symptoms worsen [address] [place] [phone]        _______________________________      Please note that this dictation was completed with [employer], the [employer] voice recognition software. Quite often unanticipated grammatical, syntax, homophones, and other interpretive errors are inadvertently transcribed by the computer software. Please disregard these errors.   Please excuse any errors that have escaped final proofreading.

## 2019-05-24 NOTE — LETTER
5/25/2019 [facility] [address] [place] [address] Dear Mr.. Mahnaz Mclean, You were recently seen in the Emergency Department of [place] [address] and had lab work performed. We would like to discuss these results with you. Please call the Emergency Department at your earliest convenience at [phone] between 10am-8pm to speak with one of our providers. Sincerely, Physician Emergency, MD 
 
 
[address] 
[phone]

## 2019-05-25 NOTE — CALL BACK NOTE
Received a call from lab informing this provider that pt was + for Gram + cocci in clusters on blood cultures. Attempt to call pt's cell phone [phone], was told by person answering the call that this was the wrong number. Attempt to call home phone, no answer.

## 2019-05-25 NOTE — CALL BACK NOTE
Calls have been made to all numbers on file for pt Mobile number is incorrect per male that answered aging today when I called. Home number called as well without answer of voicemail option. I called pt emergency contact number & left voicemail asking for return call from Mr [facility] regarding test results & ED visit. A certified letter was sent out this morning.  Pt has 2 +blood cultures needs re-eval

## 2019-05-25 NOTE — CALL BACK NOTE
Called home number with no answer & no voicemail option. Will send certified letter requesting call back for results review

## 2019-05-27 LAB
BACTERIA SPEC CULT: ABNORMAL
GRAM STN SPEC: ABNORMAL
SERVICE CMNT-IMP: ABNORMAL
SERVICE CMNT-IMP: ABNORMAL

## 2020-04-06 NOTE — PERIOP NOTES
Alie ARNOLD notified that pt took ASA 4 days ago, she stated that she will inform Chuckie Moses when he returns call. 100

## 2020-10-27 NOTE — ANESTHESIA POSTPROCEDURE EVALUATION
Post-Anesthesia Evaluation & Assessment    Visit Vitals    /73    Pulse 61    Temp 36.7 °C (98.1 °F)    Resp 12    Ht 5' 5.5\" (1.664 m)    Wt 65.9 kg (145 lb 3 oz)    SpO2 96%    BMI 23.79 kg/m2       No untreated/active PONV    Post-operative hydration adequate. Adequate post-operative analgesia per PACU discharge criteria    Mental status & level of consciousness: alert and oriented x 3    Respiratory status: patent unassisted airway     No apparent anesthetic complications requiring additional post-anesthetic care    Patient has met all discharge requirements.             Zachary De La Rosa CRNA Render In Strict Bullet Format?: No Samples Given: Retin a micro gel 0.08% Initiate Treatment: Cimetidine 300 mg/5 ml take 12.5mL po bid\\nHC 2.5% cream bid to irritation of arm, abdomen, and chest\\nMupirocin 2% ointment QDay to sores Detail Level: Simple Continue Regimen: Samples or retin a micro 0.08% gel, apply thin layer qday to M/C on trunk and arm

## 2021-11-08 ENCOUNTER — APPOINTMENT (OUTPATIENT)
Dept: GENERAL RADIOLOGY | Age: 56
End: 2021-11-08
Attending: PHYSICIAN ASSISTANT
Payer: MEDICAID

## 2021-11-08 ENCOUNTER — HOSPITAL ENCOUNTER (EMERGENCY)
Age: 56
Discharge: HOME | End: 2021-11-08
Attending: EMERGENCY MEDICINE
Payer: MEDICAID

## 2021-11-08 VITALS
HEART RATE: 74 BPM | BODY MASS INDEX: 19.1 KG/M2 | SYSTOLIC BLOOD PRESSURE: 204 MMHG | OXYGEN SATURATION: 98 % | WEIGHT: 114.64 LBS | DIASTOLIC BLOOD PRESSURE: 88 MMHG | HEIGHT: 65 IN | RESPIRATION RATE: 16 BRPM | TEMPERATURE: 97.7 F

## 2021-11-08 DIAGNOSIS — R04.2 HEMOPTYSIS: ICD-10-CM

## 2021-11-08 DIAGNOSIS — N18.6 ESRD ON HEMODIALYSIS (HCC): ICD-10-CM

## 2021-11-08 DIAGNOSIS — K43.9 VENTRAL HERNIA WITHOUT OBSTRUCTION OR GANGRENE: Primary | ICD-10-CM

## 2021-11-08 DIAGNOSIS — M47.816 LUMBAR SPONDYLOSIS: ICD-10-CM

## 2021-11-08 DIAGNOSIS — Z99.2 ESRD ON HEMODIALYSIS (HCC): ICD-10-CM

## 2021-11-08 LAB
ALBUMIN SERPL-MCNC: 2.5 G/DL (ref 3.4–5)
ALBUMIN/GLOB SERPL: 0.7 {RATIO} (ref 0.8–1.7)
ALP SERPL-CCNC: 176 U/L (ref 45–117)
ALT SERPL-CCNC: 16 U/L (ref 16–61)
ANION GAP SERPL CALC-SCNC: 4 MMOL/L (ref 3–18)
AST SERPL-CCNC: 33 U/L (ref 10–38)
BASOPHILS # BLD: 0.1 K/UL (ref 0–0.1)
BASOPHILS NFR BLD: 0 % (ref 0–2)
BILIRUB SERPL-MCNC: 0.9 MG/DL (ref 0.2–1)
BUN SERPL-MCNC: 19 MG/DL (ref 7–18)
BUN/CREAT SERPL: 4 (ref 12–20)
CALCIUM SERPL-MCNC: 8.4 MG/DL (ref 8.5–10.1)
CHLORIDE SERPL-SCNC: 106 MMOL/L (ref 100–111)
CO2 SERPL-SCNC: 30 MMOL/L (ref 21–32)
CREAT SERPL-MCNC: 4.92 MG/DL (ref 0.6–1.3)
DIFFERENTIAL METHOD BLD: ABNORMAL
EOSINOPHIL # BLD: 0.2 K/UL (ref 0–0.4)
EOSINOPHIL NFR BLD: 2 % (ref 0–5)
ERYTHROCYTE [DISTWIDTH] IN BLOOD BY AUTOMATED COUNT: 13.5 % (ref 11.6–14.5)
GLOBULIN SER CALC-MCNC: 3.7 G/DL (ref 2–4)
GLUCOSE SERPL-MCNC: 87 MG/DL (ref 74–99)
HCT VFR BLD AUTO: 28.9 % (ref 36–48)
HGB BLD-MCNC: 9.3 G/DL (ref 13–16)
LIPASE SERPL-CCNC: 117 U/L (ref 73–393)
LYMPHOCYTES # BLD: 1 K/UL (ref 0.9–3.6)
LYMPHOCYTES NFR BLD: 9 % (ref 21–52)
MCH RBC QN AUTO: 31.5 PG (ref 24–34)
MCHC RBC AUTO-ENTMCNC: 32.2 G/DL (ref 31–37)
MCV RBC AUTO: 98 FL (ref 78–100)
MONOCYTES # BLD: 1.2 K/UL (ref 0.05–1.2)
MONOCYTES NFR BLD: 10 % (ref 3–10)
NEUTS SEG # BLD: 9.4 K/UL (ref 1.8–8)
NEUTS SEG NFR BLD: 79 % (ref 40–73)
PLATELET # BLD AUTO: 183 K/UL (ref 135–420)
PMV BLD AUTO: 11.2 FL (ref 9.2–11.8)
POTASSIUM SERPL-SCNC: 3 MMOL/L (ref 3.5–5.5)
PROT SERPL-MCNC: 6.2 G/DL (ref 6.4–8.2)
RBC # BLD AUTO: 2.95 M/UL (ref 4.35–5.65)
SODIUM SERPL-SCNC: 140 MMOL/L (ref 136–145)
WBC # BLD AUTO: 12 K/UL (ref 4.6–13.2)

## 2021-11-08 PROCEDURE — 85025 COMPLETE CBC W/AUTO DIFF WBC: CPT

## 2021-11-08 PROCEDURE — 72110 X-RAY EXAM L-2 SPINE 4/>VWS: CPT

## 2021-11-08 PROCEDURE — 80053 COMPREHEN METABOLIC PANEL: CPT

## 2021-11-08 PROCEDURE — 83690 ASSAY OF LIPASE: CPT

## 2021-11-08 PROCEDURE — 71045 X-RAY EXAM CHEST 1 VIEW: CPT

## 2021-11-08 PROCEDURE — 99284 EMERGENCY DEPT VISIT MOD MDM: CPT

## 2021-11-08 PROCEDURE — 74011250637 HC RX REV CODE- 250/637: Performed by: PHYSICIAN ASSISTANT

## 2021-11-08 RX ORDER — LIDOCAINE 50 MG/G
PATCH TOPICAL
Qty: 5 EACH | Refills: 0 | Status: SHIPPED | OUTPATIENT
Start: 2021-11-08

## 2021-11-08 RX ORDER — METHOCARBAMOL 500 MG/1
500 TABLET, FILM COATED ORAL ONCE
Status: COMPLETED | OUTPATIENT
Start: 2021-11-08 | End: 2021-11-08

## 2021-11-08 RX ADMIN — METHOCARBAMOL TABLETS 500 MG: 500 TABLET, COATED ORAL at 14:22
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
## 2021-11-08 NOTE — ED TRIAGE NOTES
Patient reports he is a HD patient just finished he goes on M-W-F.    Reports he has abdominal pain and coughing up blood

## 2022-03-18 PROBLEM — T82.898A AV FISTULA OCCLUSION (HCC): Status: ACTIVE | Noted: 2017-12-24

## 2022-03-18 PROBLEM — D64.9 SEVERE ANEMIA: Status: ACTIVE | Noted: 2017-08-04

## 2022-03-18 PROBLEM — R21 RASH OF GENITAL AREA: Status: ACTIVE | Noted: 2017-08-05

## 2022-03-18 PROBLEM — N19 UREMIA: Status: ACTIVE | Noted: 2017-08-04

## 2022-03-19 PROBLEM — N18.9 CRF (CHRONIC RENAL FAILURE): Status: ACTIVE | Noted: 2017-12-24

## 2022-03-19 PROBLEM — Z91.199 NONADHERENCE TO MEDICAL TREATMENT: Status: ACTIVE | Noted: 2017-08-04

## 2022-03-19 PROBLEM — E43 SEVERE PROTEIN-CALORIE MALNUTRITION (HCC): Status: ACTIVE | Noted: 2017-08-08

## 2022-03-19 PROBLEM — L08.9 WOUND INFECTION: Status: ACTIVE | Noted: 2018-03-12

## 2022-03-19 PROBLEM — N18.6 ESRD ON HEMODIALYSIS (HCC): Status: ACTIVE | Noted: 2017-11-22

## 2022-03-19 PROBLEM — T14.8XXA WOUND INFECTION: Status: ACTIVE | Noted: 2018-03-12

## 2022-03-19 PROBLEM — N17.9 AKI (ACUTE KIDNEY INJURY) (HCC): Status: ACTIVE | Noted: 2017-08-04

## 2022-03-19 PROBLEM — Z99.2 ESRD ON HEMODIALYSIS (HCC): Status: ACTIVE | Noted: 2017-11-22

## 2022-03-19 PROBLEM — J90 PLEURAL EFFUSION, RIGHT: Status: ACTIVE | Noted: 2017-08-06

## 2022-03-20 PROBLEM — G89.18 POST-OPERATIVE PAIN: Status: ACTIVE | Noted: 2018-02-28

## 2022-03-20 PROBLEM — I16.0 HYPERTENSIVE URGENCY, MALIGNANT: Status: ACTIVE | Noted: 2017-08-04

## 2023-03-07 NOTE — ROUTINE PROCESS
Bedside and Verbal shift change report given to Eliud Butler RN (oncoming nurse) by Simran Sawyer RN (offgoing nurse). Report included the following information SBAR, Kardex, Procedure Summary, Intake/Output, MAR, Accordion, Recent Results, Med Rec Status and Alarm Parameters . cardiovascular

## 2024-12-08 NOTE — IP AVS SNAPSHOT
[address] [place] [id number] 
[phone] Patient: Severo Fermo MRN: [id number] QIF:[id number] About your hospitalization You were admitted on:  November 6, 2017 You last received care in the:  [facility] PACU You were discharged on:  November 6, 2017 Why you were hospitalized Your primary diagnosis was:  Not on File Things You Need To Do (next 8 weeks) Follow up with None Where:  None (395) Patient stated that they have no PCP Follow up with Sofi Mancini MD in 2 day(s) Phone:  [phone] [facility]   [facility] [address], [place] [address] Wednesday Nov 22, 2017 HOSPITAL DISCHARGE with Sofi Mancini MD at 10:45 AM  
Where:  [facility] ([facility]uel A No[facility]) Discharge Orders None A check ofelia indicates which time of day the medication should be taken. My Medications TAKE these medications as instructed Instructions Each Dose to Equal  
 Morning Noon Evening Bedtime  
 amLODIPine 5 mg tablet Commonly known as:  Justyna Skelton Your last dose was: Your next dose is: Take 1 Tab by mouth daily. 5 mg  
    
   
   
   
  
 aspirin 81 mg chewable tablet Your last dose was: Your next dose is: Take 1 Tab by mouth daily. 81 mg  
    
   
   
   
  
 calcium carbonate 500 mg calcium (1,250 mg) tablet Commonly known as:  OS-NATALIE Your last dose was: Your next dose is: Take 1 Tab by mouth three (3) times daily (with meals). 1 Tab  
    
   
   
   
  
 carvedilol 25 mg tablet Commonly known as:  Marilee Wei Your last dose was: Your next dose is: Take 1 Tab by mouth two (2) times daily (with meals). Indications: hypertension 25 mg CLONIDINE HCL PO Your last dose was: Your next dose is: Take 2.5 mg by mouth daily. Indications: HTN  
 2.5 mg  
    
   
   
   
  
 oxyCODONE-acetaminophen 5-325 mg per tablet Commonly known as:  PERCOCET Your last dose was: Your next dose is: Take 2 Tabs by mouth every six (6) hours as needed for Pain. Max Daily Amount: 8 Tabs. 2 Tab  
    
   
   
   
  
 simvastatin 20 mg tablet Commonly known as:  ZOCOR Your last dose was: Your next dose is: Take 1 Tab by mouth nightly. 20 mg  
    
   
   
   
  
 vit B Cmplx 3-FA-Vit C-Biotin 1- mg-mg-mcg tablet Commonly known as:  NEPHRO FLETCHER RX Your last dose was: Your next dose is: Take 1 Tab by mouth daily. 1 Tab Where to Get Your Medications Information on where to get these meds will be given to you by the nurse or doctor. ! Ask your nurse or doctor about these medications  
  oxyCODONE-acetaminophen 5-325 mg per tablet Discharge Instructions Hemodialysis Access: What to Expect at [facility] Your Recovery Hemodialysis is a way to remove wastes from the blood when your kidneys can no longer do the job. It is not a cure, but it can help you live longer and feel better. It is a lifesaving treatment when you have kidney failure. Hemodialysis is often called dialysis. Your doctor created a place (called an access) in your arm for your blood to flow in and out of your body during your dialysis sessions. Your arm will probably be bruised and swollen. It may hurt. The cut (incision) may bleed. The pain and bleeding will get better over several days. You will probably need only over-the-counter pain medicine. You can reduce swelling by propping your arm on 1 or 2 pillows and keeping your elbow straight. You will have stitches.  These may dissolve on their own, or your doctor will tell you when to come in to have them removed. You should also be able to return to work in a few days. You may feel some coolness or numbness in your hand. These feelings usually go away in a few weeks. Your doctor may suggest squeezing a soft object. This will strengthen your access and may make hemodialysis faster and easier. You should always be able to feel blood rushing through the fistula or graft. It feels like a slight vibration when you put your fingers on the skin over the fistula or graft. This feeling is called a thrill or pulse. This care sheet gives you a general idea about how long it will take for you to recover. But each person recovers at a different pace. Follow the steps below to get better as quickly as possible. How can you care for yourself at home? Activity ? · Rest when you feel tired. Getting enough sleep will help you recover. Do not lie on or sleep on the arm with the access. ? · Avoid activities such as washing windows or gardening that put stress on the arm with the access. ? · You may use your arm, but do not lift anything that weighs more than about 15 pounds. This may include a child, heavy grocery bags, a heavy briefcase or backpack, cat litter or dog food bags, or a vacuum . ? · You can shower, but keep the access dry for the first 2 days. Cover the area with a plastic bag to keep it dry. ? · Do not soak or scrub the incision until it has healed. ? · Wear an arm guard to protect the area if you play sports or work with your arms. ? · You may drive when your doctor says it is okay. This is usually in 1 to 2 days. ? · Most people are able to return to work about 1 or 2 days after surgery. Diet ? · Follow an eating plan that is good for your kidneys. A registered dietitian can help you make a meal plan that is right for you. You may need to limit protein, salt, fluids, and certain foods. Medicines ? · Your doctor will tell you if and when you can restart your medicines. He or she will also give you instructions about taking any new medicines. ? · If you take blood thinners, such as warfarin (Coumadin), clopidogrel (Plavix), or aspirin, be sure to talk to your doctor. He or she will tell you if and when to start taking those medicines again. Make sure that you understand exactly what your doctor wants you to do. ? · Take pain medicines exactly as directed. ¨ If the doctor gave you a prescription medicine for pain, take it as prescribed. ¨ If you are not taking a prescription pain medicine, ask your doctor if you can take acetaminophen (Tylenol). Do not take ibuprofen (Advil, Motrin) or naproxen (Aleve), or similar medicines, unless your doctor tells you to. They may make chronic kidney disease worse. ¨ Do not take two or more pain medicines at the same time unless the doctor told you to. Many pain medicines have acetaminophen, which is Tylenol. Too much acetaminophen (Tylenol) can be harmful. ? · If you think your pain medicine is making you sick to your stomach: 
¨ Take your medicine after meals (unless your doctor has told you not to). ¨ Ask your doctor for a different pain medicine. ? · If your doctor prescribed antibiotics, take them as directed. Do not stop taking them just because you feel better. You need to take the full course of antibiotics. Incision care ? · Keep the area dry for 2 days. After 2 days, wash the area with soap and water every day, and always before dialysis. ? · Do not soak or scrub the incision until it has healed. ? · If you have a bandage, change it every day or as your doctor recommends. Your doctor will tell you when you can remove it. Exercise ? · Squeeze a soft ball or other object as your doctor tells you. This will help blood flow through the access and help prevent blood clots. ? Elevation ? · Prop up the sore arm on a pillow anytime you sit or lie down during the next 3 days. Try to keep it above the level of your heart. This will help reduce swelling. Other instructions ? · Every day, check your access for a pulse or thrill in the fistula or graft area. A thrill is a vibration. To feel a pulse or thrill, place the first two fingers of your hand over the access. ? · Do not bump your arm. ? · Do not wear tight clothing, jewelry, or anything else that may squeeze the access. ? · Use your other arm to have blood drawn or blood pressure taken. ? · Do not put cream or lotion on or near the access. ? · Make sure all doctors you deal with know you have a vascular access. Follow-up care is a key part of your treatment and safety. Be sure to make and go to all appointments, and call your doctor if you are having problems. It's also a good idea to know your test results and keep a list of the medicines you take. When should you call for help? Call 911 anytime you think you may need emergency care. For example, call if: 
? · You passed out (lost consciousness). ? · You have chest pain, are short of breath, or cough up blood. ?Call your doctor now or seek immediate medical care if: 
? · Your hand or arm is cold or dark-colored. ? · You have no pulse in your access. ? · You have nausea or you vomit. ? · You have pain that does not get better after you take pain medicine. ? · You have loose stitches, or your incision comes open. ? · You are bleeding from the incision. ? · You have signs of infection, such as: 
¨ Increased pain, swelling, warmth, or redness. ¨ Red streaks leading from the area. ¨ Pus draining from the area. ¨ A fever. ? · You have signs of a blood clot in your leg (called a deep vein thrombosis), such as: 
¨ Pain in your calf, back of the knee, thigh, or groin. ¨ Redness or swelling in your leg. ?Watch closely for changes in your health, and be sure to contact your doctor if you have any problems. Where can you learn more? Go to http://jacinto-mike.info/. Enter P616 in the search box to learn more about \"Hemodialysis Access: What to Expect at Home. \" Current as of: May 12, 2017 Content Version: 11.4 © 4551-9977 [employer]. Care instructions adapted under license by [employer] (which disclaims liability or warranty for this information). If you have questions about a medical condition or this instruction, always ask your healthcare professional. [place] [address] any warranty or liability for your use of this information. DISCHARGE SUMMARY from Nurse PATIENT INSTRUCTIONS: 
 
 
F-face looks uneven A-arms unable to move or move unevenly S-speech slurred or non-existent T-time-call 911 as soon as signs and symptoms begin-DO NOT go Back to bed or wait to see if you get better-TIME IS BRAIN. Warning Signs of HEART ATTACK Call 911 if you have these symptoms: 
? Chest discomfort. Most heart attacks involve discomfort in the center of the chest that lasts more than a few minutes, or that goes away and comes back. It can feel like uncomfortable pressure, squeezing, fullness, or pain. ? Discomfort in other areas of the upper body. Symptoms can include pain or discomfort in one or both arms, the back, neck, jaw, or stomach. ? Shortness of breath with or without chest discomfort. ? Other signs may include breaking out in a cold sweat, nausea, or lightheadedness. Don't wait more than five minutes to call [address]! Fast action can save your life. Calling 911 is almost always the fastest way to get lifesaving treatment. Emergency Medical Services staff can begin treatment when they arrive  up to an hour sooner than if someone gets to the hospital by car. Patient armband removed and shredded The discharge information has been reviewed with the patient and caregiver. The patient and caregiver verbalized understanding. Discharge medications reviewed with the patient and caregiver and appropriate educational materials and side effects teaching were provided. ___________________________________________________________________________________________________________________________________ Providers Seen During Your Hospitalization Provider Specialty Primary office phone Megha Coto MD Vascular Surgery [phone] Your Primary Care Physician (PCP) Primary Care Physician Office Phone Office Fax NONE ** None ** ** None ** You are allergic to the following No active allergies Recent Documentation Height Weight BMI Smoking Status 1.651 m 61.2 kg 22.47 kg/m2 Former Smoker Emergency Contacts Name Discharge Info Relation Home Work Mobile Rahel Cruz DISCHARGE CAREGIVER [3] Friend [5]   [phone] Patient Belongings The following personal items are in your possession at time of discharge: 
     Visual Aid: Glasses Please provide this summary of care documentation to your next provider. Signatures-by signing, you are acknowledging that this After Visit Summary has been reviewed with you and you have received a copy. Patient Signature:  ____________________________________________________________ Date:  ____________________________________________________________  
  
Leonel Police  Provider Signature: ____________________________________________________________ Date:  ____________________________________________________________ no

## 2024-12-11 ENCOUNTER — HOSPITAL ENCOUNTER (EMERGENCY)
Facility: HOSPITAL | Age: 59
Discharge: HOME | End: 2024-12-11
Payer: MEDICAID

## 2024-12-11 ENCOUNTER — APPOINTMENT (OUTPATIENT)
Facility: HOSPITAL | Age: 59
End: 2024-12-11
Payer: MEDICAID

## 2024-12-11 VITALS
TEMPERATURE: 98.4 F | HEIGHT: 65 IN | RESPIRATION RATE: 16 BRPM | WEIGHT: 112.43 LBS | OXYGEN SATURATION: 95 % | SYSTOLIC BLOOD PRESSURE: 190 MMHG | HEART RATE: 65 BPM | DIASTOLIC BLOOD PRESSURE: 82 MMHG | BODY MASS INDEX: 18.73 KG/M2

## 2024-12-11 DIAGNOSIS — J45.41 MODERATE PERSISTENT REACTIVE AIRWAY DISEASE WITH ACUTE EXACERBATION: Primary | ICD-10-CM

## 2024-12-11 DIAGNOSIS — Z87.891 HISTORY OF TOBACCO USE: ICD-10-CM

## 2024-12-11 DIAGNOSIS — N18.6 ESRD ON DIALYSIS (HCC): ICD-10-CM

## 2024-12-11 DIAGNOSIS — R05.1 ACUTE COUGH: ICD-10-CM

## 2024-12-11 DIAGNOSIS — Z99.2 ESRD ON DIALYSIS (HCC): ICD-10-CM

## 2024-12-11 DIAGNOSIS — I10 ELEVATED BLOOD PRESSURE READING IN OFFICE WITH DIAGNOSIS OF HYPERTENSION: ICD-10-CM

## 2024-12-11 LAB
FLUAV RNA SPEC QL NAA+PROBE: NOT DETECTED
FLUBV RNA SPEC QL NAA+PROBE: NOT DETECTED
S PYO DNA THROAT QL NAA+PROBE: NOT DETECTED
SARS-COV-2 RNA RESP QL NAA+PROBE: NOT DETECTED
SOURCE: NORMAL

## 2024-12-11 PROCEDURE — 6360000002 HC RX W HCPCS: Performed by: PHYSICIAN ASSISTANT

## 2024-12-11 PROCEDURE — 87636 SARSCOV2 & INF A&B AMP PRB: CPT

## 2024-12-11 PROCEDURE — 71045 X-RAY EXAM CHEST 1 VIEW: CPT

## 2024-12-11 PROCEDURE — 6370000000 HC RX 637 (ALT 250 FOR IP): Performed by: PHYSICIAN ASSISTANT

## 2024-12-11 PROCEDURE — 87651 STREP A DNA AMP PROBE: CPT

## 2024-12-11 PROCEDURE — 99284 EMERGENCY DEPT VISIT MOD MDM: CPT

## 2024-12-11 RX ORDER — ALBUTEROL SULFATE 90 UG/1
2 INHALANT RESPIRATORY (INHALATION) EVERY 6 HOURS PRN
Qty: 18 G | Refills: 0 | Status: SHIPPED | OUTPATIENT
Start: 2024-12-11

## 2024-12-11 RX ORDER — IBUPROFEN 600 MG/1
600 TABLET, FILM COATED ORAL
Status: DISCONTINUED | OUTPATIENT
Start: 2024-12-11 | End: 2024-12-11

## 2024-12-11 RX ORDER — BUTALBITAL, ACETAMINOPHEN AND CAFFEINE 50; 325; 40 MG/1; MG/1; MG/1
1 TABLET ORAL
Status: COMPLETED | OUTPATIENT
Start: 2024-12-11 | End: 2024-12-11

## 2024-12-11 RX ORDER — METHYLPREDNISOLONE 4 MG/1
TABLET ORAL
Qty: 1 KIT | Refills: 0 | Status: SHIPPED | OUTPATIENT
Start: 2024-12-11

## 2024-12-11 RX ORDER — INHALER, ASSIST DEVICES
SPACER (EA) MISCELLANEOUS
Qty: 1 EACH | Refills: 0 | Status: SHIPPED | OUTPATIENT
Start: 2024-12-11

## 2024-12-11 RX ORDER — IPRATROPIUM BROMIDE AND ALBUTEROL SULFATE 2.5; .5 MG/3ML; MG/3ML
1 SOLUTION RESPIRATORY (INHALATION)
Status: COMPLETED | OUTPATIENT
Start: 2024-12-11 | End: 2024-12-11

## 2024-12-11 RX ORDER — DEXAMETHASONE 4 MG/1
6 TABLET ORAL ONCE
Status: COMPLETED | OUTPATIENT
Start: 2024-12-11 | End: 2024-12-11

## 2024-12-11 RX ORDER — AZITHROMYCIN 250 MG/1
TABLET, FILM COATED ORAL
Qty: 6 TABLET | Refills: 0 | Status: SHIPPED | OUTPATIENT
Start: 2024-12-11 | End: 2024-12-21

## 2024-12-11 RX ADMIN — IPRATROPIUM BROMIDE AND ALBUTEROL SULFATE 1 DOSE: .5; 3 SOLUTION RESPIRATORY (INHALATION) at 14:05

## 2024-12-11 RX ADMIN — IPRATROPIUM BROMIDE AND ALBUTEROL SULFATE 1 DOSE: .5; 3 SOLUTION RESPIRATORY (INHALATION) at 13:52

## 2024-12-11 RX ADMIN — DEXAMETHASONE 6 MG: 4 TABLET ORAL at 13:52

## 2024-12-11 RX ADMIN — BUTALBITAL, ACETAMINOPHEN AND CAFFEINE 1 TABLET: 325; 50; 40 TABLET ORAL at 14:46

## 2024-12-11 ASSESSMENT — PAIN DESCRIPTION - DESCRIPTORS: DESCRIPTORS: ACHING

## 2024-12-11 ASSESSMENT — PAIN SCALES - GENERAL: PAINLEVEL_OUTOF10: 4

## 2024-12-11 ASSESSMENT — PAIN DESCRIPTION - PAIN TYPE: TYPE: CHRONIC PAIN;ACUTE PAIN

## 2024-12-11 ASSESSMENT — PAIN - FUNCTIONAL ASSESSMENT: PAIN_FUNCTIONAL_ASSESSMENT: 0-10

## 2024-12-11 ASSESSMENT — PAIN DESCRIPTION - FREQUENCY: FREQUENCY: INTERMITTENT

## 2024-12-11 NOTE — ED PROVIDER NOTES
[facility] EMERGENCY DEPT  EMERGENCY DEPARTMENT ENCOUNTER       Pt Name: Dale Milton  MRN: [id number]  Birthdate 1965  Date of evaluation: 12/11/2024  PCP: Lake Dickerson APRN - NP  Note Started: 3:52 PM 12/11/24     CHIEF COMPLAINT       Chief Complaint   Patient presents with    Cough    Nasal Congestion    Cold Symptoms        HISTORY OF PRESENT ILLNESS: 1 or more elements      History From: Patient  HPI Limitations: None  Chronic Conditions: HTN, ESRD on dialysis ([facility]), M/W/F with last session today, GERD  Social Determinants affecting Dx or Tx: non      Dale Milton is a 59 y.o. male who presents to ED c/o cough. Productive cough with yellow sputum. Associated sxs are headache, congestion, wheezing, and SOB. Sxs x 2-3 days. No sick contacts. Pt is former smoker but denies any resp hx. Pt called PCP for appt but no call back. No fever, chills, CP, leg swelling     Nursing Notes were all reviewed and agreed with or any disagreements were addressed in the HPI.    PAST HISTORY     Past Medical History:  Past Medical History:   Diagnosis Date    Chronic kidney disease     [facility]    GERD (gastroesophageal reflux disease)     Hypertension 07/2017    Ill-defined condition     dialysis monday wednesday friday    Non compliance w medication regimen     noncompliant with HTN meds       Past Surgical History:  Past Surgical History:   Procedure Laterality Date    IR NONTUNNELED VASCULAR CATHETER  8/4/2017    IR NONTUNNELED VASCULAR CATHETER 8/4/2017 [facility] RAD ANGIO IR    IR TUNNELED CATHETER PLACEMENT GREATER THAN 5 YEARS  12/24/2017    IR TUNNELED CATHETER PLACEMENT GREATER THAN 5 YEARS 12/24/2017 [facility] RAD ANGIO IR    IR TUNNELED CATHETER PLACEMENT GREATER THAN 5 YEARS  8/7/2017    IR TUNNELED CATHETER PLACEMENT GREATER THAN 5 YEARS 8/7/2017 [facility] RAD ANGIO IR    OTHER SURGICAL HISTORY      left arm dialysis shunt    VASCULAR SURGERY  07/2017    left chest cath for dialysis    VASCULAR

## 2024-12-11 NOTE — ED TRIAGE NOTES
Addended by: NITISH MARTIN on: 4/26/2022 11:42 AM     Modules accepted: Orders     Pt reports he has cough,cold and congestion.

## 2025-02-14 NOTE — ED NOTES
Provider Staff:  Action required for this patient    Requires labs      Please see care gap opportunities below in Care Due Message.    Thanks!  [facility]     Appointments      Date Provider   Last Visit   8/12/2024 Alonzo Ruff MD   Next Visit   Visit date not found Alonzo Ruff MD     Refill Decision Note   Elvira Church  is requesting a refill authorization.  Brief Assessment and Rationale for Refill:  Approve     Medication Therapy Plan:         Comments:     Note composed:2:41 AM 02/14/2025             Hospitalist at bedside to assess patient. Patient ambulatory to and from restroom. Upon returning from restroom, shortness of breath noted. Patient now endorses to this RN that he has been experiencing shortness of breath upon exertion for approximately 6 months. Patient medicated per MAR orders.

## 2025-03-17 NOTE — PROGRESS NOTES
Palma Coon is calling to request a refill on the following medication(s):    Medication Request:  Requested Prescriptions     Pending Prescriptions Disp Refills    insulin glargine, 1 unit dial, (TOUJEO SOLOSTAR) 300 UNIT/ML concentrated injection pen 18 mL 1     Sig: INJECT SUBCUTANEOUSLY 40 UNITS DAILY       Last Visit Date (If Applicable):  2/25/2025    Next Visit Date:    5/27/2025                 Shift summary: uneventful;    Dialysis completed, tolerated well. Pt. Cleared for discharge. Patient provided with d/c education, review of medications, follow-up appointment with dialysis on Mon. Morning. Pt. Spoke with care management and stated he would have a ride for dialysis appointment Monday morning.    Pt. Escorted to main entrance with taxi ride home provided by hospital.

## 2025-03-21 ENCOUNTER — APPOINTMENT (OUTPATIENT)
Facility: HOSPITAL | Age: 60
DRG: 280 | End: 2025-03-21
Attending: INTERNAL MEDICINE
Payer: MEDICAID

## 2025-03-21 ENCOUNTER — APPOINTMENT (OUTPATIENT)
Facility: HOSPITAL | Age: 60
DRG: 280 | End: 2025-03-21

## 2025-03-21 ENCOUNTER — HOSPITAL ENCOUNTER (INPATIENT)
Facility: HOSPITAL | Age: 60
LOS: 4 days | Discharge: HOME | DRG: 280 | End: 2025-03-25
Attending: STUDENT IN AN ORGANIZED HEALTH CARE EDUCATION/TRAINING PROGRAM | Admitting: INTERNAL MEDICINE
Payer: MEDICAID

## 2025-03-21 DIAGNOSIS — K62.5 RECTAL BLEEDING: ICD-10-CM

## 2025-03-21 DIAGNOSIS — N18.6 ESRD (END STAGE RENAL DISEASE) (HCC): ICD-10-CM

## 2025-03-21 DIAGNOSIS — I10 ESSENTIAL HYPERTENSION: ICD-10-CM

## 2025-03-21 DIAGNOSIS — I21.4 NSTEMI (NON-ST ELEVATED MYOCARDIAL INFARCTION) (HCC): Primary | ICD-10-CM

## 2025-03-21 DIAGNOSIS — R07.9 CHEST PAIN, UNSPECIFIED TYPE: ICD-10-CM

## 2025-03-21 LAB
ABO + RH BLD: NORMAL
ALBUMIN SERPL-MCNC: 3 G/DL (ref 3.4–5)
ALBUMIN/GLOB SERPL: 0.8 (ref 0.8–1.7)
ALP SERPL-CCNC: 78 U/L (ref 45–117)
ALT SERPL-CCNC: 19 U/L (ref 16–61)
ANION GAP SERPL CALC-SCNC: 5 MMOL/L (ref 3–18)
AST SERPL-CCNC: 22 U/L (ref 10–38)
BASOPHILS # BLD: 0.03 K/UL (ref 0–0.1)
BASOPHILS NFR BLD: 0.4 % (ref 0–2)
BILIRUB SERPL-MCNC: 1.1 MG/DL (ref 0.2–1)
BLOOD GROUP ANTIBODIES SERPL: NORMAL
BUN SERPL-MCNC: 38 MG/DL (ref 7–18)
BUN/CREAT SERPL: 4 (ref 12–20)
CALCIUM SERPL-MCNC: 8.6 MG/DL (ref 8.5–10.1)
CHLORIDE SERPL-SCNC: 101 MMOL/L (ref 100–111)
CO2 SERPL-SCNC: 35 MMOL/L (ref 21–32)
CREAT SERPL-MCNC: 9.73 MG/DL (ref 0.6–1.3)
DIFFERENTIAL METHOD BLD: ABNORMAL
EOSINOPHIL # BLD: 0.17 K/UL (ref 0–0.4)
EOSINOPHIL NFR BLD: 2.3 % (ref 0–5)
ERYTHROCYTE [DISTWIDTH] IN BLOOD BY AUTOMATED COUNT: 13.7 % (ref 11.6–14.5)
FLUAV RNA SPEC QL NAA+PROBE: NOT DETECTED
FLUBV RNA SPEC QL NAA+PROBE: NOT DETECTED
GLOBULIN SER CALC-MCNC: 3.7 G/DL (ref 2–4)
GLUCOSE SERPL-MCNC: 81 MG/DL (ref 74–99)
HCT VFR BLD AUTO: 33.8 % (ref 36–48)
HEMOCCULT STL QL: NEGATIVE
HGB BLD-MCNC: 11.3 G/DL (ref 13–16)
IMM GRANULOCYTES # BLD AUTO: 0.02 K/UL (ref 0–0.04)
IMM GRANULOCYTES NFR BLD AUTO: 0.3 % (ref 0–0.5)
INR PPP: 1 (ref 0.9–1.1)
LIPASE SERPL-CCNC: 44 U/L (ref 13–75)
LYMPHOCYTES # BLD: 0.96 K/UL (ref 0.9–3.3)
LYMPHOCYTES NFR BLD: 12.9 % (ref 21–52)
MCH RBC QN AUTO: 32.1 PG (ref 24–34)
MCHC RBC AUTO-ENTMCNC: 33.4 G/DL (ref 31–37)
MCV RBC AUTO: 96 FL (ref 78–100)
MONOCYTES # BLD: 0.92 K/UL (ref 0.05–1.2)
MONOCYTES NFR BLD: 12.4 % (ref 3–10)
NEUTS SEG # BLD: 5.32 K/UL (ref 1.8–8)
NEUTS SEG NFR BLD: 71.7 % (ref 40–73)
NRBC # BLD: 0 K/UL (ref 0–0.01)
NRBC BLD-RTO: 0 PER 100 WBC
PLATELET # BLD AUTO: 125 K/UL (ref 135–420)
PMV BLD AUTO: 12.3 FL (ref 9.2–11.8)
POTASSIUM SERPL-SCNC: 3.3 MMOL/L (ref 3.5–5.5)
PROT SERPL-MCNC: 6.7 G/DL (ref 6.4–8.2)
PROTHROMBIN TIME: 13.4 SEC (ref 11.9–14.9)
RBC # BLD AUTO: 3.52 M/UL (ref 4.35–5.65)
SARS-COV-2 RNA RESP QL NAA+PROBE: NOT DETECTED
SODIUM SERPL-SCNC: 141 MMOL/L (ref 136–145)
SOURCE: NORMAL
SPECIMEN EXP DATE BLD: NORMAL
TROPONIN I SERPL HS-MCNC: 686 NG/L (ref 0–78)
TROPONIN I SERPL HS-MCNC: 772 NG/L (ref 0–78)
WBC # BLD AUTO: 7.4 K/UL (ref 4.6–13.2)

## 2025-03-21 PROCEDURE — 96374 THER/PROPH/DIAG INJ IV PUSH: CPT

## 2025-03-21 PROCEDURE — 80053 COMPREHEN METABOLIC PANEL: CPT

## 2025-03-21 PROCEDURE — 86901 BLOOD TYPING SEROLOGIC RH(D): CPT

## 2025-03-21 PROCEDURE — 87636 SARSCOV2 & INF A&B AMP PRB: CPT

## 2025-03-21 PROCEDURE — 93005 ELECTROCARDIOGRAM TRACING: CPT | Performed by: STUDENT IN AN ORGANIZED HEALTH CARE EDUCATION/TRAINING PROGRAM

## 2025-03-21 PROCEDURE — 84484 ASSAY OF TROPONIN QUANT: CPT

## 2025-03-21 PROCEDURE — 82272 OCCULT BLD FECES 1-3 TESTS: CPT

## 2025-03-21 PROCEDURE — 85610 PROTHROMBIN TIME: CPT

## 2025-03-21 PROCEDURE — 86850 RBC ANTIBODY SCREEN: CPT

## 2025-03-21 PROCEDURE — 6360000002 HC RX W HCPCS: Performed by: STUDENT IN AN ORGANIZED HEALTH CARE EDUCATION/TRAINING PROGRAM

## 2025-03-21 PROCEDURE — 6360000002 HC RX W HCPCS: Performed by: INTERNAL MEDICINE

## 2025-03-21 PROCEDURE — 85025 COMPLETE CBC W/AUTO DIFF WBC: CPT

## 2025-03-21 PROCEDURE — 71045 X-RAY EXAM CHEST 1 VIEW: CPT

## 2025-03-21 PROCEDURE — 6370000000 HC RX 637 (ALT 250 FOR IP): Performed by: INTERNAL MEDICINE

## 2025-03-21 PROCEDURE — 93306 TTE W/DOPPLER COMPLETE: CPT

## 2025-03-21 PROCEDURE — 96375 TX/PRO/DX INJ NEW DRUG ADDON: CPT

## 2025-03-21 PROCEDURE — 1100000003 HC PRIVATE W/ TELEMETRY

## 2025-03-21 PROCEDURE — 86900 BLOOD TYPING SEROLOGIC ABO: CPT

## 2025-03-21 PROCEDURE — 83690 ASSAY OF LIPASE: CPT

## 2025-03-21 PROCEDURE — 6370000000 HC RX 637 (ALT 250 FOR IP): Performed by: STUDENT IN AN ORGANIZED HEALTH CARE EDUCATION/TRAINING PROGRAM

## 2025-03-21 PROCEDURE — 99291 CRITICAL CARE FIRST HOUR: CPT

## 2025-03-21 PROCEDURE — 74176 CT ABD & PELVIS W/O CONTRAST: CPT

## 2025-03-21 RX ORDER — SEVELAMER CARBONATE 800 MG/1
1 TABLET, FILM COATED ORAL
COMMUNITY

## 2025-03-21 RX ORDER — SODIUM CHLORIDE 9 MG/ML
INJECTION, SOLUTION INTRAVENOUS PRN
Status: DISCONTINUED | OUTPATIENT
Start: 2025-03-21 | End: 2025-03-25 | Stop reason: SDUPTHER

## 2025-03-21 RX ORDER — CARVEDILOL 12.5 MG/1
25 TABLET ORAL 2 TIMES DAILY WITH MEALS
Status: DISCONTINUED | OUTPATIENT
Start: 2025-03-21 | End: 2025-03-25 | Stop reason: HOSPADM

## 2025-03-21 RX ORDER — LOSARTAN POTASSIUM 25 MG/1
25 TABLET ORAL DAILY
Status: DISCONTINUED | OUTPATIENT
Start: 2025-03-22 | End: 2025-03-24

## 2025-03-21 RX ORDER — LACTULOSE 10 G/15ML
20 SOLUTION ORAL 2 TIMES DAILY
Status: DISCONTINUED | OUTPATIENT
Start: 2025-03-21 | End: 2025-03-25 | Stop reason: HOSPADM

## 2025-03-21 RX ORDER — HEPARIN SODIUM 5000 [USP'U]/ML
5000 INJECTION, SOLUTION INTRAVENOUS; SUBCUTANEOUS EVERY 8 HOURS SCHEDULED
Status: DISCONTINUED | OUTPATIENT
Start: 2025-03-21 | End: 2025-03-25 | Stop reason: HOSPADM

## 2025-03-21 RX ORDER — CARVEDILOL 12.5 MG/1
25 TABLET ORAL 2 TIMES DAILY WITH MEALS
Status: ON HOLD | COMMUNITY
End: 2025-03-25

## 2025-03-21 RX ORDER — PANTOPRAZOLE SODIUM 40 MG/1
40 TABLET, DELAYED RELEASE ORAL
Status: DISCONTINUED | OUTPATIENT
Start: 2025-03-22 | End: 2025-03-25 | Stop reason: HOSPADM

## 2025-03-21 RX ORDER — CLONIDINE HYDROCHLORIDE 0.1 MG/1
0.2 TABLET ORAL
Status: COMPLETED | OUTPATIENT
Start: 2025-03-21 | End: 2025-03-21

## 2025-03-21 RX ORDER — POLYETHYLENE GLYCOL 3350 17 G/17G
17 POWDER, FOR SOLUTION ORAL DAILY PRN
Status: DISCONTINUED | OUTPATIENT
Start: 2025-03-21 | End: 2025-03-25 | Stop reason: HOSPADM

## 2025-03-21 RX ORDER — ASPIRIN 325 MG
325 TABLET ORAL
Status: COMPLETED | OUTPATIENT
Start: 2025-03-21 | End: 2025-03-21

## 2025-03-21 RX ORDER — MORPHINE SULFATE 4 MG/ML
4 INJECTION, SOLUTION INTRAMUSCULAR; INTRAVENOUS
Status: COMPLETED | OUTPATIENT
Start: 2025-03-21 | End: 2025-03-21

## 2025-03-21 RX ORDER — ACETAMINOPHEN 650 MG/1
650 SUPPOSITORY RECTAL EVERY 6 HOURS PRN
Status: DISCONTINUED | OUTPATIENT
Start: 2025-03-21 | End: 2025-03-25 | Stop reason: HOSPADM

## 2025-03-21 RX ORDER — ONDANSETRON 4 MG/1
4 TABLET, ORALLY DISINTEGRATING ORAL EVERY 8 HOURS PRN
Status: DISCONTINUED | OUTPATIENT
Start: 2025-03-21 | End: 2025-03-25 | Stop reason: HOSPADM

## 2025-03-21 RX ORDER — ONDANSETRON 2 MG/ML
4 INJECTION INTRAMUSCULAR; INTRAVENOUS EVERY 6 HOURS PRN
Status: DISCONTINUED | OUTPATIENT
Start: 2025-03-21 | End: 2025-03-25 | Stop reason: HOSPADM

## 2025-03-21 RX ORDER — ATORVASTATIN CALCIUM 20 MG/1
80 TABLET, FILM COATED ORAL NIGHTLY
Status: DISCONTINUED | OUTPATIENT
Start: 2025-03-21 | End: 2025-03-25 | Stop reason: HOSPADM

## 2025-03-21 RX ORDER — ACETAMINOPHEN 325 MG/1
650 TABLET ORAL EVERY 6 HOURS PRN
Status: DISCONTINUED | OUTPATIENT
Start: 2025-03-21 | End: 2025-03-25 | Stop reason: HOSPADM

## 2025-03-21 RX ORDER — ASPIRIN 81 MG/1
81 TABLET, CHEWABLE ORAL DAILY
Status: DISCONTINUED | OUTPATIENT
Start: 2025-03-22 | End: 2025-03-25 | Stop reason: HOSPADM

## 2025-03-21 RX ORDER — SODIUM CHLORIDE 0.9 % (FLUSH) 0.9 %
5-40 SYRINGE (ML) INJECTION EVERY 12 HOURS SCHEDULED
Status: DISCONTINUED | OUTPATIENT
Start: 2025-03-21 | End: 2025-03-25 | Stop reason: HOSPADM

## 2025-03-21 RX ORDER — SODIUM CHLORIDE 0.9 % (FLUSH) 0.9 %
5-40 SYRINGE (ML) INJECTION PRN
Status: DISCONTINUED | OUTPATIENT
Start: 2025-03-21 | End: 2025-03-25 | Stop reason: SDUPTHER

## 2025-03-21 RX ORDER — DILTIAZEM HYDROCHLORIDE 30 MG/1
30 TABLET, FILM COATED ORAL ONCE
Status: ON HOLD | COMMUNITY
End: 2025-03-25 | Stop reason: HOSPADM

## 2025-03-21 RX ORDER — HYDRALAZINE HYDROCHLORIDE 20 MG/ML
10 INJECTION INTRAMUSCULAR; INTRAVENOUS
Status: COMPLETED | OUTPATIENT
Start: 2025-03-21 | End: 2025-03-21

## 2025-03-21 RX ADMIN — NITROGLYCERIN 0.5 INCH: 20 OINTMENT TOPICAL at 23:33

## 2025-03-21 RX ADMIN — CLONIDINE HYDROCHLORIDE 0.2 MG: 0.1 TABLET ORAL at 14:18

## 2025-03-21 RX ADMIN — ATORVASTATIN CALCIUM 80 MG: 20 TABLET, FILM COATED ORAL at 23:34

## 2025-03-21 RX ADMIN — ASPIRIN 325 MG: 325 TABLET ORAL at 16:07

## 2025-03-21 RX ADMIN — CARVEDILOL 25 MG: 12.5 TABLET, FILM COATED ORAL at 19:57

## 2025-03-21 RX ADMIN — HYDRALAZINE HYDROCHLORIDE 10 MG: 20 INJECTION INTRAMUSCULAR; INTRAVENOUS at 12:53

## 2025-03-21 RX ADMIN — HEPARIN SODIUM 5000 UNITS: 5000 INJECTION INTRAVENOUS; SUBCUTANEOUS at 23:34

## 2025-03-21 RX ADMIN — LACTULOSE 20 G: 10 SOLUTION ORAL at 19:57

## 2025-03-21 RX ADMIN — MORPHINE SULFATE 4 MG: 4 INJECTION, SOLUTION INTRAMUSCULAR; INTRAVENOUS at 12:54

## 2025-03-21 ASSESSMENT — PAIN DESCRIPTION - LOCATION: LOCATION: HEAD

## 2025-03-21 ASSESSMENT — LIFESTYLE VARIABLES
HOW MANY STANDARD DRINKS CONTAINING ALCOHOL DO YOU HAVE ON A TYPICAL DAY: PATIENT DOES NOT DRINK
HOW OFTEN DO YOU HAVE A DRINK CONTAINING ALCOHOL: NEVER

## 2025-03-21 ASSESSMENT — PAIN - FUNCTIONAL ASSESSMENT: PAIN_FUNCTIONAL_ASSESSMENT: 0-10

## 2025-03-21 ASSESSMENT — PAIN SCALES - GENERAL
PAINLEVEL_OUTOF10: 9
PAINLEVEL_OUTOF10: 0

## 2025-03-21 NOTE — ED PROVIDER NOTES
EMERGENCY DEPARTMENT HISTORY AND PHYSICAL EXAM      Date: 3/21/2025  Patient Name: Dale Milton    History of Presenting Illness     Chief Complaint   Patient presents with    Abdominal Pain     bl    Rectal Bleeding       Patient is a 59 male with past medical history of ESRD on hemodialysis Monday Wednesday Friday, anemia, hypertension, presenting to the emergency department for evaluation of abdominal pain.  Patient reports generalized cramping abdominal pain.  Also reported noticing some bright red blood in his stool this morning.  Patient states that the symptoms have been ongoing since Sunday night, 5 days ago.  He did go to his dialysis today and finished entire session but admits that he did miss his Monday and Wednesday sessions due to not feeling well.            PCP: Lake Dickerson, APRN - NP    Current Facility-Administered Medications   Medication Dose Route Frequency Provider Last Rate Last Admin    aspirin tablet 325 mg  325 mg Oral NOW Aurelio Mcnair, DO         Current Outpatient Medications   Medication Sig Dispense Refill    methylPREDNISolone (MEDROL DOSEPACK) 4 MG tablet Take with food. 1 kit 0    albuterol sulfate HFA (PROVENTIL;VENTOLIN;PROAIR) 108 (90 Base) MCG/ACT inhaler Inhale 2 puffs into the lungs every 6 hours as needed for Wheezing 18 g 0    Spacer/Aero-Holding Chambers (COMPACT SPACE CHAMBER) MARIN Please provide one adult spacer to be used with HFA 1 each 0    amLODIPine (NORVASC) 5 MG tablet Take 5 mg by mouth 2 times daily      aspirin 81 MG chewable tablet Take 81 mg by mouth daily      cloNIDine (CATAPRES) 0.2 MG/24HR PTWK Place 1 patch onto the skin every 7 days      ferric citrate (AURYXIA) 1  MG(Fe) TABS tablet Take 420 mg by mouth 3 times daily (with meals)      hydrALAZINE (APRESOLINE) 25 MG tablet Take 25 mg by mouth 3 times daily      lidocaine (LIDODERM) 5 % Apply patch to the affected area for 12 hours a day and remove for 12 hours a day.

## 2025-03-21 NOTE — ED NOTES
ED TO INPATIENT SBAR HANDOFF     Patient Name: Dale Milton   Preferred Name: Dale  : 1965  59 y.o.             Family/Caregiver Present: yes   Code Status Order: No Order  PO Status:[unfilled]  Telemetry Order:   C-SSRS: Risk of Suicide: No Risk  Sitter no   Restraints:    Sepsis Risk Score      Situation:  Chief Complaint   Patient presents with    Abdominal Pain     bl    Rectal Bleeding     Brief Description of Patient's Condition: Stable  Mental Status: oriented and alert  Arrived from: Home  Abnormal labs:   Abnormal Labs Reviewed   CBC WITH AUTO DIFFERENTIAL - Abnormal; Notable for the following components:       Result Value    RBC 3.52 (*)     Hemoglobin 11.3 (*)     Hematocrit 33.8 (*)     Platelets 125 (*)     MPV 12.3 (*)     Lymphocytes % 12.9 (*)     Monocytes % 12.4 (*)     All other components within normal limits   COMPREHENSIVE METABOLIC PANEL - Abnormal; Notable for the following components:    Potassium 3.3 (*)     CO2 35 (*)     BUN 38 (*)     Creatinine 9.73 (*)     BUN/Creatinine Ratio 4 (*)     Est, Glom Filt Rate 6 (*)     Total Bilirubin 1.1 (*)     Albumin 3.0 (*)     All other components within normal limits   TROPONIN - Abnormal; Notable for the following components:    Troponin, High Sensitivity 772 (*)     All other components within normal limits   TROPONIN - Abnormal; Notable for the following components:    Troponin, High Sensitivity 686 (*)     All other components within normal limits        Background:  Allergies: No Known Allergies  History:   Past Medical History:   Diagnosis Date    Chronic kidney disease     ESRD- Dialysis- [facility]    GERD (gastroesophageal reflux disease)     Hypertension 2017    Ill-defined condition     dialysis     Non compliance w medication regimen     noncompliant with HTN meds        Assessment:  Vitals/MEWS:        Vitals:    25 1254 25 1255 25 1410 25 1542   BP: (!) 199/81

## 2025-03-21 NOTE — H&P (VIEW-ONLY)
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   Further recs.   Risk factor modification.      History of Present Illness:   Dale Milton is a 59 y.o.  male who has history of end-stage renal disease on dialysis Monday Wednesday Friday presents to the emergency room with complaints of abdominal pain and bloating and no BM for 4 days associated with some rectal bleeding that has been progressive.  He also complains of difficulty swallowing and vomiting after eating occasionally with hemoptysis he notes that he has had rectal bleeding and some nosebleed and is also coughing up blood at times in the emergency room he was found to have rectal negative Hemoccult his hemoglobin was 11 his last dialysis was today his blood pressure was high at 210 systolic he was given hydralazine and Coreg with some improvement patient admits to only taking hydralazine and has slowly dwindle off his antihypertensives. Cardiology was consulted for further management.     All other systems were reviewed.  They

## 2025-03-22 ENCOUNTER — APPOINTMENT (OUTPATIENT)
Facility: HOSPITAL | Age: 60
DRG: 280 | End: 2025-03-22

## 2025-03-22 PROBLEM — D64.9 ANEMIA: Status: ACTIVE | Noted: 2025-03-22

## 2025-03-22 PROBLEM — I10 HTN (HYPERTENSION): Status: ACTIVE | Noted: 2025-03-22

## 2025-03-22 LAB
ALBUMIN SERPL-MCNC: 2.6 G/DL (ref 3.4–5)
ALBUMIN/GLOB SERPL: 0.8 (ref 0.8–1.7)
ALP SERPL-CCNC: 66 U/L (ref 45–117)
ALT SERPL-CCNC: 16 U/L (ref 16–61)
ANION GAP SERPL CALC-SCNC: 10 MMOL/L (ref 3–18)
ANION GAP SERPL CALC-SCNC: 11 MMOL/L (ref 3–18)
AST SERPL-CCNC: 17 U/L (ref 10–38)
BILIRUB SERPL-MCNC: 0.9 MG/DL (ref 0.2–1)
BUN SERPL-MCNC: 53 MG/DL (ref 7–18)
BUN SERPL-MCNC: 56 MG/DL (ref 7–18)
BUN/CREAT SERPL: 4 (ref 12–20)
BUN/CREAT SERPL: 4 (ref 12–20)
CA-I SERPL-SCNC: 0.9 MMOL/L (ref 1.12–1.32)
CALCIUM SERPL-MCNC: 7.6 MG/DL (ref 8.5–10.1)
CALCIUM SERPL-MCNC: 8 MG/DL (ref 8.5–10.1)
CHLORIDE SERPL-SCNC: 100 MMOL/L (ref 100–111)
CHLORIDE SERPL-SCNC: 98 MMOL/L (ref 100–111)
CHOLEST SERPL-MCNC: 150 MG/DL
CO2 SERPL-SCNC: 29 MMOL/L (ref 21–32)
CO2 SERPL-SCNC: 29 MMOL/L (ref 21–32)
CREAT SERPL-MCNC: 12.7 MG/DL (ref 0.6–1.3)
CREAT SERPL-MCNC: 13 MG/DL (ref 0.6–1.3)
ERYTHROCYTE [DISTWIDTH] IN BLOOD BY AUTOMATED COUNT: 13.8 % (ref 11.6–14.5)
GLOBULIN SER CALC-MCNC: 3.2 G/DL (ref 2–4)
GLUCOSE SERPL-MCNC: 78 MG/DL (ref 74–99)
GLUCOSE SERPL-MCNC: 97 MG/DL (ref 74–99)
HCT VFR BLD AUTO: 32.7 % (ref 36–48)
HDLC SERPL-MCNC: 54 MG/DL (ref 40–60)
HDLC SERPL: 2.8 (ref 0–5)
HGB BLD-MCNC: 10.7 G/DL (ref 13–16)
LDLC SERPL CALC-MCNC: 81.4 MG/DL (ref 0–100)
LIPID PANEL: NORMAL
MAGNESIUM SERPL-MCNC: 2.7 MG/DL (ref 1.6–2.6)
MCH RBC QN AUTO: 31.9 PG (ref 24–34)
MCHC RBC AUTO-ENTMCNC: 32.7 G/DL (ref 31–37)
MCV RBC AUTO: 97.6 FL (ref 78–100)
NRBC # BLD: 0 K/UL (ref 0–0.01)
NRBC BLD-RTO: 0 PER 100 WBC
NT PRO BNP: ABNORMAL PG/ML (ref 0–900)
PHOSPHATE SERPL-MCNC: 5.9 MG/DL (ref 2.5–4.9)
PLATELET # BLD AUTO: 129 K/UL (ref 135–420)
PMV BLD AUTO: 11.7 FL (ref 9.2–11.8)
POTASSIUM SERPL-SCNC: 3.5 MMOL/L (ref 3.5–5.5)
POTASSIUM SERPL-SCNC: 3.8 MMOL/L (ref 3.5–5.5)
PROCALCITONIN SERPL-MCNC: 0.56 NG/ML
PROT SERPL-MCNC: 5.8 G/DL (ref 6.4–8.2)
RBC # BLD AUTO: 3.35 M/UL (ref 4.35–5.65)
SODIUM SERPL-SCNC: 137 MMOL/L (ref 136–145)
SODIUM SERPL-SCNC: 140 MMOL/L (ref 136–145)
TRIGL SERPL-MCNC: 73 MG/DL
TROPONIN I SERPL HS-MCNC: 429 NG/L (ref 0–78)
VLDLC SERPL CALC-MCNC: 14.6 MG/DL
WBC # BLD AUTO: 7.3 K/UL (ref 4.6–13.2)

## 2025-03-22 PROCEDURE — 6370000000 HC RX 637 (ALT 250 FOR IP): Performed by: HOSPITALIST

## 2025-03-22 PROCEDURE — 82330 ASSAY OF CALCIUM: CPT

## 2025-03-22 PROCEDURE — 71275 CT ANGIOGRAPHY CHEST: CPT

## 2025-03-22 PROCEDURE — 2500000003 HC RX 250 WO HCPCS: Performed by: INTERNAL MEDICINE

## 2025-03-22 PROCEDURE — 84100 ASSAY OF PHOSPHORUS: CPT

## 2025-03-22 PROCEDURE — 83880 ASSAY OF NATRIURETIC PEPTIDE: CPT

## 2025-03-22 PROCEDURE — 83735 ASSAY OF MAGNESIUM: CPT

## 2025-03-22 PROCEDURE — 84484 ASSAY OF TROPONIN QUANT: CPT

## 2025-03-22 PROCEDURE — 84145 PROCALCITONIN (PCT): CPT

## 2025-03-22 PROCEDURE — 80053 COMPREHEN METABOLIC PANEL: CPT

## 2025-03-22 PROCEDURE — 6370000000 HC RX 637 (ALT 250 FOR IP): Performed by: INTERNAL MEDICINE

## 2025-03-22 PROCEDURE — 97161 PT EVAL LOW COMPLEX 20 MIN: CPT

## 2025-03-22 PROCEDURE — 92610 EVALUATE SWALLOWING FUNCTION: CPT

## 2025-03-22 PROCEDURE — 97530 THERAPEUTIC ACTIVITIES: CPT

## 2025-03-22 PROCEDURE — 36415 COLL VENOUS BLD VENIPUNCTURE: CPT

## 2025-03-22 PROCEDURE — 80061 LIPID PANEL: CPT

## 2025-03-22 PROCEDURE — 6360000002 HC RX W HCPCS: Performed by: INTERNAL MEDICINE

## 2025-03-22 PROCEDURE — 1100000003 HC PRIVATE W/ TELEMETRY

## 2025-03-22 PROCEDURE — 6360000004 HC RX CONTRAST MEDICATION: Performed by: INTERNAL MEDICINE

## 2025-03-22 PROCEDURE — 85027 COMPLETE CBC AUTOMATED: CPT

## 2025-03-22 RX ORDER — IOPAMIDOL 755 MG/ML
100 INJECTION, SOLUTION INTRAVASCULAR
Status: COMPLETED | OUTPATIENT
Start: 2025-03-22 | End: 2025-03-22

## 2025-03-22 RX ORDER — OXYCODONE AND ACETAMINOPHEN 10; 325 MG/1; MG/1
1 TABLET ORAL EVERY 6 HOURS PRN
Refills: 0 | Status: DISCONTINUED | OUTPATIENT
Start: 2025-03-22 | End: 2025-03-25 | Stop reason: HOSPADM

## 2025-03-22 RX ORDER — HYDRALAZINE HYDROCHLORIDE 25 MG/1
25 TABLET, FILM COATED ORAL 3 TIMES DAILY
Status: DISCONTINUED | OUTPATIENT
Start: 2025-03-22 | End: 2025-03-23

## 2025-03-22 RX ORDER — SEVELAMER CARBONATE 800 MG/1
800 TABLET, FILM COATED ORAL
Status: DISCONTINUED | OUTPATIENT
Start: 2025-03-22 | End: 2025-03-25 | Stop reason: HOSPADM

## 2025-03-22 RX ADMIN — HYDRALAZINE HYDROCHLORIDE 25 MG: 25 TABLET ORAL at 13:26

## 2025-03-22 RX ADMIN — LACTULOSE 20 G: 10 SOLUTION ORAL at 09:03

## 2025-03-22 RX ADMIN — SODIUM CHLORIDE, PRESERVATIVE FREE 10 ML: 5 INJECTION INTRAVENOUS at 20:38

## 2025-03-22 RX ADMIN — OXYCODONE AND ACETAMINOPHEN 1 TABLET: 10; 325 TABLET ORAL at 23:14

## 2025-03-22 RX ADMIN — ACETAMINOPHEN 650 MG: 325 TABLET ORAL at 13:30

## 2025-03-22 RX ADMIN — SODIUM CHLORIDE, PRESERVATIVE FREE 10 ML: 5 INJECTION INTRAVENOUS at 09:06

## 2025-03-22 RX ADMIN — IOPAMIDOL 100 ML: 755 INJECTION, SOLUTION INTRAVENOUS at 00:54

## 2025-03-22 RX ADMIN — HEPARIN SODIUM 5000 UNITS: 5000 INJECTION INTRAVENOUS; SUBCUTANEOUS at 22:11

## 2025-03-22 RX ADMIN — HEPARIN SODIUM 5000 UNITS: 5000 INJECTION INTRAVENOUS; SUBCUTANEOUS at 13:27

## 2025-03-22 RX ADMIN — PANTOPRAZOLE SODIUM 40 MG: 40 TABLET, DELAYED RELEASE ORAL at 06:46

## 2025-03-22 RX ADMIN — ATORVASTATIN CALCIUM 80 MG: 20 TABLET, FILM COATED ORAL at 20:37

## 2025-03-22 RX ADMIN — SEVELAMER CARBONATE 800 MG: 800 TABLET, FILM COATED ORAL at 18:40

## 2025-03-22 RX ADMIN — SEVELAMER CARBONATE 800 MG: 800 TABLET, FILM COATED ORAL at 13:26

## 2025-03-22 RX ADMIN — CARVEDILOL 25 MG: 12.5 TABLET, FILM COATED ORAL at 09:03

## 2025-03-22 RX ADMIN — NITROGLYCERIN 0.5 INCH: 20 OINTMENT TOPICAL at 18:37

## 2025-03-22 RX ADMIN — NITROGLYCERIN 0.5 INCH: 20 OINTMENT TOPICAL at 23:17

## 2025-03-22 RX ADMIN — HYDRALAZINE HYDROCHLORIDE 25 MG: 25 TABLET ORAL at 20:36

## 2025-03-22 RX ADMIN — CARVEDILOL 25 MG: 12.5 TABLET, FILM COATED ORAL at 18:39

## 2025-03-22 RX ADMIN — NITROGLYCERIN 0.5 INCH: 20 OINTMENT TOPICAL at 06:46

## 2025-03-22 RX ADMIN — LACTULOSE 20 G: 10 SOLUTION ORAL at 20:35

## 2025-03-22 RX ADMIN — NITROGLYCERIN 0.5 INCH: 20 OINTMENT TOPICAL at 13:15

## 2025-03-22 RX ADMIN — ASPIRIN 81 MG: 81 TABLET, CHEWABLE ORAL at 09:03

## 2025-03-22 RX ADMIN — HEPARIN SODIUM 5000 UNITS: 5000 INJECTION INTRAVENOUS; SUBCUTANEOUS at 06:46

## 2025-03-22 RX ADMIN — LOSARTAN POTASSIUM 25 MG: 25 TABLET, FILM COATED ORAL at 09:03

## 2025-03-22 ASSESSMENT — PAIN SCALES - GENERAL
PAINLEVEL_OUTOF10: 0
PAINLEVEL_OUTOF10: 5
PAINLEVEL_OUTOF10: 8
PAINLEVEL_OUTOF10: 1
PAINLEVEL_OUTOF10: 6

## 2025-03-22 ASSESSMENT — PAIN DESCRIPTION - DESCRIPTORS
DESCRIPTORS: ACHING;DISCOMFORT;DULL
DESCRIPTORS: ACHING;DISCOMFORT

## 2025-03-22 ASSESSMENT — PAIN DESCRIPTION - LOCATION
LOCATION: BACK

## 2025-03-22 ASSESSMENT — PAIN DESCRIPTION - ORIENTATION: ORIENTATION: MID;LOWER

## 2025-03-22 NOTE — H&P
History & Physical    Patient: Dale Milton MRN: [id number]  CSN: [id number]    YOB: 1965  Age: 59 y.o.  Sex: male      DOA: 3/21/2025    Chief Complaint:   Chief Complaint   Patient presents with    Abdominal Pain     bl    Rectal Bleeding       Active Hospital Problems    Diagnosis Date Noted    NSTEMI (non-ST elevated myocardial infarction) (HCC) [I21.4] 03/21/2025     Priority: Low          HPI:     Dale Milton is a 59 y.o.  male who has history of end-stage renal disease on dialysis Monday Wednesday Friday presents to the emergency room with complaints of abdominal pain and bloating and no BM for 4 days associated with some rectal bleeding that has been progressive.  He also complains of difficulty swallowing and vomiting after eating occasionally with hemoptysis he notes that he has had rectal bleeding and some nosebleed and is also coughing up blood at times in the emergency room he was found to have rectal negative Hemoccult his hemoglobin was 11 his last dialysis was today his blood pressure was high at 210 systolic he was given hydralazine and Coreg with some improvement patient admits to only taking hydralazine and has slowly dwindle off his antihypertensives.  He also had bad headache with nasal congestion  In the ER his troponin was found to be over 700 cardiology was consulted and recommended against heparin due to rectal bleeding patient also noted some epi taxis which is currently not present his wife and child is at bedside    Past Medical History:   Diagnosis Date    Chronic kidney disease     ESRD- Dialysis- [address]    GERD (gastroesophageal reflux disease)     Hypertension 07/2017    Ill-defined condition     dialysis monday wednesday friday    Non compliance w medication regimen     noncompliant with HTN meds       Past Surgical History:   Procedure Laterality Date    IR NONTUNNELED VASCULAR CATHETER > 5 YEARS  8/4/2017    IR NONTUNNELED VASCULAR CATHETER

## 2025-03-22 NOTE — PLAN OF CARE
Problem: Discharge Planning  Goal: Discharge to home or other facility with appropriate resources  Outcome: Progressing  Flowsheets (Taken 3/21/2025 2000)  Discharge to home or other facility with appropriate resources:   Identify barriers to discharge with patient and caregiver   Arrange for needed discharge resources and transportation as appropriate     Problem: Pain  Goal: Verbalizes/displays adequate comfort level or baseline comfort level  Outcome: Progressing

## 2025-03-22 NOTE — PLAN OF CARE
Problem: Discharge Planning  Goal: Discharge to home or other facility with appropriate resources  3/22/2025 1414 by Naima Odell RN  Outcome: Progressing  3/22/2025 0344 by Catarina Nation RN  Outcome: Progressing  Flowsheets (Taken 3/21/2025 2000)  Discharge to home or other facility with appropriate resources:   Identify barriers to discharge with patient and caregiver   Arrange for needed discharge resources and transportation as appropriate     Problem: Pain  Goal: Verbalizes/displays adequate comfort level or baseline comfort level  3/22/2025 1414 by Naima Odell RN  Outcome: Progressing  3/22/2025 0344 by Catarina Nation RN  Outcome: Progressing     Problem: Discharge Planning  Goal: Discharge to home or other facility with appropriate resources  3/22/2025 1414 by Naima Odell RN  Outcome: Progressing  3/22/2025 0344 by Catarina Nation RN  Outcome: Progressing  Flowsheets (Taken 3/21/2025 2000)  Discharge to home or other facility with appropriate resources:   Identify barriers to discharge with patient and caregiver   Arrange for needed discharge resources and transportation as appropriate     Problem: Pain  Goal: Verbalizes/displays adequate comfort level or baseline comfort level  3/22/2025 1414 by Naima Odell RN  Outcome: Progressing  3/22/2025 0344 by Catarina Nation RN  Outcome: Progressing

## 2025-03-22 NOTE — PLAN OF CARE
Problem: SLP Adult - Impaired Swallowing  Goal: By Discharge: Advance to least restrictive diet without signs or symptoms of aspiration for planned discharge setting.  See evaluation for individualized goals.  Description: Description: Patient will:  1. Tolerate PO trials with 0 s/s overt distress in 4/5 trials  2. Utilize compensatory swallow strategies/maneuvers (decrease bite/sip, size/rate, alt. liq/sol) with min cues in 4/5 trials    Rec:     Regular diet with thin liquids  Aspiration precautions  HOB >45 during po intake, remain >30 for 30-45 minutes after po   Small bites/sips; alternate liquid/solid with slow feeding rate   Oral care TID  Meds per pt preference    Outcome: Progressing   SPEECH LANGUAGE PATHOLOGY BEDSIDE SWALLOW EVALUATION    Patient: Dale Milton (59 y.o. male)  Date: 3/22/2025  Primary Diagnosis: Rectal bleeding [K62.5]  Essential hypertension [I10]  ESRD (end stage renal disease) ([facility]) [N18.6]  NSTEMI (non-ST elevated myocardial infarction) ([facility]) [I21.4]  Chest pain, unspecified type [R07.9]       Precautions: Aspiration  PLOF: As per H&P  ASSESSMENT:  Based on the objective data described below, the patient presents with oropharyngeal swallow fxn essentially WFL. Strength, ROM, and coordination of the orofacial musculature were all found to be WFL. Pt tolerated reg solid, puree, and thin liquids +/- straw consecutive swallows without any overt s/sx of aspiration. Mastication was appropriate with timely a-p transit and positive oral clearance observed across all trials. Laryngeal elevation was functional/timely to palpation with all PO trials. Pt safe for initiation of reg solid diet with thin liquids, aspiration precautions, oral care TID, and meds as tolerated. ST will continue to follow x 1-2 visits to ensure safety of diet tolerance.    Patient will benefit from skilled intervention to address the above impairments.  Patient's rehabilitation potential fair.   Factors which may

## 2025-03-22 NOTE — CONSULTS
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   Further recs.   Risk factor modification.      History of Present Illness:   Dale Milton is a 59 y.o.  male who has history of end-stage renal disease on dialysis Monday Wednesday Friday presents to the emergency room with complaints of abdominal pain and bloating and no BM for 4 days associated with some rectal bleeding that has been progressive.  He also complains of difficulty swallowing and vomiting after eating occasionally with hemoptysis he notes that he has had rectal bleeding and some nosebleed and is also coughing up blood at times in the emergency room he was found to have rectal negative Hemoccult his hemoglobin was 11 his last dialysis was today his blood pressure was high at 210 systolic he was given hydralazine and Coreg with some improvement patient admits to only taking hydralazine and has slowly dwindle off his antihypertensives. Cardiology was consulted for further management.     All other systems were reviewed.  They 
signs:   BP (!) 172/85   Pulse 66   Temp 98.2 °F (36.8 °C) (Oral)   Resp 18   Ht 1.651 m (5' 5\")   Wt 50.8 kg (112 lb)   SpO2 95%   BMI 18.64 kg/m²   No intake or output data in the 24 hours ending 03/22/25 0954    Physical Exam:    General: No acute distress   HENT: Atraumatic and normocephalic   Eyes: Normal conjunctiva, EOMI   Neck: Supple with no mass   Cardiovascular: Normal S1 & S2, no m/r/g   Pulmonary/Chest Wall: Clear to auscultation bilaterally, no w/c   Abdominal: Soft and non-tender, normal active bowel sound   Musculoskeletal: trace edema lower ext bilaterraly   Skin: No lesions   Neurological: No focal neurological deficits       Data Review:    Recent Labs     03/21/25  1134 03/22/25  0009 03/22/25  0402    140 137   K 3.3* 3.5 3.8    100 98*   CO2 35* 29 29   BUN 38* 53* 56*   GLOB 3.7  --  3.2       Recent Labs     03/21/25  1134 03/22/25  0402   WBC 7.4 7.3   RBC 3.52* 3.35*   HGB 11.3* 10.7*   HCT 33.8* 32.7*   * 129*         Radiology:    reviewed        Impression:     ESRD on HD MWF.  Last HD on Friday prior to admission  Abd pain with constipation  Troponin elevations  HTN  Anemia     Plan:     No indication for HD today  Cont cardiac eval per team  Working on BP control, agree with adding po hydralazine tid.  Uptitrate as indicated  AM renal panel and cbc  D/w pt    Thanks for inviting us to participate in this patient's medical care.  We'll follow the patient closely with the medical team.    Daniella Paul MD  [facility]  [phone]

## 2025-03-22 NOTE — CARE COORDINATION
03/22/25 1712   Service Assessment   Patient Orientation Alert and Oriented   Cognition Alert   History Provided By Patient   Primary Caregiver Self   Support Systems Children   Patient's Healthcare Decision Maker is: Legal Next of Kin   PCP Verified by CM Yes   Last Visit to PCP Within last 3 months   Prior Functional Level Independent in ADLs/IADLs   Current Functional Level Independent in ADLs/IADLs   Can patient return to prior living arrangement Yes   Ability to make needs known: Good   Family able to assist with home care needs: Yes   Would you like for me to discuss the discharge plan with any other family members/significant others, and if so, who? No   Financial Resources Medicare   Social/Functional History   Lives With Alone   Type of Home House   Home Layout One level   Home Access Level entry   Bathroom Shower/Tub Tub/Shower unit   Bathroom Toilet Standard   Bathroom Equipment None   Bathroom Accessibility Accessible   Home Equipment None   Receives Help From Family   Prior Level of Assist for ADLs Independent   Prior Level of Assist for Homemaking Independent   Ambulation Assistance Independent   Prior Level of Assist for Transfers Independent   Active  No   Patient's  Info BUS   Discharge Planning   Type of Residence House   Living Arrangements Alone   Current Services Prior To Admission None   Potential Assistance Needed N/A   DME Ordered? No   Potential Assistance Purchasing Medications No   Type of Home Care Services None   Patient expects to be discharged to: House   Services At/After Discharge   [place] Resource Information Provided? No   Mode of Transport at Discharge Self   Confirm Follow Up Transport Self   Condition of Participation: Discharge Planning   The Plan for Transition of Care is related to the following treatment goals: DISPO HOME   The Patient and/or Patient Representative was provided with a Choice of Provider? Patient   The Patient and/Or Patient Representative 
  The Patient and/or Patient Representative was provided with a Choice of Provider? Patient   The Patient and/Or Patient Representative agree with the Discharge Plan? Yes   Freedom of Choice list was provided with basic dialogue that supports the patient's individualized plan of care/goals, treatment preferences, and shares the quality data associated with the providers?  Yes

## 2025-03-23 LAB
ALBUMIN SERPL-MCNC: 2.8 G/DL (ref 3.4–5)
ALBUMIN/GLOB SERPL: 0.8 (ref 0.8–1.7)
ALP SERPL-CCNC: 65 U/L (ref 45–117)
ALT SERPL-CCNC: 16 U/L (ref 16–61)
ANION GAP SERPL CALC-SCNC: 11 MMOL/L (ref 3–18)
AST SERPL-CCNC: 16 U/L (ref 10–38)
BILIRUB SERPL-MCNC: 0.5 MG/DL (ref 0.2–1)
BUN SERPL-MCNC: 65 MG/DL (ref 7–18)
BUN/CREAT SERPL: 4 (ref 12–20)
CA-I SERPL-SCNC: 0.92 MMOL/L (ref 1.12–1.32)
CALCIUM SERPL-MCNC: 7.8 MG/DL (ref 8.5–10.1)
CHLORIDE SERPL-SCNC: 98 MMOL/L (ref 100–111)
CO2 SERPL-SCNC: 28 MMOL/L (ref 21–32)
CREAT SERPL-MCNC: 14.9 MG/DL (ref 0.6–1.3)
ECHO AO ASC DIAM: 2.9 CM
ECHO AO ASCENDING AORTA INDEX: 1.87 CM/M2
ECHO AO ROOT DIAM: 2.5 CM
ECHO AO ROOT INDEX: 1.61 CM/M2
ECHO AV AREA PEAK VELOCITY: 2.4 CM2
ECHO AV AREA VTI: 2.6 CM2
ECHO AV AREA/BSA PEAK VELOCITY: 1.5 CM2/M2
ECHO AV AREA/BSA VTI: 1.7 CM2/M2
ECHO AV MEAN GRADIENT: 8 MMHG
ECHO AV MEAN VELOCITY: 1.3 M/S
ECHO AV PEAK GRADIENT: 17 MMHG
ECHO AV PEAK VELOCITY: 2 M/S
ECHO AV VELOCITY RATIO: 0.8
ECHO AV VTI: 41.8 CM
ECHO BSA: 1.53 M2
ECHO EST RA PRESSURE: 8 MMHG
ECHO LA DIAMETER INDEX: 2.52 CM/M2
ECHO LA DIAMETER: 3.9 CM
ECHO LA TO AORTIC ROOT RATIO: 1.56
ECHO LA VOL A-L A2C: 66 ML (ref 18–58)
ECHO LA VOL A-L A4C: 125 ML (ref 18–58)
ECHO LA VOL BP: 91 ML (ref 18–58)
ECHO LA VOL MOD A2C: 61 ML (ref 18–58)
ECHO LA VOL MOD A4C: 119 ML (ref 18–58)
ECHO LA VOL/BSA BIPLANE: 59 ML/M2 (ref 16–34)
ECHO LA VOLUME AREA LENGTH: 97 ML
ECHO LA VOLUME INDEX A-L A2C: 43 ML/M2 (ref 16–34)
ECHO LA VOLUME INDEX A-L A4C: 81 ML/M2 (ref 16–34)
ECHO LA VOLUME INDEX AREA LENGTH: 63 ML/M2 (ref 16–34)
ECHO LA VOLUME INDEX MOD A2C: 39 ML/M2 (ref 16–34)
ECHO LA VOLUME INDEX MOD A4C: 77 ML/M2 (ref 16–34)
ECHO LV E' LATERAL VELOCITY: 7.16 CM/S
ECHO LV E' SEPTAL VELOCITY: 6.8 CM/S
ECHO LV EDV A2C: 59 ML
ECHO LV EDV A4C: 53 ML
ECHO LV EDV BP: 58 ML (ref 67–155)
ECHO LV EDV INDEX A4C: 34 ML/M2
ECHO LV EDV INDEX BP: 37 ML/M2
ECHO LV EDV NDEX A2C: 38 ML/M2
ECHO LV EF PHYSICIAN: 70 %
ECHO LV EJECTION FRACTION A2C: 59 %
ECHO LV EJECTION FRACTION A2C: 73 %
ECHO LV EJECTION FRACTION A4C: 63 %
ECHO LV EJECTION FRACTION A4C: 73 %
ECHO LV EJECTION FRACTION BIPLANE: 72 % (ref 55–100)
ECHO LV ESV A2C: 16 ML
ECHO LV ESV A4C: 14 ML
ECHO LV ESV BP: 16 ML (ref 22–58)
ECHO LV ESV INDEX A2C: 10 ML/M2
ECHO LV ESV INDEX A4C: 9 ML/M2
ECHO LV ESV INDEX BP: 10 ML/M2
ECHO LV FRACTIONAL SHORTENING: 40 % (ref 28–44)
ECHO LV GLOBAL LONGITUDINAL STRAIN (GLS): -18.4 %
ECHO LV INTERNAL DIMENSION DIASTOLE INDEX: 2.77 CM/M2
ECHO LV INTERNAL DIMENSION DIASTOLIC: 4.3 CM (ref 4.2–5.9)
ECHO LV INTERNAL DIMENSION SYSTOLIC INDEX: 1.68 CM/M2
ECHO LV INTERNAL DIMENSION SYSTOLIC: 2.6 CM
ECHO LV IVSD: 1.7 CM (ref 0.6–1)
ECHO LV MASS 2D: 271.6 G (ref 88–224)
ECHO LV MASS INDEX 2D: 175.2 G/M2 (ref 49–115)
ECHO LV POSTERIOR WALL DIASTOLIC: 1.4 CM (ref 0.6–1)
ECHO LV RELATIVE WALL THICKNESS RATIO: 0.65
ECHO LVOT AREA: 3.1 CM2
ECHO LVOT AV VTI INDEX: 0.87
ECHO LVOT DIAM: 2 CM
ECHO LVOT MEAN GRADIENT: 5 MMHG
ECHO LVOT PEAK GRADIENT: 11 MMHG
ECHO LVOT PEAK VELOCITY: 1.6 M/S
ECHO LVOT STROKE VOLUME INDEX: 73.5 ML/M2
ECHO LVOT SV: 114 ML
ECHO LVOT VTI: 36.3 CM
ECHO MV A VELOCITY: 1.26 M/S
ECHO MV AREA VTI: 2.5 CM2
ECHO MV E DECELERATION TIME (DT): 235.5 MS
ECHO MV E VELOCITY: 1.2 M/S
ECHO MV E/A RATIO: 0.95
ECHO MV E/E' LATERAL: 16.76
ECHO MV E/E' RATIO (AVERAGED): 17.2
ECHO MV E/E' SEPTAL: 17.65
ECHO MV LVOT VTI INDEX: 1.26
ECHO MV MAX VELOCITY: 1.3 M/S
ECHO MV MEAN GRADIENT: 3 MMHG
ECHO MV MEAN VELOCITY: 0.8 M/S
ECHO MV PEAK GRADIENT: 7 MMHG
ECHO MV VTI: 45.6 CM
ECHO PV MAX VELOCITY: 1.3 M/S
ECHO PV MEAN GRADIENT: 4 MMHG
ECHO PV MEAN VELOCITY: 0.9 M/S
ECHO PV PEAK GRADIENT: 7 MMHG
ECHO RA END SYSTOLIC VOLUME APICAL 4 CHAMBER INDEX BSA: 46 ML/M2
ECHO RA VOLUME: 71 ML
ECHO RIGHT VENTRICULAR SYSTOLIC PRESSURE (RVSP): 60 MMHG
ECHO RV FREE WALL PEAK S': 15.4 CM/S
ECHO RV INTERNAL DIMENSION: 3.9 CM
ECHO RV TAPSE: 2.7 CM (ref 1.7–?)
ECHO RVOT MEAN GRADIENT: 2 MMHG
ECHO RVOT PEAK GRADIENT: 5 MMHG
ECHO RVOT PEAK VELOCITY: 1.1 M/S
ECHO RVOT VTI: 23.7 CM
ECHO TV REGURGITANT MAX VELOCITY: 3.6 M/S
ECHO TV REGURGITANT PEAK GRADIENT: 52 MMHG
ERYTHROCYTE [DISTWIDTH] IN BLOOD BY AUTOMATED COUNT: 13.7 % (ref 11.6–14.5)
GLOBULIN SER CALC-MCNC: 3.3 G/DL (ref 2–4)
GLUCOSE SERPL-MCNC: 145 MG/DL (ref 74–99)
HCT VFR BLD AUTO: 33.9 % (ref 36–48)
HGB BLD-MCNC: 10.8 G/DL (ref 13–16)
MAGNESIUM SERPL-MCNC: 3 MG/DL (ref 1.6–2.6)
MCH RBC QN AUTO: 31.9 PG (ref 24–34)
MCHC RBC AUTO-ENTMCNC: 31.9 G/DL (ref 31–37)
MCV RBC AUTO: 100 FL (ref 78–100)
NRBC # BLD: 0 K/UL (ref 0–0.01)
NRBC BLD-RTO: 0 PER 100 WBC
PHOSPHATE SERPL-MCNC: 5.6 MG/DL (ref 2.5–4.9)
PLATELET # BLD AUTO: 155 K/UL (ref 135–420)
PMV BLD AUTO: 12.2 FL (ref 9.2–11.8)
POTASSIUM SERPL-SCNC: 3.9 MMOL/L (ref 3.5–5.5)
PROT SERPL-MCNC: 6.1 G/DL (ref 6.4–8.2)
RBC # BLD AUTO: 3.39 M/UL (ref 4.35–5.65)
SODIUM SERPL-SCNC: 137 MMOL/L (ref 136–145)
TROPONIN I SERPL HS-MCNC: 190 NG/L (ref 0–78)
TROPONIN I SERPL HS-MCNC: 205 NG/L (ref 0–78)
WBC # BLD AUTO: 9.5 K/UL (ref 4.6–13.2)

## 2025-03-23 PROCEDURE — 82330 ASSAY OF CALCIUM: CPT

## 2025-03-23 PROCEDURE — 85027 COMPLETE CBC AUTOMATED: CPT

## 2025-03-23 PROCEDURE — 36415 COLL VENOUS BLD VENIPUNCTURE: CPT

## 2025-03-23 PROCEDURE — 80053 COMPREHEN METABOLIC PANEL: CPT

## 2025-03-23 PROCEDURE — 84100 ASSAY OF PHOSPHORUS: CPT

## 2025-03-23 PROCEDURE — 1100000003 HC PRIVATE W/ TELEMETRY

## 2025-03-23 PROCEDURE — 83735 ASSAY OF MAGNESIUM: CPT

## 2025-03-23 PROCEDURE — 6360000002 HC RX W HCPCS: Performed by: HOSPITALIST

## 2025-03-23 PROCEDURE — 2500000003 HC RX 250 WO HCPCS: Performed by: INTERNAL MEDICINE

## 2025-03-23 PROCEDURE — 6370000000 HC RX 637 (ALT 250 FOR IP): Performed by: INTERNAL MEDICINE

## 2025-03-23 PROCEDURE — 6360000002 HC RX W HCPCS: Performed by: INTERNAL MEDICINE

## 2025-03-23 PROCEDURE — 93306 TTE W/DOPPLER COMPLETE: CPT | Performed by: INTERNAL MEDICINE

## 2025-03-23 PROCEDURE — 93356 MYOCRD STRAIN IMG SPCKL TRCK: CPT | Performed by: INTERNAL MEDICINE

## 2025-03-23 PROCEDURE — 84484 ASSAY OF TROPONIN QUANT: CPT

## 2025-03-23 PROCEDURE — 6370000000 HC RX 637 (ALT 250 FOR IP): Performed by: HOSPITALIST

## 2025-03-23 RX ORDER — HYDRALAZINE HYDROCHLORIDE 20 MG/ML
10 INJECTION INTRAMUSCULAR; INTRAVENOUS EVERY 6 HOURS PRN
Status: DISCONTINUED | OUTPATIENT
Start: 2025-03-23 | End: 2025-03-25 | Stop reason: HOSPADM

## 2025-03-23 RX ORDER — HYDRALAZINE HYDROCHLORIDE 50 MG/1
50 TABLET, FILM COATED ORAL 3 TIMES DAILY
Status: DISCONTINUED | OUTPATIENT
Start: 2025-03-23 | End: 2025-03-24

## 2025-03-23 RX ADMIN — NITROGLYCERIN 0.5 INCH: 20 OINTMENT TOPICAL at 05:51

## 2025-03-23 RX ADMIN — HYDRALAZINE HYDROCHLORIDE 25 MG: 25 TABLET ORAL at 10:26

## 2025-03-23 RX ADMIN — ATORVASTATIN CALCIUM 80 MG: 20 TABLET, FILM COATED ORAL at 19:53

## 2025-03-23 RX ADMIN — CARVEDILOL 25 MG: 12.5 TABLET, FILM COATED ORAL at 10:27

## 2025-03-23 RX ADMIN — OXYCODONE AND ACETAMINOPHEN 1 TABLET: 10; 325 TABLET ORAL at 17:43

## 2025-03-23 RX ADMIN — HYDRALAZINE HYDROCHLORIDE 10 MG: 20 INJECTION INTRAMUSCULAR; INTRAVENOUS at 18:38

## 2025-03-23 RX ADMIN — PANTOPRAZOLE SODIUM 40 MG: 40 TABLET, DELAYED RELEASE ORAL at 05:50

## 2025-03-23 RX ADMIN — HYDRALAZINE HYDROCHLORIDE 50 MG: 50 TABLET ORAL at 12:04

## 2025-03-23 RX ADMIN — SEVELAMER CARBONATE 800 MG: 800 TABLET, FILM COATED ORAL at 10:27

## 2025-03-23 RX ADMIN — ASPIRIN 81 MG: 81 TABLET, CHEWABLE ORAL at 10:27

## 2025-03-23 RX ADMIN — NITROGLYCERIN 0.5 INCH: 20 OINTMENT TOPICAL at 17:40

## 2025-03-23 RX ADMIN — HEPARIN SODIUM 5000 UNITS: 5000 INJECTION INTRAVENOUS; SUBCUTANEOUS at 14:27

## 2025-03-23 RX ADMIN — NITROGLYCERIN 0.5 INCH: 20 OINTMENT TOPICAL at 12:04

## 2025-03-23 RX ADMIN — LOSARTAN POTASSIUM 25 MG: 25 TABLET, FILM COATED ORAL at 10:26

## 2025-03-23 RX ADMIN — LACTULOSE 20 G: 10 SOLUTION ORAL at 10:26

## 2025-03-23 RX ADMIN — CARVEDILOL 25 MG: 12.5 TABLET, FILM COATED ORAL at 17:40

## 2025-03-23 RX ADMIN — SODIUM CHLORIDE, PRESERVATIVE FREE 10 ML: 5 INJECTION INTRAVENOUS at 10:27

## 2025-03-23 RX ADMIN — SEVELAMER CARBONATE 800 MG: 800 TABLET, FILM COATED ORAL at 17:40

## 2025-03-23 RX ADMIN — LACTULOSE 20 G: 10 SOLUTION ORAL at 19:53

## 2025-03-23 RX ADMIN — SEVELAMER CARBONATE 800 MG: 800 TABLET, FILM COATED ORAL at 12:04

## 2025-03-23 RX ADMIN — HYDRALAZINE HYDROCHLORIDE 50 MG: 50 TABLET ORAL at 19:53

## 2025-03-23 RX ADMIN — OXYCODONE AND ACETAMINOPHEN 1 TABLET: 10; 325 TABLET ORAL at 10:39

## 2025-03-23 RX ADMIN — HEPARIN SODIUM 5000 UNITS: 5000 INJECTION INTRAVENOUS; SUBCUTANEOUS at 05:50

## 2025-03-23 RX ADMIN — HEPARIN SODIUM 5000 UNITS: 5000 INJECTION INTRAVENOUS; SUBCUTANEOUS at 19:55

## 2025-03-23 ASSESSMENT — PAIN SCALES - GENERAL
PAINLEVEL_OUTOF10: 5
PAINLEVEL_OUTOF10: 7
PAINLEVEL_OUTOF10: 6
PAINLEVEL_OUTOF10: 7
PAINLEVEL_OUTOF10: 5
PAINLEVEL_OUTOF10: 3

## 2025-03-23 ASSESSMENT — PAIN DESCRIPTION - LOCATION
LOCATION: BACK;NECK
LOCATION: BACK

## 2025-03-23 ASSESSMENT — PAIN DESCRIPTION - ORIENTATION
ORIENTATION: MID;LOWER
ORIENTATION: LEFT

## 2025-03-23 ASSESSMENT — PAIN DESCRIPTION - DESCRIPTORS: DESCRIPTORS: ACHING;DISCOMFORT

## 2025-03-23 NOTE — PLAN OF CARE
Problem: Discharge Planning  Goal: Discharge to home or other facility with appropriate resources  3/22/2025 2215 by Darleen Benitez, RN  Outcome: Progressing  Flowsheets (Taken 3/22/2025 2215)  Discharge to home or other facility with appropriate resources:   Identify barriers to discharge with patient and caregiver   Arrange for needed discharge resources and transportation as appropriate  3/22/2025 1414 by Naima Odell RN  Outcome: Progressing     Problem: Pain  Goal: Verbalizes/displays adequate comfort level or baseline comfort level  3/22/2025 2215 by Darleen Bentiez, RN  Outcome: Progressing  Flowsheets (Taken 3/22/2025 2215)  Verbalizes/displays adequate comfort level or baseline comfort level:   Encourage patient to monitor pain and request assistance   Assess pain using appropriate pain scale  3/22/2025 1414 by Naima Odell RN  Outcome: Progressing

## 2025-03-23 NOTE — PLAN OF CARE
Problem: Discharge Planning  Goal: Discharge to home or other facility with appropriate resources  3/23/2025 1100 by Nadiya Rodgers RN  Outcome: Progressing  3/22/2025 2215 by Darleen Benitez RN  Outcome: Progressing  Flowsheets (Taken 3/22/2025 2215)  Discharge to home or other facility with appropriate resources:   Identify barriers to discharge with patient and caregiver   Arrange for needed discharge resources and transportation as appropriate     Problem: Pain  Goal: Verbalizes/displays adequate comfort level or baseline comfort level  3/23/2025 1100 by Nadiya Rodgers, RN  Outcome: Progressing  3/22/2025 2215 by Darleen Benitez RN  Outcome: Progressing  Flowsheets (Taken 3/22/2025 2215)  Verbalizes/displays adequate comfort level or baseline comfort level:   Encourage patient to monitor pain and request assistance   Assess pain using appropriate pain scale

## 2025-03-24 LAB
ALBUMIN SERPL-MCNC: 2.5 G/DL (ref 3.4–5)
ALBUMIN/GLOB SERPL: 0.7 (ref 0.8–1.7)
ALP SERPL-CCNC: 63 U/L (ref 45–117)
ALT SERPL-CCNC: 15 U/L (ref 16–61)
ANION GAP SERPL CALC-SCNC: 10 MMOL/L (ref 3–18)
AST SERPL-CCNC: 14 U/L (ref 10–38)
BASOPHILS # BLD: 0.04 K/UL (ref 0–0.1)
BASOPHILS NFR BLD: 0.4 % (ref 0–2)
BILIRUB SERPL-MCNC: 0.6 MG/DL (ref 0.2–1)
BUN SERPL-MCNC: 73 MG/DL (ref 7–18)
BUN/CREAT SERPL: 4 (ref 12–20)
CA-I SERPL-SCNC: 0.99 MMOL/L (ref 1.12–1.32)
CALCIUM SERPL-MCNC: 7.7 MG/DL (ref 8.5–10.1)
CHLORIDE SERPL-SCNC: 98 MMOL/L (ref 100–111)
CO2 SERPL-SCNC: 27 MMOL/L (ref 21–32)
CREAT SERPL-MCNC: 17.5 MG/DL (ref 0.6–1.3)
DIFFERENTIAL METHOD BLD: ABNORMAL
ECHO BSA: 1.53 M2
EKG ATRIAL RATE: 63 BPM
EKG DIAGNOSIS: NORMAL
EKG P AXIS: 71 DEGREES
EKG P-R INTERVAL: 154 MS
EKG Q-T INTERVAL: 510 MS
EKG QRS DURATION: 100 MS
EKG QTC CALCULATION (BAZETT): 521 MS
EKG R AXIS: 44 DEGREES
EKG T AXIS: 59 DEGREES
EKG VENTRICULAR RATE: 63 BPM
EOSINOPHIL # BLD: 0.35 K/UL (ref 0–0.4)
EOSINOPHIL NFR BLD: 3.8 % (ref 0–5)
ERYTHROCYTE [DISTWIDTH] IN BLOOD BY AUTOMATED COUNT: 13.6 % (ref 11.6–14.5)
GLOBULIN SER CALC-MCNC: 3.6 G/DL (ref 2–4)
GLUCOSE SERPL-MCNC: 88 MG/DL (ref 74–99)
HCT VFR BLD AUTO: 32 % (ref 36–48)
HGB BLD-MCNC: 10.3 G/DL (ref 13–16)
IMM GRANULOCYTES # BLD AUTO: 0.03 K/UL (ref 0–0.04)
IMM GRANULOCYTES NFR BLD AUTO: 0.3 % (ref 0–0.5)
LYMPHOCYTES # BLD: 1.35 K/UL (ref 0.9–3.3)
LYMPHOCYTES NFR BLD: 14.5 % (ref 21–52)
MAGNESIUM SERPL-MCNC: 2.9 MG/DL (ref 1.6–2.6)
MCH RBC QN AUTO: 32.1 PG (ref 24–34)
MCHC RBC AUTO-ENTMCNC: 32.2 G/DL (ref 31–37)
MCV RBC AUTO: 99.7 FL (ref 78–100)
MONOCYTES # BLD: 1.23 K/UL (ref 0.05–1.2)
MONOCYTES NFR BLD: 13.3 % (ref 3–10)
NEUTS SEG # BLD: 6.28 K/UL (ref 1.8–8)
NEUTS SEG NFR BLD: 67.7 % (ref 40–73)
NRBC # BLD: 0 K/UL (ref 0–0.01)
NRBC BLD-RTO: 0 PER 100 WBC
PHOSPHATE SERPL-MCNC: 6 MG/DL (ref 2.5–4.9)
PLATELET # BLD AUTO: 152 K/UL (ref 135–420)
PMV BLD AUTO: 12 FL (ref 9.2–11.8)
POTASSIUM SERPL-SCNC: 4.3 MMOL/L (ref 3.5–5.5)
PROT SERPL-MCNC: 6.1 G/DL (ref 6.4–8.2)
RBC # BLD AUTO: 3.21 M/UL (ref 4.35–5.65)
SODIUM SERPL-SCNC: 135 MMOL/L (ref 136–145)
WBC # BLD AUTO: 9.3 K/UL (ref 4.6–13.2)

## 2025-03-24 PROCEDURE — 6360000004 HC RX CONTRAST MEDICATION: Performed by: INTERNAL MEDICINE

## 2025-03-24 PROCEDURE — 2500000003 HC RX 250 WO HCPCS: Performed by: INTERNAL MEDICINE

## 2025-03-24 PROCEDURE — 2709999900 HC NON-CHARGEABLE SUPPLY: Performed by: INTERNAL MEDICINE

## 2025-03-24 PROCEDURE — 6370000000 HC RX 637 (ALT 250 FOR IP): Performed by: HOSPITALIST

## 2025-03-24 PROCEDURE — 6370000000 HC RX 637 (ALT 250 FOR IP): Performed by: INTERNAL MEDICINE

## 2025-03-24 PROCEDURE — B2151ZZ FLUOROSCOPY OF LEFT HEART USING LOW OSMOLAR CONTRAST: ICD-10-PCS | Performed by: INTERNAL MEDICINE

## 2025-03-24 PROCEDURE — C1769 GUIDE WIRE: HCPCS | Performed by: INTERNAL MEDICINE

## 2025-03-24 PROCEDURE — 93010 ELECTROCARDIOGRAM REPORT: CPT | Performed by: INTERNAL MEDICINE

## 2025-03-24 PROCEDURE — 82330 ASSAY OF CALCIUM: CPT

## 2025-03-24 PROCEDURE — 87340 HEPATITIS B SURFACE AG IA: CPT

## 2025-03-24 PROCEDURE — 4A023N7 MEASUREMENT OF CARDIAC SAMPLING AND PRESSURE, LEFT HEART, PERCUTANEOUS APPROACH: ICD-10-PCS | Performed by: INTERNAL MEDICINE

## 2025-03-24 PROCEDURE — 6360000002 HC RX W HCPCS: Performed by: INTERNAL MEDICINE

## 2025-03-24 PROCEDURE — 86706 HEP B SURFACE ANTIBODY: CPT

## 2025-03-24 PROCEDURE — 1100000003 HC PRIVATE W/ TELEMETRY

## 2025-03-24 PROCEDURE — 83735 ASSAY OF MAGNESIUM: CPT

## 2025-03-24 PROCEDURE — 93458 L HRT ARTERY/VENTRICLE ANGIO: CPT | Performed by: INTERNAL MEDICINE

## 2025-03-24 PROCEDURE — B2111ZZ FLUOROSCOPY OF MULTIPLE CORONARY ARTERIES USING LOW OSMOLAR CONTRAST: ICD-10-PCS | Performed by: INTERNAL MEDICINE

## 2025-03-24 PROCEDURE — 85025 COMPLETE CBC W/AUTO DIFF WBC: CPT

## 2025-03-24 PROCEDURE — 80053 COMPREHEN METABOLIC PANEL: CPT

## 2025-03-24 PROCEDURE — C1894 INTRO/SHEATH, NON-LASER: HCPCS | Performed by: INTERNAL MEDICINE

## 2025-03-24 PROCEDURE — 5A1D70Z PERFORMANCE OF URINARY FILTRATION, INTERMITTENT, LESS THAN 6 HOURS PER DAY: ICD-10-PCS | Performed by: INTERNAL MEDICINE

## 2025-03-24 PROCEDURE — C1760 CLOSURE DEV, VASC: HCPCS | Performed by: INTERNAL MEDICINE

## 2025-03-24 PROCEDURE — 36415 COLL VENOUS BLD VENIPUNCTURE: CPT

## 2025-03-24 PROCEDURE — 6360000002 HC RX W HCPCS: Performed by: HOSPITALIST

## 2025-03-24 PROCEDURE — 99152 MOD SED SAME PHYS/QHP 5/>YRS: CPT | Performed by: INTERNAL MEDICINE

## 2025-03-24 PROCEDURE — 90935 HEMODIALYSIS ONE EVALUATION: CPT

## 2025-03-24 PROCEDURE — 84100 ASSAY OF PHOSPHORUS: CPT

## 2025-03-24 RX ORDER — MIDAZOLAM HYDROCHLORIDE 1 MG/ML
INJECTION INTRAMUSCULAR; INTRAVENOUS PRN
Status: DISCONTINUED | OUTPATIENT
Start: 2025-03-24 | End: 2025-03-24 | Stop reason: HOSPADM

## 2025-03-24 RX ORDER — SODIUM CHLORIDE 0.9 % (FLUSH) 0.9 %
5-40 SYRINGE (ML) INJECTION PRN
Status: DISCONTINUED | OUTPATIENT
Start: 2025-03-24 | End: 2025-03-25 | Stop reason: HOSPADM

## 2025-03-24 RX ORDER — HYDRALAZINE HYDROCHLORIDE 20 MG/ML
INJECTION INTRAMUSCULAR; INTRAVENOUS PRN
Status: DISCONTINUED | OUTPATIENT
Start: 2025-03-24 | End: 2025-03-24 | Stop reason: HOSPADM

## 2025-03-24 RX ORDER — AMLODIPINE BESYLATE 5 MG/1
10 TABLET ORAL DAILY
Status: DISCONTINUED | OUTPATIENT
Start: 2025-03-25 | End: 2025-03-25 | Stop reason: HOSPADM

## 2025-03-24 RX ORDER — IOPAMIDOL 612 MG/ML
INJECTION, SOLUTION INTRAVASCULAR PRN
Status: DISCONTINUED | OUTPATIENT
Start: 2025-03-24 | End: 2025-03-24 | Stop reason: HOSPADM

## 2025-03-24 RX ORDER — LOSARTAN POTASSIUM 50 MG/1
100 TABLET ORAL DAILY
Status: DISCONTINUED | OUTPATIENT
Start: 2025-03-25 | End: 2025-03-25 | Stop reason: HOSPADM

## 2025-03-24 RX ORDER — SODIUM CHLORIDE 9 MG/ML
INJECTION, SOLUTION INTRAVENOUS PRN
Status: DISCONTINUED | OUTPATIENT
Start: 2025-03-24 | End: 2025-03-25 | Stop reason: HOSPADM

## 2025-03-24 RX ORDER — SODIUM CHLORIDE 0.9 % (FLUSH) 0.9 %
5-40 SYRINGE (ML) INJECTION EVERY 12 HOURS SCHEDULED
Status: DISCONTINUED | OUTPATIENT
Start: 2025-03-24 | End: 2025-03-25 | Stop reason: HOSPADM

## 2025-03-24 RX ORDER — HEPARIN SODIUM 200 [USP'U]/100ML
INJECTION, SOLUTION INTRAVENOUS CONTINUOUS PRN
Status: COMPLETED | OUTPATIENT
Start: 2025-03-24 | End: 2025-03-24

## 2025-03-24 RX ORDER — ACETAMINOPHEN 325 MG/1
650 TABLET ORAL EVERY 4 HOURS PRN
Status: DISCONTINUED | OUTPATIENT
Start: 2025-03-24 | End: 2025-03-25 | Stop reason: HOSPADM

## 2025-03-24 RX ORDER — LIDOCAINE HYDROCHLORIDE 10 MG/ML
INJECTION, SOLUTION INFILTRATION; PERINEURAL PRN
Status: DISCONTINUED | OUTPATIENT
Start: 2025-03-24 | End: 2025-03-24 | Stop reason: HOSPADM

## 2025-03-24 RX ORDER — HYDRALAZINE HYDROCHLORIDE 50 MG/1
100 TABLET, FILM COATED ORAL 3 TIMES DAILY
Status: DISCONTINUED | OUTPATIENT
Start: 2025-03-24 | End: 2025-03-25 | Stop reason: HOSPADM

## 2025-03-24 RX ORDER — POLYETHYLENE GLYCOL 3350 17 G/17G
17 POWDER, FOR SOLUTION ORAL DAILY
Status: DISCONTINUED | OUTPATIENT
Start: 2025-03-25 | End: 2025-03-25 | Stop reason: HOSPADM

## 2025-03-24 RX ORDER — AMLODIPINE BESYLATE 5 MG/1
5 TABLET ORAL DAILY
Status: DISCONTINUED | OUTPATIENT
Start: 2025-03-24 | End: 2025-03-24

## 2025-03-24 RX ADMIN — NITROGLYCERIN 0.5 INCH: 20 OINTMENT TOPICAL at 05:18

## 2025-03-24 RX ADMIN — ATORVASTATIN CALCIUM 80 MG: 20 TABLET, FILM COATED ORAL at 21:03

## 2025-03-24 RX ADMIN — LOSARTAN POTASSIUM 25 MG: 25 TABLET, FILM COATED ORAL at 08:30

## 2025-03-24 RX ADMIN — HEPARIN SODIUM 5000 UNITS: 5000 INJECTION INTRAVENOUS; SUBCUTANEOUS at 05:18

## 2025-03-24 RX ADMIN — CARVEDILOL 25 MG: 12.5 TABLET, FILM COATED ORAL at 08:30

## 2025-03-24 RX ADMIN — OXYCODONE AND ACETAMINOPHEN 1 TABLET: 10; 325 TABLET ORAL at 12:19

## 2025-03-24 RX ADMIN — SODIUM CHLORIDE, PRESERVATIVE FREE 10 ML: 5 INJECTION INTRAVENOUS at 12:16

## 2025-03-24 RX ADMIN — ASPIRIN 81 MG: 81 TABLET, CHEWABLE ORAL at 08:07

## 2025-03-24 RX ADMIN — NITROGLYCERIN 0.5 INCH: 20 OINTMENT TOPICAL at 00:06

## 2025-03-24 RX ADMIN — HYDRALAZINE HYDROCHLORIDE 50 MG: 50 TABLET ORAL at 08:29

## 2025-03-24 RX ADMIN — HYDRALAZINE HYDROCHLORIDE 10 MG: 20 INJECTION INTRAMUSCULAR; INTRAVENOUS at 08:14

## 2025-03-24 RX ADMIN — PANTOPRAZOLE SODIUM 40 MG: 40 TABLET, DELAYED RELEASE ORAL at 05:18

## 2025-03-24 RX ADMIN — HYDRALAZINE HYDROCHLORIDE 100 MG: 50 TABLET ORAL at 21:10

## 2025-03-24 RX ADMIN — SODIUM CHLORIDE, PRESERVATIVE FREE 10 ML: 5 INJECTION INTRAVENOUS at 21:18

## 2025-03-24 RX ADMIN — OXYCODONE AND ACETAMINOPHEN 1 TABLET: 10; 325 TABLET ORAL at 00:09

## 2025-03-24 RX ADMIN — SODIUM CHLORIDE, PRESERVATIVE FREE 10 ML: 5 INJECTION INTRAVENOUS at 21:11

## 2025-03-24 RX ADMIN — HYDRALAZINE HYDROCHLORIDE 100 MG: 50 TABLET ORAL at 13:49

## 2025-03-24 RX ADMIN — OXYCODONE AND ACETAMINOPHEN 1 TABLET: 10; 325 TABLET ORAL at 21:08

## 2025-03-24 RX ADMIN — NITROGLYCERIN 0.5 INCH: 20 OINTMENT TOPICAL at 12:19

## 2025-03-24 RX ADMIN — HEPARIN SODIUM 5000 UNITS: 5000 INJECTION INTRAVENOUS; SUBCUTANEOUS at 21:30

## 2025-03-24 RX ADMIN — SEVELAMER CARBONATE 800 MG: 800 TABLET, FILM COATED ORAL at 12:19

## 2025-03-24 ASSESSMENT — PAIN DESCRIPTION - LOCATION
LOCATION: BACK;GENERALIZED
LOCATION: BACK

## 2025-03-24 ASSESSMENT — PAIN DESCRIPTION - ORIENTATION: ORIENTATION: LEFT

## 2025-03-24 ASSESSMENT — PAIN DESCRIPTION - DESCRIPTORS: DESCRIPTORS: ACHING

## 2025-03-24 ASSESSMENT — PAIN SCALES - GENERAL
PAINLEVEL_OUTOF10: 10
PAINLEVEL_OUTOF10: 9
PAINLEVEL_OUTOF10: 1
PAINLEVEL_OUTOF10: 0
PAINLEVEL_OUTOF10: 9

## 2025-03-24 ASSESSMENT — PAIN - FUNCTIONAL ASSESSMENT: PAIN_FUNCTIONAL_ASSESSMENT: ACTIVITIES ARE NOT PREVENTED

## 2025-03-24 NOTE — INTERVAL H&P NOTE
Update History & Physical    The patient's History and Physical of March 23, 2025 was reviewed with the patient and I examined the patient. There was no change. The surgical site was confirmed by the patient and me.     Plan: The risks, benefits, expected outcome, and alternative to the recommended procedure have been discussed with the patient. Patient understands and wants to proceed with the procedure.     Electronically signed by Ye Lugo MD on 3/24/2025

## 2025-03-24 NOTE — PROCEDURES
Cardiac Catherization Procedure Note         Patient: Dale Milton Age: 59 y.o. Sex: male    YOB: 1965 Admit Date: 3/21/2025 PCP: Lake Dickerson APRN - NP   MRN: [id number]  CSN: [id number]           DATE: March 24, 2025   PHYSICIAN: Ye Lugo MD, [facility]    INDICATIONS:   NSTEMI    PROCEDURE PERFORMED:   Left heart cardiac catheterization.  Selective left and right coronary angiography.   Left ventriculography.   Right femoral arterial access using ultrasound guidance  Radiologic supervision and interpretation    DESCRIPTION OF PROCEDURE:   After informed consent where the procedure was discussed both during outpatient evaluation and again immediately prior to the procedure, the patient was brought to the cardiac catheterization suite. The patient was prepped and draped in the usual sterile fashion. After local lidocaine arterial access was achieved using ultrasound guidance and with micro puncture needle.  A 6Fr glide sheath was advanced and placed into the above artery using modified Seldinger technique.  Combination of heparin, verapamil and nitroglycerin were injected into the artery if radial artery was used through the sheath. Preformed catheter(s) was/were advanced over J-glide wire and selective right and left coronary angiography was performed in multiple projections. Left ventriculography was performed. At the conclusion, all procedure catheters and wires were removed. Hemostasis was to be achieved using manual pressure /TR band.    MODERATE CONSCIOUS SEDATION:  Sedation was provided under my direct supervision with a sedation trained nurse using 1 mg of IV Versed and 25 mcg of IV Fentanyl. See [facility] trained nurse sedation orders for pre and post service care.     START/END TIME: see cath lab log  COMPLICATIONS: None  ESTIMATED BLOOD LOSS: None  FLOUROSCOPY TIME/DOSE: see cath lab log    DIAGNOSTIC CORONARY ANGIOGRAPHY FINDINGS:   The diagnostic angiography was

## 2025-03-24 NOTE — PRE SEDATION
Sedation Plan  ASA: class 5 - moribund patient, not expected to live without the procedure     Mallampati class: IV - only hard palate visible.    Sedation plan: local anesthesia and moderate (conscious sedation)    Risks, benefits, and alternatives discussed with patient.        Immediate reassessment prior to sedation:  Patient's status reviewed and vital signs assessed; acceptable to perform procedure and proceed to administer sedation as planned.      Ye Lugo MD

## 2025-03-24 NOTE — PLAN OF CARE
Problem: Discharge Planning  Goal: Discharge to home or other facility with appropriate resources  3/23/2025 2136 by Catarina Nation, RN  Outcome: Progressing  Flowsheets (Taken 3/23/2025 1930)  Discharge to home or other facility with appropriate resources:   Identify barriers to discharge with patient and caregiver   Arrange for needed discharge resources and transportation as appropriate  3/23/2025 1100 by Nadiya Rodgers, RN  Outcome: Progressing     Problem: Pain  Goal: Verbalizes/displays adequate comfort level or baseline comfort level  3/23/2025 2136 by Catarina Nation, RN  Outcome: Progressing  3/23/2025 1100 by Nadiya Rodgers, RN  Outcome: Progressing

## 2025-03-24 NOTE — DISCHARGE INSTRUCTIONS
Cardiac Catheterization/Angiography Discharge Instructions    *Check the puncture site frequently for swelling or bleeding. If you see any bleeding, lie down and apply pressure over the area with a clean towel or washcloth. Notify your doctor for any redness, swelling, drainage or oozing from the puncture site. Notify your doctor for any fever or chills.    *If the leg or arm with the puncture becomes cold, numb or painful, call Dr Lugo      *Activity should be limited for the next 48 hours. Climb stairs as little as possible and avoid any stooping, bending or strenuous activity for 48 hours. No heavy lifting (anything over 10 pounds) for three days.    *Do not drive for 24 hours.    *You may resume your usual diet. Drink more fluids than usual.    *Have a responsible person drive you home and stay with you for at least 24 hours after your heart catheterization/angiography.    *You may remove the bandage from your Right and Groin in 24 hours. You may shower in 24 hours. No tub baths, hot tubs or swimming for one week. Do not place any lotions, creams, powders, ointments over the puncture site for one week. You may place a clean band-aid over the puncture site each day for 5 days. Change this daily.       Sedation: Care Instructions  How can you care for yourself at home?    Sedation is the use of medicine to help you feel relaxed and comfortable during a procedure. The medicine is usually given in a vein (by I.V.). It may be used with numbing medicines.  There are different levels of sedation. They range from being awake but relaxed to being completely unconscious. Which level you have will depend on the procedure and your needs. You will be watched closely by a doctor or nurse during sedation.  Common side effects from sedation include:  Feeling sleepy or tired. (Your doctors and nurses will make sure you aren't too sleepy to go home.)  Feeling dizzy or unsteady.  Follow-up care is a key

## 2025-03-25 VITALS
RESPIRATION RATE: 18 BRPM | HEART RATE: 76 BPM | OXYGEN SATURATION: 97 % | BODY MASS INDEX: 19.21 KG/M2 | SYSTOLIC BLOOD PRESSURE: 157 MMHG | WEIGHT: 115.3 LBS | TEMPERATURE: 98.8 F | HEIGHT: 65 IN | DIASTOLIC BLOOD PRESSURE: 77 MMHG

## 2025-03-25 LAB
ALBUMIN SERPL-MCNC: 2.8 G/DL (ref 3.4–5)
ANION GAP SERPL CALC-SCNC: 8 MMOL/L (ref 3–18)
BUN SERPL-MCNC: 28 MG/DL (ref 7–18)
BUN/CREAT SERPL: 3 (ref 12–20)
CALCIUM SERPL-MCNC: 8 MG/DL (ref 8.5–10.1)
CHLORIDE SERPL-SCNC: 97 MMOL/L (ref 100–111)
CO2 SERPL-SCNC: 32 MMOL/L (ref 21–32)
CREAT SERPL-MCNC: 9.68 MG/DL (ref 0.6–1.3)
GLUCOSE SERPL-MCNC: 68 MG/DL (ref 74–99)
HBV SURFACE AB SER QL IA: POSITIVE
HBV SURFACE AB SERPL IA-ACNC: 30.62 MIU/ML
HBV SURFACE AG SER QL: <0.1 INDEX
HBV SURFACE AG SER QL: NEGATIVE
HEP BS AB COMMENT: NORMAL
PHOSPHATE SERPL-MCNC: 4.6 MG/DL (ref 2.5–4.9)
POTASSIUM SERPL-SCNC: 4 MMOL/L (ref 3.5–5.5)
SODIUM SERPL-SCNC: 137 MMOL/L (ref 136–145)

## 2025-03-25 PROCEDURE — 6370000000 HC RX 637 (ALT 250 FOR IP): Performed by: HOSPITALIST

## 2025-03-25 PROCEDURE — 36415 COLL VENOUS BLD VENIPUNCTURE: CPT

## 2025-03-25 PROCEDURE — 6370000000 HC RX 637 (ALT 250 FOR IP): Performed by: INTERNAL MEDICINE

## 2025-03-25 PROCEDURE — 6360000002 HC RX W HCPCS: Performed by: HOSPITALIST

## 2025-03-25 PROCEDURE — 80069 RENAL FUNCTION PANEL: CPT

## 2025-03-25 PROCEDURE — 2500000003 HC RX 250 WO HCPCS: Performed by: INTERNAL MEDICINE

## 2025-03-25 PROCEDURE — 6360000002 HC RX W HCPCS: Performed by: INTERNAL MEDICINE

## 2025-03-25 RX ORDER — ALBUTEROL SULFATE 90 UG/1
2 INHALANT RESPIRATORY (INHALATION) EVERY 6 HOURS PRN
Qty: 18 G | Refills: 0 | Status: SHIPPED | OUTPATIENT
Start: 2025-03-25

## 2025-03-25 RX ORDER — AMLODIPINE BESYLATE 10 MG/1
10 TABLET ORAL DAILY
Qty: 30 TABLET | Refills: 3 | Status: SHIPPED | OUTPATIENT
Start: 2025-03-26

## 2025-03-25 RX ORDER — PANTOPRAZOLE SODIUM 40 MG/1
40 TABLET, DELAYED RELEASE ORAL
Qty: 30 TABLET | Refills: 1 | Status: SHIPPED | OUTPATIENT
Start: 2025-03-26

## 2025-03-25 RX ORDER — ATORVASTATIN CALCIUM 80 MG/1
80 TABLET, FILM COATED ORAL NIGHTLY
Qty: 30 TABLET | Refills: 3 | Status: SHIPPED | OUTPATIENT
Start: 2025-03-25

## 2025-03-25 RX ORDER — ASPIRIN 81 MG/1
81 TABLET ORAL DAILY
Qty: 100 TABLET | Refills: 10 | Status: SHIPPED | OUTPATIENT
Start: 2025-03-25

## 2025-03-25 RX ORDER — HYDRALAZINE HYDROCHLORIDE 100 MG/1
100 TABLET, FILM COATED ORAL 3 TIMES DAILY
Qty: 90 TABLET | Refills: 3 | Status: SHIPPED | OUTPATIENT
Start: 2025-03-25

## 2025-03-25 RX ORDER — LOSARTAN POTASSIUM 100 MG/1
100 TABLET ORAL DAILY
Qty: 30 TABLET | Refills: 3 | Status: SHIPPED | OUTPATIENT
Start: 2025-03-26

## 2025-03-25 RX ORDER — CARVEDILOL 12.5 MG/1
25 TABLET ORAL 2 TIMES DAILY WITH MEALS
Qty: 60 TABLET | Refills: 3 | Status: SHIPPED | OUTPATIENT
Start: 2025-03-25

## 2025-03-25 RX ADMIN — OXYCODONE AND ACETAMINOPHEN 1 TABLET: 10; 325 TABLET ORAL at 11:53

## 2025-03-25 RX ADMIN — HEPARIN SODIUM 5000 UNITS: 5000 INJECTION INTRAVENOUS; SUBCUTANEOUS at 06:30

## 2025-03-25 RX ADMIN — HYDRALAZINE HYDROCHLORIDE 10 MG: 20 INJECTION INTRAMUSCULAR; INTRAVENOUS at 04:01

## 2025-03-25 RX ADMIN — SODIUM CHLORIDE, PRESERVATIVE FREE 10 ML: 5 INJECTION INTRAVENOUS at 08:59

## 2025-03-25 RX ADMIN — LOSARTAN POTASSIUM 100 MG: 50 TABLET, FILM COATED ORAL at 08:58

## 2025-03-25 RX ADMIN — HYDRALAZINE HYDROCHLORIDE 100 MG: 50 TABLET ORAL at 13:41

## 2025-03-25 RX ADMIN — HEPARIN SODIUM 5000 UNITS: 5000 INJECTION INTRAVENOUS; SUBCUTANEOUS at 13:41

## 2025-03-25 RX ADMIN — SEVELAMER CARBONATE 800 MG: 800 TABLET, FILM COATED ORAL at 17:00

## 2025-03-25 RX ADMIN — PANTOPRAZOLE SODIUM 40 MG: 40 TABLET, DELAYED RELEASE ORAL at 06:30

## 2025-03-25 RX ADMIN — ASPIRIN 81 MG: 81 TABLET, CHEWABLE ORAL at 08:59

## 2025-03-25 RX ADMIN — OXYCODONE AND ACETAMINOPHEN 1 TABLET: 10; 325 TABLET ORAL at 04:01

## 2025-03-25 RX ADMIN — CARVEDILOL 25 MG: 12.5 TABLET, FILM COATED ORAL at 08:58

## 2025-03-25 RX ADMIN — SEVELAMER CARBONATE 800 MG: 800 TABLET, FILM COATED ORAL at 11:53

## 2025-03-25 RX ADMIN — POLYETHYLENE GLYCOL 3350 17 G: 17 POWDER, FOR SOLUTION ORAL at 08:57

## 2025-03-25 RX ADMIN — LACTULOSE 20 G: 10 SOLUTION ORAL at 08:58

## 2025-03-25 RX ADMIN — SEVELAMER CARBONATE 800 MG: 800 TABLET, FILM COATED ORAL at 08:58

## 2025-03-25 RX ADMIN — SODIUM CHLORIDE, PRESERVATIVE FREE 10 ML: 5 INJECTION INTRAVENOUS at 09:02

## 2025-03-25 RX ADMIN — AMLODIPINE BESYLATE 10 MG: 5 TABLET ORAL at 08:58

## 2025-03-25 RX ADMIN — CARVEDILOL 25 MG: 12.5 TABLET, FILM COATED ORAL at 17:00

## 2025-03-25 RX ADMIN — HYDRALAZINE HYDROCHLORIDE 100 MG: 50 TABLET ORAL at 08:58

## 2025-03-25 ASSESSMENT — PAIN DESCRIPTION - LOCATION
LOCATION: GENERALIZED
LOCATION: GROIN
LOCATION: GROIN

## 2025-03-25 ASSESSMENT — PAIN DESCRIPTION - DESCRIPTORS: DESCRIPTORS: SORE

## 2025-03-25 ASSESSMENT — PAIN SCALES - GENERAL
PAINLEVEL_OUTOF10: 8
PAINLEVEL_OUTOF10: 4
PAINLEVEL_OUTOF10: 8

## 2025-03-25 ASSESSMENT — PAIN DESCRIPTION - ORIENTATION: ORIENTATION: RIGHT

## 2025-03-25 NOTE — PLAN OF CARE
Problem: Discharge Planning  Goal: Discharge to home or other facility with appropriate resources  3/25/2025 1252 by Dariela Warner, RN  Outcome: Progressing  3/25/2025 0014 by Terra Shaffer, RN  Outcome: Progressing     Problem: Pain  Goal: Verbalizes/displays adequate comfort level or baseline comfort level  3/25/2025 1252 by Dariela Warner, RN  Outcome: Progressing  3/25/2025 0014 by Terra Shaffer, RN  Outcome: Progressing  Flowsheets (Taken 3/25/2025 0014)  Verbalizes/displays adequate comfort level or baseline comfort level:   Encourage patient to monitor pain and request assistance   Assess pain using appropriate pain scale   Administer analgesics based on type and severity of pain and evaluate response   Consider cultural and social influences on pain and pain management     Problem: Chronic Conditions and Co-morbidities  Goal: Patient's chronic conditions and co-morbidity symptoms are monitored and maintained or improved  Outcome: Progressing     Problem: Safety - Adult  Goal: Free from fall injury  3/25/2025 1252 by Dariela Warner, RN  Outcome: Progressing  3/25/2025 0014 by Terra Shaffer, RN  Outcome: Adequate for Discharge

## 2025-03-25 NOTE — PLAN OF CARE
Problem: Pain  Goal: Verbalizes/displays adequate comfort level or baseline comfort level  Outcome: Progressing  Flowsheets (Taken 3/25/2025 0014)  Verbalizes/displays adequate comfort level or baseline comfort level:   Encourage patient to monitor pain and request assistance   Assess pain using appropriate pain scale   Administer analgesics based on type and severity of pain and evaluate response   Consider cultural and social influences on pain and pain management     Problem: Chronic Conditions and Co-morbidities  Goal: Patient's chronic conditions and co-morbidity symptoms are monitored and maintained or improved  3/24/2025 1624 by Kay Luke, RN  Outcome: Progressing     Problem: Safety - Adult  Goal: Free from fall injury  Outcome: Adequate for Discharge

## 2025-03-25 NOTE — DISCHARGE SUMMARY
MEDICATIONS     Current Discharge Medication List        START taking these medications    Details   pantoprazole (PROTONIX) 40 MG tablet Take 1 tablet by mouth every morning (before breakfast)  Qty: 30 tablet, Refills: 1  Start date: 3/26/2025      losartan (COZAAR) 100 MG tablet Take 1 tablet by mouth daily For heart and BP.  Hold dose if top BP<100.  Re-try in 2 hours.  Qty: 30 tablet, Refills: 3  Start date: 3/26/2025      atorvastatin (LIPITOR) 80 MG tablet Take 1 tablet by mouth nightly Cholesterol control for heart.  Qty: 30 tablet, Refills: 3  Start date: 3/25/2025      aspirin 81 MG EC tablet Take 1 tablet by mouth daily Lifelong for your heart.  Qty: 100 tablet, Refills: 10  Start date: 3/25/2025           CONTINUE these medications which have CHANGED    Details   amLODIPine (NORVASC) 10 MG tablet Take 1 tablet by mouth daily  Qty: 30 tablet, Refills: 3  Start date: 3/26/2025      hydrALAZINE (APRESOLINE) 100 MG tablet Take 1 tablet by mouth 3 times daily For blood pressure control.   Hold dose if top BP<100.  Re-try in 2 hours.  Qty: 90 tablet, Refills: 3  Start date: 3/25/2025      carvedilol (COREG) 12.5 MG tablet Take 2 tablets by mouth 2 times daily (with meals) For heart and BP.  Hold dose if top BP<100, or Heart rate<60.  Re-try in 2 hours.  Qty: 60 tablet, Refills: 3  Start date: 3/25/2025      albuterol sulfate HFA (PROVENTIL;VENTOLIN;PROAIR) 108 (90 Base) MCG/ACT inhaler Inhale 2 puffs into the lungs every 6 hours as needed for Wheezing  Qty: 18 g, Refills: 0  Start date: 3/25/2025           CONTINUE these medications which have NOT CHANGED    Details   sevelamer (RENVELA) 800 MG tablet Take 1 tablet by mouth 3 times daily (with meals)      ferric citrate (AURYXIA) 1  MG(Fe) TABS tablet Take 2 tablets by mouth 3 times daily (with meals)      oxyCODONE-acetaminophen (PERCOCET)  MG per tablet Take 2 tablets by mouth every 6 hours as needed.      Spacer/Aero-Holding Chambers (COMPACT

## 2025-03-25 NOTE — PROGRESS NOTES
Admit Date: 3/21/2025  Attending Cardiologist: Dr. GENNARO TRAN was called to discuss elevated troponin about the patient.     Assessment:   NSTEMI/ CAD  HTN  ESRD    Past Medical History:   Diagnosis Date    Chronic kidney disease     ESRD- Dialysis- [address]    GERD (gastroesophageal reflux disease)     Hypertension 07/2017    Ill-defined condition     dialysis monday wednesday friday    Non compliance w medication regimen     noncompliant with HTN meds         Plan:   ASA  Trend troponins  Check Echo.    BNP Lab Results   Component Value Date/Time    BNP 64,704 05/24/2019 12:20 PM       Lab Results   Component Value Date    BNP 64,704 (H) 05/24/2019      Liver Enzymes Lab Results   Component Value Date    ALT 19 03/21/2025    AST 22 03/21/2025    ALKPHOS 78 03/21/2025    BILITOT 1.1 (H) 03/21/2025      Thyroid Studies No results found for: \"T4\", \"T3RU\", \"TSH\"       Lab Results   Component Value Date    CKTOTAL 109 05/24/2019    CKMB <1.0 05/24/2019    CKMBINDEX  05/24/2019     CALCULATION NOT PERFORMED WHEN RESULT IS BELOW LINEAR LIMIT    TROPONINI 0.04 05/24/2019    TROPHS 772 (HH) 03/21/2025           Signed By: Ye Lugo MD MD    March 21, 2025        
    Cardiology Progress Note    Patient: Dale Milton Age: 59 y.o. Sex: male    YOB: 1965 Admit Date: 3/21/2025 PCP: Lake Dickerson APRN - [facility]   MRN: [id number]  CSN: [id number]       Cardiologist: Ye Lugo MD       Assessment and Diagnosis   NSTEMI  CAD  ESRD  HTN - uncontrolled    Recommendations and Plan    Cath - no obstructive disease.   Risk factor modifications  See med changes for HTN management.     Chief Complaint     Chief Complaint   Patient presents with    Abdominal Pain     bl    Rectal Bleeding        SUBJECTIVE:   Dale Milton is a 59 y.o. male who  presented with chestpain/ NSTEMI.    Medications:   No Known Allergies     Current Facility-Administered Medications   Medication Dose Route Frequency    polyethylene glycol (GLYCOLAX) packet 17 g  17 g Oral Daily    sodium chloride flush 0.9 % injection 5-40 mL  5-40 mL IntraVENous 2 times per day    sodium chloride flush 0.9 % injection 5-40 mL  5-40 mL IntraVENous PRN    0.9 % sodium chloride infusion   IntraVENous PRN    acetaminophen (TYLENOL) tablet 650 mg  650 mg Oral Q4H PRN    hydrALAZINE (APRESOLINE) tablet 100 mg  100 mg Oral TID    losartan (COZAAR) tablet 100 mg  100 mg Oral Daily    amLODIPine (NORVASC) tablet 10 mg  10 mg Oral Daily    hydrALAZINE (APRESOLINE) injection 10 mg  10 mg IntraVENous Q6H PRN    sevelamer (RENVELA) tablet 800 mg  800 mg Oral TID WC    oxyCODONE-acetaminophen (PERCOCET)  MG per tablet 1 tablet  1 tablet Oral Q6H PRN    lactulose (CHRONULAC) 10 GM/15ML solution 20 g  20 g Oral BID    pantoprazole (PROTONIX) tablet 40 mg  40 mg Oral QAM AC    carvedilol (COREG) tablet 25 mg  25 mg Oral BID WC    sodium chloride flush 0.9 % injection 5-40 mL  5-40 mL IntraVENous 2 times per day    ondansetron (ZOFRAN-ODT) disintegrating tablet 4 mg  4 mg Oral Q8H PRN    Or    ondansetron (ZOFRAN) injection 4 mg  4 mg IntraVENous Q6H PRN    acetaminophen (TYLENOL) tablet 650 mg  650 mg Oral Q6H 
    Cardiology Progress Note    Patient: Dale Milton Age: 59 y.o. Sex: male    YOB: 1965 Admit Date: 3/21/2025 PCP: Lake Dickerson APRN - [facility]   MRN: [id number]  CSN: [id number]       Cardiologist: Ye Lugo MD       Assessment and Diagnosis   NSTEMI  CAD  ESRD  HTN    Recommendations and Plan    Will proceed with cath  Risk factor modifications  Nutritional suppl    Chief Complaint     Chief Complaint   Patient presents with    Abdominal Pain     bl    Rectal Bleeding        SUBJECTIVE:   Dale Milton is a 59 y.o. male who  presented with chestpain/ NSTEMI.    Medications:   No Known Allergies     Current Facility-Administered Medications   Medication Dose Route Frequency    hydrALAZINE (APRESOLINE) tablet 50 mg  50 mg Oral TID    sevelamer (RENVELA) tablet 800 mg  800 mg Oral TID WC    oxyCODONE-acetaminophen (PERCOCET)  MG per tablet 1 tablet  1 tablet Oral Q6H PRN    lactulose (CHRONULAC) 10 GM/15ML solution 20 g  20 g Oral BID    pantoprazole (PROTONIX) tablet 40 mg  40 mg Oral QAM AC    carvedilol (COREG) tablet 25 mg  25 mg Oral BID WC    sodium chloride flush 0.9 % injection 5-40 mL  5-40 mL IntraVENous 2 times per day    sodium chloride flush 0.9 % injection 5-40 mL  5-40 mL IntraVENous PRN    0.9 % sodium chloride infusion   IntraVENous PRN    ondansetron (ZOFRAN-ODT) disintegrating tablet 4 mg  4 mg Oral Q8H PRN    Or    ondansetron (ZOFRAN) injection 4 mg  4 mg IntraVENous Q6H PRN    acetaminophen (TYLENOL) tablet 650 mg  650 mg Oral Q6H PRN    Or    acetaminophen (TYLENOL) suppository 650 mg  650 mg Rectal Q6H PRN    polyethylene glycol (GLYCOLAX) packet 17 g  17 g Oral Daily PRN    aspirin chewable tablet 81 mg  81 mg Oral Daily    atorvastatin (LIPITOR) tablet 80 mg  80 mg Oral Nightly    losartan (COZAAR) tablet 25 mg  25 mg Oral Daily    nitroglycerin (NITRO-BID) 2 % ointment 0.5 inch  0.5 inch Topical 4 times per day    heparin (porcine) injection 5,000 Units  
    Cardiology Progress Note    Patient: Dale Milton Age: 59 y.o. Sex: male    YOB: 1965 Admit Date: 3/21/2025 PCP: Lake Dickerson APRN - [facility]   MRN: [id number]  CSN: [id number]       Cardiologist: Ye Lugo MD       Assessment and Diagnosis   NSTEMI  CAD  ESRD  HTN - uncontrolled    Recommendations and Plan    Cath - no obstructive disease.   Risk factor modifications  See orders for HTN management.   OK to DC from Cardiology point of view.   Follow up in Cardiology in one month.     Chief Complaint     Chief Complaint   Patient presents with    Abdominal Pain     bl    Rectal Bleeding        SUBJECTIVE:   Dale Milton is a 59 y.o. male who  presented with chestpain/ NSTEMI.    Medications:   No Known Allergies     Current Facility-Administered Medications   Medication Dose Route Frequency    [START ON 3/25/2025] polyethylene glycol (GLYCOLAX) packet 17 g  17 g Oral Daily    hydrALAZINE (APRESOLINE) tablet 100 mg  100 mg Oral TID    [START ON 3/25/2025] losartan (COZAAR) tablet 100 mg  100 mg Oral Daily    [START ON 3/25/2025] amLODIPine (NORVASC) tablet 10 mg  10 mg Oral Daily    hydrALAZINE (APRESOLINE) injection 10 mg  10 mg IntraVENous Q6H PRN    sevelamer (RENVELA) tablet 800 mg  800 mg Oral TID WC    oxyCODONE-acetaminophen (PERCOCET)  MG per tablet 1 tablet  1 tablet Oral Q6H PRN    lactulose (CHRONULAC) 10 GM/15ML solution 20 g  20 g Oral BID    pantoprazole (PROTONIX) tablet 40 mg  40 mg Oral QAM AC    carvedilol (COREG) tablet 25 mg  25 mg Oral BID WC    sodium chloride flush 0.9 % injection 5-40 mL  5-40 mL IntraVENous 2 times per day    sodium chloride flush 0.9 % injection 5-40 mL  5-40 mL IntraVENous PRN    0.9 % sodium chloride infusion   IntraVENous PRN    ondansetron (ZOFRAN-ODT) disintegrating tablet 4 mg  4 mg Oral Q8H PRN    Or    ondansetron (ZOFRAN) injection 4 mg  4 mg IntraVENous Q6H PRN    acetaminophen (TYLENOL) tablet 650 mg  650 mg Oral Q6H PRN    Or 
    Hospitalist Progress Note    Patient: Dale Milton MRN: [id number]  CSN: [id number]    YOB: 1965  Age: 59 y.o.  Sex: male    DOA: 3/21/2025 LOS:  LOS: 1 day         Total duration of encounter: 1 day      Chief Complaint ;  Dale Milton is a 59 y.o.  male who has history of end-stage renal disease on dialysis Monday Wednesday Friday presents to the emergency room with complaints of abdominal pain and bloating and no BM for 4 days with rectal bleeding and occasionally with hemoptysis and also admitted for possible nstemi     Subjective ;     I feel fine, no bleeding and no chest pain , pt walking in the [address], no chest pain   Rn stable , got BM   Review of systems:  Constitutional: denies fevers, chills, myalgias  Respiratory: denies SOB, cough  Cardiovascular: denies chest pain, palpitations  Gastrointestinal: denies nausea, vomiting, diarrhea    10 systems reviewed, all negative other than what is mentioned above.      Vital signs/Intake and Output:  Visit Vitals  BP (!) 162/83   Pulse 61   Temp 98.1 °F (36.7 °C) (Oral)   Resp 18   Ht 1.651 m (5' 5\")   Wt 50.8 kg (112 lb)   SpO2 100%   BMI 18.64 kg/m²     Current Shift:  No intake/output data recorded.  Last three shifts:  No intake/output data recorded.    Exam:    General: Well developed, alert, NAD, OX3  Head/Neck: NCAT, supple, No masses, No lymphadenopathy  CVS:Regular rate and rhythm, no M/R/G, S1/S2 heard, no thrill  Lungs:Clear to auscultation bilaterally, no wheezes, rhonchi, or rales  Abdomen: Soft, Nontender, No distention, Normal Bowel sounds, No hepatomegaly  Extremities: No C/C/E, pulses palpable 2+  Skin:normal texture and turgor, no rashes, no lesions  Neuro:grossly normal , follows commands  Psych:appropriate    Labs: Results:       Chemistry Recent Labs     03/21/25  1134 03/22/25  0009 03/22/25  0402   GLUCOSE 81 97 78    140 137   K 3.3* 3.5 3.8    100 98*   CO2 35* 29 29   BUN 38* 53* 56*   CREATININE 9.73* 
    Hospitalist Progress Note    Patient: Dale Milton MRN: [id number]  CSN: [id number]    YOB: 1965  Age: 59 y.o.  Sex: male    DOA: 3/21/2025 LOS:  LOS: 2 days         Total duration of encounter: 2 days      Chief Complaint ;  Dale Milton is a 59 y.o.  male who has history of end-stage renal disease on dialysis Monday Wednesday Friday presents to the emergency room with complaints of abdominal pain and bloating and no BM for 4 days with rectal bleeding and occasionally with hemoptysis and also admitted for possible nstemi     Subjective ;     I feel fine, some times pain -I used oxy at home for my pain, still bloody vomitus , last colonoscopy 2020. I was told I will have scoped     Rn stable ,    Case discussed with Dr. Lopez -no planning for scope     Review of systems:  Constitutional: denies fevers, chills, myalgias  Respiratory: denies SOB, cough  Cardiovascular: denies chest pain, palpitations  Gastrointestinal: denies nausea, vomiting, diarrhea    10 systems reviewed, all negative other than what is mentioned above.      Vital signs/Intake and Output:  Visit Vitals  BP (!) 192/94   Pulse 72   Temp 98.2 °F (36.8 °C) (Oral)   Resp 18   Ht 1.651 m (5' 5\")   Wt 55.2 kg (121 lb 11.1 oz)   SpO2 100%   BMI 20.25 kg/m²     Current Shift:  03/23 0701 - 03/23 1900  In: 240 [P.O.:240]  Out: -   Last three shifts:  03/21 1901 - 03/23 0700  In: 370 [P.O.:370]  Out: -     Exam:    General: Well developed, alert, NAD, OX3  Head/Neck: NCAT, supple, No masses, No lymphadenopathy  CVS:Regular rate and rhythm, no M/R/G, S1/S2 heard, no thrill  Lungs:Clear to auscultation bilaterally, no wheezes, rhonchi, or rales  Abdomen: Soft, Nontender, No distention, Normal Bowel sounds, No hepatomegaly  Extremities: No C/C/E, pulses palpable 2+  Skin:normal texture and turgor, no rashes, no lesions  Neuro:grossly normal , follows commands  Psych:appropriate    Labs: Results:       Chemistry Recent Labs     
  Nephrology Progress Note    Patient: Dale Milton  Requesting physician: Dr. Arthur Beltre  Reason for consult: ESRD management    CC: Abd pain    History of Present Illness:  Dale Milton is a 59 y.o. male with a past medical history significant for HTN, CAD, anemia, ESRD on HD MWF who came into our hospital c.o. abd pain and bloating.  Pt reports not very compliant to his home HTN meds.  His last HD was on Friday.  Found to have elevated troponin, admitted to medicine.  CXR showed no acute process.  Nephrology was consulted for further evaluation and management of his ESRD.  No new issues today, pending dialysis.    Past Medical History:  Patient Active Problem List   Diagnosis    Rash of genital area    AV fistula occlusion    Severe anemia    Uremia    ESRD on hemodialysis (HCC)    Routine general medical examination at a health care facility    CRF (chronic renal failure)    Severe protein-calorie malnutrition    JENNY (acute kidney injury)    Nonadherence to medical treatment    Wound infection    Pleural effusion, right    Hypertensive urgency, malignant    Post-operative pain    Tobacco abuse    NSTEMI (non-ST elevated myocardial infarction) ([facility])    HTN (hypertension)    Anemia       Social History:  Social History     Socioeconomic History    Marital status: Single     Spouse name: None    Number of children: None    Years of education: None    Highest education level: None   Tobacco Use    Smoking status: Former     Current packs/day: 0.00     Types: Cigarettes     Quit date: 2017     Years since quittin.6    Smokeless tobacco: Never   Substance and Sexual Activity    Alcohol use: No    Drug use: No     Types: Marijuana (Weed)     Social Drivers of Health     Food Insecurity: Food Insecurity Present (3/21/2025)    Hunger Vital Sign     Worried About Running Out of Food in the Last Year: Often true     Ran Out of Food in the Last Year: Often true   Transportation Needs: Unmet 
  Nephrology Progress Note    Patient: Dale Milton  Requesting physician: Dr. Arthur Beltre  Reason for consult: ESRD management    CC: Abd pain    History of Present Illness:  Dale Milton is a 59 y.o. male with a past medical history significant for HTN, CAD, anemia, ESRD on HD MWF who came into our hospital c.o. abd pain and bloating.  Pt reports not very compliant to his home HTN meds.  His last HD was on Friday.  Found to have elevated troponin, admitted to medicine.  CXR showed no acute process.  Nephrology was consulted for further evaluation and management of his ESRD.  No new issues today, seeing cardiology.    Past Medical History:  Patient Active Problem List   Diagnosis    Rash of genital area    AV fistula occlusion    Severe anemia    Uremia    ESRD on hemodialysis (HCC)    Routine general medical examination at a health care facility    CRF (chronic renal failure)    Severe protein-calorie malnutrition    JENNY (acute kidney injury)    Nonadherence to medical treatment    Wound infection    Pleural effusion, right    Hypertensive urgency, malignant    Post-operative pain    Tobacco abuse    NSTEMI (non-ST elevated myocardial infarction) ([facility])    HTN (hypertension)    Anemia       Social History:  Social History     Socioeconomic History    Marital status: Single   Tobacco Use    Smoking status: Former     Current packs/day: 0.00     Types: Cigarettes     Quit date: 2017     Years since quittin.6    Smokeless tobacco: Never   Substance and Sexual Activity    Alcohol use: No    Drug use: No     Types: Marijuana (Weed)     Social Drivers of Health     Food Insecurity: Food Insecurity Present (3/21/2025)    Hunger Vital Sign     Worried About Running Out of Food in the Last Year: Often true     Ran Out of Food in the Last Year: Often true   Transportation Needs: Unmet Transportation Needs (3/21/2025)    PRAPARE - Transportation     Lack of Transportation (Medical): Yes     Lack of 
  Nephrology Progress Note    Patient: Dale Milton  Requesting physician: Dr. Arthur Beltre  Reason for consult: ESRD management    CC: Abd pain    Subjectives:  Dale Milton is a 59 y.o. male with a past medical history significant for HTN, CAD, anemia, ESRD on HD MWF who came into our hospital c.o. abd pain and bloating.  Pt reports not very compliant to his home HTN meds.  His last HD was on Friday.  Found to have elevated troponin, admitted to medicine.  CXR showed no acute process.  Nephrology was consulted for further evaluation and management of his ESRD.  No new issues today, tolerated HD yesterday with net 2L removal.    Past Medical History:  Patient Active Problem List   Diagnosis    Rash of genital area    AV fistula occlusion    Severe anemia    Uremia    ESRD on hemodialysis ([facility])    Routine general medical examination at a health care facility    CRF (chronic renal failure)    Severe protein-calorie malnutrition    JENNY (acute kidney injury)    Nonadherence to medical treatment    Wound infection    Pleural effusion, right    Hypertensive urgency, malignant    Post-operative pain    Tobacco abuse    NSTEMI (non-ST elevated myocardial infarction) ([facility])    HTN (hypertension)    Anemia       Social History:  Social History     Socioeconomic History    Marital status: Single     Spouse name: None    Number of children: None    Years of education: None    Highest education level: None   Tobacco Use    Smoking status: Former     Current packs/day: 0.00     Types: Cigarettes     Quit date: 2017     Years since quittin.6    Smokeless tobacco: Never   Substance and Sexual Activity    Alcohol use: No    Drug use: No     Types: Marijuana (Weed)     Social Drivers of Health     Food Insecurity: Food Insecurity Present (3/21/2025)    Hunger Vital Sign     Worried About Running Out of Food in the Last Year: Often true     Ran Out of Food in the Last Year: Often true   Transportation Needs: Unmet 
  TREATMENT SUMMARY     Patient dialyzed in room 334  Tolerated HD well without complaint or complications  RUE AVF functioning well without difficulty accessing or BFR   BFR  400  DFR  800  2500 ml removed via UF with a net removal of 2000 ml  Report given to Catarina Nation, PERI with all questions answered     TREATMENT NOTES      
 met patient at bedside for an initial visit.    Patient said he was in the hospital for treatment for chest pain. Patient said he was better at the time of the visit. Patient thanked  for the visit and asked for prayer.      provided presence, support, prayer, and assurance of continued prayer.     Chaplains will provide follow-up care for patient and family as needed.    Spiritual Health History and Assessment/Progress Note  [facility]    Spiritual/Emotional Needs,  ,  ,      Name: Dale Milton MRN: [id number]    Age: 59 y.o.     Sex: male   Language: English   Tenriism: None   NSTEMI (non-ST elevated myocardial infarction) (HCC)     Date: 3/24/2025            Total Time Calculated: 5 min              Spiritual Assessment began in [address] CARDIAC/MEDICAL            Encounter Overview/Reason: Spiritual/Emotional Needs  Service Provided For: Patient    Jeannette, Belief, Meaning:   Patient identifies as spiritual, is connected with a jeannette tradition or spiritual practice, and has beliefs or practices that help with coping during difficult times  Family/Friends No family/friends present      Importance and Influence:  Patient has spiritual/personal beliefs that influence decisions regarding their health  Family/Friends No family/friends present    Community:  Patient is connected with a spiritual community and feels well-supported. Support system includes: Friends  Family/Friends No family/friends present    Assessment and Plan of Care:     Patient Interventions include: Facilitated expression of thoughts and feelings, Explored spiritual coping/struggle/distress, and Affirmed coping skills/support systems  Family/Friends Interventions include: No family/friends present    Patient Plan of Care: No spiritual needs identified for follow-up  Family/Friends Plan of Care: No family/friends present    Electronically signed by Chaplain Donald on 3/24/2025 at 1:57 PM   
58 yo male with ESRD on hemodialysis came with abdominal pain, some rectal bleeding, nausea and vomiting.  Hg is 10.8 which is stable no further bleeding. CT scan of the abdomen showed severe form of constipation. Colon full of large amounts of stools.   He had an Egd last year January 5, 2024 showing apparently a large hiatal hernia and apparently had a negative colonoscopy in 2020.  No need to do any endoscopy. Just keep him on PPI daily. Treat aggressively the constipation. Take daily miralax and colace twice daily for life to prevent it from happening again. 
Moderate Risk Nutrition Assessment Initial    Type and Reason for Visit: Initial (SLP)    Nutrition Recommendations/Plan:   Diet consistency recs per SLP  Consider Low Fat/Low Na diet instead of DM 3 carb BG 88 given ESRD HD K 4.3 and Phos 6.0 (not severely elevated)   Cont Renvela TID w/meals  Consider adding Millicent-sofia daily   Cont to monitor Ca, Phos and PTH - defer to Nephro MD  Cont to monitor wt trends, lytes, UOP, bowel fx, skin integrity and adjust recs as needed     Malnutrition Assessment:  Malnutrition Status: At risk for malnutrition    Nutrition Assessment:  60yo M admitted with abd pain, bloating, no BM x4 days, rectal bleeding intermittent hemoptysis possible NSTEMI; h/o ESRD HD MWF per MD. s/p cath. passed SLP eval. mostly good some fair to good and a recent poor PO intakes since admit overall eating pretty well. ?admit wt accuracy. no recent sig wt loss noted PTA. wt is up from wt hx trends ~51kg Dec 2024 if correct. ?EDW.    Estimated Daily Nutrient Needs:  Energy (kcal):  1700 Weight Used for Energy Requirements: Current     Protein (g):  75-80 Weight Used for Protein Requirements: Current        Fluid (ml/day):    Method Used for Fluid Requirements: Defer to provider    Nutrition Related Findings:     Wound Type: None    Current Nutrition Therapies:    ADULT DIET; Regular; 3 carb choices (45 gm/meal); Low Sodium (2 gm)    Anthropometric Measures:  Height: 165.1 cm (5' 5\")  Current Body Wt: 55 kg (121 lb 4.1 oz)   BMI: 20.2    Nutrition Diagnosis:   Altered nutrition-related lab values, Increased nutrient needs related to increase demand for energy/nutrients, renal dysfunction, cardiac dysfunction as evidenced by lab values    Nutrition Interventions:   Food and/or Nutrient Delivery: Modify Current Diet, Vitamin Supplement  Nutrition Education/Counseling: Education/Counseling not indicated  Coordination of Nutrition Care: Continue to monitor while inpatient, Speech Therapy    Goals:  Goals: Meet at 
Occupational Therapy Evaluation/Treatment Attempt    Chart reviewed. Attempted Occupational Therapy Evaluation/Treatment, however, patient unable to be seen due to:  []  Nausea/vomiting  []  Eating  []  Pain  []  Patient too lethargic  [x]  Off Unit for testing/procedure  []  Dialysis treatment in progress   []  Telemetry Results  []  WB status not in order set  []  Patient on bedrest per order set  []  Other :     Will follow up later as patient's schedule allows.   Thank you for this referral.  Jackie Martinez OTGALLO, OTR/L    
Occupational Therapy Screen/DISCHARGE  Patient: Dale Milton (59 y.o. male)  Date: 3/25/2025  Primary Diagnosis: Rectal bleeding [K62.5]  Essential hypertension [I10]  ESRD (end stage renal disease) ([facility]) [N18.6]  NSTEMI (non-ST elevated myocardial infarction) ([facility]) [I21.4]  Chest pain, unspecified type [R07.9]  Procedure(s) (LRB):  Left heart cath / coronary angiography (N/A) 1 Day Post-Op   PLOF: Patient completed ADLs independently with No device  Orders received. Chart reviewed. Noted patient is ambulating with Physical Therapy independently withNo device. Patient approached for evaluation.  Patient noted to walk self to bathroom independently. Patient reports being able to complete ADLs without assistance.   Patient will be discharged from OT caseload as the patient is not in need of skilled acute care level OT.      Thank you for this referral.  Jackie AQUINO, OTR/L    
Prepped & ready. BP elevated, Hydralazine given as ordered.   1015 Back from procedure. HOB flat, Right groin closure device in, dressing dry & intact. c/o of same neck back pain as prior to procedure.  1115 Sleeping. Right groin dressing intact, no bleeding or swelling.   1205 Transferred to room 334 via bed to [facility] in stable condition. Dressing intact & dry to right groin. No swelling.   
Speech-Language Pathology    Attempted follow up treatment; however, pt currently:     [x]  Off unit for procedure  [ ]  Sleeping soundly/unable to arouse for safe po trials   [ ]  NPO for procedure   [ ]  Working with PT/OT  [ ]  Receiving direct nursing care       Attempt x2:    [ ]  Off unit for procedure  [ ]  Sleeping soundly/unable to arouse for safe po trials   [ ]  NPO for procedure   [ ]  Working with PT/OT  [ ]  Receiving HD    Will continue to follow per plan of care.     Gabriella Cerda M.S., CCC-SLP  
TRANSFER - OUT REPORT:    Verbal report given to GILL Warren RN on Dale Milton  being transferred to [address] [place] [address] for ordered procedure       Report consisted of patient's Situation, Background, Assessment and   Recommendations(SBAR).     Information from the following report(s) Nurse Handoff Report, MAR, Recent Results, and Cardiac Rhythm sr  was reviewed with the receiving nurse.           Lines:   Peripheral IV 03/21/25 Distal;Left;Anterior Forearm (Active)   Site Assessment Clean, dry & intact 03/24/25 0750   Line Status Flushed 03/24/25 0750   Line Care Connections checked and tightened 03/24/25 0750   Phlebitis Assessment No symptoms 03/24/25 0750   Infiltration Assessment 0 03/24/25 0750   Alcohol Cap Used Yes 03/24/25 0750   Dressing Status Clean, dry & intact 03/24/25 0750   Dressing Type Transparent 03/24/25 0750        Opportunity for questions and clarification was provided.      Patient transported with:  Monitor and Registered Nurse        
NONTUNNELED VASCULAR CATHETER 8/4/2017 [facility] RAD ANGIO IR    IR TUNNELED CVC PLACE WO SQ PORT/PUMP > 5 YEARS  12/24/2017    IR TUNNELED CATHETER PLACEMENT GREATER THAN 5 YEARS 12/24/2017 [facility] RAD ANGIO IR    IR TUNNELED CVC PLACE WO SQ PORT/PUMP > 5 YEARS  8/7/2017    IR TUNNELED CATHETER PLACEMENT GREATER THAN 5 YEARS 8/7/2017 [facility] RAD ANGIO IR    OTHER SURGICAL HISTORY      left arm dialysis shunt    VASCULAR SURGERY  07/2017    left chest cath for dialysis    VASCULAR SURGERY  11/2017    AV  graft left arm    VASCULAR SURGERY Left 05/15/2018    arm basilic vein transposition stage 1       Home Situation:  Social/Functional History  Lives With: Significant other  Type of Home: Apartment  Home Layout: One level  Home Access: Stairs to enter with rails  Entrance Stairs - Number of Steps: 24  Entrance Stairs - Rails: Both  Bathroom Shower/Tub: Tub/Shower unit  Bathroom Toilet: Standard  Bathroom Accessibility: Accessible  Prior Level of Assist for ADLs: Independent  Prior Level of Assist for Homemaking: Independent  Prior Level of Assist for Ambulation: Independent household ambulator, with or without device  Prior Level of Assist for Transfers: Independent  Critical Behavior:  Orientation  Overall Orientation Status: Within Normal Limits  Orientation Level: Oriented X4       Strength:    Strength: Generally decreased, functional    Functional Mobility:    Transfers:     Transfer Training  Transfer Training: Yes  Sit to Stand: Independent  Stand to Sit: Independent    Ambulation/Gait Training:    Gait  Gait Training: Yes  Overall Level of Assistance: Stand by assistance  Distance (ft): 250 Feet  Assistive Device: None  Interventions: Safety awareness training;Verbal cues  Base of Support: Center of gravity altered  Speed/Renetta: Fluctuations  Gait Abnormalities: Path deviations  Rail Use: Both  Stairs - Level of Assistance: Supervision  Number of Stairs Trained: 24    Pain:  Pain level pre-treatment: 8/10   Pain level 
done  Continue aspirin bbb and statin   Echo done still pending results   On nitro   Planning cath today      End-stage renal disease  Nephrology was consulted patient had missed 2 hemodialysis due to feeling poorly  Hd per nephrologist  Will have HD today         Abdominal pain/constipation with rectal bleeding  Abdominal pain resolved no bleeding reported  Bowel movement.  Resolved now   Continue mrilax      GI bleeding , hh stable has hernia   Continue PPI  GI has been consulted   H&H follow-up,   obtained consent for type and cross in case is needed  Talked with gi no plan for scope     Anemia  Acute versus chronic, due to renal dysfunction  H&H monitoring    Htn elevated   Continue current medication, add norvasc for better control     Discharge to; , [x] Home  []SNF/Rehab  []  Others  in 1- 2 Days, []  stable for DC         Case discussed with:  [x]Patient  []Family  [x]Nursing  [x]Case Management [] Consultants  DVT Prophylaxis:  []Lovenox  []Hep SQ  [x]SCDs  []Coumadin, Eliquis, Xarelto, Pradaxa   []On Heparin gtt. [] No indication [] refused     TIME: 55 minutes were spent on the care of this patient today,  This time also includes physician non-face-to-face service time visit on the date of service such as  Preparing to see the patient (eg, review of tests)  Obtaining and/or reviewing separately obtained history  Performing a medically necessary appropriate examination and/or evaluation  Counseling and educating the patient/family/caregiver  Ordering medications, tests, or procedures  Referring and communicating with other health care professionals as needed  Documenting clinical information in the electronic or other health record  Independently interpreting results (not reported separately) and communicating results to the patient/family/caregiver  Care coordination and discharge planning with Case Management.      Dear patient, if you are reviewing this note and have a question regarding the medical 
Skin is warm.   Neurological:      General: No focal deficit present.      Mental Status: He is alert.   Psychiatric:         Mood and Affect: Mood normal.             Data Review:     Recent Labs     03/21/25  1134 03/22/25  0402 03/23/25  0008   WBC 7.4 7.3 9.5   HGB 11.3* 10.7* 10.8*   HCT 33.8* 32.7* 33.9*   * 129* 155     Recent Labs     03/21/25  1134 03/22/25  0009 03/22/25  0402 03/23/25  0008    140 137 137   K 3.3* 3.5 3.8 3.9    100 98* 98*   CO2 35* 29 29 28   BUN 38* 53* 56* 65*   MG  --   --  2.7* 3.0*   PHOS  --   --  5.9* 5.6*   ALT 19  --  16 16   INR 1.0  --   --   --        [unfilled]    All Cardiac Markers in the last 24 hours:    Lab Results   Component Value Date/Time    TROPHS 190 03/23/2025 06:13 AM     Lab Results   Component Value Date/Time    BNP 64,704 05/24/2019 12:20 PM       Last Lipid:    Lab Results   Component Value Date/Time    CHOL 150 03/22/2025 04:02 AM    HDL 54 03/22/2025 04:02 AM    LDL 81.4 03/22/2025 04:02 AM       Cardiographics:     Encounter Date: 03/21/25   EKG 12 Lead   Result Value    Ventricular Rate 63    Atrial Rate 63    P-R Interval 154    QRS Duration 100    Q-T Interval 510    QTc Calculation (Bazett) 521    P Axis 71    R Axis 44    T Axis 59    Diagnosis      Normal sinus rhythm  Possible Left atrial enlargement  Left ventricular hypertrophy ( Sokolow-Kurtz , Bennett product , Romhilt-Britt   )  ST elevation, consider early repolarization, pericarditis, or injury  T wave abnormality, consider anterior ischemia  Prolonged QT  Abnormal ECG  When compared with ECG of 24-MAY-2019 14:23,  T wave inversion now evident in Anterior leads  T wave amplitude has increased in Lateral leads       No results found for this or any previous visit.    No results found for this or any previous visit.    No results found for this or any previous visit.    Xray Result (most recent):  XR CHEST PORTABLE 03/21/2025    Narrative  EXAM:  XR CHEST

## (undated) DEVICE — SUTURE PERMA-HAND SZ 2-0 L24IN NONABSORBABLE BLK W/O NDL SA75H

## (undated) DEVICE — DEVON™ KNEE AND BODY STRAP 60" X 3" (1.5 M X 7.6 CM): Brand: DEVON

## (undated) DEVICE — DRAPE EP LT SUBCLAV ENTRY SHLD SORBX

## (undated) DEVICE — APPLIER CLP L9.375IN APER 2.1MM CLS L3.8MM 20 SM TI CLP

## (undated) DEVICE — SUTURE PERMAHAND SZ 2-0 L30IN NONABSORBABLE BLK SILK W/O A305H

## (undated) DEVICE — SUT VCRL + 2-0 36IN CT1 UD --

## (undated) DEVICE — 4-PORT MANIFOLD: Brand: NEPTUNE 2

## (undated) DEVICE — Device

## (undated) DEVICE — KENDALL SCD EXPRESS SLEEVES, KNEE LENGTH, MEDIUM: Brand: KENDALL SCD

## (undated) DEVICE — GUIDEWIRE WITH ICE™ HYDROPHILIC COATING: Brand: V-18™ CONTROL WIRE™

## (undated) DEVICE — ANGIO-SEAL EVOLUTION VASCULAR CLOSURE DEVICE: Brand: ANGIO-SEAL

## (undated) DEVICE — 3M™ BAIR PAWS FLEX™ WARMING GOWN, STANDARD, 20 PER CASE 81003: Brand: BAIR PAWS™

## (undated) DEVICE — SOL INJ SOD CL 0.9% 500ML BG --

## (undated) DEVICE — SUT MONOCRYL PLUS UD 4-0 --

## (undated) DEVICE — FOGARTY ARTERIAL EMBOLECTOMY CATHETER 4F 40CM: Brand: FOGARTY

## (undated) DEVICE — SUTURE MCRYL SZ 4-0 L18IN ABSRB UD L19MM PS-2 3/8 CIR PRIM Y496G

## (undated) DEVICE — SHEET,DRAPE,40X58,STERILE: Brand: MEDLINE

## (undated) DEVICE — INTRODUCER SHTH 5FR CANN L11CM DIL TIP 25MM GRY TUNGSTEN

## (undated) DEVICE — SUTURE PERMAHAND SZ 3-0 L30IN NONABSORBABLE BLK W/O NDL SA84H

## (undated) DEVICE — BANDAGE COMPR SELF ADH 5 YDX4 IN TAN STRL PREMIERPRO LF

## (undated) DEVICE — PINNACLE PRECISION ACCESS SYSTEM INTRODUCER SHEATH: Brand: PINNACLE PRECISION ACCESS SYSTEM

## (undated) DEVICE — SOLUTION IV 500ML 0.9% SOD CHL FLX CONT

## (undated) DEVICE — DRSG PATCH ANTIMIC 1INX7.0MM -- CONVERT TO ITEM 356054

## (undated) DEVICE — SUTURE GOR TX SZ 5-0 L30IN NONABSORBABLE L12MM TTC-12 3/8 6M10A

## (undated) DEVICE — ELECTRODES PAIR QUIK COMBO CONN RTS DEFIB

## (undated) DEVICE — 3-0 COATED VICRYL PLUS UNDYED 1X27" SH --

## (undated) DEVICE — VESSEL LOOPS,MAXI, RED: Brand: DEVON

## (undated) DEVICE — SKIN MARKER,REGULAR TIP WITH RULER AND LABELS: Brand: DEVON

## (undated) DEVICE — REM POLYHESIVE ADULT PATIENT RETURN ELECTRODE: Brand: VALLEYLAB

## (undated) DEVICE — SUT SLK 2-0SH 30IN BLK --

## (undated) DEVICE — SENSOR PLSE OXMTR AD CBL L36IN ADH FRM FIT SPO2 DISP

## (undated) DEVICE — BANDAGE COMPR SELF ADH 5 YDX6 IN TAN STRL PREMIERPRO LF

## (undated) DEVICE — Z DISCONTINUED SURGICAL PROCEDURE PACK AV SHUNT CUST

## (undated) DEVICE — FOGARTY ARTERIAL EMBOLECTOMY CATHETER 3F 40CM: Brand: FOGARTY

## (undated) DEVICE — BAND COMPR L24CM REG CLR PLAS HEMSTAT EXT HK AND LOOP RETEN

## (undated) DEVICE — SUT PROL 6-0 30IN C1 DA BLU --

## (undated) DEVICE — STERILE POLYISOPRENE POWDER-FREE SURGICAL GLOVES: Brand: PROTEXIS

## (undated) DEVICE — LIGHT HANDLE: Brand: DEVON

## (undated) DEVICE — PTA BALLOON DILATATION CATHETER: Brand: CHARGER™

## (undated) DEVICE — (D)PREP SKN CHLRAPRP APPL 26ML -- CONVERT TO ITEM 371833

## (undated) DEVICE — SUT PROL 6-0 24IN BV1 DA BLU --

## (undated) DEVICE — DECANTING SET

## (undated) DEVICE — STERILE POLYISOPRENE POWDER-FREE SURGICAL GLOVES WITH EMOLLIENT COATING: Brand: PROTEXIS

## (undated) DEVICE — RADIFOCUS OPTITORQUE ANGIOGRAPHIC CATHETER: Brand: OPTITORQUE

## (undated) DEVICE — KERLIX BANDAGE ROLL: Brand: KERLIX

## (undated) DEVICE — DEVICE INFL SYR BLLN ENDO 30 -- INTELLI

## (undated) DEVICE — INTENDED FOR TISSUE SEPARATION, AND OTHER PROCEDURES THAT REQUIRE A SHARP SURGICAL BLADE TO PUNCTURE OR CUT.: Brand: BARD-PARKER SAFETY BLADES SIZE 11, STERILE

## (undated) DEVICE — SUTURE PROL SZ 5-0 L24IN NONABSORBABLE BLU L9.3MM BV-1 3/8 9702H

## (undated) DEVICE — GEL US 20GM NONIRRITATING OVERWRAPPED FILE PCH TRNSMIT

## (undated) DEVICE — Z DISCONTINUED USE 2423037 APPLICATOR MEDICATED 3 CC CHLORHEXIDINE GLUC 2% CHLORAPREP

## (undated) DEVICE — DRAPE,ANGIO,BRACH,STERILE,38X44: Brand: MEDLINE

## (undated) DEVICE — CATHETER DIAG AD 5FR L110CM 145DEG COR GRY HYDRPHLC NYL

## (undated) DEVICE — SPLINT WR VELC FOAM NEUT POS DISP FOR RAD ART ACC SFT STRP

## (undated) DEVICE — STOPCOCK TRNSDUC 500PSI 3 W ROT M LUER LT BLU OFF HNDL R

## (undated) DEVICE — SWAB CULT SGL AMIES W/O CHAR FOR THRT VAG SKIN HRT CULTSWAB

## (undated) DEVICE — IMMOB SHLDR W/WAIST STRP M --

## (undated) DEVICE — GUIDEWIRE VASC L260CM DIA0.035IN TIP L3MM STD EXCHG PTFE J

## (undated) DEVICE — CATHETER DIAG 5FR L100CM SPEC 3 DRC CRV SZ DBL BRAID WIRE

## (undated) DEVICE — APPLIER LIG CLP M L11IN TI STR RNG HNDL FOR 20 CLP DISP

## (undated) DEVICE — FOGARTY - HYDRAGRIP SURGICAL - CLAMP INSERTS: Brand: FOGARTY SOFTJAW

## (undated) DEVICE — INSULATED BLADE ELECTRODE: Brand: EDGE

## (undated) DEVICE — SWAB CULT LIQ STUART AGR AERB MOD IN BRK SGL RAYON TIP PLAS 220099] BECTON DICKINSON MICRO]

## (undated) DEVICE — DERMABOND SKIN ADH 0.7ML -- DERMABOND ADVANCED 12/BX

## (undated) DEVICE — SOL IRRIGATION INJ NACL 0.9% 500ML BTL

## (undated) DEVICE — GAUZE SPONGES,12 PLY: Brand: CURITY

## (undated) DEVICE — CATHETER DIAG 5FR L100CM LUMN ID0.047IN JL4 CRV 0 SIDE H

## (undated) DEVICE — COVER US PRB W15XL120CM W/ GEL RUBBERBAND TAPE STRP FLD GEN

## (undated) DEVICE — STERILE (10.2 X 147CM) TELESCOPICALLY-FOLDED COVER: Brand: CIV-FLEX™ TRANSDUCER COVER

## (undated) DEVICE — ABDOMINAL PAD: Brand: DERMACEA

## (undated) DEVICE — 3M™ TEGADERM™ TRANSPARENT FILM DRESSING FRAME STYLE, 1626W, 4 IN X 4-3/4 IN (10 CM X 12 CM), 50/CT 4CT/CASE: Brand: 3M™ TEGADERM™

## (undated) DEVICE — SUTURE NONABSORBABLE SILK BRAID BLK PSL 2 0 30IN 486H

## (undated) DEVICE — IMMOB SHLDR W/WAIST STRP S --

## (undated) DEVICE — DRAPE TWL SURG 16X26IN BLU ORB04] ALLCARE INC]

## (undated) DEVICE — DRAPE XR C ARM 41X74IN LF --

## (undated) DEVICE — MAYO STAND COVER: Brand: CONVERTORS

## (undated) DEVICE — SPONGE LAP 18X18IN STRL -- 5/PK